# Patient Record
Sex: FEMALE | Race: BLACK OR AFRICAN AMERICAN | NOT HISPANIC OR LATINO
[De-identification: names, ages, dates, MRNs, and addresses within clinical notes are randomized per-mention and may not be internally consistent; named-entity substitution may affect disease eponyms.]

---

## 2018-07-18 ENCOUNTER — APPOINTMENT (OUTPATIENT)
Dept: PULMONOLOGY | Facility: CLINIC | Age: 63
End: 2018-07-18
Payer: COMMERCIAL

## 2018-07-18 VITALS
BODY MASS INDEX: 48.82 KG/M2 | OXYGEN SATURATION: 94 % | HEIGHT: 65 IN | TEMPERATURE: 97.8 F | DIASTOLIC BLOOD PRESSURE: 70 MMHG | HEART RATE: 112 BPM | SYSTOLIC BLOOD PRESSURE: 120 MMHG | RESPIRATION RATE: 12 BRPM | WEIGHT: 293 LBS

## 2018-07-18 DIAGNOSIS — Z80.1 FAMILY HISTORY OF MALIGNANT NEOPLASM OF TRACHEA, BRONCHUS AND LUNG: ICD-10-CM

## 2018-07-18 DIAGNOSIS — Z78.9 OTHER SPECIFIED HEALTH STATUS: ICD-10-CM

## 2018-07-18 PROCEDURE — 94060 EVALUATION OF WHEEZING: CPT

## 2018-07-18 PROCEDURE — 99213 OFFICE O/P EST LOW 20 MIN: CPT | Mod: 25

## 2018-07-18 RX ORDER — BRIMONIDINE TARTRATE 1 MG/ML
0.1 SOLUTION/ DROPS OPHTHALMIC
Refills: 0 | Status: ACTIVE | COMMUNITY

## 2018-07-18 RX ORDER — LATANOPROST/PF 0.005 %
0.01 DROPS OPHTHALMIC (EYE)
Refills: 0 | Status: ACTIVE | COMMUNITY

## 2018-07-18 RX ORDER — DORZOLAMIDE HYDROCHLORIDE 20 MG/ML
2 SOLUTION OPHTHALMIC
Refills: 0 | Status: ACTIVE | COMMUNITY

## 2018-08-08 ENCOUNTER — RECORD ABSTRACTING (OUTPATIENT)
Age: 63
End: 2018-08-08

## 2018-09-14 ENCOUNTER — APPOINTMENT (OUTPATIENT)
Dept: PULMONOLOGY | Facility: CLINIC | Age: 63
End: 2018-09-14

## 2018-11-03 ENCOUNTER — RECORD ABSTRACTING (OUTPATIENT)
Age: 63
End: 2018-11-03

## 2018-11-03 DIAGNOSIS — E78.3 HYPERCHYLOMICRONEMIA: ICD-10-CM

## 2018-11-07 ENCOUNTER — APPOINTMENT (OUTPATIENT)
Dept: PULMONOLOGY | Facility: CLINIC | Age: 63
End: 2018-11-07
Payer: COMMERCIAL

## 2018-11-07 VITALS
BODY MASS INDEX: 48.82 KG/M2 | SYSTOLIC BLOOD PRESSURE: 140 MMHG | HEIGHT: 65 IN | DIASTOLIC BLOOD PRESSURE: 81 MMHG | WEIGHT: 293 LBS | TEMPERATURE: 98.7 F | OXYGEN SATURATION: 95 % | HEART RATE: 95 BPM

## 2018-11-07 PROCEDURE — 95012 NITRIC OXIDE EXP GAS DETER: CPT

## 2018-11-07 PROCEDURE — 94060 EVALUATION OF WHEEZING: CPT

## 2018-11-07 PROCEDURE — 99213 OFFICE O/P EST LOW 20 MIN: CPT | Mod: 25

## 2018-11-07 PROCEDURE — 71046 X-RAY EXAM CHEST 2 VIEWS: CPT

## 2018-11-07 PROCEDURE — 94727 GAS DIL/WSHOT DETER LNG VOL: CPT

## 2018-11-07 PROCEDURE — 94729 DIFFUSING CAPACITY: CPT

## 2018-11-07 RX ORDER — PREDNISONE 10 MG/1
10 TABLET ORAL
Refills: 0 | Status: DISCONTINUED | COMMUNITY
End: 2018-11-07

## 2018-11-09 LAB — ACE BLD-CCNC: 58 U/L

## 2019-01-05 ENCOUNTER — RECORD ABSTRACTING (OUTPATIENT)
Age: 64
End: 2019-01-05

## 2019-01-09 ENCOUNTER — APPOINTMENT (OUTPATIENT)
Dept: PULMONOLOGY | Facility: CLINIC | Age: 64
End: 2019-01-09
Payer: COMMERCIAL

## 2019-01-09 VITALS — DIASTOLIC BLOOD PRESSURE: 87 MMHG | OXYGEN SATURATION: 95 % | SYSTOLIC BLOOD PRESSURE: 160 MMHG | HEART RATE: 113 BPM

## 2019-01-09 PROCEDURE — 94060 EVALUATION OF WHEEZING: CPT

## 2019-01-09 PROCEDURE — 99213 OFFICE O/P EST LOW 20 MIN: CPT | Mod: 25

## 2019-01-09 NOTE — DISCUSSION/SUMMARY
[FreeTextEntry1] : Asthma\par  sarcoidosis\par \par Check ACE- normal\par  Rx updated\par  office f/u\par \par CXR Nov 2019\par  DEXA 4/2020\par  f/u ECHO/EKG at f/u

## 2019-01-09 NOTE — HISTORY OF PRESENT ILLNESS
[Difficulty Breathing During Exertion] : stable dyspnea on exertion [Feelings Of Weakness On Exertion] : stable exercise intolerance [Cough] : stable coughing [Wheezing] : denies wheezing [Regional Soft Tissue Swelling Both Lower Extremities] : denies lower extremity edema [Chest Pain Or Discomfort] : denies chest pain [Fever] : denies fever [Wt Gain ___ Lbs] : no recent weight gain [Wt Loss ___ Lbs] : no recent weight loss

## 2019-01-09 NOTE — PHYSICAL EXAM
[General Appearance - Well Developed] : well developed [Normal Appearance] : normal appearance [Well Groomed] : well groomed [General Appearance - Well Nourished] : well nourished [No Deformities] : no deformities [General Appearance - In No Acute Distress] : no acute distress [Normal Conjunctiva] : the conjunctiva exhibited no abnormalities [Eyelids - No Xanthelasma] : the eyelids demonstrated no xanthelasmas [Normal Oropharynx] : normal oropharynx [Neck Appearance] : the appearance of the neck was normal [Neck Cervical Mass (___cm)] : no neck mass was observed [Jugular Venous Distention Increased] : there was no jugular-venous distention [Thyroid Diffuse Enlargement] : the thyroid was not enlarged [Thyroid Nodule] : there were no palpable thyroid nodules [Heart Rate And Rhythm] : heart rate and rhythm were normal [Heart Sounds] : normal S1 and S2 [Murmurs] : no murmurs present [Arterial Pulses Normal] : the arterial pulses were normal [Edema] : no peripheral edema present [Veins - Varicosity Changes] : no varicosital changes were noted in the lower extremities [Respiration, Rhythm And Depth] : normal respiratory rhythm and effort [Exaggerated Use Of Accessory Muscles For Inspiration] : no accessory muscle use [Auscultation Breath Sounds / Voice Sounds] : lungs were clear to auscultation bilaterally [Chest Palpation] : palpation of the chest revealed no abnormalities [Lungs Percussion] : the lungs were normal to percussion [Bowel Sounds] : normal bowel sounds [Abdomen Soft] : soft [Abdomen Tenderness] : non-tender [Abdomen Mass (___ Cm)] : no abdominal mass palpated [Abnormal Walk] : normal gait [Gait - Sufficient For Exercise Testing] : the gait was sufficient for exercise testing [Nail Clubbing] : no clubbing of the fingernails [Cyanosis, Localized] : no localized cyanosis [Petechial Hemorrhages (___cm)] : no petechial hemorrhages [Skin Color & Pigmentation] : normal skin color and pigmentation [] : no rash [No Venous Stasis] : no venous stasis [Skin Lesions] : no skin lesions [No Skin Ulcers] : no skin ulcer [No Xanthoma] : no  xanthoma was observed [Deep Tendon Reflexes (DTR)] : deep tendon reflexes were 2+ and symmetric [Sensation] : the sensory exam was normal to light touch and pinprick [No Focal Deficits] : no focal deficits

## 2019-01-09 NOTE — PROCEDURE
[FreeTextEntry1] : CXR\par PA lateral projection November 7, 2018\par Cardiac size grossly enlarged suggesting enlargement of the right\par Underpenetrated due to patient size\par No parenchymal infiltrates pleural effusions dominant pulmonary nodules pneumothorax\par Compared to August 23, 2017 no significant interval change noted\par PFT Nov 7 2018\par Moderate obstructive ventilatory impairment with FEV1 61% of predicted\par Air trapping\par Moderate to severe reduction diffusion 50% of predicted with a loss of functioning alveolar capillary units\par Hemoglobin 15.0\par FENO 8 ppb Nov 7 2018\par \par Spirometry January 9, 2019\par Moderate reduction in flow rates with an obstructive ventilatory impairment\par No response to bronchodilator at the FEV1\par Stable flow rates\par \par Unable to perform exhaled nitric oxide at today's visit

## 2019-03-13 ENCOUNTER — APPOINTMENT (OUTPATIENT)
Dept: PULMONOLOGY | Facility: CLINIC | Age: 64
End: 2019-03-13

## 2019-04-03 ENCOUNTER — APPOINTMENT (OUTPATIENT)
Dept: PULMONOLOGY | Facility: CLINIC | Age: 64
End: 2019-04-03
Payer: COMMERCIAL

## 2019-04-03 VITALS
HEIGHT: 65 IN | WEIGHT: 293 LBS | BODY MASS INDEX: 48.82 KG/M2 | TEMPERATURE: 98.5 F | SYSTOLIC BLOOD PRESSURE: 147 MMHG | HEART RATE: 96 BPM | DIASTOLIC BLOOD PRESSURE: 85 MMHG | OXYGEN SATURATION: 95 % | RESPIRATION RATE: 16 BRPM

## 2019-04-03 PROCEDURE — 95012 NITRIC OXIDE EXP GAS DETER: CPT

## 2019-04-03 PROCEDURE — 94060 EVALUATION OF WHEEZING: CPT

## 2019-04-03 PROCEDURE — 99213 OFFICE O/P EST LOW 20 MIN: CPT | Mod: 25

## 2019-04-03 RX ORDER — LEVOFLOXACIN 500 MG/1
500 TABLET, FILM COATED ORAL
Qty: 7 | Refills: 0 | Status: DISCONTINUED | COMMUNITY
Start: 2019-01-11

## 2019-04-03 RX ORDER — AMLODIPINE BESYLATE 5 MG/1
5 TABLET ORAL
Qty: 90 | Refills: 0 | Status: ACTIVE | COMMUNITY
Start: 2019-01-09

## 2019-04-03 RX ORDER — ERGOCALCIFEROL 1.25 MG/1
1.25 MG CAPSULE, LIQUID FILLED ORAL
Qty: 12 | Refills: 0 | Status: ACTIVE | COMMUNITY
Start: 2019-01-28

## 2019-04-03 RX ORDER — METHYLPREDNISOLONE 4 MG/1
4 TABLET ORAL
Qty: 21 | Refills: 0 | Status: DISCONTINUED | COMMUNITY
Start: 2019-01-11

## 2019-04-03 RX ORDER — BRIMONIDINE TARTRATE 2 MG/MG
0.2 SOLUTION/ DROPS OPHTHALMIC
Qty: 5 | Refills: 0 | Status: ACTIVE | COMMUNITY
Start: 2018-12-10

## 2019-04-03 NOTE — PROCEDURE
[FreeTextEntry1] : CXR\par PA lateral projection November 7, 2018\par Cardiac size grossly enlarged suggesting enlargement of the right\par Underpenetrated due to patient size\par No parenchymal infiltrates pleural effusions dominant pulmonary nodules pneumothorax\par Compared to August 23, 2017 no significant interval change noted\par PFT Nov 7 2018\par Moderate obstructive ventilatory impairment with FEV1 61% of predicted\par Air trapping\par Moderate to severe reduction diffusion 50% of predicted with a loss of functioning alveolar capillary units\par Hemoglobin 15.0\par \par \par Spirometry 4/3/2019\par Moderate reduction in flow rates with an obstructive ventilatory impairment\par No response to bronchodilator at the FEV1\par Stable flow rates\par \par FENO 9 ppb

## 2019-04-10 ENCOUNTER — RX RENEWAL (OUTPATIENT)
Age: 64
End: 2019-04-10

## 2019-05-14 ENCOUNTER — RX RENEWAL (OUTPATIENT)
Age: 64
End: 2019-05-14

## 2019-05-15 ENCOUNTER — APPOINTMENT (OUTPATIENT)
Dept: PULMONOLOGY | Facility: CLINIC | Age: 64
End: 2019-05-15
Payer: COMMERCIAL

## 2019-05-15 ENCOUNTER — NON-APPOINTMENT (OUTPATIENT)
Age: 64
End: 2019-05-15

## 2019-05-15 VITALS
OXYGEN SATURATION: 94 % | HEART RATE: 111 BPM | RESPIRATION RATE: 16 BRPM | SYSTOLIC BLOOD PRESSURE: 155 MMHG | DIASTOLIC BLOOD PRESSURE: 77 MMHG | TEMPERATURE: 99.6 F

## 2019-05-15 LAB — POCT - HEMOGLOBIN (HGB), QUANTITATIVE, TRANSCUTANEOUS: 13.1

## 2019-05-15 PROCEDURE — 88738 HGB QUANT TRANSCUTANEOUS: CPT

## 2019-05-15 PROCEDURE — 99214 OFFICE O/P EST MOD 30 MIN: CPT | Mod: 25

## 2019-05-15 PROCEDURE — 95012 NITRIC OXIDE EXP GAS DETER: CPT

## 2019-05-15 PROCEDURE — 93000 ELECTROCARDIOGRAM COMPLETE: CPT

## 2019-05-15 PROCEDURE — 94727 GAS DIL/WSHOT DETER LNG VOL: CPT

## 2019-05-15 PROCEDURE — 94060 EVALUATION OF WHEEZING: CPT

## 2019-05-15 PROCEDURE — 94729 DIFFUSING CAPACITY: CPT

## 2019-05-15 NOTE — PROCEDURE
[FreeTextEntry1] : CXR\par PA lateral projection November 7, 2018\par Cardiac size grossly enlarged suggesting enlargement of the right\par Underpenetrated due to patient size\par No parenchymal infiltrates pleural effusions dominant pulmonary nodules pneumothorax\par Compared to August 23, 2017 no significant interval change noted\par \par PFT  May 15 2019\par Moderate obstructive ventilatory impairment with FEV1 61% of predicted\par Air trapping\par Moderate to severe reduction diffusion 50% of predicted with a loss of functioning alveolar capillary units\par Hemoglobin  13.5\par \par FENO 11 ppb

## 2019-05-15 NOTE — HISTORY OF PRESENT ILLNESS
[Difficulty Breathing During Exertion] : stable dyspnea on exertion [Feelings Of Weakness On Exertion] : stable exercise intolerance [Wheezing] : denies wheezing [Cough] : stable coughing [Regional Soft Tissue Swelling Both Lower Extremities] : denies lower extremity edema [Chest Pain Or Discomfort] : denies chest pain [Fever] : denies fever [Wt Loss ___ Lbs] : no recent weight loss [Wt Gain ___ Lbs] : no recent weight gain

## 2019-05-15 NOTE — PHYSICAL EXAM
[Normal Appearance] : normal appearance [General Appearance - Well Developed] : well developed [General Appearance - Well Nourished] : well nourished [No Deformities] : no deformities [Well Groomed] : well groomed [General Appearance - In No Acute Distress] : no acute distress [Normal Conjunctiva] : the conjunctiva exhibited no abnormalities [Normal Oropharynx] : normal oropharynx [Eyelids - No Xanthelasma] : the eyelids demonstrated no xanthelasmas [Neck Cervical Mass (___cm)] : no neck mass was observed [Jugular Venous Distention Increased] : there was no jugular-venous distention [Neck Appearance] : the appearance of the neck was normal [Thyroid Diffuse Enlargement] : the thyroid was not enlarged [Thyroid Nodule] : there were no palpable thyroid nodules [Heart Rate And Rhythm] : heart rate and rhythm were normal [Heart Sounds] : normal S1 and S2 [Murmurs] : no murmurs present [Arterial Pulses Normal] : the arterial pulses were normal [Veins - Varicosity Changes] : no varicosital changes were noted in the lower extremities [Edema] : no peripheral edema present [Exaggerated Use Of Accessory Muscles For Inspiration] : no accessory muscle use [Respiration, Rhythm And Depth] : normal respiratory rhythm and effort [Chest Palpation] : palpation of the chest revealed no abnormalities [Auscultation Breath Sounds / Voice Sounds] : lungs were clear to auscultation bilaterally [Lungs Percussion] : the lungs were normal to percussion [Abdomen Tenderness] : non-tender [Bowel Sounds] : normal bowel sounds [Abdomen Soft] : soft [Abdomen Mass (___ Cm)] : no abdominal mass palpated [Gait - Sufficient For Exercise Testing] : the gait was sufficient for exercise testing [Abnormal Walk] : normal gait [Nail Clubbing] : no clubbing of the fingernails [Cyanosis, Localized] : no localized cyanosis [Petechial Hemorrhages (___cm)] : no petechial hemorrhages [Skin Color & Pigmentation] : normal skin color and pigmentation [] : no rash [No Venous Stasis] : no venous stasis [No Xanthoma] : no  xanthoma was observed [Skin Lesions] : no skin lesions [No Skin Ulcers] : no skin ulcer [Deep Tendon Reflexes (DTR)] : deep tendon reflexes were 2+ and symmetric [Sensation] : the sensory exam was normal to light touch and pinprick [No Focal Deficits] : no focal deficits

## 2019-05-15 NOTE — DISCUSSION/SUMMARY
[FreeTextEntry1] : Asthma\par  sarcoidosis\par Sinus tachycardia\par Check ACE- normal\par  Rx updated\par Cardiology consultation with Dr. [facility] with echocardiogram and further management based on their expertise\par CXR Nov 2019\par  DEXA 4/2020\par  f/u ECHO/EKG at f/u

## 2019-05-22 ENCOUNTER — NON-APPOINTMENT (OUTPATIENT)
Age: 64
End: 2019-05-22

## 2019-05-22 ENCOUNTER — APPOINTMENT (OUTPATIENT)
Dept: CARDIOLOGY | Facility: CLINIC | Age: 64
End: 2019-05-22
Payer: COMMERCIAL

## 2019-05-22 VITALS
HEIGHT: 65 IN | SYSTOLIC BLOOD PRESSURE: 160 MMHG | HEART RATE: 95 BPM | OXYGEN SATURATION: 99 % | DIASTOLIC BLOOD PRESSURE: 90 MMHG | TEMPERATURE: 98.3 F | BODY MASS INDEX: 48.82 KG/M2 | WEIGHT: 293 LBS

## 2019-05-22 DIAGNOSIS — M54.9 DORSALGIA, UNSPECIFIED: ICD-10-CM

## 2019-05-22 PROCEDURE — 93306 TTE W/DOPPLER COMPLETE: CPT

## 2019-05-22 PROCEDURE — 93000 ELECTROCARDIOGRAM COMPLETE: CPT | Mod: 59

## 2019-05-22 PROCEDURE — 93224 XTRNL ECG REC UP TO 48 HRS: CPT

## 2019-05-22 PROCEDURE — 99204 OFFICE O/P NEW MOD 45 MIN: CPT

## 2019-05-22 NOTE — HISTORY OF PRESENT ILLNESS
[FreeTextEntry1] : pt presents for cardiac eval pt with exertional dyspnea pt with sarcoidosis sees dr johnson pt with asthma .pt woith exertional dyspnea on ocsassion .pt s/p recent ekg sinus tach vs atrial arrythmia pt denies any chest  pain dizziness ,lightheadedness ,nausea vomiting diaphoresis\par

## 2019-05-22 NOTE — PHYSICAL EXAM
[Normal Conjunctiva] : the conjunctiva exhibited no abnormalities [Eyelids - No Xanthelasma] : the eyelids demonstrated no xanthelasmas [Normal Oral Mucosa] : normal oral mucosa [No Oral Pallor] : no oral pallor [No Oral Cyanosis] : no oral cyanosis [Normal Jugular Venous A Waves Present] : normal jugular venous A waves present [Normal Jugular Venous V Waves Present] : normal jugular venous V waves present [No Jugular Venous Khan A Waves] : no jugular venous khan A waves [Heart Rate And Rhythm] : heart rate and rhythm were normal [Heart Sounds] : normal S1 and S2 [Murmurs] : no murmurs present [Respiration, Rhythm And Depth] : normal respiratory rhythm and effort [Exaggerated Use Of Accessory Muscles For Inspiration] : no accessory muscle use [Auscultation Breath Sounds / Voice Sounds] : lungs were clear to auscultation bilaterally [Abdomen Soft] : soft [Abdomen Tenderness] : non-tender [Abdomen Mass (___ Cm)] : no abdominal mass palpated [Abnormal Walk] : normal gait [Gait - Sufficient For Exercise Testing] : the gait was sufficient for exercise testing [Nail Clubbing] : no clubbing of the fingernails [Cyanosis, Localized] : no localized cyanosis [Petechial Hemorrhages (___cm)] : no petechial hemorrhages [Skin Color & Pigmentation] : normal skin color and pigmentation [] : no rash [No Venous Stasis] : no venous stasis [Skin Lesions] : no skin lesions [No Skin Ulcers] : no skin ulcer [No Xanthoma] : no  xanthoma was observed [Oriented To Time, Place, And Person] : oriented to person, place, and time [Affect] : the affect was normal [Mood] : the mood was normal [No Anxiety] : not feeling anxious [FreeTextEntry1] : obesity

## 2019-05-23 ENCOUNTER — APPOINTMENT (OUTPATIENT)
Dept: CARDIOLOGY | Facility: CLINIC | Age: 64
End: 2019-05-23
Payer: COMMERCIAL

## 2019-05-23 PROCEDURE — 78452 HT MUSCLE IMAGE SPECT MULT: CPT

## 2019-05-23 PROCEDURE — 93015 CV STRESS TEST SUPVJ I&R: CPT

## 2019-05-23 PROCEDURE — A9500: CPT

## 2019-05-24 ENCOUNTER — OUTPATIENT (OUTPATIENT)
Dept: OUTPATIENT SERVICES | Facility: HOSPITAL | Age: 64
LOS: 1 days | End: 2019-05-24
Payer: COMMERCIAL

## 2019-05-24 VITALS
HEART RATE: 90 BPM | SYSTOLIC BLOOD PRESSURE: 145 MMHG | OXYGEN SATURATION: 98 % | TEMPERATURE: 99 F | DIASTOLIC BLOOD PRESSURE: 84 MMHG | HEIGHT: 65 IN | RESPIRATION RATE: 18 BRPM | WEIGHT: 293 LBS

## 2019-05-24 DIAGNOSIS — R06.02 SHORTNESS OF BREATH: ICD-10-CM

## 2019-05-24 DIAGNOSIS — R94.39 ABNORMAL RESULT OF OTHER CARDIOVASCULAR FUNCTION STUDY: ICD-10-CM

## 2019-05-24 DIAGNOSIS — Z90.710 ACQUIRED ABSENCE OF BOTH CERVIX AND UTERUS: Chronic | ICD-10-CM

## 2019-05-24 LAB
ALBUMIN SERPL ELPH-MCNC: 4.3 G/DL — SIGNIFICANT CHANGE UP (ref 3.3–5)
ALP SERPL-CCNC: 100 U/L — SIGNIFICANT CHANGE UP (ref 40–120)
ALT FLD-CCNC: 20 U/L — SIGNIFICANT CHANGE UP (ref 10–45)
ANION GAP SERPL CALC-SCNC: 12 MMOL/L — SIGNIFICANT CHANGE UP (ref 5–17)
AST SERPL-CCNC: 18 U/L — SIGNIFICANT CHANGE UP (ref 10–40)
BILIRUB SERPL-MCNC: 0.6 MG/DL — SIGNIFICANT CHANGE UP (ref 0.2–1.2)
BUN SERPL-MCNC: 11 MG/DL — SIGNIFICANT CHANGE UP (ref 7–23)
CALCIUM SERPL-MCNC: 9.9 MG/DL — SIGNIFICANT CHANGE UP (ref 8.4–10.5)
CHLORIDE SERPL-SCNC: 105 MMOL/L — SIGNIFICANT CHANGE UP (ref 96–108)
CO2 SERPL-SCNC: 27 MMOL/L — SIGNIFICANT CHANGE UP (ref 22–31)
CREAT SERPL-MCNC: 0.82 MG/DL — SIGNIFICANT CHANGE UP (ref 0.5–1.3)
GLUCOSE SERPL-MCNC: 88 MG/DL — SIGNIFICANT CHANGE UP (ref 70–99)
HCT VFR BLD CALC: 42.5 % — SIGNIFICANT CHANGE UP (ref 34.5–45)
HGB BLD-MCNC: 14.5 G/DL — SIGNIFICANT CHANGE UP (ref 11.5–15.5)
MCHC RBC-ENTMCNC: 27.9 PG — SIGNIFICANT CHANGE UP (ref 27–34)
MCHC RBC-ENTMCNC: 34.2 GM/DL — SIGNIFICANT CHANGE UP (ref 32–36)
MCV RBC AUTO: 81.7 FL — SIGNIFICANT CHANGE UP (ref 80–100)
PLATELET # BLD AUTO: 264 K/UL — SIGNIFICANT CHANGE UP (ref 150–400)
POTASSIUM SERPL-MCNC: 4.4 MMOL/L — SIGNIFICANT CHANGE UP (ref 3.5–5.3)
POTASSIUM SERPL-SCNC: 4.4 MMOL/L — SIGNIFICANT CHANGE UP (ref 3.5–5.3)
PROT SERPL-MCNC: 7.9 G/DL — SIGNIFICANT CHANGE UP (ref 6–8.3)
RBC # BLD: 5.2 M/UL — SIGNIFICANT CHANGE UP (ref 3.8–5.2)
RBC # FLD: 14.9 % — HIGH (ref 10.3–14.5)
SODIUM SERPL-SCNC: 144 MMOL/L — SIGNIFICANT CHANGE UP (ref 135–145)
WBC # BLD: 6.8 K/UL — SIGNIFICANT CHANGE UP (ref 3.8–10.5)
WBC # FLD AUTO: 6.8 K/UL — SIGNIFICANT CHANGE UP (ref 3.8–10.5)

## 2019-05-24 PROCEDURE — C1817: CPT

## 2019-05-24 PROCEDURE — C1894: CPT

## 2019-05-24 PROCEDURE — C1769: CPT

## 2019-05-24 PROCEDURE — 93005 ELECTROCARDIOGRAM TRACING: CPT

## 2019-05-24 PROCEDURE — 93010 ELECTROCARDIOGRAM REPORT: CPT

## 2019-05-24 PROCEDURE — 99204 OFFICE O/P NEW MOD 45 MIN: CPT

## 2019-05-24 PROCEDURE — C1887: CPT

## 2019-05-24 PROCEDURE — 99152 MOD SED SAME PHYS/QHP 5/>YRS: CPT | Mod: GC

## 2019-05-24 PROCEDURE — 85027 COMPLETE CBC AUTOMATED: CPT

## 2019-05-24 PROCEDURE — 80053 COMPREHEN METABOLIC PANEL: CPT

## 2019-05-24 PROCEDURE — 93456 R HRT CORONARY ARTERY ANGIO: CPT | Mod: 26,GC

## 2019-05-24 PROCEDURE — 99152 MOD SED SAME PHYS/QHP 5/>YRS: CPT

## 2019-05-24 PROCEDURE — 93456 R HRT CORONARY ARTERY ANGIO: CPT

## 2019-05-24 NOTE — H&P CARDIOLOGY - PMH
Asthma, unspecified asthma severity, unspecified whether complicated, unspecified whether persistent    Essential hypertension    Glaucoma of both eyes, unspecified glaucoma type    Morbid obesity with BMI of 50.0-59.9, adult    Sarcoidosis

## 2019-05-24 NOTE — H&P CARDIOLOGY - HISTORY OF PRESENT ILLNESS
64 y/o  female with PMHx of Morbid Obesity (BMI 54.91), Asthma - never intubated, Sarcoidosis (followed by Pulm - Dr. Mccrary), Glaucoma and HTN offering c/o exertional dyspnea, possible anginal equivalent "for quite some time that I always thought was my asthma".  Patient was evaluated by Cardiology - Dr. Day and had TTE on 5/22 - no report available and Nuclear Stress Test on 5/23/2019 with abnormal findings - final report not available.  Patient is now for R/L HC for which she presents today.

## 2019-05-26 LAB
ALBUMIN SERPL ELPH-MCNC: 4.3 G/DL
ALP BLD-CCNC: 94 U/L
ALT SERPL-CCNC: 22 U/L
ANION GAP SERPL CALC-SCNC: 13 MMOL/L
AST SERPL-CCNC: 16 U/L
BASOPHILS # BLD AUTO: 0.05 K/UL
BASOPHILS NFR BLD AUTO: 0.8 %
BILIRUB SERPL-MCNC: 0.5 MG/DL
BUN SERPL-MCNC: 16 MG/DL
CALCIUM SERPL-MCNC: 9.5 MG/DL
CHLORIDE SERPL-SCNC: 104 MMOL/L
CO2 SERPL-SCNC: 26 MMOL/L
CREAT SERPL-MCNC: 0.77 MG/DL
EOSINOPHIL # BLD AUTO: 0.15 K/UL
EOSINOPHIL NFR BLD AUTO: 2.5 %
ESTIMATED AVERAGE GLUCOSE: 120 MG/DL
GLUCOSE SERPL-MCNC: 99 MG/DL
HBA1C MFR BLD HPLC: 5.8 %
HCT VFR BLD CALC: 41.3 %
HGB BLD-MCNC: 13.8 G/DL
IMM GRANULOCYTES NFR BLD AUTO: 0.2 %
LYMPHOCYTES # BLD AUTO: 1.79 K/UL
LYMPHOCYTES NFR BLD AUTO: 29.5 %
MAN DIFF?: NORMAL
MCHC RBC-ENTMCNC: 27.1 PG
MCHC RBC-ENTMCNC: 33.4 GM/DL
MCV RBC AUTO: 81 FL
MONOCYTES # BLD AUTO: 0.57 K/UL
MONOCYTES NFR BLD AUTO: 9.4 %
NEUTROPHILS # BLD AUTO: 3.5 K/UL
NEUTROPHILS NFR BLD AUTO: 57.6 %
PLATELET # BLD AUTO: 288 K/UL
POTASSIUM SERPL-SCNC: 4 MMOL/L
PROT SERPL-MCNC: 7.2 G/DL
RBC # BLD: 5.1 M/UL
RBC # FLD: 16.5 %
SODIUM SERPL-SCNC: 143 MMOL/L
T4 FREE SERPL-MCNC: 1.3 NG/DL
TSH SERPL-ACNC: 1.53 UIU/ML
WBC # FLD AUTO: 6.07 K/UL

## 2019-05-28 ENCOUNTER — MEDICATION RENEWAL (OUTPATIENT)
Age: 64
End: 2019-05-28

## 2019-05-28 ENCOUNTER — OTHER (OUTPATIENT)
Age: 64
End: 2019-05-28

## 2019-05-28 PROBLEM — H40.9 UNSPECIFIED GLAUCOMA: Chronic | Status: ACTIVE | Noted: 2019-05-24

## 2019-05-28 PROBLEM — I10 ESSENTIAL (PRIMARY) HYPERTENSION: Chronic | Status: ACTIVE | Noted: 2019-05-24

## 2019-05-28 PROBLEM — D86.9 SARCOIDOSIS, UNSPECIFIED: Chronic | Status: ACTIVE | Noted: 2019-05-24

## 2019-05-28 PROBLEM — E66.01 MORBID (SEVERE) OBESITY DUE TO EXCESS CALORIES: Chronic | Status: ACTIVE | Noted: 2019-05-24

## 2019-05-28 PROBLEM — J45.909 UNSPECIFIED ASTHMA, UNCOMPLICATED: Chronic | Status: ACTIVE | Noted: 2019-05-24

## 2019-06-03 ENCOUNTER — NON-APPOINTMENT (OUTPATIENT)
Age: 64
End: 2019-06-03

## 2019-06-04 ENCOUNTER — APPOINTMENT (OUTPATIENT)
Dept: CARDIOLOGY | Facility: CLINIC | Age: 64
End: 2019-06-04
Payer: COMMERCIAL

## 2019-06-04 VITALS
WEIGHT: 293 LBS | OXYGEN SATURATION: 98 % | HEART RATE: 96 BPM | BODY MASS INDEX: 50.59 KG/M2 | TEMPERATURE: 98.1 F | DIASTOLIC BLOOD PRESSURE: 96 MMHG | SYSTOLIC BLOOD PRESSURE: 158 MMHG

## 2019-06-04 DIAGNOSIS — I34.0 NONRHEUMATIC MITRAL (VALVE) INSUFFICIENCY: ICD-10-CM

## 2019-06-04 PROCEDURE — 99213 OFFICE O/P EST LOW 20 MIN: CPT

## 2019-06-04 NOTE — PHYSICAL EXAM
[No Acute Distress] : no acute distress [Well Nourished] : well nourished [Normal Sclera/Conjunctiva] : normal sclera/conjunctiva [Well Developed] : well developed [Well-Appearing] : well-appearing [EOMI] : extraocular movements intact [PERRL] : pupils equal round and reactive to light [Normal Oropharynx] : the oropharynx was normal [Normal Outer Ear/Nose] : the outer ears and nose were normal in appearance [No JVD] : no jugular venous distention [Supple] : supple [No Lymphadenopathy] : no lymphadenopathy [Thyroid Normal, No Nodules] : the thyroid was normal and there were no nodules present [Clear to Auscultation] : lungs were clear to auscultation bilaterally [No Accessory Muscle Use] : no accessory muscle use [No Respiratory Distress] : no respiratory distress  [Normal Rate] : normal rate  [Regular Rhythm] : with a regular rhythm [Normal S1, S2] : normal S1 and S2 [No Murmur] : no murmur heard [No Carotid Bruits] : no carotid bruits [No Abdominal Bruit] : a ~M bruit was not heard ~T in the abdomen [No Varicosities] : no varicosities [Pedal Pulses Present] : the pedal pulses are present [No Edema] : there was no peripheral edema [No Extremity Clubbing/Cyanosis] : no extremity clubbing/cyanosis [No Palpable Aorta] : no palpable aorta [Soft] : abdomen soft [Non-distended] : non-distended [Non Tender] : non-tender [No HSM] : no HSM [Normal Bowel Sounds] : normal bowel sounds [No Masses] : no abdominal mass palpated [Normal Posterior Cervical Nodes] : no posterior cervical lymphadenopathy [No CVA Tenderness] : no CVA  tenderness [Normal Anterior Cervical Nodes] : no anterior cervical lymphadenopathy [No Joint Swelling] : no joint swelling [No Spinal Tenderness] : no spinal tenderness [Grossly Normal Strength/Tone] : grossly normal strength/tone [No Rash] : no rash [Normal Gait] : normal gait [Coordination Grossly Intact] : coordination grossly intact [No Focal Deficits] : no focal deficits [Deep Tendon Reflexes (DTR)] : deep tendon reflexes were 2+ and symmetric [Normal Affect] : the affect was normal [Normal Insight/Judgement] : insight and judgment were intact [de-identified] : site of cath intact

## 2019-06-04 NOTE — HISTORY OF PRESENT ILLNESS
[FreeTextEntry1] : f/u cv issues  [de-identified] : pt presents for f/u pt s/p f/u s/p cath  pt with diastolic dysfx /pt witth mod mr .pt feels significant improvement on dyazide .pt with nl cors pt denies any chest  pain dizziness ,lightheadedness ,nausea vomiting diaphoresis\par pt s/p holter pvcs

## 2019-06-06 LAB
ANION GAP SERPL CALC-SCNC: 15 MMOL/L
BUN SERPL-MCNC: 25 MG/DL
CALCIUM SERPL-MCNC: 10.2 MG/DL
CHLORIDE SERPL-SCNC: 96 MMOL/L
CO2 SERPL-SCNC: 29 MMOL/L
CREAT SERPL-MCNC: 1.25 MG/DL
GLUCOSE SERPL-MCNC: 105 MG/DL
MAGNESIUM SERPL-MCNC: 2.4 MG/DL
NT-PROBNP SERPL-MCNC: 19 PG/ML
POTASSIUM SERPL-SCNC: 4.6 MMOL/L
SODIUM SERPL-SCNC: 140 MMOL/L

## 2019-06-07 ENCOUNTER — OTHER (OUTPATIENT)
Age: 64
End: 2019-06-07

## 2019-06-19 ENCOUNTER — APPOINTMENT (OUTPATIENT)
Dept: PULMONOLOGY | Facility: CLINIC | Age: 64
End: 2019-06-19
Payer: COMMERCIAL

## 2019-06-19 VITALS — SYSTOLIC BLOOD PRESSURE: 145 MMHG | DIASTOLIC BLOOD PRESSURE: 80 MMHG | OXYGEN SATURATION: 100 % | HEART RATE: 89 BPM

## 2019-06-19 PROCEDURE — 94060 EVALUATION OF WHEEZING: CPT

## 2019-06-19 PROCEDURE — 99213 OFFICE O/P EST LOW 20 MIN: CPT | Mod: 25

## 2019-06-19 PROCEDURE — 95012 NITRIC OXIDE EXP GAS DETER: CPT

## 2019-06-19 NOTE — PROCEDURE
[FreeTextEntry1] : CXR\par PA lateral projection November 7, 2018\par Cardiac size grossly enlarged suggesting enlargement of the right\par Underpenetrated due to patient size\par No parenchymal infiltrates pleural effusions dominant pulmonary nodules pneumothorax\par Compared to August 23, 2017 no significant interval change noted\par \par PFT  May 15 2019\par Moderate obstructive ventilatory impairment with FEV1 61% of predicted\par Air trapping\par Moderate to severe reduction diffusion 50% of predicted with a loss of functioning alveolar capillary units\par Hemoglobin  13.5\par \par Spirometry June 19, 2019\par Mild obstructive ventilatory impairment\par No bronchodilator response at FEV1\par 22% response to bronchodilator at small airways\par Interval improvement of flow rates\par FENO 12 ppb

## 2019-06-19 NOTE — DISCUSSION/SUMMARY
[FreeTextEntry1] : Asthma\par  sarcoidosis\par Mod MR\par  Diastolic Dysfx\par On Dyazide\par \par Check ACE- normal\par  Rx updated\par Note stable with interval improvement of spirometry on beta-blocker\par  office f/u\par \par CXR Nov 2019\par  DEXA 4/2020\par  f/u ECHO/EKG per Dr Day

## 2019-06-19 NOTE — PHYSICAL EXAM
[General Appearance - Well Developed] : well developed [Normal Appearance] : normal appearance [Well Groomed] : well groomed [General Appearance - Well Nourished] : well nourished [No Deformities] : no deformities [General Appearance - In No Acute Distress] : no acute distress [Normal Conjunctiva] : the conjunctiva exhibited no abnormalities [Eyelids - No Xanthelasma] : the eyelids demonstrated no xanthelasmas [Normal Oropharynx] : normal oropharynx [Neck Appearance] : the appearance of the neck was normal [Neck Cervical Mass (___cm)] : no neck mass was observed [Jugular Venous Distention Increased] : there was no jugular-venous distention [Thyroid Diffuse Enlargement] : the thyroid was not enlarged [Thyroid Nodule] : there were no palpable thyroid nodules [Heart Rate And Rhythm] : heart rate and rhythm were normal [Heart Sounds] : normal S1 and S2 [Murmurs] : no murmurs present [Veins - Varicosity Changes] : no varicosital changes were noted in the lower extremities [Arterial Pulses Normal] : the arterial pulses were normal [Edema] : no peripheral edema present [Auscultation Breath Sounds / Voice Sounds] : lungs were clear to auscultation bilaterally [Respiration, Rhythm And Depth] : normal respiratory rhythm and effort [Exaggerated Use Of Accessory Muscles For Inspiration] : no accessory muscle use [Bowel Sounds] : normal bowel sounds [Lungs Percussion] : the lungs were normal to percussion [Chest Palpation] : palpation of the chest revealed no abnormalities [Abdomen Soft] : soft [Abdomen Tenderness] : non-tender [Abdomen Mass (___ Cm)] : no abdominal mass palpated [Abnormal Walk] : normal gait [Gait - Sufficient For Exercise Testing] : the gait was sufficient for exercise testing [Nail Clubbing] : no clubbing of the fingernails [Petechial Hemorrhages (___cm)] : no petechial hemorrhages [Cyanosis, Localized] : no localized cyanosis [Skin Color & Pigmentation] : normal skin color and pigmentation [] : no rash [No Venous Stasis] : no venous stasis [Skin Lesions] : no skin lesions [No Skin Ulcers] : no skin ulcer [No Xanthoma] : no  xanthoma was observed [Deep Tendon Reflexes (DTR)] : deep tendon reflexes were 2+ and symmetric [Sensation] : the sensory exam was normal to light touch and pinprick [No Focal Deficits] : no focal deficits

## 2019-06-19 NOTE — REASON FOR VISIT
[Follow-Up] : a follow-up visit [Asthma] : asthma [Sarcoidosis] : sarcoidosis [Shortness of Breath] : shortness of Breath

## 2019-06-19 NOTE — HISTORY OF PRESENT ILLNESS
[Difficulty Breathing During Exertion] : stable dyspnea on exertion [Feelings Of Weakness On Exertion] : stable exercise intolerance [Cough] : stable coughing [Regional Soft Tissue Swelling Both Lower Extremities] : denies lower extremity edema [Wheezing] : denies wheezing [Chest Pain Or Discomfort] : denies chest pain [Fever] : denies fever [Wt Gain ___ Lbs] : no recent weight gain [FreeTextEntry1] : Cardiology evaluation\par Patient had addition of Dyazide with improvement of shortness of breath\par Mild mitral valve regurgitation\par Hypertension\par Arrhythmia\par With PVCs noted Toprol was added\par This post cardiac cath with diastolic dysfunction moderate mitral regurgitation Holter monitor that demonstrated PVCs [Wt Loss ___ Lbs] : no recent weight loss

## 2019-06-25 ENCOUNTER — APPOINTMENT (OUTPATIENT)
Dept: CARDIOLOGY | Facility: CLINIC | Age: 64
End: 2019-06-25
Payer: COMMERCIAL

## 2019-06-25 ENCOUNTER — NON-APPOINTMENT (OUTPATIENT)
Age: 64
End: 2019-06-25

## 2019-06-25 VITALS
WEIGHT: 293 LBS | TEMPERATURE: 98.5 F | BODY MASS INDEX: 48.82 KG/M2 | SYSTOLIC BLOOD PRESSURE: 146 MMHG | HEIGHT: 65 IN | DIASTOLIC BLOOD PRESSURE: 90 MMHG | HEART RATE: 99 BPM | OXYGEN SATURATION: 97 %

## 2019-06-25 DIAGNOSIS — I49.9 CARDIAC ARRHYTHMIA, UNSPECIFIED: ICD-10-CM

## 2019-06-25 DIAGNOSIS — I10 ESSENTIAL (PRIMARY) HYPERTENSION: ICD-10-CM

## 2019-06-25 PROCEDURE — 93000 ELECTROCARDIOGRAM COMPLETE: CPT

## 2019-06-25 PROCEDURE — 99213 OFFICE O/P EST LOW 20 MIN: CPT

## 2019-06-25 NOTE — HISTORY OF PRESENT ILLNESS
[FreeTextEntry1] : pt presents for f/u saw dr elizabeth woody from pulm prospective pt with no furthur palpitations pt denies any chest  pain dizziness ,lightheadedness ,nausea vomiting diaphoresis\par pt s/p cath moderate mr ml lvfx no obstructive cad

## 2019-06-25 NOTE — PHYSICAL EXAM
[Normal Appearance] : normal appearance [General Appearance - Well Developed] : well developed [No Deformities] : no deformities [General Appearance - Well Nourished] : well nourished [Well Groomed] : well groomed [General Appearance - In No Acute Distress] : no acute distress [Normal Conjunctiva] : the conjunctiva exhibited no abnormalities [Normal Oral Mucosa] : normal oral mucosa [Eyelids - No Xanthelasma] : the eyelids demonstrated no xanthelasmas [No Oral Pallor] : no oral pallor [No Oral Cyanosis] : no oral cyanosis [Normal Jugular Venous V Waves Present] : normal jugular venous V waves present [Normal Jugular Venous A Waves Present] : normal jugular venous A waves present [No Jugular Venous Khan A Waves] : no jugular venous khan A waves [Exaggerated Use Of Accessory Muscles For Inspiration] : no accessory muscle use [Respiration, Rhythm And Depth] : normal respiratory rhythm and effort [Auscultation Breath Sounds / Voice Sounds] : lungs were clear to auscultation bilaterally [Heart Rate And Rhythm] : heart rate and rhythm were normal [Heart Sounds] : normal S1 and S2 [Murmurs] : no murmurs present [Abdomen Soft] : soft [Abdomen Tenderness] : non-tender [Abnormal Walk] : normal gait [Abdomen Mass (___ Cm)] : no abdominal mass palpated [Gait - Sufficient For Exercise Testing] : the gait was sufficient for exercise testing [Nail Clubbing] : no clubbing of the fingernails [Petechial Hemorrhages (___cm)] : no petechial hemorrhages [Cyanosis, Localized] : no localized cyanosis [] : no rash [Skin Color & Pigmentation] : normal skin color and pigmentation [No Venous Stasis] : no venous stasis [Skin Lesions] : no skin lesions [No Skin Ulcers] : no skin ulcer [No Xanthoma] : no  xanthoma was observed [Oriented To Time, Place, And Person] : oriented to person, place, and time [Mood] : the mood was normal [Affect] : the affect was normal [No Anxiety] : not feeling anxious [FreeTextEntry1] : obese

## 2019-07-15 ENCOUNTER — RX RENEWAL (OUTPATIENT)
Age: 64
End: 2019-07-15

## 2019-08-11 ENCOUNTER — RX RENEWAL (OUTPATIENT)
Age: 64
End: 2019-08-11

## 2019-08-11 RX ORDER — METOPROLOL SUCCINATE 50 MG/1
50 TABLET, EXTENDED RELEASE ORAL
Qty: 30 | Refills: 1 | Status: ACTIVE | COMMUNITY
Start: 2019-06-04 | End: 1900-01-01

## 2019-08-12 ENCOUNTER — APPOINTMENT (OUTPATIENT)
Dept: PULMONOLOGY | Facility: CLINIC | Age: 64
End: 2019-08-12

## 2019-08-13 ENCOUNTER — APPOINTMENT (OUTPATIENT)
Dept: PULMONOLOGY | Facility: CLINIC | Age: 64
End: 2019-08-13
Payer: COMMERCIAL

## 2019-08-13 VITALS — SYSTOLIC BLOOD PRESSURE: 130 MMHG | DIASTOLIC BLOOD PRESSURE: 88 MMHG | HEART RATE: 102 BPM | OXYGEN SATURATION: 95 %

## 2019-08-13 PROCEDURE — 95012 NITRIC OXIDE EXP GAS DETER: CPT

## 2019-08-13 PROCEDURE — 94060 EVALUATION OF WHEEZING: CPT

## 2019-08-13 PROCEDURE — 99213 OFFICE O/P EST LOW 20 MIN: CPT | Mod: 25

## 2019-08-13 RX ORDER — MOMETASONE FUROATE AND FORMOTEROL FUMARATE DIHYDRATE 200; 5 UG/1; UG/1
200-5 AEROSOL RESPIRATORY (INHALATION) TWICE DAILY
Qty: 1 | Refills: 3 | Status: DISCONTINUED | COMMUNITY
End: 2019-08-13

## 2019-08-13 NOTE — PROCEDURE
[FreeTextEntry1] : CXR\par PA lateral projection November 7, 2018\par Cardiac size grossly enlarged suggesting enlargement of the right\par Underpenetrated due to patient size\par No parenchymal infiltrates pleural effusions dominant pulmonary nodules pneumothorax\par Compared to August 23, 2017 no significant interval change noted\par \par PFT  May 15 2019\par Moderate obstructive ventilatory impairment with FEV1 61% of predicted\par Air trapping\par Moderate to severe reduction diffusion 50% of predicted with a loss of functioning alveolar capillary units\par Hemoglobin  13.5\par \par Spirometry  August 13 2019\par Mild obstructive ventilatory impairment\par No bronchodilator response at FEV1\par \par FENO 11  ppb

## 2019-08-13 NOTE — PHYSICAL EXAM
[General Appearance - Well Developed] : well developed [Well Groomed] : well groomed [Normal Appearance] : normal appearance [General Appearance - Well Nourished] : well nourished [General Appearance - In No Acute Distress] : no acute distress [Normal Conjunctiva] : the conjunctiva exhibited no abnormalities [No Deformities] : no deformities [Eyelids - No Xanthelasma] : the eyelids demonstrated no xanthelasmas [Normal Oropharynx] : normal oropharynx [Neck Appearance] : the appearance of the neck was normal [Neck Cervical Mass (___cm)] : no neck mass was observed [Thyroid Diffuse Enlargement] : the thyroid was not enlarged [Jugular Venous Distention Increased] : there was no jugular-venous distention [Heart Rate And Rhythm] : heart rate and rhythm were normal [Thyroid Nodule] : there were no palpable thyroid nodules [Murmurs] : no murmurs present [Heart Sounds] : normal S1 and S2 [Arterial Pulses Normal] : the arterial pulses were normal [Edema] : no peripheral edema present [Veins - Varicosity Changes] : no varicosital changes were noted in the lower extremities [Exaggerated Use Of Accessory Muscles For Inspiration] : no accessory muscle use [Respiration, Rhythm And Depth] : normal respiratory rhythm and effort [Auscultation Breath Sounds / Voice Sounds] : lungs were clear to auscultation bilaterally [Lungs Percussion] : the lungs were normal to percussion [Chest Palpation] : palpation of the chest revealed no abnormalities [Bowel Sounds] : normal bowel sounds [Abdomen Soft] : soft [Abdomen Tenderness] : non-tender [Abdomen Mass (___ Cm)] : no abdominal mass palpated [Abnormal Walk] : normal gait [Gait - Sufficient For Exercise Testing] : the gait was sufficient for exercise testing [Nail Clubbing] : no clubbing of the fingernails [Petechial Hemorrhages (___cm)] : no petechial hemorrhages [Cyanosis, Localized] : no localized cyanosis [Skin Color & Pigmentation] : normal skin color and pigmentation [] : no rash [No Venous Stasis] : no venous stasis [Skin Lesions] : no skin lesions [No Skin Ulcers] : no skin ulcer [Deep Tendon Reflexes (DTR)] : deep tendon reflexes were 2+ and symmetric [No Xanthoma] : no  xanthoma was observed [Sensation] : the sensory exam was normal to light touch and pinprick [No Focal Deficits] : no focal deficits

## 2019-08-13 NOTE — HISTORY OF PRESENT ILLNESS
[None] : ~He/She~ has no significant interval events [Difficulty Breathing During Exertion] : stable dyspnea on exertion [Cough] : stable coughing [Feelings Of Weakness On Exertion] : stable exercise intolerance [Wheezing] : denies wheezing [Regional Soft Tissue Swelling Both Lower Extremities] : denies lower extremity edema [Chest Pain Or Discomfort] : denies chest pain [Fever] : denies fever [Never] : was never a smoker [Wt Gain ___ Lbs] : no recent weight gain [Wt Loss ___ Lbs] : no recent weight loss [Oxygen] : the patient uses no supplemental oxygen

## 2019-08-13 NOTE — DISCUSSION/SUMMARY
[FreeTextEntry1] : Asthma\par  sarcoidosis\par Mod MR\par  Diastolic Dysfx\par On Dyazide\par \par Check ACE- normal\par  Rx updated\par  office f/u\par CXR Nov 2019\par  DEXA 4/2020\par  f/u ECHO/EKG per Dr Day

## 2019-09-21 ENCOUNTER — RESULT REVIEW (OUTPATIENT)
Age: 64
End: 2019-09-21

## 2019-09-25 ENCOUNTER — APPOINTMENT (OUTPATIENT)
Dept: PULMONOLOGY | Facility: CLINIC | Age: 64
End: 2019-09-25
Payer: COMMERCIAL

## 2019-09-25 VITALS
TEMPERATURE: 98.5 F | OXYGEN SATURATION: 94 % | SYSTOLIC BLOOD PRESSURE: 139 MMHG | DIASTOLIC BLOOD PRESSURE: 72 MMHG | HEART RATE: 99 BPM

## 2019-09-25 PROCEDURE — 94060 EVALUATION OF WHEEZING: CPT

## 2019-09-25 PROCEDURE — 90662 IIV NO PRSV INCREASED AG IM: CPT

## 2019-09-25 PROCEDURE — G0008: CPT

## 2019-09-25 PROCEDURE — 99214 OFFICE O/P EST MOD 30 MIN: CPT | Mod: 25

## 2019-09-25 RX ORDER — AZITHROMYCIN 250 MG/1
250 TABLET, FILM COATED ORAL
Qty: 6 | Refills: 0 | Status: DISCONTINUED | COMMUNITY
Start: 2019-09-06 | End: 2019-09-25

## 2019-09-25 NOTE — PHYSICAL EXAM
[General Appearance - Well Developed] : well developed [Normal Appearance] : normal appearance [Well Groomed] : well groomed [General Appearance - Well Nourished] : well nourished [General Appearance - In No Acute Distress] : no acute distress [No Deformities] : no deformities [Normal Conjunctiva] : the conjunctiva exhibited no abnormalities [Eyelids - No Xanthelasma] : the eyelids demonstrated no xanthelasmas [Normal Oropharynx] : normal oropharynx [Neck Appearance] : the appearance of the neck was normal [Neck Cervical Mass (___cm)] : no neck mass was observed [Thyroid Diffuse Enlargement] : the thyroid was not enlarged [Jugular Venous Distention Increased] : there was no jugular-venous distention [Thyroid Nodule] : there were no palpable thyroid nodules [Heart Rate And Rhythm] : heart rate and rhythm were normal [Heart Sounds] : normal S1 and S2 [Murmurs] : no murmurs present [Arterial Pulses Normal] : the arterial pulses were normal [Edema] : no peripheral edema present [Veins - Varicosity Changes] : no varicosital changes were noted in the lower extremities [Respiration, Rhythm And Depth] : normal respiratory rhythm and effort [Exaggerated Use Of Accessory Muscles For Inspiration] : no accessory muscle use [Auscultation Breath Sounds / Voice Sounds] : lungs were clear to auscultation bilaterally [Chest Palpation] : palpation of the chest revealed no abnormalities [Lungs Percussion] : the lungs were normal to percussion [Bowel Sounds] : normal bowel sounds [Abdomen Soft] : soft [Abdomen Tenderness] : non-tender [Abdomen Mass (___ Cm)] : no abdominal mass palpated [Abnormal Walk] : normal gait [Gait - Sufficient For Exercise Testing] : the gait was sufficient for exercise testing [Nail Clubbing] : no clubbing of the fingernails [Petechial Hemorrhages (___cm)] : no petechial hemorrhages [Cyanosis, Localized] : no localized cyanosis [] : no rash [Skin Color & Pigmentation] : normal skin color and pigmentation [No Venous Stasis] : no venous stasis [Skin Lesions] : no skin lesions [No Skin Ulcers] : no skin ulcer [No Xanthoma] : no  xanthoma was observed [Deep Tendon Reflexes (DTR)] : deep tendon reflexes were 2+ and symmetric [Sensation] : the sensory exam was normal to light touch and pinprick [No Focal Deficits] : no focal deficits

## 2019-09-25 NOTE — DISCUSSION/SUMMARY
[FreeTextEntry1] : Asthma\par  sarcoidosis\par Mod MR\par  Diastolic Dysfx\par On Dyazide\par \par ACE- normal\par  Rx updated\par  office f/u\par \par CXR Nov 2019\par  DEXA 4/2020\par  f/u ECHO/EKG per Dr Day

## 2019-09-25 NOTE — PROCEDURE
[FreeTextEntry1] : Spirometry September 25, 2019\par Mild obstructive ventilatory impairment\par No response to bronchodilator at FEV1\par Stable flow rates\par \par CXR\par PA lateral projection November 7, 2018\par Cardiac size grossly enlarged suggesting enlargement of the right\par Underpenetrated due to patient size\par No parenchymal infiltrates pleural effusions dominant pulmonary nodules pneumothorax\par Compared to August 23, 2017 no significant interval change noted\par \par PFT  May 15 2019\par Moderate obstructive ventilatory impairment with FEV1 61% of predicted\par Air trapping\par Moderate to severe reduction diffusion 50% of predicted with a loss of functioning alveolar capillary units\par Hemoglobin  13.5\par \par FENO 11  ppb\par \par High-dose flu vaccination administered September 25, 2019

## 2019-09-25 NOTE — HISTORY OF PRESENT ILLNESS
[None] : ~He/She~ has no significant interval events [Feelings Of Weakness On Exertion] : stable exercise intolerance [Difficulty Breathing During Exertion] : stable dyspnea on exertion [Regional Soft Tissue Swelling Both Lower Extremities] : denies lower extremity edema [Cough] : stable coughing [Wheezing] : denies wheezing [Chest Pain Or Discomfort] : denies chest pain [Fever] : denies fever [Never] : was never a smoker [Wt Gain ___ Lbs] : no recent weight gain [Oxygen] : the patient uses no supplemental oxygen [Wt Loss ___ Lbs] : no recent weight loss

## 2019-10-29 ENCOUNTER — APPOINTMENT (OUTPATIENT)
Dept: CARDIOLOGY | Facility: CLINIC | Age: 64
End: 2019-10-29

## 2019-11-14 ENCOUNTER — APPOINTMENT (OUTPATIENT)
Dept: PULMONOLOGY | Facility: CLINIC | Age: 64
End: 2019-11-14
Payer: COMMERCIAL

## 2019-11-14 VITALS
HEIGHT: 65 IN | BODY MASS INDEX: 48.82 KG/M2 | HEART RATE: 101 BPM | OXYGEN SATURATION: 93 % | SYSTOLIC BLOOD PRESSURE: 143 MMHG | TEMPERATURE: 98.2 F | RESPIRATION RATE: 15 BRPM | DIASTOLIC BLOOD PRESSURE: 85 MMHG | WEIGHT: 293 LBS

## 2019-11-14 PROCEDURE — 94060 EVALUATION OF WHEEZING: CPT

## 2019-11-14 PROCEDURE — 94727 GAS DIL/WSHOT DETER LNG VOL: CPT

## 2019-11-14 PROCEDURE — 71046 X-RAY EXAM CHEST 2 VIEWS: CPT

## 2019-11-14 PROCEDURE — 94729 DIFFUSING CAPACITY: CPT

## 2019-11-14 PROCEDURE — 99213 OFFICE O/P EST LOW 20 MIN: CPT | Mod: 25

## 2019-11-14 RX ORDER — PREDNISONE 10 MG/1
10 TABLET ORAL
Qty: 18 | Refills: 0 | Status: DISCONTINUED | COMMUNITY
Start: 2019-09-06 | End: 2019-11-14

## 2019-11-14 NOTE — PROCEDURE
[FreeTextEntry1] : Chest x-ray PA lateral normal cardiac size\par Uncoiled aorta\par No clear parenchymal infiltrates pleural effusions or dominant pulmonary nodules\par No interval change compared to chest x-ray barring difference in technique dating back to November 7, 2018\par \par PFT November 14, 2019\par Mild reduction in flow rates with an obstructive pattern\par Normal total lung capacity 80% predicted\par Mild air trapping RV/TLC ratio 129% predicted.\par Moderate reduction diffusion 56% predicted with a loss of functioning alveolocapillary units\par Heme globin 13.8\par Bronchodilator response at small airways 29%\par \par PFT  May 15 2019\par Moderate obstructive ventilatory impairment with FEV1 61% of predicted\par Air trapping\par Moderate to severe reduction diffusion 50% of predicted with a loss of functioning alveolar capillary units\par Hemoglobin  13.5\par \par High-dose flu vaccination administered September 25, 2019

## 2019-11-14 NOTE — DISCUSSION/SUMMARY
[FreeTextEntry1] : Asthma\par  sarcoidosis\par Sinus  tach 0n BB but  issue  with  wheeze- f/u  cardiology\par Mod MR\par  Diastolic Dysfx\par On Dyazide\par \par Check ACE- normal\par  Rx updated\par  office f/u\par \par CXR Nov 2020\par  DEXA 4/2020\par  f/u ECHO/EKG per Dr Day timing

## 2019-11-14 NOTE — PHYSICAL EXAM
[Normal Appearance] : normal appearance [General Appearance - Well Developed] : well developed [Well Groomed] : well groomed [General Appearance - Well Nourished] : well nourished [No Deformities] : no deformities [General Appearance - In No Acute Distress] : no acute distress [Normal Conjunctiva] : the conjunctiva exhibited no abnormalities [Eyelids - No Xanthelasma] : the eyelids demonstrated no xanthelasmas [Normal Oropharynx] : normal oropharynx [Neck Appearance] : the appearance of the neck was normal [Neck Cervical Mass (___cm)] : no neck mass was observed [Jugular Venous Distention Increased] : there was no jugular-venous distention [Thyroid Diffuse Enlargement] : the thyroid was not enlarged [Thyroid Nodule] : there were no palpable thyroid nodules [Heart Rate And Rhythm] : heart rate and rhythm were normal [Heart Sounds] : normal S1 and S2 [Arterial Pulses Normal] : the arterial pulses were normal [Murmurs] : no murmurs present [Edema] : no peripheral edema present [Veins - Varicosity Changes] : no varicosital changes were noted in the lower extremities [Respiration, Rhythm And Depth] : normal respiratory rhythm and effort [Exaggerated Use Of Accessory Muscles For Inspiration] : no accessory muscle use [Auscultation Breath Sounds / Voice Sounds] : lungs were clear to auscultation bilaterally [Lungs Percussion] : the lungs were normal to percussion [Chest Palpation] : palpation of the chest revealed no abnormalities [Bowel Sounds] : normal bowel sounds [Abdomen Tenderness] : non-tender [Abdomen Soft] : soft [Abdomen Mass (___ Cm)] : no abdominal mass palpated [Abnormal Walk] : normal gait [Gait - Sufficient For Exercise Testing] : the gait was sufficient for exercise testing [Cyanosis, Localized] : no localized cyanosis [Nail Clubbing] : no clubbing of the fingernails [Petechial Hemorrhages (___cm)] : no petechial hemorrhages [Skin Color & Pigmentation] : normal skin color and pigmentation [] : no rash [No Venous Stasis] : no venous stasis [Skin Lesions] : no skin lesions [No Skin Ulcers] : no skin ulcer [No Xanthoma] : no  xanthoma was observed [Deep Tendon Reflexes (DTR)] : deep tendon reflexes were 2+ and symmetric [Sensation] : the sensory exam was normal to light touch and pinprick [No Focal Deficits] : no focal deficits

## 2019-11-14 NOTE — HISTORY OF PRESENT ILLNESS
[None] : ~He/She~ has no significant interval events [Difficulty Breathing During Exertion] : stable dyspnea on exertion [Wheezing] : denies wheezing [Cough] : stable coughing [Feelings Of Weakness On Exertion] : stable exercise intolerance [Fever] : denies fever [Regional Soft Tissue Swelling Both Lower Extremities] : denies lower extremity edema [Chest Pain Or Discomfort] : denies chest pain [Never] : was never a smoker [Wt Gain ___ Lbs] : no recent weight gain [Wt Loss ___ Lbs] : no recent weight loss [Oxygen] : the patient uses no supplemental oxygen

## 2019-12-19 ENCOUNTER — APPOINTMENT (OUTPATIENT)
Dept: PULMONOLOGY | Facility: CLINIC | Age: 64
End: 2019-12-19
Payer: COMMERCIAL

## 2019-12-19 VITALS — DIASTOLIC BLOOD PRESSURE: 80 MMHG | OXYGEN SATURATION: 98 % | HEART RATE: 98 BPM | SYSTOLIC BLOOD PRESSURE: 130 MMHG

## 2019-12-19 PROCEDURE — 99213 OFFICE O/P EST LOW 20 MIN: CPT | Mod: 25

## 2019-12-19 PROCEDURE — 94060 EVALUATION OF WHEEZING: CPT

## 2019-12-19 PROCEDURE — 95012 NITRIC OXIDE EXP GAS DETER: CPT

## 2019-12-19 NOTE — PHYSICAL EXAM
[General Appearance - Well Developed] : well developed [Normal Appearance] : normal appearance [Well Groomed] : well groomed [No Deformities] : no deformities [General Appearance - Well Nourished] : well nourished [General Appearance - In No Acute Distress] : no acute distress [Normal Conjunctiva] : the conjunctiva exhibited no abnormalities [Eyelids - No Xanthelasma] : the eyelids demonstrated no xanthelasmas [Normal Oropharynx] : normal oropharynx [Neck Appearance] : the appearance of the neck was normal [Neck Cervical Mass (___cm)] : no neck mass was observed [Jugular Venous Distention Increased] : there was no jugular-venous distention [Thyroid Nodule] : there were no palpable thyroid nodules [Thyroid Diffuse Enlargement] : the thyroid was not enlarged [Heart Rate And Rhythm] : heart rate and rhythm were normal [Heart Sounds] : normal S1 and S2 [Murmurs] : no murmurs present [Arterial Pulses Normal] : the arterial pulses were normal [Veins - Varicosity Changes] : no varicosital changes were noted in the lower extremities [Edema] : no peripheral edema present [Exaggerated Use Of Accessory Muscles For Inspiration] : no accessory muscle use [Respiration, Rhythm And Depth] : normal respiratory rhythm and effort [Lungs Percussion] : the lungs were normal to percussion [Chest Palpation] : palpation of the chest revealed no abnormalities [Auscultation Breath Sounds / Voice Sounds] : lungs were clear to auscultation bilaterally [Abdomen Tenderness] : non-tender [Bowel Sounds] : normal bowel sounds [Abdomen Soft] : soft [Abnormal Walk] : normal gait [Abdomen Mass (___ Cm)] : no abdominal mass palpated [Nail Clubbing] : no clubbing of the fingernails [Gait - Sufficient For Exercise Testing] : the gait was sufficient for exercise testing [Cyanosis, Localized] : no localized cyanosis [Petechial Hemorrhages (___cm)] : no petechial hemorrhages [Skin Color & Pigmentation] : normal skin color and pigmentation [] : no rash [Skin Lesions] : no skin lesions [No Venous Stasis] : no venous stasis [No Xanthoma] : no  xanthoma was observed [No Skin Ulcers] : no skin ulcer [No Focal Deficits] : no focal deficits [Deep Tendon Reflexes (DTR)] : deep tendon reflexes were 2+ and symmetric [Sensation] : the sensory exam was normal to light touch and pinprick

## 2019-12-19 NOTE — DISCUSSION/SUMMARY
[FreeTextEntry1] : Asthma\par  sarcoidosis\par Sinus  tach on BB but  issue  with  wheeze- f/u  cardiology\par Mod MR\par  Diastolic Dysfx\par On Dyazide\par \par Check ACE- normal\par  Rx updated\par  office f/u\par \par CXR Nov 2020\par  DEXA 4/2020\par  f/u ECHO/EKG per Dr Day timing

## 2019-12-19 NOTE — HISTORY OF PRESENT ILLNESS
[Feelings Of Weakness On Exertion] : stable exercise intolerance [None] : ~He/She~ has no significant interval events [Difficulty Breathing During Exertion] : stable dyspnea on exertion [Wheezing] : denies wheezing [Cough] : stable coughing [Fever] : denies fever [Regional Soft Tissue Swelling Both Lower Extremities] : denies lower extremity edema [Chest Pain Or Discomfort] : denies chest pain [Never] : was never a smoker [Wt Gain ___ Lbs] : no recent weight gain [Wt Loss ___ Lbs] : no recent weight loss [Oxygen] : the patient uses no supplemental oxygen

## 2019-12-19 NOTE — PROCEDURE
[FreeTextEntry1] : Spirometry 12/19/2019\par mild  reduction flow rates\par No BD at FEV1\par Obstructive pattern\par \par FENO  12   12/19/2019\par \par Chest x-ray PA lateral normal cardiac size Nov 14 2019\par Uncoiled aorta\par No clear parenchymal infiltrates pleural effusions or dominant pulmonary nodules\par No interval change compared to chest x-ray barring difference in technique dating back to November 7, 2018\par \par PFT November 14, 2019\par Mild reduction in flow rates with an obstructive pattern\par Normal total lung capacity 80% predicted\par Mild air trapping RV/TLC ratio 129% predicted.\par Moderate reduction diffusion 56% predicted with a loss of functioning alveolocapillary units\par Heme globin 13.8\par Bronchodilator response at small airways 29%\par \par \par High-dose flu vaccination administered September 25, 2019

## 2020-01-30 ENCOUNTER — APPOINTMENT (OUTPATIENT)
Dept: PULMONOLOGY | Facility: CLINIC | Age: 65
End: 2020-01-30
Payer: MEDICARE

## 2020-01-30 VITALS
OXYGEN SATURATION: 100 % | TEMPERATURE: 99.1 F | RESPIRATION RATE: 16 BRPM | HEART RATE: 92 BPM | DIASTOLIC BLOOD PRESSURE: 86 MMHG | SYSTOLIC BLOOD PRESSURE: 118 MMHG

## 2020-01-30 LAB
ANION GAP SERPL CALC-SCNC: 11 MMOL/L
BUN SERPL-MCNC: 23 MG/DL
CALCIUM SERPL-MCNC: 9.7 MG/DL
CHLORIDE SERPL-SCNC: 101 MMOL/L
CO2 SERPL-SCNC: 30 MMOL/L
CREAT SERPL-MCNC: 1.02 MG/DL
GLUCOSE SERPL-MCNC: 103 MG/DL
POTASSIUM SERPL-SCNC: 4 MMOL/L
SODIUM SERPL-SCNC: 142 MMOL/L

## 2020-01-30 PROCEDURE — 99213 OFFICE O/P EST LOW 20 MIN: CPT | Mod: 25

## 2020-01-30 PROCEDURE — 94060 EVALUATION OF WHEEZING: CPT

## 2020-01-30 PROCEDURE — 95012 NITRIC OXIDE EXP GAS DETER: CPT

## 2020-01-30 NOTE — PHYSICAL EXAM
[General Appearance - Well Developed] : well developed [Normal Appearance] : normal appearance [Well Groomed] : well groomed [General Appearance - Well Nourished] : well nourished [No Deformities] : no deformities [General Appearance - In No Acute Distress] : no acute distress [Normal Conjunctiva] : the conjunctiva exhibited no abnormalities [Eyelids - No Xanthelasma] : the eyelids demonstrated no xanthelasmas [Normal Oropharynx] : normal oropharynx [Neck Appearance] : the appearance of the neck was normal [Neck Cervical Mass (___cm)] : no neck mass was observed [Jugular Venous Distention Increased] : there was no jugular-venous distention [Thyroid Diffuse Enlargement] : the thyroid was not enlarged [Thyroid Nodule] : there were no palpable thyroid nodules [Heart Sounds] : normal S1 and S2 [Heart Rate And Rhythm] : heart rate and rhythm were normal [Arterial Pulses Normal] : the arterial pulses were normal [Murmurs] : no murmurs present [Veins - Varicosity Changes] : no varicosital changes were noted in the lower extremities [Edema] : no peripheral edema present [Respiration, Rhythm And Depth] : normal respiratory rhythm and effort [Exaggerated Use Of Accessory Muscles For Inspiration] : no accessory muscle use [Auscultation Breath Sounds / Voice Sounds] : lungs were clear to auscultation bilaterally [Chest Palpation] : palpation of the chest revealed no abnormalities [Lungs Percussion] : the lungs were normal to percussion [Bowel Sounds] : normal bowel sounds [Abdomen Soft] : soft [Abdomen Tenderness] : non-tender [Abdomen Mass (___ Cm)] : no abdominal mass palpated [Gait - Sufficient For Exercise Testing] : the gait was sufficient for exercise testing [Abnormal Walk] : normal gait [Cyanosis, Localized] : no localized cyanosis [Nail Clubbing] : no clubbing of the fingernails [Petechial Hemorrhages (___cm)] : no petechial hemorrhages [Skin Color & Pigmentation] : normal skin color and pigmentation [] : no rash [No Venous Stasis] : no venous stasis [Skin Lesions] : no skin lesions [No Skin Ulcers] : no skin ulcer [No Xanthoma] : no  xanthoma was observed [Deep Tendon Reflexes (DTR)] : deep tendon reflexes were 2+ and symmetric [Sensation] : the sensory exam was normal to light touch and pinprick [No Focal Deficits] : no focal deficits

## 2020-02-01 LAB — ACE BLD-CCNC: 55 U/L

## 2020-02-01 NOTE — PROCEDURE
[FreeTextEntry1] : Spirometry 1/30/20\par mild borderline moderate  reduction flow rates\par No BD at FEV1\par Obstructive pattern\par \par FENO  11  ppb  1/30 20\par \par Chest x-ray PA lateral normal cardiac size Nov 14 2019\par Uncoiled aorta\par No clear parenchymal infiltrates pleural effusions or dominant pulmonary nodules\par No interval change compared to chest x-ray barring difference in technique dating back to November 7, 2018\par \par PFT November 14, 2019\par Mild reduction in flow rates with an obstructive pattern\par Normal total lung capacity 80% predicted\par Mild air trapping RV/TLC ratio 129% predicted.\par Moderate reduction diffusion 56% predicted with a loss of functioning alveolocapillary units\par Heme globin 13.8\par Bronchodilator response at small airways 29%\par \par High-dose flu vaccination administered September 25, 2019\par DAta 1/30/20\par \par Serum electrolytes normal\par Glucose 103\par BUN 23 creatinine 1.02\par Serum calcium 9.7 in patient with sarcoidosis\par ACE level normal range

## 2020-02-01 NOTE — HISTORY OF PRESENT ILLNESS
[None] : ~He/She~ has no significant interval events [Difficulty Breathing During Exertion] : stable dyspnea on exertion [Feelings Of Weakness On Exertion] : stable exercise intolerance [Wheezing] : denies wheezing [Cough] : stable coughing [Regional Soft Tissue Swelling Both Lower Extremities] : denies lower extremity edema [Chest Pain Or Discomfort] : denies chest pain [Fever] : denies fever [Never] : was never a smoker [Wt Gain ___ Lbs] : no recent weight gain [Wt Loss ___ Lbs] : no recent weight loss [Oxygen] : the patient uses no supplemental oxygen

## 2020-02-10 ENCOUNTER — OUTPATIENT (OUTPATIENT)
Dept: OUTPATIENT SERVICES | Facility: HOSPITAL | Age: 65
LOS: 1 days | End: 2020-02-10
Payer: MEDICARE

## 2020-02-10 ENCOUNTER — RESULT REVIEW (OUTPATIENT)
Age: 65
End: 2020-02-10

## 2020-02-10 DIAGNOSIS — Z90.710 ACQUIRED ABSENCE OF BOTH CERVIX AND UTERUS: Chronic | ICD-10-CM

## 2020-02-10 DIAGNOSIS — K62.5 HEMORRHAGE OF ANUS AND RECTUM: ICD-10-CM

## 2020-02-10 PROCEDURE — 88305 TISSUE EXAM BY PATHOLOGIST: CPT | Mod: 26

## 2020-02-10 PROCEDURE — 88305 TISSUE EXAM BY PATHOLOGIST: CPT

## 2020-02-10 PROCEDURE — 45380 COLONOSCOPY AND BIOPSY: CPT

## 2020-02-12 LAB — SURGICAL PATHOLOGY STUDY: SIGNIFICANT CHANGE UP

## 2020-02-27 ENCOUNTER — APPOINTMENT (OUTPATIENT)
Dept: PULMONOLOGY | Facility: CLINIC | Age: 65
End: 2020-02-27
Payer: MEDICARE

## 2020-02-27 VITALS
DIASTOLIC BLOOD PRESSURE: 83 MMHG | SYSTOLIC BLOOD PRESSURE: 137 MMHG | TEMPERATURE: 98.6 F | OXYGEN SATURATION: 98 % | HEART RATE: 98 BPM

## 2020-02-27 PROCEDURE — 94060 EVALUATION OF WHEEZING: CPT

## 2020-02-27 PROCEDURE — 99213 OFFICE O/P EST LOW 20 MIN: CPT | Mod: 25

## 2020-02-27 RX ORDER — ALBUTEROL SULFATE 90 UG/1
108 (90 BASE) AEROSOL, METERED RESPIRATORY (INHALATION)
Qty: 34 | Refills: 0 | Status: ACTIVE | COMMUNITY
Start: 2019-07-15 | End: 1900-01-01

## 2020-02-27 NOTE — PHYSICAL EXAM
[General Appearance - Well Developed] : well developed [Normal Appearance] : normal appearance [Well Groomed] : well groomed [General Appearance - Well Nourished] : well nourished [General Appearance - In No Acute Distress] : no acute distress [No Deformities] : no deformities [Normal Conjunctiva] : the conjunctiva exhibited no abnormalities [Eyelids - No Xanthelasma] : the eyelids demonstrated no xanthelasmas [Normal Oropharynx] : normal oropharynx [Neck Appearance] : the appearance of the neck was normal [Neck Cervical Mass (___cm)] : no neck mass was observed [Jugular Venous Distention Increased] : there was no jugular-venous distention [Thyroid Diffuse Enlargement] : the thyroid was not enlarged [Thyroid Nodule] : there were no palpable thyroid nodules [Heart Rate And Rhythm] : heart rate and rhythm were normal [Heart Sounds] : normal S1 and S2 [Murmurs] : no murmurs present [Edema] : no peripheral edema present [Arterial Pulses Normal] : the arterial pulses were normal [Veins - Varicosity Changes] : no varicosital changes were noted in the lower extremities [Respiration, Rhythm And Depth] : normal respiratory rhythm and effort [Exaggerated Use Of Accessory Muscles For Inspiration] : no accessory muscle use [Auscultation Breath Sounds / Voice Sounds] : lungs were clear to auscultation bilaterally [Chest Palpation] : palpation of the chest revealed no abnormalities [Lungs Percussion] : the lungs were normal to percussion [Bowel Sounds] : normal bowel sounds [Abdomen Soft] : soft [Abdomen Tenderness] : non-tender [Abdomen Mass (___ Cm)] : no abdominal mass palpated [Abnormal Walk] : normal gait [Gait - Sufficient For Exercise Testing] : the gait was sufficient for exercise testing [Nail Clubbing] : no clubbing of the fingernails [Cyanosis, Localized] : no localized cyanosis [Petechial Hemorrhages (___cm)] : no petechial hemorrhages [Skin Color & Pigmentation] : normal skin color and pigmentation [] : no rash [No Venous Stasis] : no venous stasis [Skin Lesions] : no skin lesions [No Skin Ulcers] : no skin ulcer [No Xanthoma] : no  xanthoma was observed [Deep Tendon Reflexes (DTR)] : deep tendon reflexes were 2+ and symmetric [Sensation] : the sensory exam was normal to light touch and pinprick [No Focal Deficits] : no focal deficits

## 2020-02-27 NOTE — PROCEDURE
[FreeTextEntry1] : Spirometry 2/27/2020\par  moderate  reduction flow rates\par No BD at FEV1\par Obstructive pattern\par \par FENO  11  ppb  1/30 20\par \par Chest x-ray PA lateral normal cardiac size Nov 14 2019\par Uncoiled aorta\par No clear parenchymal infiltrates pleural effusions or dominant pulmonary nodules\par No interval change compared to chest x-ray barring difference in technique dating back to November 7, 2018\par \par PFT November 14, 2019\par Mild reduction in flow rates with an obstructive pattern\par Normal total lung capacity 80% predicted\par Mild air trapping RV/TLC ratio 129% predicted.\par Moderate reduction diffusion 56% predicted with a loss of functioning alveolocapillary units\par Heme globin 13.8\par Bronchodilator response at small airways 29%\par \par High-dose flu vaccination administered September 25, 2019\par DAta 1/30/20\par \par Serum electrolytes normal\par Glucose 103\par BUN 23 creatinine 1.02\par Serum calcium 9.7 in patient with sarcoidosis\par ACE level normal range

## 2020-02-27 NOTE — HISTORY OF PRESENT ILLNESS
[None] : ~He/She~ has no significant interval events [Feelings Of Weakness On Exertion] : stable exercise intolerance [Difficulty Breathing During Exertion] : stable dyspnea on exertion [Cough] : stable coughing [Regional Soft Tissue Swelling Both Lower Extremities] : denies lower extremity edema [Wheezing] : denies wheezing [Chest Pain Or Discomfort] : denies chest pain [Fever] : denies fever [Never] : was never a smoker [Wt Gain ___ Lbs] : no recent weight gain [Wt Loss ___ Lbs] : no recent weight loss [Oxygen] : the patient uses no supplemental oxygen

## 2020-02-27 NOTE — DISCUSSION/SUMMARY
[FreeTextEntry1] : Asthma\par  sarcoidosis\par Sinus  tach on BB but  issue  with  wheeze- f/u  cardiology\par Mod MR\par  Diastolic Dysfx\par On Dyazide\par \par  Rx updated\par  office f/u\par OPTH completed Jan 2020\par \par CXR Nov 2020\par  DEXA 4/2020\par  f/u ECHO/EKG per Dr Day timing

## 2020-04-02 ENCOUNTER — APPOINTMENT (OUTPATIENT)
Dept: PULMONOLOGY | Facility: CLINIC | Age: 65
End: 2020-04-02

## 2020-06-26 ENCOUNTER — RX RENEWAL (OUTPATIENT)
Age: 65
End: 2020-06-26

## 2020-08-28 ENCOUNTER — RX RENEWAL (OUTPATIENT)
Age: 65
End: 2020-08-28

## 2020-09-01 ENCOUNTER — RX RENEWAL (OUTPATIENT)
Age: 65
End: 2020-09-01

## 2020-09-09 ENCOUNTER — APPOINTMENT (OUTPATIENT)
Dept: PULMONOLOGY | Facility: CLINIC | Age: 65
End: 2020-09-09
Payer: MEDICARE

## 2020-09-09 VITALS — SYSTOLIC BLOOD PRESSURE: 140 MMHG | DIASTOLIC BLOOD PRESSURE: 83 MMHG

## 2020-09-09 VITALS
DIASTOLIC BLOOD PRESSURE: 83 MMHG | SYSTOLIC BLOOD PRESSURE: 156 MMHG | OXYGEN SATURATION: 92 % | HEART RATE: 120 BPM | TEMPERATURE: 98 F | RESPIRATION RATE: 16 BRPM

## 2020-09-09 PROCEDURE — 90662 IIV NO PRSV INCREASED AG IM: CPT

## 2020-09-09 PROCEDURE — G0008: CPT

## 2020-09-09 PROCEDURE — 71046 X-RAY EXAM CHEST 2 VIEWS: CPT

## 2020-09-09 PROCEDURE — 94618 PULMONARY STRESS TESTING: CPT

## 2020-09-09 PROCEDURE — 99214 OFFICE O/P EST MOD 30 MIN: CPT | Mod: 25

## 2020-09-09 RX ORDER — AZITHROMYCIN 250 MG/1
250 TABLET, FILM COATED ORAL
Qty: 1 | Refills: 0 | Status: DISCONTINUED | COMMUNITY
Start: 2020-03-18 | End: 2020-09-09

## 2020-09-09 RX ORDER — METHYLPREDNISOLONE 4 MG/1
4 TABLET ORAL
Qty: 1 | Refills: 0 | Status: DISCONTINUED | COMMUNITY
Start: 2020-03-18 | End: 2020-09-09

## 2020-09-09 NOTE — HISTORY OF PRESENT ILLNESS
[Difficulty Breathing During Exertion] : stable dyspnea on exertion [None] : ~He/She~ has no significant interval events [Wheezing] : denies wheezing [Feelings Of Weakness On Exertion] : stable exercise intolerance [Cough] : stable coughing [Regional Soft Tissue Swelling Both Lower Extremities] : denies lower extremity edema [Chest Pain Or Discomfort] : denies chest pain [Fever] : denies fever [Never] : never [TextBox_4] : 1  week ago chest tightness txed PMD antibx Z Indra with interval improvement\par  no sputum\par  no wheeze\par no fever\par No COVID Ab negative [Wt Gain ___ Lbs] : no recent weight gain [Oxygen] : the patient uses no supplemental oxygen [Wt Loss ___ Lbs] : no recent weight loss

## 2020-09-09 NOTE — PHYSICAL EXAM
[General Appearance - Well Developed] : well developed [Normal Appearance] : normal appearance [Well Groomed] : well groomed [General Appearance - Well Nourished] : well nourished [No Deformities] : no deformities [General Appearance - In No Acute Distress] : no acute distress [Normal Conjunctiva] : the conjunctiva exhibited no abnormalities [Eyelids - No Xanthelasma] : the eyelids demonstrated no xanthelasmas [Normal Oropharynx] : normal oropharynx [Neck Appearance] : the appearance of the neck was normal [Neck Cervical Mass (___cm)] : no neck mass was observed [Thyroid Diffuse Enlargement] : the thyroid was not enlarged [Jugular Venous Distention Increased] : there was no jugular-venous distention [Heart Rate And Rhythm] : heart rate and rhythm were normal [Thyroid Nodule] : there were no palpable thyroid nodules [Heart Sounds] : normal S1 and S2 [Murmurs] : no murmurs present [Arterial Pulses Normal] : the arterial pulses were normal [Veins - Varicosity Changes] : no varicosital changes were noted in the lower extremities [Edema] : no peripheral edema present [Exaggerated Use Of Accessory Muscles For Inspiration] : no accessory muscle use [Respiration, Rhythm And Depth] : normal respiratory rhythm and effort [Auscultation Breath Sounds / Voice Sounds] : lungs were clear to auscultation bilaterally [Chest Palpation] : palpation of the chest revealed no abnormalities [Lungs Percussion] : the lungs were normal to percussion [Abdomen Soft] : soft [Bowel Sounds] : normal bowel sounds [Abdomen Mass (___ Cm)] : no abdominal mass palpated [Abdomen Tenderness] : non-tender [Nail Clubbing] : no clubbing of the fingernails [Abnormal Walk] : normal gait [Gait - Sufficient For Exercise Testing] : the gait was sufficient for exercise testing [Cyanosis, Localized] : no localized cyanosis [Petechial Hemorrhages (___cm)] : no petechial hemorrhages [Skin Color & Pigmentation] : normal skin color and pigmentation [] : no rash [No Venous Stasis] : no venous stasis [No Skin Ulcers] : no skin ulcer [Skin Lesions] : no skin lesions [No Xanthoma] : no  xanthoma was observed [No Focal Deficits] : no focal deficits [Sensation] : the sensory exam was normal to light touch and pinprick [Deep Tendon Reflexes (DTR)] : deep tendon reflexes were 2+ and symmetric

## 2020-09-10 LAB — SARS-COV-2 N GENE NPH QL NAA+PROBE: NOT DETECTED

## 2020-09-11 ENCOUNTER — APPOINTMENT (OUTPATIENT)
Dept: PULMONOLOGY | Facility: CLINIC | Age: 65
End: 2020-09-11
Payer: MEDICARE

## 2020-09-11 VITALS
DIASTOLIC BLOOD PRESSURE: 80 MMHG | RESPIRATION RATE: 16 BRPM | HEIGHT: 65 IN | WEIGHT: 293 LBS | BODY MASS INDEX: 48.82 KG/M2 | HEART RATE: 105 BPM | SYSTOLIC BLOOD PRESSURE: 140 MMHG | OXYGEN SATURATION: 96 %

## 2020-09-11 LAB — POCT - HEMOGLOBIN (HGB), QUANTITATIVE, TRANSCUTANEOUS: 13.9

## 2020-09-11 PROCEDURE — 94060 EVALUATION OF WHEEZING: CPT

## 2020-09-11 PROCEDURE — 94750: CPT

## 2020-09-11 PROCEDURE — 94729 DIFFUSING CAPACITY: CPT

## 2020-09-11 PROCEDURE — 95012 NITRIC OXIDE EXP GAS DETER: CPT

## 2020-09-11 PROCEDURE — 88738 HGB QUANT TRANSCUTANEOUS: CPT

## 2020-09-11 PROCEDURE — 94726 PLETHYSMOGRAPHY LUNG VOLUMES: CPT

## 2020-10-12 ENCOUNTER — APPOINTMENT (OUTPATIENT)
Dept: PULMONOLOGY | Facility: CLINIC | Age: 65
End: 2020-10-12
Payer: MEDICARE

## 2020-10-12 VITALS
HEART RATE: 112 BPM | DIASTOLIC BLOOD PRESSURE: 67 MMHG | TEMPERATURE: 97.3 F | SYSTOLIC BLOOD PRESSURE: 129 MMHG | OXYGEN SATURATION: 99 %

## 2020-10-12 DIAGNOSIS — Z23 ENCOUNTER FOR IMMUNIZATION: ICD-10-CM

## 2020-10-12 PROCEDURE — 99214 OFFICE O/P EST MOD 30 MIN: CPT | Mod: 25

## 2020-10-12 PROCEDURE — 90670 PCV13 VACCINE IM: CPT

## 2020-10-12 PROCEDURE — 77080 DXA BONE DENSITY AXIAL: CPT

## 2020-10-12 PROCEDURE — G0009: CPT

## 2020-10-12 NOTE — HISTORY OF PRESENT ILLNESS
[None] : ~He/She~ has no significant interval events [Difficulty Breathing During Exertion] : stable dyspnea on exertion [Feelings Of Weakness On Exertion] : stable exercise intolerance [Cough] : stable coughing [Wheezing] : denies wheezing [Regional Soft Tissue Swelling Both Lower Extremities] : denies lower extremity edema [Chest Pain Or Discomfort] : denies chest pain [Fever] : denies fever [Never] : was never a smoker [TextBox_4] : 1  week ago chest tightness txed PMD antibx Z Indra with interval improvement\par  no sputum\par  no wheeze\par no fever\par No COVID Ab negative [Wt Gain ___ Lbs] : no recent weight gain [Wt Loss ___ Lbs] : no recent weight loss [Oxygen] : the patient uses no supplemental oxygen

## 2020-10-12 NOTE — DISCUSSION/SUMMARY
[FreeTextEntry1] : Asthma\par  sarcoidosis\par Sinus  tach on BB but  issue  with  wheeze- f/u  cardiology\par Mod MR\par  Diastolic Dysfx\par On Dyazide\par Glaucoma\par \par  Rx updated\par  office f/u\par OPTH completed Jan 2020\par \par CXR Nov 2020\par  DEXA 4/2020\par  f/u ECHO/EKG per Dr Day timing

## 2020-10-12 NOTE — PROCEDURE
[FreeTextEntry1] : PFT body box\par September 11, 2020\par Mild reduction in flow rates with a mild obstructive ventilatory impairment\par Lung volumes are normal\par Air trapping with an RV/TLC ratio 129% predicted.\par Resistance is increased specific conductance is increased\par Mild reduction diffusion with a loss of functioning alveolocapillary units 56% of predicted.\par Impression chronic obstructive airway disease\par \par  nitric oxide 103 elevated \par Hemoglobin 13.9\par Inflammatory airway disease \par \par Chest x-ray PA lateral September 9, 2020\par Sarcoidosis\par Cardiac size are normal\par Uncoiled aorta\par No parenchymal infiltrates pleural effusions or dominant pulmonary nodules\par We will change compared to chest x-ray of November 14, 2019\par \par Pulmonary 6-minute walk stress test September 9, 2020\par \par Room air O2 saturation 97\par Persistent tachycardia\par Desaturation to 89% on room air\par Impression borderline desaturation\par No indication for portable oxygen therapy\par \par \par Spirometry 2/27/2020\par  moderate  reduction flow rates\par No BD at FEV1\par Obstructive pattern\par \par FENO  11  ppb  1/30 20\par \par Chest x-ray PA lateral normal cardiac size Nov 14 2019\par Uncoiled aorta\par No clear parenchymal infiltrates pleural effusions or dominant pulmonary nodules\par No interval change compared to chest x-ray barring difference in technique dating back to November 7, 2018\par \par PFT November 14, 2019\par Mild reduction in flow rates with an obstructive pattern\par Normal total lung capacity 80% predicted\par Mild air trapping RV/TLC ratio 129% predicted.\par Moderate reduction diffusion 56% predicted with a loss of functioning alveolocapillary units\par Heme globin 13.8\par Bronchodilator response at small airways 29%\par \par High-dose flu vaccination administered Sept 9 2020\par \par DAta 1/30/20\par \par Serum electrolytes normal\par Glucose 103\par BUN 23 creatinine 1.02\par Serum calcium 9.7 in patient with sarcoidosis\par ACE level normal range\par \par High-dose flu vaccination administered September 9, 2020\par PREVNAIR 10/12/2020\par \par DEXA Bone Density\par  Normal study

## 2020-11-23 ENCOUNTER — APPOINTMENT (OUTPATIENT)
Dept: PULMONOLOGY | Facility: CLINIC | Age: 65
End: 2020-11-23

## 2021-04-01 ENCOUNTER — NON-APPOINTMENT (OUTPATIENT)
Age: 66
End: 2021-04-01

## 2021-04-01 ENCOUNTER — INPATIENT (INPATIENT)
Facility: HOSPITAL | Age: 66
LOS: 3 days | Discharge: HOME | DRG: 202 | End: 2021-04-05
Attending: HOSPITALIST | Admitting: HOSPITALIST
Payer: COMMERCIAL

## 2021-04-01 VITALS
TEMPERATURE: 99 F | HEART RATE: 124 BPM | RESPIRATION RATE: 18 BRPM | HEIGHT: 66 IN | OXYGEN SATURATION: 98 % | SYSTOLIC BLOOD PRESSURE: 166 MMHG | DIASTOLIC BLOOD PRESSURE: 72 MMHG | WEIGHT: 279.99 LBS

## 2021-04-01 DIAGNOSIS — R06.02 SHORTNESS OF BREATH: ICD-10-CM

## 2021-04-01 DIAGNOSIS — Z90.710 ACQUIRED ABSENCE OF BOTH CERVIX AND UTERUS: Chronic | ICD-10-CM

## 2021-04-01 DIAGNOSIS — Z98.49 CATARACT EXTRACTION STATUS, UNSPECIFIED EYE: Chronic | ICD-10-CM

## 2021-04-01 LAB
ALBUMIN SERPL ELPH-MCNC: 4.1 G/DL — SIGNIFICANT CHANGE UP (ref 3.3–5)
ALP SERPL-CCNC: 117 U/L — SIGNIFICANT CHANGE UP (ref 40–120)
ALT FLD-CCNC: 22 U/L — SIGNIFICANT CHANGE UP (ref 10–45)
ANION GAP SERPL CALC-SCNC: 12 MMOL/L — SIGNIFICANT CHANGE UP (ref 5–17)
APTT BLD: 30.7 SEC — SIGNIFICANT CHANGE UP (ref 27.5–35.5)
AST SERPL-CCNC: 18 U/L — SIGNIFICANT CHANGE UP (ref 10–40)
BASOPHILS # BLD AUTO: 0.03 K/UL — SIGNIFICANT CHANGE UP (ref 0–0.2)
BASOPHILS NFR BLD AUTO: 0.5 % — SIGNIFICANT CHANGE UP (ref 0–2)
BILIRUB SERPL-MCNC: 0.5 MG/DL — SIGNIFICANT CHANGE UP (ref 0.2–1.2)
BUN SERPL-MCNC: 16 MG/DL — SIGNIFICANT CHANGE UP (ref 7–23)
CALCIUM SERPL-MCNC: 9.7 MG/DL — SIGNIFICANT CHANGE UP (ref 8.4–10.5)
CHLORIDE SERPL-SCNC: 102 MMOL/L — SIGNIFICANT CHANGE UP (ref 96–108)
CO2 SERPL-SCNC: 28 MMOL/L — SIGNIFICANT CHANGE UP (ref 22–31)
CREAT SERPL-MCNC: 1.08 MG/DL — SIGNIFICANT CHANGE UP (ref 0.5–1.3)
D DIMER BLD IA.RAPID-MCNC: 221 NG/ML DDU — SIGNIFICANT CHANGE UP
EOSINOPHIL # BLD AUTO: 0.02 K/UL — SIGNIFICANT CHANGE UP (ref 0–0.5)
EOSINOPHIL NFR BLD AUTO: 0.3 % — SIGNIFICANT CHANGE UP (ref 0–6)
GLUCOSE SERPL-MCNC: 140 MG/DL — HIGH (ref 70–99)
HCT VFR BLD CALC: 38 % — SIGNIFICANT CHANGE UP (ref 34.5–45)
HGB BLD-MCNC: 12.8 G/DL — SIGNIFICANT CHANGE UP (ref 11.5–15.5)
IMM GRANULOCYTES NFR BLD AUTO: 0.7 % — SIGNIFICANT CHANGE UP (ref 0–1.5)
INR BLD: 1.05 RATIO — SIGNIFICANT CHANGE UP (ref 0.88–1.16)
LYMPHOCYTES # BLD AUTO: 0.6 K/UL — LOW (ref 1–3.3)
LYMPHOCYTES # BLD AUTO: 10.1 % — LOW (ref 13–44)
MAGNESIUM SERPL-MCNC: 2 MG/DL — SIGNIFICANT CHANGE UP (ref 1.6–2.6)
MCHC RBC-ENTMCNC: 26.1 PG — LOW (ref 27–34)
MCHC RBC-ENTMCNC: 33.7 GM/DL — SIGNIFICANT CHANGE UP (ref 32–36)
MCV RBC AUTO: 77.4 FL — LOW (ref 80–100)
MONOCYTES # BLD AUTO: 0.16 K/UL — SIGNIFICANT CHANGE UP (ref 0–0.9)
MONOCYTES NFR BLD AUTO: 2.7 % — SIGNIFICANT CHANGE UP (ref 2–14)
NEUTROPHILS # BLD AUTO: 5.12 K/UL — SIGNIFICANT CHANGE UP (ref 1.8–7.4)
NEUTROPHILS NFR BLD AUTO: 85.7 % — HIGH (ref 43–77)
NRBC # BLD: 0 /100 WBCS — SIGNIFICANT CHANGE UP (ref 0–0)
NT-PROBNP SERPL-SCNC: 50 PG/ML — SIGNIFICANT CHANGE UP (ref 0–300)
PHOSPHATE SERPL-MCNC: 2.6 MG/DL — SIGNIFICANT CHANGE UP (ref 2.5–4.5)
PLATELET # BLD AUTO: 291 K/UL — SIGNIFICANT CHANGE UP (ref 150–400)
POTASSIUM SERPL-MCNC: 3.9 MMOL/L — SIGNIFICANT CHANGE UP (ref 3.5–5.3)
POTASSIUM SERPL-SCNC: 3.9 MMOL/L — SIGNIFICANT CHANGE UP (ref 3.5–5.3)
PROT SERPL-MCNC: 7.5 G/DL — SIGNIFICANT CHANGE UP (ref 6–8.3)
PROTHROM AB SERPL-ACNC: 12.6 SEC — SIGNIFICANT CHANGE UP (ref 10.6–13.6)
RBC # BLD: 4.91 M/UL — SIGNIFICANT CHANGE UP (ref 3.8–5.2)
RBC # FLD: 16.3 % — HIGH (ref 10.3–14.5)
SARS-COV-2 RNA SPEC QL NAA+PROBE: SIGNIFICANT CHANGE UP
SODIUM SERPL-SCNC: 142 MMOL/L — SIGNIFICANT CHANGE UP (ref 135–145)
TROPONIN T, HIGH SENSITIVITY RESULT: 12 NG/L — SIGNIFICANT CHANGE UP (ref 0–51)
TROPONIN T, HIGH SENSITIVITY RESULT: 13 NG/L — SIGNIFICANT CHANGE UP (ref 0–51)
WBC # BLD: 5.97 K/UL — SIGNIFICANT CHANGE UP (ref 3.8–10.5)
WBC # FLD AUTO: 5.97 K/UL — SIGNIFICANT CHANGE UP (ref 3.8–10.5)

## 2021-04-01 PROCEDURE — 99285 EMERGENCY DEPT VISIT HI MDM: CPT

## 2021-04-01 PROCEDURE — 93010 ELECTROCARDIOGRAM REPORT: CPT

## 2021-04-01 PROCEDURE — 71045 X-RAY EXAM CHEST 1 VIEW: CPT | Mod: 26

## 2021-04-01 PROCEDURE — 71275 CT ANGIOGRAPHY CHEST: CPT | Mod: 26

## 2021-04-01 RX ORDER — BRIMONIDINE TARTRATE 2 MG/MG
1 SOLUTION/ DROPS OPHTHALMIC
Qty: 0 | Refills: 0 | DISCHARGE

## 2021-04-01 RX ORDER — ERGOCALCIFEROL 1.25 MG/1
50000 CAPSULE ORAL
Refills: 0 | Status: DISCONTINUED | OUTPATIENT
Start: 2021-04-01 | End: 2021-04-05

## 2021-04-01 RX ORDER — LATANOPROST 0.05 MG/ML
1 SOLUTION/ DROPS OPHTHALMIC; TOPICAL
Qty: 0 | Refills: 0 | DISCHARGE

## 2021-04-01 RX ORDER — AMLODIPINE BESYLATE 2.5 MG/1
5 TABLET ORAL DAILY
Refills: 0 | Status: DISCONTINUED | OUTPATIENT
Start: 2021-04-01 | End: 2021-04-05

## 2021-04-01 RX ORDER — BUDESONIDE AND FORMOTEROL FUMARATE DIHYDRATE 160; 4.5 UG/1; UG/1
2 AEROSOL RESPIRATORY (INHALATION)
Refills: 0 | Status: DISCONTINUED | OUTPATIENT
Start: 2021-04-01 | End: 2021-04-05

## 2021-04-01 RX ORDER — IPRATROPIUM/ALBUTEROL SULFATE 18-103MCG
3 AEROSOL WITH ADAPTER (GRAM) INHALATION ONCE
Refills: 0 | Status: COMPLETED | OUTPATIENT
Start: 2021-04-01 | End: 2021-04-01

## 2021-04-01 RX ORDER — ASPIRIN/CALCIUM CARB/MAGNESIUM 324 MG
81 TABLET ORAL DAILY
Refills: 0 | Status: DISCONTINUED | OUTPATIENT
Start: 2021-04-01 | End: 2021-04-05

## 2021-04-01 RX ORDER — ALBUTEROL 90 UG/1
2 AEROSOL, METERED ORAL EVERY 6 HOURS
Refills: 0 | Status: DISCONTINUED | OUTPATIENT
Start: 2021-04-01 | End: 2021-04-02

## 2021-04-01 RX ORDER — FERROUS SULFATE 325(65) MG
325 TABLET ORAL DAILY
Refills: 0 | Status: DISCONTINUED | OUTPATIENT
Start: 2021-04-01 | End: 2021-04-05

## 2021-04-01 RX ORDER — MONTELUKAST 4 MG/1
10 TABLET, CHEWABLE ORAL DAILY
Refills: 0 | Status: DISCONTINUED | OUTPATIENT
Start: 2021-04-01 | End: 2021-04-05

## 2021-04-01 RX ORDER — DORZOLAMIDE HYDROCHLORIDE 20 MG/ML
1 SOLUTION/ DROPS OPHTHALMIC
Refills: 0 | Status: DISCONTINUED | OUTPATIENT
Start: 2021-04-01 | End: 2021-04-05

## 2021-04-01 RX ORDER — DORZOLAMIDE HYDROCHLORIDE 20 MG/ML
1 SOLUTION/ DROPS OPHTHALMIC
Qty: 0 | Refills: 0 | DISCHARGE

## 2021-04-01 RX ORDER — LATANOPROST 0.05 MG/ML
1 SOLUTION/ DROPS OPHTHALMIC; TOPICAL EVERY 24 HOURS
Refills: 0 | Status: DISCONTINUED | OUTPATIENT
Start: 2021-04-01 | End: 2021-04-05

## 2021-04-01 RX ORDER — BRIMONIDINE TARTRATE 2 MG/MG
1 SOLUTION/ DROPS OPHTHALMIC
Refills: 0 | Status: DISCONTINUED | OUTPATIENT
Start: 2021-04-01 | End: 2021-04-05

## 2021-04-01 RX ORDER — METOPROLOL TARTRATE 50 MG
50 TABLET ORAL DAILY
Refills: 0 | Status: DISCONTINUED | OUTPATIENT
Start: 2021-04-01 | End: 2021-04-05

## 2021-04-01 RX ADMIN — Medication 60 MILLIGRAM(S): at 16:05

## 2021-04-01 RX ADMIN — Medication 3 MILLILITER(S): at 16:06

## 2021-04-01 NOTE — H&P ADULT - PROBLEM SELECTOR PLAN 2
- Plan above for asthma exacerbation  - F/u CTA r/o PE  - TTE  - Cardiac catheterization for suspected pulmonary HTN Acute on chronic worsening ALEXANDER. Spirometry 2/2020 showing obstructive pattern with moderate reduction flow rates. Pulmonary walk test Sept 2020 showed desaturation to 89% on RA.   -Previous RHC showed normal coronaries with mod MR and diastolic dysfunction  - HF exacerbation less likely given low BNP, even in setting of obesity  - F/u final read regarding fibrotic changes noted on CT, concerning for ILD. Consider Pulm consult in AM.   - Plan above for asthma exacerbation  - F/u TTE  - LHC/ RHC for ischemic workup and suspected pulmonary HTN (cor pulmonale) Acute on chronic worsening ALEXANDER. Spirometry 2/2020 showing obstructive pattern with moderate reduction flow rates. Pulmonary walk test Sept 2020 showed desaturation to 89% on RA.   -Previous RHC showed normal coronaries with mod MR and diastolic dysfunction  - HF exacerbation less likely given low BNP, even in setting of obesity  - F/u final read regarding fibrotic changes noted on CT, concerning for ILD. Consider Pulm consult in AM.   - Plan above for asthma exacerbation  - F/u TTE  - LHC/ RHC for ischemic workup and suspected pulmonary HTN (cor pulmonale)  - Tele and continuous pulse ox Acute on chronic worsening ALEXANDER. Spirometry 2/2020 showing obstructive pattern with moderate reduction flow rates. Pulmonary walk test Sept 2020 showed desaturation to 89% on RA.   - Previous RHC showed normal coronaries with mod MR and diastolic dysfunction  - HF exacerbation less likely given low BNP, even in setting of obesity  - F/u final read regarding fibrotic changes noted on CT, concerning for ILD. Consider Pulm consult in AM.   - F/u TTE to r/o cardiomyopathy, valvulopathy, etc  - LHC/ RHC for complete ischemic workup following TTE and suspected pulmonary HTN (cor pulmonale) respectively  - Tele and continuous pulse ox Acute on chronic worsening ALEXANDER. Spirometry 2/2020 showing obstructive pattern with moderate reduction flow rates. Pulmonary walk test Sept 2020 showed desaturation to 89% on RA.   - Previous RHC showed normal coronaries with mod MR and diastolic dysfunction  - HF exacerbation less likely given low BNP, even in setting of obesity  - F/u final read regarding ? fibrotic changes noted on CT w/?bilateral GGO, concerning for ILD Consider Pulm consult in AM.   - COVID negative; no fever, leukocytosis, cough, hypoxia to suggest infectious process  - F/u TTE to r/o cardiomyopathy, valvulopathy, etc  - LHC/ RHC for complete ischemic workup following TTE and suspected pulmonary HTN (cor pulmonale) respectively  - Tele and continuous pulse ox

## 2021-04-01 NOTE — ED ADULT NURSE REASSESSMENT NOTE - NS ED NURSE REASSESS COMMENT FT1
Received report from Cecily TURNER. Pt resting comfortably in bed, denies pain. Respirations equal and symmetrical. IV in right forearm patent. Pt aware of plan to await lab results. Bed locked and in lowest position.

## 2021-04-01 NOTE — H&P ADULT - PROBLEM SELECTOR PLAN 5
Not on any medications.  Stable   CTM Hx of mild intermittent asthma, last hospitalization over 5 years. Never been intubated.   -C/w Singulair  -C/w PRN Xopenex, holding home Albuterol  - Albuterol nebs  - Continuous pulse Ox

## 2021-04-01 NOTE — H&P ADULT - NSHPSOCIALHISTORY_GEN_ALL_CORE
Non-smoker  Drinks 1-2 [employer] beers at social events, last drink 1 month ago  No other recreational drug use  Mostly sedentary, ambulates with cane  Lives with daughter and granddaughter  Recently retired, previously PCA at [employer]

## 2021-04-01 NOTE — H&P ADULT - NSICDXPASTSURGICALHX_GEN_ALL_CORE_FT
PAST SURGICAL HISTORY:  History of hysterectomy for benign disease 2006    S/P cataract surgery

## 2021-04-01 NOTE — H&P ADULT - PROBLEM SELECTOR PROBLEM 5
Sarcoidosis Asthma, unspecified asthma severity, unspecified whether complicated, unspecified whether persistent

## 2021-04-01 NOTE — H&P ADULT - PROBLEM SELECTOR PLAN 8
C/w Latanoprost  C/w Dorzolamide  C/w Alphagan Hx of Microcytic anemia, on ferrous sulfate. Denies acute blood loss, melena or hematochezia.  -C/w Ferrous sulfate

## 2021-04-01 NOTE — H&P ADULT - PROBLEM SELECTOR PLAN 3
New RBBB with associated sinus tachycardia likely secondary to pulmonary disease vs ischemic CAD vs arrhythmia vs PE. Was previously on ECG for palpitations.   -Tele monitoring    - Cardiac cath New RBBB likely secondary to pulmonary disease vs ischemic CAD vs PE. Was previously on Holter monitoring for palpitations.  -CTA neg for PE, shows fibrotic changes and LN consistent with sarcoidosis.  - Plan above for ischemic workup (TTE, L/R HC)  - Plan above for pulmonary workup/ALEXANDER  - Tele monitoring    #CAD  - F/u A1C  - F/u ipid panel   -C/w ASA 81 New RBBB likely secondary to pulmonary disease vs ischemic CAD vs PE. Was previously on Holter monitoring for palpitations.  -ECG showed RBBB without ST elevation Trop 12--> 13 without active CP  -CTA neg for PE, shows fibrotic changes and LN consistent with sarcoidosis.  - Plan above for ischemic workup (TTE, L/R HC)  - Plan above for pulmonary workup/ALEXANDER  - Tele monitoring    #CAD  - F/u A1C  - F/u lipid panel   - C/w ASA 81 New RBBB likely secondary to pulmonary disease vs ischemic CAD vs PE. Was previously on Holter monitoring for palpitations.  -ECG showed RBBB without ST elevation Trop 12--> 13 without active CP  -CTA neg for PE, shows possible ?fibrotic changes and LN consistent with sarcoidosis.  f/u official report  - Plan above for ischemic workup (TTE, L/R HC)  - Plan above for pulmonary workup/ALEXANDER  - Tele monitoring    #CAD  - F/u A1C  - F/u lipid panel   - C/w ASA 81

## 2021-04-01 NOTE — H&P ADULT - NSHPREVIEWOFSYSTEMS_GEN_ALL_CORE
REVIEW OF SYSTEMS:    CONSTITUTIONAL: No weakness, fevers, night sweats, fatigue/malaise, chills, weight loss, loss of appetite  HEENT: No headache, visual changes, hearing changes, dysphagia or odynophagia    NECK: No neck pain or stiffness  RESPIRATORY: No SOB, cough, wheezing, hemoptysis   CARDIOVASCULAR: No chest pain, palpitations, orthopnea  GASTROINTESTINAL: No abdominal pain, nausea, vomiting, hematemesis, diarrhea or constipation. No melena or hematochezia.  GENITOURINARY: No dysuria, frequency or hematuria  NEUROLOGICAL: No numbness, weakness, paresthesias  MSK: No joint swelling or pain  HEME: No easy bruising, bleeding or LAD  SKIN: No itching, rashes  PSYCH: No mood changes, no SI/HI REVIEW OF SYSTEMS:    CONSTITUTIONAL: +Fatigue, lightheadedness, dizziness. No weakness, fevers, night sweats, chills, weight loss, loss of appetite  HEENT: No headache, visual changes, hearing changes, dysphagia or odynophagia    NECK: No neck pain or stiffness  RESPIRATORY: +Cough, ALEXANDER, wheezing. No SOB at rest, hemoptysis   CARDIOVASCULAR: +Chest tightness, orthopnea, palpitations. No chest pain, PND  GASTROINTESTINAL: No abdominal pain, nausea, vomiting, hematemesis, diarrhea or constipation. No melena or hematochezia.  GENITOURINARY: No dysuria, frequency or hematuria  NEUROLOGICAL: No numbness, weakness, paresthesias  EXT: +B/l LE Swelling  HEME: No easy bruising, bleeding or LAD  SKIN: No itching, rashes  PSYCH: No mood changes, no SI/HI

## 2021-04-01 NOTE — ED ADULT NURSE NOTE - OBJECTIVE STATEMENT
Pt brought in by ambulance from PMD office for shortness of breath.  Pt reports worsening sob with wheezing x 4 days.  Hx asthma, morbid obesity, htn.  Sent in by MD for new RBBB, was given duoneb and full dose aspirin.  Pt is conversive, speaking in full sentences without difficulty, abdomen soft/non-distended/non-tender, +b/l le edema states is about her baseline, denies chest pain, lightheadedness, abdominal pain, nausea/vomiting/diarrhea, urinary symptoms, fevers.  IV L AC by EMS.

## 2021-04-01 NOTE — H&P ADULT - PROBLEM SELECTOR PLAN 6
HTNsive on admission.  C/w Amlodipine 5 mg   Hold home Metoprolol succinate in setting of asthma exacerbation  Start Not on high dose steroids or any other medications. CTA shows calcified mediastinal and hilar LN consistent with sarcoidosis.   - F/u Final read on CT regarding fibrotic changes noted on CT scan  - CTM   - Pulm consult

## 2021-04-01 NOTE — H&P ADULT - ATTENDING COMMENTS
65 y F with PMHx of Sarcoidosis, Asthma, Morbid Obesity, HLD and HTN who presents with acute worsening of chronic dyspnea on exertion associated with wheezing for 3 days, found to have new RBBB on EKG, being admitted for acute asthma exacerbation.  Will c/w IV sollumedrol, xopenex.  f/u CTA chest official report; f/u TTE.  d-dimer negative, not likely PE.  Will get pulm consult

## 2021-04-01 NOTE — H&P ADULT - PROBLEM SELECTOR PLAN 4
Hx of mild intermittent asthma, last hospitalization over 5 years. Never been intubated.   -C/w Singulair  -C/w PRN Albuterol  - Albuterol nebs  - Continuous pulse Ox Sinus tachycardia in setting of albuter. Has Hx of persistant tachycardia and palpitations on BB.   -CTA neg for PE  -C/w metoprolol  -Tele monitoring Sinus tachycardia in setting of missed BB, albuterol administration. Has Hx of persistant tachycardia and palpitations on BB.   -CTA neg for PE  -C/w metoprolol  -Tele monitoring Sinus tachycardia in setting of not taking home BB, albuterol administration. Has Hx of persistent tachycardia and palpitations on BB.   -CTA neg for PE  -C/w metoprolol  -Tele monitoring

## 2021-04-01 NOTE — H&P ADULT - HISTORY OF PRESENT ILLNESS
Patient is a 65 y F with PMHx of Sarcoidosis, Asthma, Morbid Obesity, HLD and HTN who presents with worsening shortness of breath for 3 days. Patient notes she has had worsening dyspnea on exertion for the past 3 months, previously was able to walk around her 1 story home without issue and now, becomes short of breath when walking a few steps from her bedroom to the bathroom. Also notes worsening non-painful swelling in her legs for same duration. She normally sleeps with 2 pillows propping her up, noting shortness of breath when lying flat. Denies PND. In the last 3 days, she notes her ALEXANDER is worsening, associated with wheezing, intermittent substernal chest tightness, cough and lightheadedness. Symptoms were at the most frequent night prior to admission, Patient notes she was using her Symbicort more often, 4-5x in the night prior to going to her doctor with little improvement. On day of admission, patient presented as a walk-in to her PMD. An EKG was performed showing new RBBB, which was not seen on her prior EKGs. At that point, PMD told patient to go to the ED.     ED Course: Patient   Patient is a 65 y F with PMHx of Sarcoidosis, Asthma, Morbid Obesity, HLD and HTN who presents with worsening shortness of breath for 3 days. Patient notes she has had worsening dyspnea on exertion for the past 3 months, previously was able to walk around her 1 story home without issue and now, becomes short of breath when walking a few steps from her bedroom to the bathroom.  Also notes worsening non-painful swelling in her legs for same duration. Patient notes she is largely sedentary lying in bed or chair, due to worsening LE swelling. She normally sleeps with 2 pillows propping her up, noting shortness of breath when lying flat. Denies PND, SOB at rest. In the last 3 days, she notes her ALEXANDER is worsening, associated with wheezing, intermittent substernal chest tightness, dry cough, lightheadedness and palpitations. Patient notes she was using her Symbicort more often, 4-5x in the night prior to going to her doctor with little improvement. Did not take any Albuterol because she does not take it unless she feels symptoms when exposed to pollen and other triggers outside of the home. Symptoms became more frequent night prior to admission, prompting her to walk-in to her PMD's office the next day. In her PMD's office, an EKG was performed showing new RBBB, which was not seen on her prior EKGs. At that point, PMD told patient to go to the ED.     Of note, had right heart cath with coronary angiogram in May 2019 following abnormal nuclear stress test (documentation not in EMR). Cath showed non-obstructive CAD. Noted in cardiology notes to have mild MR and diastolic dysfunction.        In ED, vital signs 98.8 F, -124, //66, RR 18-20, SpO2 94% on RA, dropping to 91% on RA upon ambulation. S/p Solumedrol 60 mg IVP x1, Albuterol nebs x 1.  Patient is a 65 y F with PMHx of Sarcoidosis, Asthma, Morbid Obesity, HLD and HTN who presents with worsening shortness of breath for 3 days. Patient notes she has had worsening dyspnea on exertion for the past 3 months, previously was able to walk around her 1 story home without issue and now, becomes short of breath when walking a few steps from her bedroom to the bathroom.  Also notes worsening non-painful swelling in her legs for same duration. Patient notes she is largely sedentary lying in bed or chair, due to worsening LE swelling. She normally sleeps with 2 pillows propping her up, noting shortness of breath when lying flat. Denies PND, SOB at rest. In the last 3 days, she notes her ALEXANDER is worsening, associated with wheezing, intermittent substernal chest tightness, dry cough, lightheadedness and palpitations. Patient notes she was using her Symbicort more often, 4-5x in the night prior to going to her doctor with little improvement. Did not take any Albuterol because she does not take it unless she feels symptoms when exposed to pollen and other triggers outside of the home. Symptoms became more frequent night prior to admission, prompting her to walk-in to her PMD's office the next day. In her PMD's office, an EKG was performed showing new RBBB, which was not seen on her prior EKGs. At that point, PMD told patient to go to the ED. Denies chest pain, abdominal pain, N/V, fevers/chills, recent travel.     Of note, had right heart cath with coronary angiogram in May 2019 following abnormal nuclear stress test (documentation not in EMR). Cath showed non-obstructive CAD. Noted in cardiology notes to have mild MR and diastolic dysfunction.        In ED, vital signs 98.8 F, -124, //66, RR 18-20, SpO2 94% on RA, dropping to 91% on RA upon ambulation. S/p Solumedrol 60 mg IVP x1, Albuterol nebs x 1.

## 2021-04-01 NOTE — H&P ADULT - NSHPLABSRESULTS_GEN_ALL_CORE
04-01    142  |  102  |  16  ----------------------------<  140<H>  3.9   |  28  |  1.08    Ca    9.7      01 Apr 2021 16:44  Phos  2.6     04-01  Mg     2.0     04-01    TPro  7.5  /  Alb  4.1  /  TBili  0.5  /  DBili  x   /  AST  18  /  ALT  22  /  AlkPhos  117  04-01                          12.8   5.97  )-----------( 291      ( 01 Apr 2021 16:44 )             38.0    Troponin T, High Sensitivity Result: 13   Troponin T, High Sensitivity Result: 12   Serum Pro-Brain Natriuretic Peptide: 50 pg/mL  D-Dimer Assay, Quantitative: 221 ng/mL  COVID-19 PCR: NotDetec Labs/imaging/EKG reviewed    04-01    142  |  102  |  16  ----------------------------<  140<H>  3.9   |  28  |  1.08    Ca    9.7      01 Apr 2021 16:44  Phos  2.6     04-01  Mg     2.0     04-01    TPro  7.5  /  Alb  4.1  /  TBili  0.5  /  DBili  x   /  AST  18  /  ALT  22  /  AlkPhos  117  04-01                          12.8   5.97  )-----------( 291      ( 01 Apr 2021 16:44 )             38.0    Troponin T, High Sensitivity Result: 13   Troponin T, High Sensitivity Result: 12   Serum Pro-Brain Natriuretic Peptide: 50 pg/mL  D-Dimer Assay, Quantitative: 221 ng/mL  COVID-19 PCR: NotDetec    CTA chest prelim:   INTERPRETATION:  Evaluation of the segmental pulmonary arteries is limited by multiple artifacts. No main, left, right or lobar pulmonary emboli.    Partially calcified mediastinal and hilar lymph nodes consistent with known history of sarcoidosis.    CXR : clear    EKG: incomplete RBBB

## 2021-04-01 NOTE — ED PROVIDER NOTE - PHYSICAL EXAMINATION
Physical Exam:  Gen: NAD, non-toxic appearing, awake alert   HEENT: normal conjunctiva, oral mucosa moist  Lung: wheezes B/L, speaking in full sentences  CV: RRR, no murmurs, rubs or gallops  Abd: soft, NT, ND, no guarding, no rigidity, no rebound tenderness, no CVA tenderness   MSK: no visible deformities  Neuro: No focal sensory or motor deficits  Skin: Warm, well perfused, no rash, 2+ B/L pitting edema in LE's  Psych: normal affect, calm  ~Pankaj Boudreaux MD (PGY-1)

## 2021-04-01 NOTE — ED PROVIDER NOTE - ATTENDING CONTRIBUTION TO CARE
Patient presenting complaining of shortness of breath.  History of asthma, never intubated for it, last on steroids many years ago.  Also history of sarcoidosis but reporting this usually does not affect her lungs.  Never smoker.  Unsure what triggered this exacerbation.  Received nebs from EMS en-route with some improvement.  Denying chest pains.    A 14 point review of systems is negative except as in HPI or otherwise documented.    Exam:  General: Patient well appearing, tachycardic otherwise vital signs within normal limits  HEENT: airway patent with moist mucous membranes  Cardiac: regular tachycardia S1/S2 with strong peripheral pulses  Respiratory: diffusely wheezy inspiratory and expiratory speaking in 4 word sentences  GI: abdomen soft, non tender, non distended  Neuro: no gross neurologic deficits  Skin: warm, well perfused  Psych: normal mood and affect    Patient presenting with acute asthma exacerbation, unclear trigger - plan for nebs, steroids, labs, COVID-19 testing, CXR.  Will re-evaluate after treatment, may require admission or observation based of response to therapy.

## 2021-04-01 NOTE — ED PROVIDER NOTE - NS ED ROS FT
CONST: no fevers, no chills  ENT: no sore throat  CV: no chest pain, +leg swelling  RESP: +shortness of breath, +cough  ABD: no abdominal pain, no nausea, no vomiting, no diarrhea  : no dysuria, no flank pain, no hematuria  MSK: no back pain, no extremity pain  NEURO: no headache or additional neurologic complaints  SKIN:  no rash

## 2021-04-01 NOTE — H&P ADULT - PROBLEM SELECTOR PLAN 7
Hx of Microcytic anemia, on ferrous sulfate. Denies acute blood loss.  -C/w Ferrous sulfate HTNsive on admission, did not take home anti-hypertensives prior to admission.   C/w Amlodipine 5 mg   C/w Metoprolol succinate

## 2021-04-01 NOTE — H&P ADULT - NSHPPHYSICALEXAM_GEN_ALL_CORE
PHYSICAL EXAM:  GENERAL: NAD, Morbidly Obese female, on RA, speaking in full sentences  HEAD: NC/AT  EYES: EOMI, PERRL, sclera clear  ENMT: No tonsillar erythema, exudates, or enlargement; Moist mucous membranes  NECK: Supple, No JVD,  CHEST/LUNG: Few scattered expiratory wheezes, otherwise CTABL; No rales, rhonchi or rubs  HEART: +Sinus Tachycardia; S1 and S2; No murmurs, rubs, or gallops appreciated  ABDOMEN: Soft, Nontender, Nondistended, Bowel sounds present  EXTREMITIES:+ 1+ pitting edema to the knees b/l. Palpable Peripheral Pulses, No clubbing, cyanosis  LYMPH: No lymphadenopathy noted  SKIN: No rashes or lesions  MSK: no joint swelling or erythema  NEURO: AOx4, Non focal exam PHYSICAL EXAM:  GENERAL: NAD, Morbidly Obese female, on RA, speaking in full sentences  HEAD: NC/AT  EYES: EOMI, PERRL, sclera clear  ENMT: No tonsillar erythema, exudates, or enlargement; Moist mucous membranes  NECK: +Hepatojugular reflex, distended superficial lung veins. Supple, No JVD  CHEST/LUNG: Few scattered expiratory wheezes, otherwise CTABL; No rales, rhonchi or rubs  HEART: +Sinus Tachycardia; S1 and S2; No murmurs, rubs, or gallops appreciated  ABDOMEN: Soft, Nontender, Nondistended, Bowel sounds present  EXTREMITIES:+ 1+ pitting edema to the knees b/l. Palpable Peripheral Pulses, No clubbing, cyanosis  LYMPH: No lymphadenopathy noted  SKIN: No rashes or lesions  MSK: no joint swelling or erythema  NEURO: AOx4, Non focal exam

## 2021-04-01 NOTE — ED PROVIDER NOTE - PROGRESS NOTE DETAILS
Janusz SEGAL (PGY-1)   pt arrived to ED with paperwork from pcp's office stating to "admit to Dr. Day." was informed that Dr. Day follows pt's when they are admitted to hospitalist. Will admit pt to hospitalist

## 2021-04-01 NOTE — ED PROVIDER NOTE - OBJECTIVE STATEMENT
65F PMH HTN, HLD, obesity, sarcoidosis, asthma presenting for SOB for past 4 days. Also endorses some worsening B/L LE swelling with some intermittent coughing. Was at PCP's office (Dr. Burnett) this AM where she was noted to have a new RBBB. Denies chest pain, abdominal pain, urinary symptoms, fevers/chills, recent travel, recent sx

## 2021-04-01 NOTE — H&P ADULT - PROBLEM SELECTOR PROBLEM 4
Asthma, unspecified asthma severity, unspecified whether complicated, unspecified whether persistent Sinus tachycardia

## 2021-04-01 NOTE — H&P ADULT - PROBLEM SELECTOR PLAN 10
Diet: CC/DASH  DVT: Lovenox  Dispo: PT  Vaccinations: Has not received COVID Vaccine, has received flu shot and PSV13  GOC: Full Code

## 2021-04-01 NOTE — H&P ADULT - NSICDXPASTMEDICALHX_GEN_ALL_CORE_FT
PAST MEDICAL HISTORY:  Asthma, unspecified asthma severity, unspecified whether complicated, unspecified whether persistent     Essential hypertension     Glaucoma of both eyes, unspecified glaucoma type     Morbid obesity with BMI of 50.0-59.9, adult     Sarcoidosis

## 2021-04-01 NOTE — H&P ADULT - ASSESSMENT
Patient is a 65 y F with PMHx of Sarcoidosis, Asthma, Morbid Obesity, HLD and HTN who presents with acute worsening of chronic dyspnea on exertion associated with wheezing for 3 days, found to have new RBBB on EKG, concerning for acute asthma exacerbation vs pulmonary hypertension vs HF.  Patient is a 65 y F with PMHx of Sarcoidosis, Asthma, Morbid Obesity, HLD and HTN who presents with acute worsening of chronic dyspnea on exertion associated with wheezing for 3 days, found to have new RBBB on EKG, concerning for acute asthma exacerbation vs pulmonary hypertension vs ILD.

## 2021-04-01 NOTE — ED PROVIDER NOTE - CLINICAL SUMMARY MEDICAL DECISION MAKING FREE TEXT BOX
65F presenting for SOB with worsening LE edema and new RBBB on ECG. Exam with wheezes B/L, VSS  The patient's risk factors for ACS were reviewed as well as the EKG.  The CXR assists in r/o Pneumonia, Pneumothorax, Esophageal Tears.  The patient does not appear to have a Pulmonary Embolism based on the Wells Score and PERC rule and there is no apparent DVT.  There are no signs of Pericarditis, Endocarditis, or Myocarditis based on risk factor analysis.  There is no fever.  There does not appear to be an Aortic Dissection either based on history, physical exam, and signs. Likely asthma exacerbation.  Plan: ekg, troponin, labs, coags, bnp, d-dimer, cardiac monitor, reassess.

## 2021-04-01 NOTE — H&P ADULT - PROBLEM SELECTOR PLAN 1
Acute wheezing, cough, ALEXANDER and cough  -S/p Solumedrol x 1, Albuterol nebs x 1.   -C/w albuterol nebs  -C/w IV Solumedrol 5-7 days  -Continuous pulse ox Acute wheezing, cough, ALEXANDER and cough.  -S/p Solumedrol x 1, Albuterol nebs x 1, with improvement in symptoms.    -Start Xopenex PRN  -C/w IV Solumedrol with transition to PO prednisone   -Continuous pulse ox  - Peak flow Acute wheezing, cough, ALEXANDER and cough for 3 days. Was not taking rescue inhaler with new exacerbation  -S/p Solumedrol x 1, Albuterol nebs x 1, with improvement in symptoms.    -Start Xopenex PRN  -C/w IV Solumedrol with transition to PO prednisone   -Continuous pulse ox  - Peak flow measurements Acute wheezing, cough, ALEXANDER and cough for 3 days. Was not taking rescue inhaler with new exacerbation  -S/p Solumedrol x 1, Albuterol nebs x 1, with improvement in symptoms.    -Start Xopenex PRN  -C/w IV Solumedrol with transition to PO prednisone  -Duonebs q6   -Continuous pulse ox  - Peak flow measurements Acute wheezing, cough, ALEXANDER and cough for 3 days. Was not taking rescue inhaler with new exacerbation  -S/p Solumedrol x 1, Albuterol nebs x 1, with improvement in symptoms.    -Start Xopenex PRN  -C/w IV Solumedrol 40mg q8h for now with transition to PO prednisone  -Duonebs q6   -Continuous pulse ox  - Peak flow measurements  - check VBG

## 2021-04-02 DIAGNOSIS — I45.10 UNSPECIFIED RIGHT BUNDLE-BRANCH BLOCK: ICD-10-CM

## 2021-04-02 DIAGNOSIS — H40.9 UNSPECIFIED GLAUCOMA: ICD-10-CM

## 2021-04-02 DIAGNOSIS — Z29.9 ENCOUNTER FOR PROPHYLACTIC MEASURES, UNSPECIFIED: ICD-10-CM

## 2021-04-02 DIAGNOSIS — R00.0 TACHYCARDIA, UNSPECIFIED: ICD-10-CM

## 2021-04-02 DIAGNOSIS — D50.9 IRON DEFICIENCY ANEMIA, UNSPECIFIED: ICD-10-CM

## 2021-04-02 DIAGNOSIS — D86.9 SARCOIDOSIS, UNSPECIFIED: ICD-10-CM

## 2021-04-02 DIAGNOSIS — J45.901 UNSPECIFIED ASTHMA WITH (ACUTE) EXACERBATION: ICD-10-CM

## 2021-04-02 DIAGNOSIS — J45.909 UNSPECIFIED ASTHMA, UNCOMPLICATED: ICD-10-CM

## 2021-04-02 DIAGNOSIS — I10 ESSENTIAL (PRIMARY) HYPERTENSION: ICD-10-CM

## 2021-04-02 DIAGNOSIS — R06.00 DYSPNEA, UNSPECIFIED: ICD-10-CM

## 2021-04-02 LAB
A1C WITH ESTIMATED AVERAGE GLUCOSE RESULT: 6.2 % — HIGH (ref 4–5.6)
ALBUMIN SERPL ELPH-MCNC: 3.9 G/DL — SIGNIFICANT CHANGE UP (ref 3.3–5)
ALP SERPL-CCNC: 113 U/L — SIGNIFICANT CHANGE UP (ref 40–120)
ALT FLD-CCNC: 21 U/L — SIGNIFICANT CHANGE UP (ref 10–45)
ANION GAP SERPL CALC-SCNC: 11 MMOL/L — SIGNIFICANT CHANGE UP (ref 5–17)
AST SERPL-CCNC: 16 U/L — SIGNIFICANT CHANGE UP (ref 10–40)
BASOPHILS # BLD AUTO: 0.01 K/UL — SIGNIFICANT CHANGE UP (ref 0–0.2)
BASOPHILS NFR BLD AUTO: 0.1 % — SIGNIFICANT CHANGE UP (ref 0–2)
BILIRUB SERPL-MCNC: 0.4 MG/DL — SIGNIFICANT CHANGE UP (ref 0.2–1.2)
BUN SERPL-MCNC: 19 MG/DL — SIGNIFICANT CHANGE UP (ref 7–23)
CALCIUM SERPL-MCNC: 9.5 MG/DL — SIGNIFICANT CHANGE UP (ref 8.4–10.5)
CHLORIDE SERPL-SCNC: 102 MMOL/L — SIGNIFICANT CHANGE UP (ref 96–108)
CHOLEST SERPL-MCNC: 223 MG/DL — HIGH
CO2 SERPL-SCNC: 28 MMOL/L — SIGNIFICANT CHANGE UP (ref 22–31)
COVID-19 SPIKE DOMAIN AB INTERP: NEGATIVE — SIGNIFICANT CHANGE UP
COVID-19 SPIKE DOMAIN ANTIBODY RESULT: 0.4 U/ML — SIGNIFICANT CHANGE UP
CREAT SERPL-MCNC: 1.07 MG/DL — SIGNIFICANT CHANGE UP (ref 0.5–1.3)
EOSINOPHIL # BLD AUTO: 0 K/UL — SIGNIFICANT CHANGE UP (ref 0–0.5)
EOSINOPHIL NFR BLD AUTO: 0 % — SIGNIFICANT CHANGE UP (ref 0–6)
ESTIMATED AVERAGE GLUCOSE: 131 MG/DL — HIGH (ref 68–114)
GAS PNL BLDV: SIGNIFICANT CHANGE UP
GLUCOSE SERPL-MCNC: 148 MG/DL — HIGH (ref 70–99)
HCT VFR BLD CALC: 37.4 % — SIGNIFICANT CHANGE UP (ref 34.5–45)
HCV AB S/CO SERPL IA: 0.07 S/CO — SIGNIFICANT CHANGE UP (ref 0–0.99)
HCV AB SERPL-IMP: SIGNIFICANT CHANGE UP
HDLC SERPL-MCNC: 71 MG/DL — SIGNIFICANT CHANGE UP
HGB BLD-MCNC: 12.7 G/DL — SIGNIFICANT CHANGE UP (ref 11.5–15.5)
IMM GRANULOCYTES NFR BLD AUTO: 0.4 % — SIGNIFICANT CHANGE UP (ref 0–1.5)
LIPID PNL WITH DIRECT LDL SERPL: 138 MG/DL — HIGH
LYMPHOCYTES # BLD AUTO: 0.98 K/UL — LOW (ref 1–3.3)
LYMPHOCYTES # BLD AUTO: 14 % — SIGNIFICANT CHANGE UP (ref 13–44)
MAGNESIUM SERPL-MCNC: 2.2 MG/DL — SIGNIFICANT CHANGE UP (ref 1.6–2.6)
MCHC RBC-ENTMCNC: 26.1 PG — LOW (ref 27–34)
MCHC RBC-ENTMCNC: 34 GM/DL — SIGNIFICANT CHANGE UP (ref 32–36)
MCV RBC AUTO: 77 FL — LOW (ref 80–100)
MONOCYTES # BLD AUTO: 0.25 K/UL — SIGNIFICANT CHANGE UP (ref 0–0.9)
MONOCYTES NFR BLD AUTO: 3.6 % — SIGNIFICANT CHANGE UP (ref 2–14)
NEUTROPHILS # BLD AUTO: 5.75 K/UL — SIGNIFICANT CHANGE UP (ref 1.8–7.4)
NEUTROPHILS NFR BLD AUTO: 81.9 % — HIGH (ref 43–77)
NON HDL CHOLESTEROL: 151 MG/DL — HIGH
NRBC # BLD: 0 /100 WBCS — SIGNIFICANT CHANGE UP (ref 0–0)
PHOSPHATE SERPL-MCNC: 2.2 MG/DL — LOW (ref 2.5–4.5)
PLATELET # BLD AUTO: 313 K/UL — SIGNIFICANT CHANGE UP (ref 150–400)
POTASSIUM SERPL-MCNC: 4.2 MMOL/L — SIGNIFICANT CHANGE UP (ref 3.5–5.3)
POTASSIUM SERPL-SCNC: 4.2 MMOL/L — SIGNIFICANT CHANGE UP (ref 3.5–5.3)
PROT SERPL-MCNC: 7.3 G/DL — SIGNIFICANT CHANGE UP (ref 6–8.3)
RBC # BLD: 4.86 M/UL — SIGNIFICANT CHANGE UP (ref 3.8–5.2)
RBC # FLD: 16 % — HIGH (ref 10.3–14.5)
SARS-COV-2 IGG+IGM SERPL QL IA: 0.4 U/ML — SIGNIFICANT CHANGE UP
SARS-COV-2 IGG+IGM SERPL QL IA: NEGATIVE — SIGNIFICANT CHANGE UP
SODIUM SERPL-SCNC: 141 MMOL/L — SIGNIFICANT CHANGE UP (ref 135–145)
TRIGL SERPL-MCNC: 67 MG/DL — SIGNIFICANT CHANGE UP
WBC # BLD: 7.02 K/UL — SIGNIFICANT CHANGE UP (ref 3.8–10.5)
WBC # FLD AUTO: 7.02 K/UL — SIGNIFICANT CHANGE UP (ref 3.8–10.5)

## 2021-04-02 PROCEDURE — 99223 1ST HOSP IP/OBS HIGH 75: CPT | Mod: GC

## 2021-04-02 PROCEDURE — 99222 1ST HOSP IP/OBS MODERATE 55: CPT

## 2021-04-02 PROCEDURE — 99223 1ST HOSP IP/OBS HIGH 75: CPT

## 2021-04-02 PROCEDURE — 93306 TTE W/DOPPLER COMPLETE: CPT | Mod: 26

## 2021-04-02 RX ORDER — IPRATROPIUM/ALBUTEROL SULFATE 18-103MCG
3 AEROSOL WITH ADAPTER (GRAM) INHALATION EVERY 6 HOURS
Refills: 0 | Status: DISCONTINUED | OUTPATIENT
Start: 2021-04-02 | End: 2021-04-04

## 2021-04-02 RX ORDER — LEVALBUTEROL 1.25 MG/.5ML
0.63 SOLUTION, CONCENTRATE RESPIRATORY (INHALATION) EVERY 6 HOURS
Refills: 0 | Status: DISCONTINUED | OUTPATIENT
Start: 2021-04-02 | End: 2021-04-05

## 2021-04-02 RX ORDER — SODIUM,POTASSIUM PHOSPHATES 278-250MG
2 POWDER IN PACKET (EA) ORAL EVERY 4 HOURS
Refills: 0 | Status: COMPLETED | OUTPATIENT
Start: 2021-04-02 | End: 2021-04-02

## 2021-04-02 RX ORDER — ENOXAPARIN SODIUM 100 MG/ML
40 INJECTION SUBCUTANEOUS DAILY
Refills: 0 | Status: DISCONTINUED | OUTPATIENT
Start: 2021-04-02 | End: 2021-04-05

## 2021-04-02 RX ADMIN — AMLODIPINE BESYLATE 5 MILLIGRAM(S): 2.5 TABLET ORAL at 05:45

## 2021-04-02 RX ADMIN — BRIMONIDINE TARTRATE 1 DROP(S): 2 SOLUTION/ DROPS OPHTHALMIC at 00:36

## 2021-04-02 RX ADMIN — Medication 50 MILLIGRAM(S): at 05:46

## 2021-04-02 RX ADMIN — Medication 40 MILLIGRAM(S): at 05:46

## 2021-04-02 RX ADMIN — Medication 40 MILLIGRAM(S): at 21:25

## 2021-04-02 RX ADMIN — Medication 40 MILLIGRAM(S): at 14:16

## 2021-04-02 RX ADMIN — BRIMONIDINE TARTRATE 1 DROP(S): 2 SOLUTION/ DROPS OPHTHALMIC at 05:47

## 2021-04-02 RX ADMIN — Medication 3 MILLILITER(S): at 23:15

## 2021-04-02 RX ADMIN — Medication 2 PACKET(S): at 14:15

## 2021-04-02 RX ADMIN — DORZOLAMIDE HYDROCHLORIDE 1 DROP(S): 20 SOLUTION/ DROPS OPHTHALMIC at 00:36

## 2021-04-02 RX ADMIN — DORZOLAMIDE HYDROCHLORIDE 1 DROP(S): 20 SOLUTION/ DROPS OPHTHALMIC at 22:05

## 2021-04-02 RX ADMIN — Medication 3 MILLILITER(S): at 05:46

## 2021-04-02 RX ADMIN — DORZOLAMIDE HYDROCHLORIDE 1 DROP(S): 20 SOLUTION/ DROPS OPHTHALMIC at 11:57

## 2021-04-02 RX ADMIN — Medication 2 PACKET(S): at 11:39

## 2021-04-02 RX ADMIN — ENOXAPARIN SODIUM 40 MILLIGRAM(S): 100 INJECTION SUBCUTANEOUS at 11:40

## 2021-04-02 RX ADMIN — BUDESONIDE AND FORMOTEROL FUMARATE DIHYDRATE 2 PUFF(S): 160; 4.5 AEROSOL RESPIRATORY (INHALATION) at 17:43

## 2021-04-02 RX ADMIN — MONTELUKAST 10 MILLIGRAM(S): 4 TABLET, CHEWABLE ORAL at 21:25

## 2021-04-02 RX ADMIN — BRIMONIDINE TARTRATE 1 DROP(S): 2 SOLUTION/ DROPS OPHTHALMIC at 17:44

## 2021-04-02 RX ADMIN — Medication 81 MILLIGRAM(S): at 05:45

## 2021-04-02 RX ADMIN — Medication 3 MILLILITER(S): at 11:39

## 2021-04-02 RX ADMIN — Medication 325 MILLIGRAM(S): at 11:39

## 2021-04-02 RX ADMIN — LEVALBUTEROL 0.63 MILLIGRAM(S): 1.25 SOLUTION, CONCENTRATE RESPIRATORY (INHALATION) at 17:43

## 2021-04-02 RX ADMIN — BUDESONIDE AND FORMOTEROL FUMARATE DIHYDRATE 2 PUFF(S): 160; 4.5 AEROSOL RESPIRATORY (INHALATION) at 05:46

## 2021-04-02 RX ADMIN — Medication 3 MILLILITER(S): at 17:44

## 2021-04-02 RX ADMIN — LATANOPROST 1 DROP(S): 0.05 SOLUTION/ DROPS OPHTHALMIC; TOPICAL at 23:18

## 2021-04-02 RX ADMIN — MONTELUKAST 10 MILLIGRAM(S): 4 TABLET, CHEWABLE ORAL at 00:37

## 2021-04-02 RX ADMIN — LATANOPROST 1 DROP(S): 0.05 SOLUTION/ DROPS OPHTHALMIC; TOPICAL at 00:35

## 2021-04-02 NOTE — PROGRESS NOTE ADULT - PROBLEM SELECTOR PLAN 4
Sinus tachycardia in setting of not taking home BB, albuterol administration. Has Hx of persistent tachycardia and palpitations on BB.   -CTA neg for PE  -C/w metoprolol  -Tele monitoring

## 2021-04-02 NOTE — PROGRESS NOTE ADULT - ASSESSMENT
Patient is a 65 y F with PMHx of Sarcoidosis, Asthma, Morbid Obesity, HLD and HTN who presents with acute worsening of chronic dyspnea on exertion associated with wheezing for 3 days, found to have new RBBB on EKG, concerning for acute asthma exacerbation vs pulmonary hypertension vs ILD.

## 2021-04-02 NOTE — PHYSICAL THERAPY INITIAL EVALUATION ADULT - PERTINENT HX OF CURRENT PROBLEM, REHAB EVAL
65F PMH HTN, HLD, obesity, sarcoidosis, asthma presenting for SOB for past 4 days and worsening B/L LE swelling with some intermittent coughing. Pt was at PCP's morning of admit where pt noted to have new RBBB on EKG, concerning for acute asthma exacerbation vs pulmonary hypertension. Denies chest pain, abdominal pain, urinary symptoms, fevers/chills, recent travel, recent sx.

## 2021-04-02 NOTE — PROGRESS NOTE ADULT - PROBLEM SELECTOR PLAN 5
Hx of mild intermittent asthma, last hospitalization over 5 years. Never been intubated.   -C/w Singulair  -C/w PRN Xopenex, holding home Albuterol  - Albuterol nebs  - Continuous pulse Ox

## 2021-04-02 NOTE — PROGRESS NOTE ADULT - PROBLEM SELECTOR PLAN 2
Acute on chronic worsening ALEXANDER. Spirometry 2/2020 showing obstructive pattern with moderate reduction flow rates. Pulmonary walk test Sept 2020 showed desaturation to 89% on RA.   - Previous RHC showed normal coronaries with mod MR and diastolic dysfunction  - HF exacerbation less likely given low BNP, even in setting of obesity  - F/u final read regarding ? fibrotic changes noted on CT w/?bilateral GGO, concerning for ILD Consider Pulm consult in AM.   - COVID negative; no fever, leukocytosis, cough, hypoxia to suggest infectious process  - F/u TTE to r/o cardiomyopathy, valvulopathy, etc  - LHC/ RHC for complete ischemic workup following TTE and suspected pulmonary HTN (cor pulmonale) respectively  - Tele and continuous pulse ox

## 2021-04-02 NOTE — CONSULT NOTE ADULT - SUBJECTIVE AND OBJECTIVE BOX
Patient seen and evaluated at bedside    Chief Complaint: dyspnea    HPI:  Patient is a 65 y F with PMHx of Sarcoidosis, Asthma, Morbid Obesity, HLD and HTN who presents with worsening shortness of breath for 3 days. In her PMD's office, an EKG was performed showing new RBBB, which was not seen on her prior EKGs. At that point, PMD told patient to go to the ED. Denies chest pain, abdominal pain, N/V, fevers/chills, recent travel. Cardiology consulted.    PMHx:   Asthma, unspecified asthma severity, unspecified whether complicated, unspecified whether persistent    Sarcoidosis    Essential hypertension    Glaucoma of both eyes, unspecified glaucoma type    Morbid obesity with BMI of 50.0-59.9, adult      PSHx:   History of hysterectomy for benign disease    S/P cataract surgery      FAMILY HISTORY:  No pertinent family history in first degree relatives      Allergies:  No Known Allergies    Home Medications:  Alphagan P 0.1% ophthalmic solution: to each affected eye 2 times a day (01 Apr 2021 22:21)  amLODIPine 5 mg oral tablet: 1 tab(s) orally once a day (01 Apr 2021 22:21)  Aspirin Enteric Coated 81 mg oral delayed release tablet: 1 tab(s) orally once a day (01 Apr 2021 22:21)  dorzolamide 2% ophthalmic solution: 1 drop(s) to each affected eye 2 times a day (01 Apr 2021 22:21)  ferrous sulfate 325 mg (65 mg elemental iron) oral delayed release tablet: 1 tab(s) orally once a day (01 Apr 2021 22:21)  latanoprost 0.005% ophthalmic solution: 1 drop(s) to each affected eye 2 times a day (01 Apr 2021 22:21)  metoprolol succinate 50 mg oral tablet, extended release: 1 tab(s) orally once a day (01 Apr 2021 22:21)  montelukast 10 mg oral tablet: 1 tab(s) orally once a day (01 Apr 2021 22:21)  ProAir HFA 90 mcg/inh inhalation aerosol: 2 puff(s) inhaled 4 times a day, As Needed (01 Apr 2021 22:21)  Symbicort 160 mcg-4.5 mcg/inh inhalation aerosol: 2 puff(s) inhaled 2 times a day (01 Apr 2021 22:21)  Vitamin D2 50,000 intl units (1.25 mg) oral capsule: 1 cap(s) orally once a day (01 Apr 2021 22:21)    Current Medications:   albuterol/ipratropium for Nebulization 3 milliLiter(s) Nebulizer every 6 hours  amLODIPine   Tablet 5 milliGRAM(s) Oral daily  aspirin enteric coated 81 milliGRAM(s) Oral daily  brimonidine 0.2% Ophthalmic Solution 1 Drop(s) Both EYES two times a day  budesonide 160 MICROgram(s)/formoterol 4.5 MICROgram(s) Inhaler 2 Puff(s) Inhalation two times a day  dorzolamide 2% Ophthalmic Solution 1 Drop(s) Both EYES <User Schedule>  enoxaparin Injectable 40 milliGRAM(s) SubCutaneous daily  ergocalciferol 57204 Unit(s) Oral <User Schedule>  ferrous    sulfate 325 milliGRAM(s) Oral daily  latanoprost 0.005% Ophthalmic Solution 1 Drop(s) Both EYES every 24 hours  levalbuterol Inhalation 0.63 milliGRAM(s) Inhalation every 6 hours PRN  methylPREDNISolone sodium succinate Injectable 40 milliGRAM(s) IV Push every 8 hours  metoprolol succinate ER 50 milliGRAM(s) Oral daily  montelukast 10 milliGRAM(s) Oral daily  potassium phosphate / sodium phosphate Powder (PHOS-NaK) 2 Packet(s) Oral every 4 hours    Social History  Smoking History: denies  Alcohol Use: denies  Drug Use: denies    REVIEW OF SYSTEMS:  Constitutional:     [X] negative [ ] fevers [ ] chills [ ] weight loss [ ] weight gain  HEENT:                  [X] negative [ ] dry eyes [ ] eye irritation [ ] postnasal drip [ ] nasal congestion  CV:                         [X] negative  [ ] chest pain [ ] orthopnea [ ] palpitations [ ] murmur  Resp:                     [X] negative [ ] cough [ ] shortness of breath [ ] wheezing [ ] sputum [ ] hemoptysis  GI:                          [X] negative [ ] nausea [ ] vomiting [ ] diarrhea [ ] constipation [ ] abd pain [ ] dysphagia   :                        [X] negative [ ] dysuria [ ] nocturia [ ] hematuria [ ] increased urinary frequency  MSK:                      [X] negative [ ] back pain [ ] myalgias [ ] arthralgias [ ] fracture  Skin:                       [X] negative [ ] rash [ ] itch  Neuro:                   [X] negative [ ] headache [ ] dizziness [ ] syncope [ ] weakness [ ] numbness  Psych:                    [X] negative [ ] anxiety [ ] depression  Endo:                     [X] negative [ ] diabetes [ ] thyroid problem  Heme/Lymph:      [X] negative [ ] anemia [ ] bleeding problem  Allergic/Immune: [X] negative [ ] itchy eyes [ ] nasal discharge [ ] hives [ ] angioedema    [X] All other systems negative or otherwise described above.  [ ] Unable to assess ROS because ________.    ICU Vital Signs Last 24 Hrs  T(C): 36.9 (02 Apr 2021 09:16), Max: 37.6 (01 Apr 2021 21:59)  T(F): 98.4 (02 Apr 2021 09:16), Max: 99.7 (01 Apr 2021 21:59)  HR: 100 (02 Apr 2021 09:16) (100 - 124)  BP: 120/47 (02 Apr 2021 09:16) (120/47 - 166/72)  BP(mean): --  ABP: --  ABP(mean): --  RR: 16 (02 Apr 2021 09:16) (16 - 20)  SpO2: 94% (02 Apr 2021 09:16) (94% - 100%)    Daily Height in cm: 167.64 (01 Apr 2021 21:59)    Daily     Physical Exam:  GENERAL: No acute distress, well-developed  HEAD:  Atraumatic, Normocephalic  ENT: EOMI, conjunctiva and sclera clear, Neck supple, No JVD, moist mucosa  CHEST/LUNG: Clear to auscultation bilaterally; No wheeze, equal breath sounds bilaterally   BACK: No spinal tenderness  HEART: Regular rate and rhythm; No murmurs, rubs, or gallops, radial and DP 2+ b/l, euvolemic  ABDOMEN: Soft, Nontender, Nondistended  EXTREMITIES:  No clubbing, cyanosis, or edema  PSYCH: Nl behavior, nl affect  NEUROLOGY: AAOx3, non-focal  SKIN: Normal color, No rashes or lesions  LINES: no central lines present    Cardiovascular Diagnostic Testing    ECG: Personally reviewed    Echo: Personally reviewed    Stress Testing: none    Cath: none    Imaging: none    CXR: Personally reviewed    Labs: Personally reviewed                        12.7   7.02  )-----------( 313      ( 02 Apr 2021 04:58 )             37.4     04-02    141  |  102  |  19  ----------------------------<  148<H>  4.2   |  28  |  1.07    Ca    9.5      02 Apr 2021 04:58  Phos  2.2     04-02  Mg     2.2     04-02    TPro  7.3  /  Alb  3.9  /  TBili  0.4  /  DBili  x   /  AST  16  /  ALT  21  /  AlkPhos  113  04-02    PT/INR - ( 01 Apr 2021 16:44 )   PT: 12.6 sec;   INR: 1.05 ratio         PTT - ( 01 Apr 2021 16:44 )  PTT:30.7 sec  Serum Pro-Brain Natriuretic Peptide: 50 pg/mL (04-01 @ 16:44)      < from: Cardiac Cath Lab - Adult (05.24.19 @ 17:56) >  VENTRICLES: No left ventriculogram was performed.  CORONARY VESSELS: The coronary circulation is right dominant.  LM:   --  LM: Normal.  LAD:   --  LAD: Normal.  CX:   --  Circumflex: Normal.  RCA:   --  RCA: Normal.  COMPLICATIONS: There were no complications.  DIAGNOSTIC RECOMMENDATIONS: Medical management is recommended.  Prepared and signed by  Cookie Lawton M.D.  Signed 05/24/2019 20:08:53    < end of copied text >      < from: CT Angio Chest w/ IV Cont (04.01.21 @ 22:33) >  IMPRESSION:    1. No pulmonary embolus, however the segmental and subsegmental divisions are not well evaluated.  2. Calcified and non calcified chest lymph nodes can be secondary to history of sarcoidosis.    < end of copied text >   Patient seen and evaluated at bedside    Chief Complaint: dyspnea    HPI:  Patient is a 65 y F with PMHx of Sarcoidosis, Asthma, Morbid Obesity, HLD and HTN who presents with worsening shortness of breath for 3 days. In her PMD's office, an EKG was performed showing new RBBB, which was not seen on her prior EKGs. At that point, PMD told patient to go to the ED. Denies chest pain, abdominal pain, N/V, fevers/chills, recent travel. Cardiology consulted.    PMHx:   Asthma, unspecified asthma severity, unspecified whether complicated, unspecified whether persistent    Sarcoidosis    Essential hypertension    Glaucoma of both eyes, unspecified glaucoma type    Morbid obesity with BMI of 50.0-59.9, adult      PSHx:   History of hysterectomy for benign disease    S/P cataract surgery      FAMILY HISTORY:  No pertinent family history in first degree relatives      Allergies:  No Known Allergies    Home Medications:  Alphagan P 0.1% ophthalmic solution: to each affected eye 2 times a day (01 Apr 2021 22:21)  amLODIPine 5 mg oral tablet: 1 tab(s) orally once a day (01 Apr 2021 22:21)  Aspirin Enteric Coated 81 mg oral delayed release tablet: 1 tab(s) orally once a day (01 Apr 2021 22:21)  dorzolamide 2% ophthalmic solution: 1 drop(s) to each affected eye 2 times a day (01 Apr 2021 22:21)  ferrous sulfate 325 mg (65 mg elemental iron) oral delayed release tablet: 1 tab(s) orally once a day (01 Apr 2021 22:21)  latanoprost 0.005% ophthalmic solution: 1 drop(s) to each affected eye 2 times a day (01 Apr 2021 22:21)  metoprolol succinate 50 mg oral tablet, extended release: 1 tab(s) orally once a day (01 Apr 2021 22:21)  montelukast 10 mg oral tablet: 1 tab(s) orally once a day (01 Apr 2021 22:21)  ProAir HFA 90 mcg/inh inhalation aerosol: 2 puff(s) inhaled 4 times a day, As Needed (01 Apr 2021 22:21)  Symbicort 160 mcg-4.5 mcg/inh inhalation aerosol: 2 puff(s) inhaled 2 times a day (01 Apr 2021 22:21)  Vitamin D2 50,000 intl units (1.25 mg) oral capsule: 1 cap(s) orally once a day (01 Apr 2021 22:21)    Current Medications:   albuterol/ipratropium for Nebulization 3 milliLiter(s) Nebulizer every 6 hours  amLODIPine   Tablet 5 milliGRAM(s) Oral daily  aspirin enteric coated 81 milliGRAM(s) Oral daily  brimonidine 0.2% Ophthalmic Solution 1 Drop(s) Both EYES two times a day  budesonide 160 MICROgram(s)/formoterol 4.5 MICROgram(s) Inhaler 2 Puff(s) Inhalation two times a day  dorzolamide 2% Ophthalmic Solution 1 Drop(s) Both EYES <User Schedule>  enoxaparin Injectable 40 milliGRAM(s) SubCutaneous daily  ergocalciferol 83948 Unit(s) Oral <User Schedule>  ferrous    sulfate 325 milliGRAM(s) Oral daily  latanoprost 0.005% Ophthalmic Solution 1 Drop(s) Both EYES every 24 hours  levalbuterol Inhalation 0.63 milliGRAM(s) Inhalation every 6 hours PRN  methylPREDNISolone sodium succinate Injectable 40 milliGRAM(s) IV Push every 8 hours  metoprolol succinate ER 50 milliGRAM(s) Oral daily  montelukast 10 milliGRAM(s) Oral daily  potassium phosphate / sodium phosphate Powder (PHOS-NaK) 2 Packet(s) Oral every 4 hours    Social History  Smoking History: denies  Alcohol Use: denies  Drug Use: denies    REVIEW OF SYSTEMS:  Constitutional:     [X] negative [ ] fevers [ ] chills [ ] weight loss [ ] weight gain  HEENT:                  [X] negative [ ] dry eyes [ ] eye irritation [ ] postnasal drip [ ] nasal congestion  CV:                         [X] negative  [ ] chest pain [ ] orthopnea [ ] palpitations [ ] murmur  Resp:                     [ ] negative [ ] cough [X] shortness of breath [ ] wheezing [ ] sputum [ ] hemoptysis  GI:                          [X] negative [ ] nausea [ ] vomiting [ ] diarrhea [ ] constipation [ ] abd pain [ ] dysphagia   :                        [X] negative [ ] dysuria [ ] nocturia [ ] hematuria [ ] increased urinary frequency  MSK:                      [X] negative [ ] back pain [ ] myalgias [ ] arthralgias [ ] fracture  Skin:                       [X] negative [ ] rash [ ] itch  Neuro:                   [X] negative [ ] headache [ ] dizziness [ ] syncope [ ] weakness [ ] numbness  Psych:                    [X] negative [ ] anxiety [ ] depression  Endo:                     [X] negative [ ] diabetes [ ] thyroid problem  Heme/Lymph:      [X] negative [ ] anemia [ ] bleeding problem  Allergic/Immune: [X] negative [ ] itchy eyes [ ] nasal discharge [ ] hives [ ] angioedema    [X] All other systems negative or otherwise described above.  [ ] Unable to assess ROS because ________.    ICU Vital Signs Last 24 Hrs  T(C): 36.9 (02 Apr 2021 09:16), Max: 37.6 (01 Apr 2021 21:59)  T(F): 98.4 (02 Apr 2021 09:16), Max: 99.7 (01 Apr 2021 21:59)  HR: 100 (02 Apr 2021 09:16) (100 - 124)  BP: 120/47 (02 Apr 2021 09:16) (120/47 - 166/72)  BP(mean): --  ABP: --  ABP(mean): --  RR: 16 (02 Apr 2021 09:16) (16 - 20)  SpO2: 94% (02 Apr 2021 09:16) (94% - 100%)    Daily Height in cm: 167.64 (01 Apr 2021 21:59)    Daily     Physical Exam:  GENERAL: No acute distress, well-developed  HEAD:  Atraumatic, Normocephalic  ENT: EOMI, conjunctiva and sclera clear, Neck supple, No JVD, moist mucosa  CHEST/LUNG: Clear to auscultation bilaterally; No wheeze, equal breath sounds bilaterally   BACK: No spinal tenderness  HEART: Regular rhythm; tachycardic; No murmurs, rubs, or gallops, radial and DP 2+ b/l, euvolemic  ABDOMEN: Soft, Nontender, obese  EXTREMITIES:  No clubbing, cyanosis, or edema  PSYCH: Nl behavior, nl affect  NEUROLOGY: AAOx3, non-focal  SKIN: Normal color, No rashes or lesions  LINES: no central lines present    Cardiovascular Diagnostic Testing    ECG: Personally reviewed    Echo: Personally reviewed    Stress Testing: none    Cath: none    Imaging: none    CXR: Personally reviewed    Labs: Personally reviewed                        12.7   7.02  )-----------( 313      ( 02 Apr 2021 04:58 )             37.4     04-02    141  |  102  |  19  ----------------------------<  148<H>  4.2   |  28  |  1.07    Ca    9.5      02 Apr 2021 04:58  Phos  2.2     04-02  Mg     2.2     04-02    TPro  7.3  /  Alb  3.9  /  TBili  0.4  /  DBili  x   /  AST  16  /  ALT  21  /  AlkPhos  113  04-02    PT/INR - ( 01 Apr 2021 16:44 )   PT: 12.6 sec;   INR: 1.05 ratio         PTT - ( 01 Apr 2021 16:44 )  PTT:30.7 sec  Serum Pro-Brain Natriuretic Peptide: 50 pg/mL (04-01 @ 16:44)      < from: Cardiac Cath Lab - Adult (05.24.19 @ 17:56) >  VENTRICLES: No left ventriculogram was performed.  CORONARY VESSELS: The coronary circulation is right dominant.  LM:   --  LM: Normal.  LAD:   --  LAD: Normal.  CX:   --  Circumflex: Normal.  RCA:   --  RCA: Normal.  COMPLICATIONS: There were no complications.  DIAGNOSTIC RECOMMENDATIONS: Medical management is recommended.  Prepared and signed by  Cookie Lawton M.D.  Signed 05/24/2019 20:08:53    < end of copied text >      < from: CT Angio Chest w/ IV Cont (04.01.21 @ 22:33) >  IMPRESSION:    1. No pulmonary embolus, however the segmental and subsegmental divisions are not well evaluated.  2. Calcified and non calcified chest lymph nodes can be secondary to history of sarcoidosis.    < end of copied text >   Patient seen and evaluated at bedside    Chief Complaint: dyspnea    HPI:  Patient is a 65 y F with PMHx of Sarcoidosis, Asthma, Morbid Obesity, HLD and HTN who presents with worsening shortness of breath for 3 days. In her PMD's office, an EKG was performed showing new RBBB, which was not seen on her prior EKGs. At that point, PMD told patient to go to the ED. Denies chest pain, abdominal pain, N/V, fevers/chills, recent travel. Cardiology consulted.    PMHx:   Asthma, unspecified asthma severity, unspecified whether complicated, unspecified whether persistent  Sarcoidosis  Essential hypertension  Glaucoma of both eyes, unspecified glaucoma type  Morbid obesity with BMI of 50.0-59.9, adult    PSHx:   History of hysterectomy for benign disease  S/P cataract surgery    FAMILY HISTORY:  No pertinent family history in first degree relatives    Allergies:  No Known Allergies    Home Medications:  Alphagan P 0.1% ophthalmic solution: to each affected eye 2 times a day (01 Apr 2021 22:21)  amLODIPine 5 mg oral tablet: 1 tab(s) orally once a day (01 Apr 2021 22:21)  Aspirin Enteric Coated 81 mg oral delayed release tablet: 1 tab(s) orally once a day (01 Apr 2021 22:21)  dorzolamide 2% ophthalmic solution: 1 drop(s) to each affected eye 2 times a day (01 Apr 2021 22:21)  ferrous sulfate 325 mg (65 mg elemental iron) oral delayed release tablet: 1 tab(s) orally once a day (01 Apr 2021 22:21)  latanoprost 0.005% ophthalmic solution: 1 drop(s) to each affected eye 2 times a day (01 Apr 2021 22:21)  metoprolol succinate 50 mg oral tablet, extended release: 1 tab(s) orally once a day (01 Apr 2021 22:21)  montelukast 10 mg oral tablet: 1 tab(s) orally once a day (01 Apr 2021 22:21)  ProAir HFA 90 mcg/inh inhalation aerosol: 2 puff(s) inhaled 4 times a day, As Needed (01 Apr 2021 22:21)  Symbicort 160 mcg-4.5 mcg/inh inhalation aerosol: 2 puff(s) inhaled 2 times a day (01 Apr 2021 22:21)  Vitamin D2 50,000 intl units (1.25 mg) oral capsule: 1 cap(s) orally once a day (01 Apr 2021 22:21)    Current Medications:   albuterol/ipratropium for Nebulization 3 milliLiter(s) Nebulizer every 6 hours  amLODIPine   Tablet 5 milliGRAM(s) Oral daily  aspirin enteric coated 81 milliGRAM(s) Oral daily  brimonidine 0.2% Ophthalmic Solution 1 Drop(s) Both EYES two times a day  budesonide 160 MICROgram(s)/formoterol 4.5 MICROgram(s) Inhaler 2 Puff(s) Inhalation two times a day  dorzolamide 2% Ophthalmic Solution 1 Drop(s) Both EYES <User Schedule>  enoxaparin Injectable 40 milliGRAM(s) SubCutaneous daily  ergocalciferol 62380 Unit(s) Oral <User Schedule>  ferrous    sulfate 325 milliGRAM(s) Oral daily  latanoprost 0.005% Ophthalmic Solution 1 Drop(s) Both EYES every 24 hours  levalbuterol Inhalation 0.63 milliGRAM(s) Inhalation every 6 hours PRN  methylPREDNISolone sodium succinate Injectable 40 milliGRAM(s) IV Push every 8 hours  metoprolol succinate ER 50 milliGRAM(s) Oral daily  montelukast 10 milliGRAM(s) Oral daily  potassium phosphate / sodium phosphate Powder (PHOS-NaK) 2 Packet(s) Oral every 4 hours    Social History  Smoking History: denies  Alcohol Use: denies  Drug Use: denies    REVIEW OF SYSTEMS:  Constitutional:     [X] negative [ ] fevers [ ] chills [ ] weight loss [ ] weight gain  HEENT:                  [X] negative [ ] dry eyes [ ] eye irritation [ ] postnasal drip [ ] nasal congestion  CV:                         [X] negative  [ ] chest pain [ ] orthopnea [ ] palpitations [ ] murmur  Resp:                     [ ] negative [ ] cough [X] shortness of breath [ ] wheezing [ ] sputum [ ] hemoptysis  GI:                          [X] negative [ ] nausea [ ] vomiting [ ] diarrhea [ ] constipation [ ] abd pain [ ] dysphagia   :                        [X] negative [ ] dysuria [ ] nocturia [ ] hematuria [ ] increased urinary frequency  MSK:                      [X] negative [ ] back pain [ ] myalgias [ ] arthralgias [ ] fracture  Skin:                       [X] negative [ ] rash [ ] itch  Neuro:                   [X] negative [ ] headache [ ] dizziness [ ] syncope [ ] weakness [ ] numbness  Psych:                    [X] negative [ ] anxiety [ ] depression  Endo:                     [X] negative [ ] diabetes [ ] thyroid problem  Heme/Lymph:      [X] negative [ ] anemia [ ] bleeding problem  Allergic/Immune: [X] negative [ ] itchy eyes [ ] nasal discharge [ ] hives [ ] angioedema    [X] All other systems negative or otherwise described above.  [ ] Unable to assess ROS because ________.    ICU Vital Signs Last 24 Hrs  T(C): 36.9 (02 Apr 2021 09:16), Max: 37.6 (01 Apr 2021 21:59)  T(F): 98.4 (02 Apr 2021 09:16), Max: 99.7 (01 Apr 2021 21:59)  HR: 100 (02 Apr 2021 09:16) (100 - 124)  BP: 120/47 (02 Apr 2021 09:16) (120/47 - 166/72)  BP(mean): --  RR: 16 (02 Apr 2021 09:16) (16 - 20)  SpO2: 94% (02 Apr 2021 09:16) (94% - 100%)    Daily Height in cm: 167.64 (01 Apr 2021 21:59)    Daily     Physical Exam:  GENERAL: No acute distress, well-developed  HEAD:  Atraumatic, Normocephalic  ENT: EOMI, conjunctiva and sclera clear, Neck supple, No JVD, moist mucosa  CHEST/LUNG: Clear to auscultation bilaterally; No wheeze, equal breath sounds bilaterally   BACK: No spinal tenderness  HEART: Regular rhythm; tachycardic; No murmurs, rubs, or gallops, radial and DP 2+ b/l, euvolemic  ABDOMEN: Soft, Nontender, obese  EXTREMITIES:  No clubbing, cyanosis, or edema  PSYCH: Nl behavior, nl affect  NEUROLOGY: AAOx3, non-focal  SKIN: Normal color, No rashes or lesions  LINES: no central lines present    Cardiovascular Diagnostic Testing    ECG: Personally reviewed    Echo: Personally reviewed    Stress Testing: none    Cath: none    Imaging: none    CXR: Personally reviewed    Labs: Personally reviewed                        12.7   7.02  )-----------( 313      ( 02 Apr 2021 04:58 )             37.4     04-02    141  |  102  |  19  ----------------------------<  148<H>  4.2   |  28  |  1.07    Ca    9.5      02 Apr 2021 04:58  Phos  2.2     04-02  Mg     2.2     04-02    TPro  7.3  /  Alb  3.9  /  TBili  0.4  /  DBili  x   /  AST  16  /  ALT  21  /  AlkPhos  113  04-02    PT/INR - ( 01 Apr 2021 16:44 )   PT: 12.6 sec;   INR: 1.05 ratio       PTT - ( 01 Apr 2021 16:44 )  PTT:30.7 sec  Serum Pro-Brain Natriuretic Peptide: 50 pg/mL (04-01 @ 16:44)    < from: Cardiac Cath Lab - Adult (05.24.19 @ 17:56) >  VENTRICLES: No left ventriculogram was performed.  CORONARY VESSELS: The coronary circulation is right dominant.  LM:   --  LM: Normal.  LAD:   --  LAD: Normal.  CX:   --  Circumflex: Normal.  RCA:   --  RCA: Normal.  COMPLICATIONS: There were no complications.  DIAGNOSTIC RECOMMENDATIONS: Medical management is recommended.  Prepared and signed by  Cookie Lawton M.D.  Signed 05/24/2019 20:08:53    < end of copied text >    < from: CT Angio Chest w/ IV Cont (04.01.21 @ 22:33) >  IMPRESSION:    1. No pulmonary embolus, however the segmental and subsegmental divisions are not well evaluated.  2. Calcified and non calcified chest lymph nodes can be secondary to history of sarcoidosis.    < end of copied text >

## 2021-04-02 NOTE — PROGRESS NOTE ADULT - PROBLEM SELECTOR PLAN 7
HTNsive on admission, did not take home anti-hypertensives prior to admission.   C/w Amlodipine 5 mg   C/w Metoprolol succinate

## 2021-04-02 NOTE — PROGRESS NOTE ADULT - PROBLEM SELECTOR PLAN 6
Not on high dose steroids or any other medications. CTA shows calcified mediastinal and hilar LN consistent with sarcoidosis.   - F/u Final read on CT regarding fibrotic changes noted on CT scan  - CTM   - Pulm consult

## 2021-04-02 NOTE — PROGRESS NOTE ADULT - PROBLEM SELECTOR PLAN 8
Hx of Microcytic anemia, on ferrous sulfate. Denies acute blood loss, melena or hematochezia.  -C/w Ferrous sulfate

## 2021-04-02 NOTE — PHYSICAL THERAPY INITIAL EVALUATION ADULT - LIVES WITH, PROFILE
Pt lives alone in apartment with elevator access. Prior to admission pt independent with all functional mobility including ambulation with straight cane 2/2 BLE arthritis. Pt with HHA assist for cleanng & cooking, otherwise independent with ADL's./alone

## 2021-04-02 NOTE — CONSULT NOTE ADULT - ATTENDING COMMENTS
65 year old female with past medical history of Sarcoidosis, Asthma, Morbid Obesity, HLD, and HTN presenting with asthma exacerbation and progressing incomplete RBBB. ECG 2019 with QRS measuring 102 ms incomplete RBBB. Multiple primary pulmonary medical issues that will increase RV volume and pressure load which can ultimately lead to right sided conduction defects. The actualy conduction defect itself of incomplete/complete RBBB is mostly benign; the etiology may not be. Ischemic evaluation recently done cardiac cath 2019 with nonobstructive CAD. No plans for additional ischemic evaluation. Continue management of primary pulmonary illnesses. Please call with questions. Signing off.     Irene Bonilla MD, MPH, VALDO, RPVI, FACC  Cardiovascular Specialist   [facility] ([facility])  [facility]  c: [phone] (text preferred and/or call)  e: [email or web address]  For all [facility] Cardiology and Cardiovascular Surgery on-service contact/call information, go to [email or web address] and use ""[email or web address]"" to login.  For outpatient Cardiology appointments, call  [phone] to arrange with a colleague; I do not have outpatient Cardiology clinic. 65 year old female with past medical history of Sarcoidosis, Asthma, Morbid Obesity, HLD, and HTN presenting with asthma exacerbation and progressing incomplete RBBB. ECG 2019 with QRS measuring 102 ms incomplete RBBB. All ECGs here continue to demonstrate incomplete RBBB with QRS <120 ms. cardiac enzymes negative, pBNP normal. CTA Chest negative for PE. ECHO with preserved LV and poorly visualized RV; personally reviewed and although cannot visualize RV, functional markers are within normal limites TAPSE 2 cm. Ts' 13, IVC 1.6 cm and compressible suggesting RA pressures of 5-10 mm Hg.  Ischemic evaluation recently done cardiac cath 2019 with nonobstructive CAD. No plans for additional ischemic evaluation. Continue management of primary pulmonary illnesses. Multiple primary pulmonary medical issues that will increase RV volume and pressure load which can ultimately lead to right sided conduction defects. The actual conduction defect itself of incomplete/complete RBBB is mostly benign; the etiology may not be. Life threatening etiology ruled out. Please call with questions. Signing off.     Irene Bonilla MD, MPH, VALDO, RPVI, FACC  Cardiovascular Specialist   [facility] ([facility])  [facility]  c: [phone] (text preferred and/or call)  e: [email or web address]  For all [facility] Cardiology and Cardiovascular Surgery on-service contact/call information, go to [email or web address] and use "[email or web address]" to login.  For outpatient Cardiology appointments, call  [phone] to arrange with a colleague; I do not have outpatient Cardiology clinic.

## 2021-04-02 NOTE — PROGRESS NOTE ADULT - SUBJECTIVE AND OBJECTIVE BOX
PROGRESS NOTE:     Kiersten Marshall,  PGY1   Contact: Pager [phone] [facility] | [phone] [facility] | [employer] Teams  Please check Resident Assignment tab on [place] for Team / Coverage     Patient is a 65y old  Female who presents with a chief complaint of shortness of breath (01 Apr 2021 22:02)    EVENTS / SUBJECTIVE: [place]. Pt seen at bedside.    MEDICATIONS  (STANDING):  albuterol/ipratropium for Nebulization 3 milliLiter(s) Nebulizer every 6 hours  amLODIPine   Tablet 5 milliGRAM(s) Oral daily  aspirin enteric coated 81 milliGRAM(s) Oral daily  brimonidine 0.2% Ophthalmic Solution 1 Drop(s) Both EYES two times a day  budesonide 160 MICROgram(s)/formoterol 4.5 MICROgram(s) Inhaler 2 Puff(s) Inhalation two times a day  dorzolamide 2% Ophthalmic Solution 1 Drop(s) Both EYES <User Schedule>  enoxaparin Injectable 40 milliGRAM(s) SubCutaneous daily  ergocalciferol 83388 Unit(s) Oral <User Schedule>  ferrous    sulfate 325 milliGRAM(s) Oral daily  latanoprost 0.005% Ophthalmic Solution 1 Drop(s) Both EYES every 24 hours  methylPREDNISolone sodium succinate Injectable 40 milliGRAM(s) IV Push every 8 hours  metoprolol succinate ER 50 milliGRAM(s) Oral daily  montelukast 10 milliGRAM(s) Oral daily    MEDICATIONS  (PRN):  levalbuterol Inhalation 0.63 milliGRAM(s) Inhalation every 6 hours PRN wheeze    Allergies  No Known Allergies    PHYSICAL EXAM:  Vital Signs Last 24 Hrs  T(C): 37.2 (02 Apr 2021 05:47), Max: 37.6 (01 Apr 2021 21:59)  T(F): 99 (02 Apr 2021 05:47), Max: 99.7 (01 Apr 2021 21:59)  HR: 102 (02 Apr 2021 05:47) (100 - 124)  BP: 126/66 (02 Apr 2021 05:47) (126/66 - 166/72)  RR: 16 (02 Apr 2021 05:47) (16 - 20)  SpO2: 95% (02 Apr 2021 05:47) (94% - 100%)    I&O's Summary  02 Apr 2021 07:01  -  02 Apr 2021 07:55  --------------------------------------------------------  IN: 240 mL / OUT: 0 mL / NET: 240 mL      GENERAL: No acute distress, well-developed  HEENT::  Atraumatic, EOMI, sclera clear, supple neck  CHEST/LUNG: CTAB; No wheezes, rales, or rhonchi  HEART: Regular rate and rhythm; No murmurs, rubs, or gallops  ABDOMEN: Soft, non-tender, non-distended; normal bowel sounds, no organomegaly  EXTREMITIES:  2+ peripheral pulses b/l, No clubbing, cyanosis, or edema  NEUROLOGY: A&O x 3, no focal deficits  PSYCH:   SKIN: No rashes or lesions    PATIENT DATA:                        12.7   7.02  )-----------( 313      ( 02 Apr 2021 04:58 )             37.4     04-02    141  |  102  |  19  ----------------------------<  148<H>  4.2   |  28  |  1.07    Ca    9.5      02 Apr 2021 04:58  Phos  2.2     04-02  Mg     2.2     04-02    TPro  7.3  /  Alb  3.9  /  TBili  0.4  /  DBili  x   /  AST  16  /  ALT  21  /  AlkPhos  113  04-02    PT/INR - ( 01 Apr 2021 16:44 )   PT: 12.6 sec;   INR: 1.05 ratio         PTT - ( 01 Apr 2021 16:44 )  PTT:30.7 sec

## 2021-04-02 NOTE — CONSULT NOTE ADULT - ASSESSMENT
Patient is a 65 y F with PMHx of Sarcoidosis, Asthma, Morbid Obesity, HLD and HTN who presents with worsening shortness of breath for 3 days and new RBBB    Plan  -f/u TTE; prior TTE (2019) w/ normal EF, low-normal global LV systolic function, grade 1 diastolic dysfunction, mod-severe MR, mod TR  -new RBBB, likely in the setting of multiple pulmonary disorders, sarcoidosis, and obesity; no PE on CTA  -low suspicion for ACS or CHF; trops WNL, BNP WNL    JOEY Streeter MD  Cardiology Fellow  Text or Call: [phone]  For all New Consults and Questions:  [email or web address]   Login: anders 65 y F with PMHx of Sarcoidosis, Asthma, Morbid Obesity, HLD and HTN who presents with worsening shortness of breath for 3 days and new RBBB    Plan  -f/u TTE; prior TTE (2019) w/ normal EF, low-normal global LV systolic function, grade 1 diastolic dysfunction, mod-severe MR, mod TR  -new RBBB, likely in the setting of multiple pulmonary disorders, sarcoidosis, and obesity; no PE on CTA  -low suspicion for ACS or CHF; trops WNL, BNP WNL    JOEY Streeter MD  Cardiology Fellow  Text or Call: [phone]  For all New Consults and Questions:  [email or web address]   Login: [email or web address]

## 2021-04-02 NOTE — PHYSICAL THERAPY INITIAL EVALUATION ADULT - ADDITIONAL COMMENTS
CT ANGIO CHEST: 1. No pulmonary embolus, however the segmental and subsegmental divisions are not well evaluated.  2. Calcified and non calcified chest lymph nodes can be secondary to history of sarcoidosis.

## 2021-04-02 NOTE — PROGRESS NOTE ADULT - SUBJECTIVE AND OBJECTIVE BOX
PULMONARY PROGRESS NOTE    DERIAN PEACOCK  MRN-[id number]    Patient is a 65y old  Female who presents with a chief complaint of shortness of breath (02 Apr 2021 07:54)      HPI:  per chart review:  Reason for Admission: shortness of breath  History of Present Illness:   Patient is a 65 y F with PMHx of Sarcoidosis, Asthma, Morbid Obesity, HLD and HTN who presents with worsening shortness of breath for 3 days. Patient notes she has had worsening dyspnea on exertion for the past 3 months, previously was able to walk around her 1 story home without issue and now, becomes short of breath when walking a few steps from her bedroom to the bathroom.  Also notes worsening non-painful swelling in her legs for same duration. Patient notes she is largely sedentary lying in bed or chair, due to worsening LE swelling. She normally sleeps with 2 pillows propping her up, noting shortness of breath when lying flat. Denies PND, SOB at rest. In the last 3 days, she notes her ALEXANDER is worsening, associated with wheezing, intermittent substernal chest tightness, dry cough, lightheadedness and palpitations. Patient notes she was using her Symbicort more often, 4-5x in the night prior to going to her doctor with little improvement. Did not take any Albuterol because she does not take it unless she feels symptoms when exposed to pollen and other triggers outside of the home. Symptoms became more frequent night prior to admission, prompting her to walk-in to her PMD's office the next day. In her PMD's office, an EKG was performed showing new RBBB, which was not seen on her prior EKGs. At that point, PMD told patient to go to the ED. Denies chest pain, abdominal pain, N/V, fevers/chills, recent travel.     Of note, had right heart cath with coronary angiogram in May 2019 following abnormal nuclear stress test (documentation not in EMR). Cath showed non-obstructive CAD. Noted in cardiology notes to have mild MR and diastolic dysfunction.        In ED, vital signs 98.8 F, -124, //66, RR 18-20, SpO2 94% on RA, dropping to 91% on RA upon ambulation. S/p Solumedrol 60 mg IVP x1, Albuterol nebs x 1.   -  -    ROS:   -    ACTIVE MEDICATION LIST:  MEDICATIONS  (STANDING):  albuterol/ipratropium for Nebulization 3 milliLiter(s) Nebulizer every 6 hours  amLODIPine   Tablet 5 milliGRAM(s) Oral daily  aspirin enteric coated 81 milliGRAM(s) Oral daily  brimonidine 0.2% Ophthalmic Solution 1 Drop(s) Both EYES two times a day  budesonide 160 MICROgram(s)/formoterol 4.5 MICROgram(s) Inhaler 2 Puff(s) Inhalation two times a day  dorzolamide 2% Ophthalmic Solution 1 Drop(s) Both EYES <User Schedule>  enoxaparin Injectable 40 milliGRAM(s) SubCutaneous daily  ergocalciferol 43348 Unit(s) Oral <User Schedule>  ferrous    sulfate 325 milliGRAM(s) Oral daily  latanoprost 0.005% Ophthalmic Solution 1 Drop(s) Both EYES every 24 hours  methylPREDNISolone sodium succinate Injectable 40 milliGRAM(s) IV Push every 8 hours  metoprolol succinate ER 50 milliGRAM(s) Oral daily  montelukast 10 milliGRAM(s) Oral daily  potassium phosphate / sodium phosphate Powder (PHOS-NaK) 2 Packet(s) Oral every 4 hours    MEDICATIONS  (PRN):  levalbuterol Inhalation 0.63 milliGRAM(s) Inhalation every 6 hours PRN wheeze      EXAM:  Vital Signs Last 24 Hrs  T(C): 36.9 (02 Apr 2021 09:16), Max: 37.6 (01 Apr 2021 21:59)  T(F): 98.4 (02 Apr 2021 09:16), Max: 99.7 (01 Apr 2021 21:59)  HR: 100 (02 Apr 2021 09:16) (100 - 124)  BP: 120/47 (02 Apr 2021 09:16) (120/47 - 166/72)  BP(mean): --  RR: 16 (02 Apr 2021 09:16) (16 - 20)  SpO2: 94% (02 Apr 2021 09:16) (94% - 100%)    GENERAL: The patient is awake and alert in no apparent distress.     LUNGS: Clear to auscultation without wheezing, rales or rhonchi; respirations unlabored    HEART: Regular rate and rhythm without murmur.    LABS/IMAGING: reviewed                        12.7   7.02  )-----------( 313      ( 02 Apr 2021 04:58 )             37.4   04-02    141  |  102  |  19  ----------------------------<  148<H>  4.2   |  28  |  1.07    Ca    9.5      02 Apr 2021 04:58  Phos  2.2     04-02  Mg     2.2     04-02    TPro  7.3  /  Alb  3.9  /  TBili  0.4  /  DBili  x   /  AST  16  /  ALT  21  /  AlkPhos  113  04-02      < from: CT Angio Chest w/ IV Cont (04.01.21 @ 22:33) >    EXAM:  CT ANGIO CHEST (W)AW IC                            PROCEDURE DATE:  04/01/2021            INTERPRETATION:  CTA CHEST    INDICATION: Asthma, sinus tachycardia. Sarcoidosis. Evaluate for pulmonary embolus.    TECHNIQUE: Enhanced helical imageswere obtained of the chest. Coronal and sagittal images were reconstructed.  Images were obtained after the uneventful administration of 57 cc of nonionic intravenous contrast (Omnipaque 350). 43 cc of nonionic intravenous contrast (Omnipaque 350) was discarded. Maximum intensity projection images were generated.    COMPARISON: Radiograph 4/1/2020.    FINDINGS:    Pulmonary Artery:  There is no main, central or lobar pulmonary emboli. The segmental and subsegmental divisions are not well evaluated.    Tubes/Lines: None.    Lungs, airways and pleura: Mosaic attenuation of the lungs are likely due to the expiratory phase of the study. Bilateral lower lobe and right middle lobe linear atelectasis. The airways are normal. No pleural effusion or pneumothorax.    Mediastinum: The calcified and non calcified chest lymph nodes measure less than 15 mm in the short axis. The visualized thyroid gland and esophagus are unremarkable.    The heart is enlarged. Qualitatively, the myocardium may be concentrically thickened. No pericardial effusion. The aorta is tortuous and ectatic.    Upper Abdomen: The upper abdomen is unremarkable.    Bones And Soft Tissues: The bones are unremarkable.  The soft tissues are unremarkable.      IMPRESSION:    1. No pulmonary embolus, however the segmental and subsegmental divisions are not well evaluated.  2. Calcified and non calcified chest lymph nodes can be secondary to history of sarcoidosis.            < end of copied text >    PROBLEM LIST:  65y Female with HEALTH ISSUES - PROBLEM Dx:  Asthma exacerbation  Dyspnea on exertion  RBBB  Asthma, unspecified asthma severity, unspecified whether complicated, unspecified whether persistent  Sarcoidosis  Essential hypertension  Microcytic anemia  Glaucoma of both eyes, unspecified glaucoma type      RECS:        Lupe Mays MD   [phone]           PULMONARY PROGRESS NOTE    DERIAN PEACOCK  MRN-[id number]    Patient is a 65y old  Female who presents with a chief complaint of shortness of breath (02 Apr 2021 07:54)      HPI:  per chart review:  Reason for Admission: shortness of breath  History of Present Illness:   Patient is a 65 y F with PMHx of Sarcoidosis, Asthma, Morbid Obesity, HLD and HTN who presents with worsening shortness of breath for 3 days. Patient notes she has had worsening dyspnea on exertion for the past 3 months, previously was able to walk around her 1 story home without issue and now, becomes short of breath when walking a few steps from her bedroom to the bathroom.  Also notes worsening non-painful swelling in her legs for same duration. Patient notes she is largely sedentary lying in bed or chair, due to worsening LE swelling. She normally sleeps with 2 pillows propping her up, noting shortness of breath when lying flat. Denies PND, SOB at rest. In the last 3 days, she notes her ALEXANDER is worsening, associated with wheezing, intermittent substernal chest tightness, dry cough, lightheadedness and palpitations. Patient notes she was using her Symbicort more often, 4-5x in the night prior to going to her doctor with little improvement. Did not take any Albuterol because she does not take it unless she feels symptoms when exposed to pollen and other triggers outside of the home. Symptoms became more frequent night prior to admission, prompting her to walk-in to her PMD's office the next day. In her PMD's office, an EKG was performed showing new RBBB, which was not seen on her prior EKGs. At that point, PMD told patient to go to the ED. Denies chest pain, abdominal pain, N/V, fevers/chills, recent travel.     Of note, had right heart cath with coronary angiogram in May 2019 following abnormal nuclear stress test (documentation not in EMR). Cath showed non-obstructive CAD. Noted in cardiology notes to have mild MR and diastolic dysfunction.        In ED, vital signs 98.8 F, -124, //66, RR 18-20, SpO2 94% on RA, dropping to 91% on RA upon ambulation. S/p Solumedrol 60 mg IVP x1, Albuterol nebs x 1.   -  -on my exam:  -feeling better, wheezing improving, ALEXANDER    ROS:   -    ACTIVE MEDICATION LIST:  MEDICATIONS  (STANDING):  albuterol/ipratropium for Nebulization 3 milliLiter(s) Nebulizer every 6 hours  amLODIPine   Tablet 5 milliGRAM(s) Oral daily  aspirin enteric coated 81 milliGRAM(s) Oral daily  brimonidine 0.2% Ophthalmic Solution 1 Drop(s) Both EYES two times a day  budesonide 160 MICROgram(s)/formoterol 4.5 MICROgram(s) Inhaler 2 Puff(s) Inhalation two times a day  dorzolamide 2% Ophthalmic Solution 1 Drop(s) Both EYES <User Schedule>  enoxaparin Injectable 40 milliGRAM(s) SubCutaneous daily  ergocalciferol 42303 Unit(s) Oral <User Schedule>  ferrous    sulfate 325 milliGRAM(s) Oral daily  latanoprost 0.005% Ophthalmic Solution 1 Drop(s) Both EYES every 24 hours  methylPREDNISolone sodium succinate Injectable 40 milliGRAM(s) IV Push every 8 hours  metoprolol succinate ER 50 milliGRAM(s) Oral daily  montelukast 10 milliGRAM(s) Oral daily  potassium phosphate / sodium phosphate Powder (PHOS-NaK) 2 Packet(s) Oral every 4 hours    MEDICATIONS  (PRN):  levalbuterol Inhalation 0.63 milliGRAM(s) Inhalation every 6 hours PRN wheeze      EXAM:  Vital Signs Last 24 Hrs  T(C): 36.9 (02 Apr 2021 09:16), Max: 37.6 (01 Apr 2021 21:59)  T(F): 98.4 (02 Apr 2021 09:16), Max: 99.7 (01 Apr 2021 21:59)  HR: 100 (02 Apr 2021 09:16) (100 - 124)  BP: 120/47 (02 Apr 2021 09:16) (120/47 - 166/72)  BP(mean): --  RR: 16 (02 Apr 2021 09:16) (16 - 20)  SpO2: 94% (02 Apr 2021 09:16) (94% - 100%)    GENERAL: The patient is awake and alert in no apparent distress.     LUNGS: Clear to auscultation without wheezing, rales or rhonchi; respirations unlabored        LABS/IMAGING: reviewed                        12.7   7.02  )-----------( 313      ( 02 Apr 2021 04:58 )             37.4   04-02    141  |  102  |  19  ----------------------------<  148<H>  4.2   |  28  |  1.07    Ca    9.5      02 Apr 2021 04:58  Phos  2.2     04-02  Mg     2.2     04-02    TPro  7.3  /  Alb  3.9  /  TBili  0.4  /  DBili  x   /  AST  16  /  ALT  21  /  AlkPhos  113  04-02      < from: CT Angio Chest w/ IV Cont (04.01.21 @ 22:33) >    EXAM:  CT ANGIO CHEST (W)AW IC                            PROCEDURE DATE:  04/01/2021            INTERPRETATION:  CTA CHEST    INDICATION: Asthma, sinus tachycardia. Sarcoidosis. Evaluate for pulmonary embolus.    TECHNIQUE: Enhanced helical imageswere obtained of the chest. Coronal and sagittal images were reconstructed.  Images were obtained after the uneventful administration of 57 cc of nonionic intravenous contrast (Omnipaque 350). 43 cc of nonionic intravenous contrast (Omnipaque 350) was discarded. Maximum intensity projection images were generated.    COMPARISON: Radiograph 4/1/2020.    FINDINGS:    Pulmonary Artery:  There is no main, central or lobar pulmonary emboli. The segmental and subsegmental divisions are not well evaluated.    Tubes/Lines: None.    Lungs, airways and pleura: Mosaic attenuation of the lungs are likely due to the expiratory phase of the study. Bilateral lower lobe and right middle lobe linear atelectasis. The airways are normal. No pleural effusion or pneumothorax.    Mediastinum: The calcified and non calcified chest lymph nodes measure less than 15 mm in the short axis. The visualized thyroid gland and esophagus are unremarkable.    The heart is enlarged. Qualitatively, the myocardium may be concentrically thickened. No pericardial effusion. The aorta is tortuous and ectatic.    Upper Abdomen: The upper abdomen is unremarkable.    Bones And Soft Tissues: The bones are unremarkable.  The soft tissues are unremarkable.      IMPRESSION:    1. No pulmonary embolus, however the segmental and subsegmental divisions are not well evaluated.  2. Calcified and non calcified chest lymph nodes can be secondary to history of sarcoidosis.            < end of copied text >      PROBLEM LIST:  65y Female with HEALTH ISSUES - PROBLEM Dx:  Asthma exacerbation  Dyspnea on exertion  RBBB  Asthma, unspecified asthma severity, unspecified whether complicated, unspecified whether persistent  Sarcoidosis  Essential hypertension  Microcytic anemia  Glaucoma of both eyes, unspecified glaucoma type      RECS:  -cont symbicort/singulair/nebs  -cont solumedrol 40IV Q8, taper per clinical course  -cardiology fu      Lupe Mays MD   [phone]

## 2021-04-02 NOTE — PROGRESS NOTE ADULT - PROBLEM SELECTOR PLAN 3
New RBBB likely secondary to pulmonary disease vs ischemic CAD vs PE. Was previously on Holter monitoring for palpitations.  -ECG showed RBBB without ST elevation Trop 12--> 13 without active CP  -CTA neg for PE, shows possible ?fibrotic changes and LN consistent with sarcoidosis.  f/u official report  - Plan above for ischemic workup (TTE, L/R HC)  - Plan above for pulmonary workup/ALEXANDER  - Tele monitoring    #CAD  - F/u A1C  - F/u lipid panel   - C/w ASA 81

## 2021-04-02 NOTE — PROGRESS NOTE ADULT - PROBLEM SELECTOR PLAN 1
Acute wheezing, cough, ALEXANDER and cough for 3 days. Was not taking rescue inhaler with new exacerbation  -S/p Solumedrol x 1, Albuterol nebs x 1, with improvement in symptoms.    -Start Xopenex PRN  -C/w IV Solumedrol 40mg q8h for now with transition to PO prednisone  -Duonebs q6   -Continuous pulse ox  - Peak flow measurements  - check VBG

## 2021-04-03 ENCOUNTER — TRANSCRIPTION ENCOUNTER (OUTPATIENT)
Age: 66
End: 2021-04-03

## 2021-04-03 LAB
ANION GAP SERPL CALC-SCNC: 13 MMOL/L — SIGNIFICANT CHANGE UP (ref 5–17)
BASOPHILS # BLD AUTO: 0 K/UL — SIGNIFICANT CHANGE UP (ref 0–0.2)
BASOPHILS NFR BLD AUTO: 0 % — SIGNIFICANT CHANGE UP (ref 0–2)
BUN SERPL-MCNC: 22 MG/DL — SIGNIFICANT CHANGE UP (ref 7–23)
CALCIUM SERPL-MCNC: 9 MG/DL — SIGNIFICANT CHANGE UP (ref 8.4–10.5)
CHLORIDE SERPL-SCNC: 103 MMOL/L — SIGNIFICANT CHANGE UP (ref 96–108)
CO2 SERPL-SCNC: 28 MMOL/L — SIGNIFICANT CHANGE UP (ref 22–31)
CREAT SERPL-MCNC: 1.02 MG/DL — SIGNIFICANT CHANGE UP (ref 0.5–1.3)
EOSINOPHIL # BLD AUTO: 0 K/UL — SIGNIFICANT CHANGE UP (ref 0–0.5)
EOSINOPHIL NFR BLD AUTO: 0 % — SIGNIFICANT CHANGE UP (ref 0–6)
FERRITIN SERPL-MCNC: 104 NG/ML — SIGNIFICANT CHANGE UP (ref 15–150)
GAS PNL BLDV: SIGNIFICANT CHANGE UP
GLUCOSE SERPL-MCNC: 143 MG/DL — HIGH (ref 70–99)
HCT VFR BLD CALC: 40.3 % — SIGNIFICANT CHANGE UP (ref 34.5–45)
HGB BLD-MCNC: 13.3 G/DL — SIGNIFICANT CHANGE UP (ref 11.5–15.5)
IMM GRANULOCYTES NFR BLD AUTO: 0.5 % — SIGNIFICANT CHANGE UP (ref 0–1.5)
IRON SATN MFR SERPL: 13 % — LOW (ref 14–50)
IRON SATN MFR SERPL: 40 UG/DL — SIGNIFICANT CHANGE UP (ref 30–160)
LYMPHOCYTES # BLD AUTO: 0.94 K/UL — LOW (ref 1–3.3)
LYMPHOCYTES # BLD AUTO: 10.3 % — LOW (ref 13–44)
MAGNESIUM SERPL-MCNC: 2.4 MG/DL — SIGNIFICANT CHANGE UP (ref 1.6–2.6)
MCHC RBC-ENTMCNC: 26 PG — LOW (ref 27–34)
MCHC RBC-ENTMCNC: 33 GM/DL — SIGNIFICANT CHANGE UP (ref 32–36)
MCV RBC AUTO: 78.9 FL — LOW (ref 80–100)
MONOCYTES # BLD AUTO: 0.49 K/UL — SIGNIFICANT CHANGE UP (ref 0–0.9)
MONOCYTES NFR BLD AUTO: 5.3 % — SIGNIFICANT CHANGE UP (ref 2–14)
NEUTROPHILS # BLD AUTO: 7.68 K/UL — HIGH (ref 1.8–7.4)
NEUTROPHILS NFR BLD AUTO: 83.9 % — HIGH (ref 43–77)
NRBC # BLD: 0 /100 WBCS — SIGNIFICANT CHANGE UP (ref 0–0)
PHOSPHATE SERPL-MCNC: 3.7 MG/DL — SIGNIFICANT CHANGE UP (ref 2.5–4.5)
PLATELET # BLD AUTO: 334 K/UL — SIGNIFICANT CHANGE UP (ref 150–400)
POTASSIUM SERPL-MCNC: 4.1 MMOL/L — SIGNIFICANT CHANGE UP (ref 3.5–5.3)
POTASSIUM SERPL-SCNC: 4.1 MMOL/L — SIGNIFICANT CHANGE UP (ref 3.5–5.3)
RBC # BLD: 5.11 M/UL — SIGNIFICANT CHANGE UP (ref 3.8–5.2)
RBC # FLD: 16.3 % — HIGH (ref 10.3–14.5)
SODIUM SERPL-SCNC: 144 MMOL/L — SIGNIFICANT CHANGE UP (ref 135–145)
TIBC SERPL-MCNC: 306 UG/DL — SIGNIFICANT CHANGE UP (ref 220–430)
UIBC SERPL-MCNC: 266 UG/DL — SIGNIFICANT CHANGE UP (ref 110–370)
WBC # BLD: 9.16 K/UL — SIGNIFICANT CHANGE UP (ref 3.8–10.5)
WBC # FLD AUTO: 9.16 K/UL — SIGNIFICANT CHANGE UP (ref 3.8–10.5)

## 2021-04-03 PROCEDURE — 99232 SBSQ HOSP IP/OBS MODERATE 35: CPT

## 2021-04-03 PROCEDURE — 99233 SBSQ HOSP IP/OBS HIGH 50: CPT | Mod: GC

## 2021-04-03 RX ORDER — PANTOPRAZOLE SODIUM 20 MG/1
1 TABLET, DELAYED RELEASE ORAL
Qty: 30 | Refills: 3
Start: 2021-04-03 | End: 2021-07-31

## 2021-04-03 RX ORDER — FLUTICASONE PROPIONATE 50 MCG
2 SPRAY, SUSPENSION NASAL
Refills: 0 | Status: DISCONTINUED | OUTPATIENT
Start: 2021-04-03 | End: 2021-04-03

## 2021-04-03 RX ORDER — FLUTICASONE PROPIONATE 50 MCG
1 SPRAY, SUSPENSION NASAL
Refills: 0 | Status: DISCONTINUED | OUTPATIENT
Start: 2021-04-03 | End: 2021-04-05

## 2021-04-03 RX ORDER — PANTOPRAZOLE SODIUM 20 MG/1
40 TABLET, DELAYED RELEASE ORAL
Refills: 0 | Status: DISCONTINUED | OUTPATIENT
Start: 2021-04-03 | End: 2021-04-05

## 2021-04-03 RX ORDER — SODIUM CHLORIDE 0.65 %
1 AEROSOL, SPRAY (ML) NASAL DAILY
Refills: 0 | Status: DISCONTINUED | OUTPATIENT
Start: 2021-04-03 | End: 2021-04-05

## 2021-04-03 RX ADMIN — Medication 1 SPRAY(S): at 11:54

## 2021-04-03 RX ADMIN — BRIMONIDINE TARTRATE 1 DROP(S): 2 SOLUTION/ DROPS OPHTHALMIC at 06:10

## 2021-04-03 RX ADMIN — Medication 40 MILLIGRAM(S): at 21:20

## 2021-04-03 RX ADMIN — BUDESONIDE AND FORMOTEROL FUMARATE DIHYDRATE 2 PUFF(S): 160; 4.5 AEROSOL RESPIRATORY (INHALATION) at 06:10

## 2021-04-03 RX ADMIN — Medication 3 MILLILITER(S): at 11:53

## 2021-04-03 RX ADMIN — Medication 325 MILLIGRAM(S): at 11:54

## 2021-04-03 RX ADMIN — Medication 50 MILLIGRAM(S): at 06:10

## 2021-04-03 RX ADMIN — DORZOLAMIDE HYDROCHLORIDE 1 DROP(S): 20 SOLUTION/ DROPS OPHTHALMIC at 21:20

## 2021-04-03 RX ADMIN — Medication 3 MILLILITER(S): at 17:45

## 2021-04-03 RX ADMIN — MONTELUKAST 10 MILLIGRAM(S): 4 TABLET, CHEWABLE ORAL at 21:20

## 2021-04-03 RX ADMIN — DORZOLAMIDE HYDROCHLORIDE 1 DROP(S): 20 SOLUTION/ DROPS OPHTHALMIC at 08:04

## 2021-04-03 RX ADMIN — ENOXAPARIN SODIUM 40 MILLIGRAM(S): 100 INJECTION SUBCUTANEOUS at 11:53

## 2021-04-03 RX ADMIN — Medication 600 MILLIGRAM(S): at 17:45

## 2021-04-03 RX ADMIN — BRIMONIDINE TARTRATE 1 DROP(S): 2 SOLUTION/ DROPS OPHTHALMIC at 17:44

## 2021-04-03 RX ADMIN — Medication 40 MILLIGRAM(S): at 13:22

## 2021-04-03 RX ADMIN — Medication 3 MILLILITER(S): at 06:10

## 2021-04-03 RX ADMIN — Medication 1 SPRAY(S): at 17:46

## 2021-04-03 RX ADMIN — Medication 81 MILLIGRAM(S): at 11:53

## 2021-04-03 RX ADMIN — Medication 40 MILLIGRAM(S): at 06:10

## 2021-04-03 RX ADMIN — BUDESONIDE AND FORMOTEROL FUMARATE DIHYDRATE 2 PUFF(S): 160; 4.5 AEROSOL RESPIRATORY (INHALATION) at 17:45

## 2021-04-03 RX ADMIN — AMLODIPINE BESYLATE 5 MILLIGRAM(S): 2.5 TABLET ORAL at 06:09

## 2021-04-03 NOTE — PROGRESS NOTE ADULT - PROBLEM SELECTOR PLAN 2
Acute on chronic worsening ALEXANDER. Spirometry 2/2020 showing obstructive pattern with moderate reduction flow rates. Pulmonary walk test Sept 2020 showed desaturation to 89% on RA.   - Previous RHC showed normal coronaries with mod MR and diastolic dysfunction  - HF exacerbation less likely given low BNP, even in setting of obesity  - F/u final read regarding ? fibrotic changes noted on CT w/?bilateral GGO, concerning for ILD Consider Pulm consult in AM.   - COVID negative; no fever, leukocytosis, cough, hypoxia to suggest infectious process  - F/u TTE to r/o cardiomyopathy, valvulopathy, etc  - LHC/ RHC for complete ischemic workup following TTE and suspected pulmonary HTN (cor pulmonale) respectively  - Tele and continuous pulse ox Acute on chronic worsening ALEXANDER. Spirometry 2/2020 showing obstructive pattern with moderate reduction flow rates. Pulmonary walk test Sept 2020 showed desaturation to 89% on RA.   - Previous RHC showed normal coronaries with mod MR and diastolic dysfunction  - HF exacerbation less likely given low BNP, even in setting of obesity  - F/u final read regarding ? fibrotic changes noted on CT w/?bilateral GGO, concerning for ILD Consider Pulm consult in AM.   - COVID negative; no fever, leukocytosis, cough, hypoxia to suggest infectious process  - F/u TTE to r/o cardiomyopathy, valvulopathy, etc  - LHC/ RHC for complete ischemic workup following TTE and suspected pulmonary HTN (cor pulmonale) respectively  -DC continuous pulse ox as pt is stable on room air  - f/u Dr. Mccrary regarding whether cardiac MRI should be done inpt vs outpt to r/o cardiac sarcoid  -f/u ACE level  -ENT c/s to r/o traceal stenosis, vocal cord dysfunction

## 2021-04-03 NOTE — PROVIDER CONTACT NOTE (CRITICAL VALUE NOTIFICATION) - BACKGROUND
pt with h/o sarcoidosis admitted from ED with c/o in SOB
Patient admitted for shortness of breath, history of asthma, essential hypertension, glaucoma

## 2021-04-03 NOTE — DISCHARGE NOTE PROVIDER - NSDCMRMEDTOKEN_GEN_ALL_CORE_FT
Alphagan P 0.1% ophthalmic solution: to each affected eye 2 times a day  amLODIPine 5 mg oral tablet: 1 tab(s) orally once a day  Aspirin Enteric Coated 81 mg oral delayed release tablet: 1 tab(s) orally once a day  dorzolamide 2% ophthalmic solution: 1 drop(s) to each affected eye 2 times a day  ferrous sulfate 325 mg (65 mg elemental iron) oral delayed release tablet: 1 tab(s) orally once a day  latanoprost 0.005% ophthalmic solution: 1 drop(s) to each affected eye 2 times a day  metoprolol succinate 50 mg oral tablet, extended release: 1 tab(s) orally once a day  montelukast 10 mg oral tablet: 1 tab(s) orally once a day  ProAir HFA 90 mcg/inh inhalation aerosol: 2 puff(s) inhaled 4 times a day, As Needed  Symbicort 160 mcg-4.5 mcg/inh inhalation aerosol: 2 puff(s) inhaled 2 times a day  Vitamin D2 50,000 intl units (1.25 mg) oral capsule: 1 cap(s) orally once a day   Alphagan P 0.1% ophthalmic solution: to each affected eye 2 times a day  amLODIPine 5 mg oral tablet: 1 tab(s) orally once a day  Aspirin Enteric Coated 81 mg oral delayed release tablet: 1 tab(s) orally once a day  dorzolamide 2% ophthalmic solution: 1 drop(s) to each affected eye 2 times a day  ferrous sulfate 325 mg (65 mg elemental iron) oral delayed release tablet: 1 tab(s) orally once a day  latanoprost 0.005% ophthalmic solution: 1 drop(s) to each affected eye 2 times a day  metoprolol succinate 50 mg oral tablet, extended release: 1 tab(s) orally once a day  montelukast 10 mg oral tablet: 1 tab(s) orally once a day  pantoprazole 40 mg oral delayed release tablet: 1 tab(s) orally once a day (before a meal)  ProAir HFA 90 mcg/inh inhalation aerosol: 2 puff(s) inhaled 4 times a day, As Needed  Symbicort 160 mcg-4.5 mcg/inh inhalation aerosol: 2 puff(s) inhaled 2 times a day  Vitamin D2 50,000 intl units (1.25 mg) oral capsule: 1 cap(s) orally once a day   Alphagan P 0.1% ophthalmic solution: to each affected eye 2 times a day  amLODIPine 5 mg oral tablet: 1 tab(s) orally once a day  Aspirin Enteric Coated 81 mg oral delayed release tablet: 1 tab(s) orally once a day  atorvastatin 10 mg oral tablet: 1 tab(s) orally once a day (at bedtime)  dorzolamide 2% ophthalmic solution: 1 drop(s) to each affected eye 2 times a day  ferrous sulfate 325 mg (65 mg elemental iron) oral delayed release tablet: 1 tab(s) orally once a day  latanoprost 0.005% ophthalmic solution: 1 drop(s) to each affected eye 2 times a day  metoprolol succinate 50 mg oral tablet, extended release: 1 tab(s) orally once a day  montelukast 10 mg oral tablet: 1 tab(s) orally once a day  pantoprazole 40 mg oral delayed release tablet: 1 tab(s) orally once a day (before a meal)  ProAir HFA 90 mcg/inh inhalation aerosol: 2 puff(s) inhaled 4 times a day, As Needed  Symbicort 160 mcg-4.5 mcg/inh inhalation aerosol: 2 puff(s) inhaled 2 times a day  Vitamin D2 50,000 intl units (1.25 mg) oral capsule: 1 cap(s) orally once a day   Alphagan P 0.1% ophthalmic solution: to each affected eye 2 times a day  amLODIPine 5 mg oral tablet: 1 tab(s) orally once a day  Aspirin Enteric Coated 81 mg oral delayed release tablet: 1 tab(s) orally once a day  atorvastatin 10 mg oral tablet: 1 tab(s) orally once a day (at bedtime)  Cardiac MRI : Cardiac MRI   R/O cardiac sarcoidosis   dorzolamide 2% ophthalmic solution: 1 drop(s) to each affected eye 2 times a day  ferrous sulfate 325 mg (65 mg elemental iron) oral delayed release tablet: 1 tab(s) orally once a day  ipratropium-albuterol 0.5 mg-2.5 mg/3 mLinhalation solution: 3 milliliter(s) inhaled every 8 hours  latanoprost 0.005% ophthalmic solution: 1 drop(s) to each affected eye 2 times a day  metoprolol succinate 50 mg oral tablet, extended release: 1 tab(s) orally once a day  montelukast 10 mg oral tablet: 1 tab(s) orally once a day  pantoprazole 40 mg oral delayed release tablet: 1 tab(s) orally once a day (before a meal)  predniSONE 10 mg oral tablet: Please take 5 tablets in the morning on 4/6  Please take 4 tablets in the morning on 4/7 and 4/8  Please take 3 tablets on 4/9 and 4/10  Please take 2 tablets on 4/11 and 4/12  Please take 1 tablets on 4/13 and 4/14   ProAir HFA 90 mcg/inh inhalation aerosol: 2 puff(s) inhaled 4 times a day, As Needed  Symbicort 160 mcg-4.5 mcg/inh inhalation aerosol: 2 puff(s) inhaled 2 times a day  Vitamin D2 50,000 intl units (1.25 mg) oral capsule: 1 cap(s) orally once a day

## 2021-04-03 NOTE — DISCHARGE NOTE PROVIDER - CARE PROVIDERS DIRECT ADDRESSES
,[email or web address] ,[email or web address]

## 2021-04-03 NOTE — PROVIDER CONTACT NOTE (CRITICAL VALUE NOTIFICATION) - ASSESSMENT
vss no complaints at this time no s/s of distress
Patient is A&Ox4 on room air, no s/s of distress noted or verbalized.

## 2021-04-03 NOTE — DISCHARGE NOTE PROVIDER - CARE PROVIDER_API CALL
Troy Day)  Cardiology; Internal Medicine  [address], [address]  [place] [address]  Phone: [phone]  Fax: [phone]  Established Patient  Follow Up Time:     Mario Mccrary  INTERNAL MEDICINE  [address], [address]  [place] [address]  Phone: [phone]  Fax: [phone]  Established Patient  Follow Up Time: 1 week    Graham Machado)  Cardiovascular Disease  [address] [place] [address]  Phone: [phone]  Fax: [phone]  Established Patient  Follow Up Time:     Moe Pérez  OTOLARYNGOLOGY  [address] [place] [address], [facility] [place] [address]  [place] [address]  Phone: [phone]  Fax: [phone]  Established Patient  Follow Up Time: 2 weeks   Troy Day)  Cardiology; Internal Medicine  [address], [address]  [place] [address]  Phone: [phone]  Fax: [phone]  Established Patient  Follow Up Time:     Mario Mccrary  INTERNAL MEDICINE  [address], [address]  [place] [address]  Phone: [phone]  Fax: [phone]  Established Patient  Follow Up Time: 1 week    Graham Machado)  Cardiovascular Disease  [address] [place] [address]  Phone: [phone]  Fax: [phone]  Established Patient  Follow Up Time:     Moe Pérez  OTOLARYNGOLOGY  [address], [facility] [place] [address]  [place] [address]  Phone: [phone]  Fax: [phone]  Established Patient  Follow Up Time: 2 weeks    Jm Burnett)  Internal Medicine  [address] [place] [address]  Phone: [phone]  Fax: [phone]  Established Patient  Follow Up Time: 1 month

## 2021-04-03 NOTE — PROVIDER CONTACT NOTE (CRITICAL VALUE NOTIFICATION) - ACTION/TREATMENT ORDERED:
MD made aware, no intervention at this time. Will continue to monitor.
MD made aware will f/u with labs

## 2021-04-03 NOTE — CONSULT NOTE ADULT - PROBLEM SELECTOR RECOMMENDATION 9
- Patient should follow up in ENT office as an outpatient for possible UPPP eval. Call [phone].   - Mucinex / nasal saline / flonase   - PPI  - No further ENT intervention at this time   - Call with questions or concerns - Patient should follow up in ENT office as an outpatient for possible UPPP eval / sleep apnea workup. Call [phone].   - Mucinex / nasal saline / flonase   - PPI  - No further ENT intervention at this time   - Call with questions or concerns

## 2021-04-03 NOTE — PROGRESS NOTE ADULT - SUBJECTIVE AND OBJECTIVE BOX
Triny Steel MD-PGY3  Department of Internal Medicine  Pager [phone]    PROGRESS NOTE:       Patient is a 65y old  Female who presents with a chief complaint of shortness of breath (03 Apr 2021 06:23)      SUBJECTIVE / OVERNIGHT EVENTS:    ADDITIONAL REVIEW OF SYSTEMS:    MEDICATIONS  (STANDING):  albuterol/ipratropium for Nebulization 3 milliLiter(s) Nebulizer every 6 hours  amLODIPine   Tablet 5 milliGRAM(s) Oral daily  aspirin enteric coated 81 milliGRAM(s) Oral daily  brimonidine 0.2% Ophthalmic Solution 1 Drop(s) Both EYES two times a day  budesonide 160 MICROgram(s)/formoterol 4.5 MICROgram(s) Inhaler 2 Puff(s) Inhalation two times a day  dorzolamide 2% Ophthalmic Solution 1 Drop(s) Both EYES <User Schedule>  enoxaparin Injectable 40 milliGRAM(s) SubCutaneous daily  ergocalciferol 44584 Unit(s) Oral <User Schedule>  ferrous    sulfate 325 milliGRAM(s) Oral daily  latanoprost 0.005% Ophthalmic Solution 1 Drop(s) Both EYES every 24 hours  methylPREDNISolone sodium succinate Injectable 40 milliGRAM(s) IV Push every 8 hours  metoprolol succinate ER 50 milliGRAM(s) Oral daily  montelukast 10 milliGRAM(s) Oral daily    MEDICATIONS  (PRN):  levalbuterol Inhalation 0.63 milliGRAM(s) Inhalation every 6 hours PRN wheeze      CAPILLARY BLOOD GLUCOSE        I&O's Summary    02 Apr 2021 07:01  -  03 Apr 2021 07:00  --------------------------------------------------------  IN: 970 mL / OUT: 0 mL / NET: 970 mL        PHYSICAL EXAM:  Vital Signs Last 24 Hrs  T(C): 36.7 (03 Apr 2021 05:45), Max: 37 (02 Apr 2021 18:05)  T(F): 98.1 (03 Apr 2021 05:45), Max: 98.6 (02 Apr 2021 18:05)  HR: 102 (03 Apr 2021 05:45) (100 - 110)  BP: 135/77 (03 Apr 2021 05:45) (110/73 - 150/78)  BP(mean): --  RR: 18 (03 Apr 2021 05:45) (16 - 20)  SpO2: 93% (03 Apr 2021 05:45) (93% - 96%)    CONSTITUTIONAL: NAD, well-developed  RESPIRATORY: Normal respiratory effort; lungs are clear to auscultation bilaterally  CARDIOVASCULAR: Regular rate and rhythm, normal S1 and S2, no murmur/rub/gallop; No lower extremity edema; Peripheral pulses are 2+ bilaterally  ABDOMEN: Nontender to palpation, normoactive bowel sounds, no rebound/guarding; No hepatosplenomegaly  MUSCLOSKELETAL: no clubbing or cyanosis of digits; no joint swelling or tenderness to palpation  PSYCH: A+O to person, place, and time; affect appropriate    LABS:                        13.3   9.16  )-----------( 334      ( 03 Apr 2021 06:17 )             40.3     04-02    141  |  102  |  19  ----------------------------<  148<H>  4.2   |  28  |  1.07    Ca    9.5      02 Apr 2021 04:58  Phos  2.2     04-02  Mg     2.2     04-02    TPro  7.3  /  Alb  3.9  /  TBili  0.4  /  DBili  x   /  AST  16  /  ALT  21  /  AlkPhos  113  04-02    PT/INR - ( 01 Apr 2021 16:44 )   PT: 12.6 sec;   INR: 1.05 ratio         PTT - ( 01 Apr 2021 16:44 )  PTT:30.7 sec            RADIOLOGY & ADDITIONAL TESTS:  Results Reviewed:   Imaging Personally Reviewed:  Electrocardiogram Personally Reviewed:    COORDINATION OF CARE:  Care Discussed with Consultants/Other Providers [Y/N]:  Prior or Outpatient Records Reviewed [Y/N]:   Triny Steel MD-PGY3  Department of Internal Medicine  Pager [phone]    PROGRESS NOTE:       Patient is a 65y old  Female who presents with a chief complaint of shortness of breath (03 Apr 2021 06:23)      SUBJECTIVE / OVERNIGHT EVENTS: pt satting 89% on RA overnight    ADDITIONAL REVIEW OF SYSTEMS: Pt c/o "throat tightness" and reports improvement in SOB but still has some SOB. Able to ambulate. She feels she has secretions that she is unable to bring up. Denies having sleep study done outpt.    REVIEW OF SYSTEMS:    CONSTITUTIONAL: No weakness, fevers or chills  EYES/ENT: No visual changes;  No vertigo or throat pain   NECK: No pain or stiffness  RESPIRATORY: +cough, No wheezing, hemoptysis  CARDIOVASCULAR: No chest pain or palpitations  GASTROINTESTINAL: No abdominal or epigastric pain. No nausea, vomiting, or hematemesis; No diarrhea or constipation. No melena or hematochezia.  GENITOURINARY: No dysuria, frequency or hematuria  NEUROLOGICAL: No numbness or weakness  SKIN: No itching, burning, rashes, or lesions   All other review of systems is negative unless indicated above.    MEDICATIONS  (STANDING):  albuterol/ipratropium for Nebulization 3 milliLiter(s) Nebulizer every 6 hours  amLODIPine   Tablet 5 milliGRAM(s) Oral daily  aspirin enteric coated 81 milliGRAM(s) Oral daily  brimonidine 0.2% Ophthalmic Solution 1 Drop(s) Both EYES two times a day  budesonide 160 MICROgram(s)/formoterol 4.5 MICROgram(s) Inhaler 2 Puff(s) Inhalation two times a day  dorzolamide 2% Ophthalmic Solution 1 Drop(s) Both EYES <User Schedule>  enoxaparin Injectable 40 milliGRAM(s) SubCutaneous daily  ergocalciferol 82240 Unit(s) Oral <User Schedule>  ferrous    sulfate 325 milliGRAM(s) Oral daily  latanoprost 0.005% Ophthalmic Solution 1 Drop(s) Both EYES every 24 hours  methylPREDNISolone sodium succinate Injectable 40 milliGRAM(s) IV Push every 8 hours  metoprolol succinate ER 50 milliGRAM(s) Oral daily  montelukast 10 milliGRAM(s) Oral daily    MEDICATIONS  (PRN):  levalbuterol Inhalation 0.63 milliGRAM(s) Inhalation every 6 hours PRN wheeze      CAPILLARY BLOOD GLUCOSE        I&O's Summary    02 Apr 2021 07:01  -  03 Apr 2021 07:00  --------------------------------------------------------  IN: 970 mL / OUT: 0 mL / NET: 970 mL        PHYSICAL EXAM:  Vital Signs Last 24 Hrs  T(C): 36.7 (03 Apr 2021 05:45), Max: 37 (02 Apr 2021 18:05)  T(F): 98.1 (03 Apr 2021 05:45), Max: 98.6 (02 Apr 2021 18:05)  HR: 102 (03 Apr 2021 05:45) (100 - 110)  BP: 135/77 (03 Apr 2021 05:45) (110/73 - 150/78)  BP(mean): --  RR: 18 (03 Apr 2021 05:45) (16 - 20)  SpO2: 93% (03 Apr 2021 05:45) (93% - 96%)    CONSTITUTIONAL: NAD, well-developed  RESPIRATORY: Normal respiratory effort; lungs are clear to auscultation bilaterally  CARDIOVASCULAR: Regular rate and rhythm, normal S1 and S2, no murmur/rub/gallop; No lower extremity edema; Peripheral pulses are 2+ bilaterally  ABDOMEN: Nontender to palpation, normoactive bowel sounds, no rebound/guarding; No hepatosplenomegaly  MUSCLOSKELETAL: no clubbing or cyanosis of digits; no joint swelling or tenderness to palpation  PSYCH: A+O to person, place, and time; affect appropriate    LABS:                        13.3   9.16  )-----------( 334      ( 03 Apr 2021 06:17 )             40.3     04-02    141  |  102  |  19  ----------------------------<  148<H>  4.2   |  28  |  1.07    Ca    9.5      02 Apr 2021 04:58  Phos  2.2     04-02  Mg     2.2     04-02    TPro  7.3  /  Alb  3.9  /  TBili  0.4  /  DBili  x   /  AST  16  /  ALT  21  /  AlkPhos  113  04-02    PT/INR - ( 01 Apr 2021 16:44 )   PT: 12.6 sec;   INR: 1.05 ratio         PTT - ( 01 Apr 2021 16:44 )  PTT:30.7 sec            RADIOLOGY & ADDITIONAL TESTS:  Results Reviewed:   Imaging Personally Reviewed:  Electrocardiogram Personally Reviewed:    COORDINATION OF CARE:  Care Discussed with Consultants/Other Providers [Y/N]:  Prior or Outpatient Records Reviewed [Y/N]:

## 2021-04-03 NOTE — PROGRESS NOTE ADULT - SUBJECTIVE AND OBJECTIVE BOX
Pulmonary Consult Note    DERIAN PEACOCK  MRN-[id number]    Chief Complaint: Patient is a 65y old  Female who presents with a chief complaint of shortness of breath (02 Apr 2021 12:14)      HPI:  65yFemale   -Patient is a 65 y F with PMHx of Sarcoidosis, Asthma, Morbid Obesity, HLD and HTN who presents with worsening shortness of breath for 3 days. Patient notes she has had worsening dyspnea on exertion for the past 3 months, previously was able to walk around her 1 story home without issue and now, becomes short of breath when walking a few steps from her bedroom to the bathroom.  Also notes worsening non-painful swelling in her legs for same duration. Patient notes she is largely sedentary lying in bed or chair, due to worsening LE swelling. She normally sleeps with 2 pillows propping her up, noting shortness of breath when lying flat. Denies PND, SOB at rest. In the last 3 days, she notes her ALEXANDER is worsening, associated with wheezing, intermittent substernal chest tightness, dry cough, lightheadedness and palpitations. Patient notes she was using her Symbicort more often, 4-5x in the night prior to going to her doctor with little improvement. Did not take any Albuterol because she does not take it unless she feels symptoms when exposed to pollen and other triggers outside of the home. Symptoms became more frequent night prior to admission, prompting her to walk-in to her PMD's office the next day. In her PMD's office, an EKG was performed showing new RBBB, which was not seen on her prior EKGs. At that point, PMD told patient to go to the ED. Denies chest pain, abdominal pain, N/V, fevers/chills, recent travel.     Of note, had right heart cath with coronary angiogram in May 2019 following abnormal nuclear stress test (documentation not in EMR). Cath showed non-obstructive CAD. Noted in cardiology notes to have mild MR and diastolic dysfunction.        In ED, vital signs 98.8 F, -124, //66, RR 18-20, SpO2 94% on RA, dropping to 91% on RA upon ambulation. S/p Solumedrol 60 mg IVP x1, Albuterol nebs x 1.   -    -  -  -    ROS:  -  -  All other systems reviewed and negative    PAST MEDICAL HISTORY: HEALTH ISSUES - PROBLEM Dx:  Asthma exacerbation  Asthma exacerbation    Dyspnea on exertion  Dyspnea on exertion    RBBB  RBBB    Asthma, unspecified asthma severity, unspecified whether complicated, unspecified whether persistent  Asthma, unspecified asthma severity, unspecified whether complicated, unspecified whether persistent    Sarcoidosis  Sarcoidosis    Essential hypertension  Essential hypertension    Microcytic anemia  Microcytic anemia    Glaucoma of both eyes, unspecified glaucoma type  Glaucoma of both eyes, unspecified glaucoma type    Prophylactic measure  Prophylactic measure    Sinus tachycardia  Sinus tachycardia            SOCIAL HISTORY:     ACTIVE MEDICATION LIST:  MEDICATIONS  (STANDING):  albuterol/ipratropium for Nebulization 3 milliLiter(s) Nebulizer every 6 hours  amLODIPine   Tablet 5 milliGRAM(s) Oral daily  aspirin enteric coated 81 milliGRAM(s) Oral daily  brimonidine 0.2% Ophthalmic Solution 1 Drop(s) Both EYES two times a day  budesonide 160 MICROgram(s)/formoterol 4.5 MICROgram(s) Inhaler 2 Puff(s) Inhalation two times a day  dorzolamide 2% Ophthalmic Solution 1 Drop(s) Both EYES <User Schedule>  enoxaparin Injectable 40 milliGRAM(s) SubCutaneous daily  ergocalciferol 55864 Unit(s) Oral <User Schedule>  ferrous    sulfate 325 milliGRAM(s) Oral daily  latanoprost 0.005% Ophthalmic Solution 1 Drop(s) Both EYES every 24 hours  methylPREDNISolone sodium succinate Injectable 40 milliGRAM(s) IV Push every 8 hours  metoprolol succinate ER 50 milliGRAM(s) Oral daily  montelukast 10 milliGRAM(s) Oral daily    MEDICATIONS  (PRN):  levalbuterol Inhalation 0.63 milliGRAM(s) Inhalation every 6 hours PRN wheeze      EXAM:  Vital Signs Last 24 Hrs  T(C): 36.7 (03 Apr 2021 05:45), Max: 37 (02 Apr 2021 18:05)  T(F): 98.1 (03 Apr 2021 05:45), Max: 98.6 (02 Apr 2021 18:05)  HR: 102 (03 Apr 2021 05:45) (100 - 110)  BP: 135/77 (03 Apr 2021 05:45) (110/73 - 150/78)  BP(mean): --  RR: 18 (03 Apr 2021 05:45) (16 - 20)  SpO2: 93% (03 Apr 2021 05:45) (93% - 96%)  GENERAL: No acute distress  NEURO: Alert and oriented x 3  LUNGS: posterior thorax clear  anteriuor chest with upper chest occ wheeze with ins wheeze over tracheal region  CV: S1/S2, no murmur  ABDOMEN: +BS, nontender  EXTREMITIES: no clubbing, cyanosis, edema    < from: CT Angio Chest w/ IV Cont (04.01.21 @ 22:33) >  IMPRESSION:    1. No pulmonary embolus, however the segmental and subsegmental divisions are not well evaluated.  2. Calcified and non calcified chest lymph nodes can be secondary to history of sarcoidosis.    < end of copied text >    -f/u TTE; prior TTE (2019) w/ normal EF, low-normal global LV systolic function, grade 1 diastolic dysfunction, mod-severe MR, mod TR      PROBLEM LIST:  65yFemale with HEALTH ISSUES - PROBLEM Dx:  Asthma exacerbation  Pulmonary Sarcoidosis  Dyspnea on exertion  New RBBB  HTN  Microcytic anemia  Sinus tachycardia  Mod MR  Diastolic dysfx              RECS:  -RECS:  -cont symbicort/singulair/nebs  -cont solumedrol 40IV Q8, taper per clinical course  -cardiology fu  -recommend ENT r/o Vocal  cord dysfx   check ACE level  out patient schedule PFT  Can consider cardiac MRI if any ? re  cardiac sarcoid      Thank you for this consultation, please feel free to call with any questions [phone]  Mario Paige DO [facility]

## 2021-04-03 NOTE — DISCHARGE NOTE PROVIDER - HOSPITAL COURSE
Patient is a 65 y F with PMHx of Sarcoidosis, Asthma, Morbid Obesity, HLD and HTN who presents with worsening shortness of breath for 3 days. Patient notes she has had worsening dyspnea on exertion for the past 3 months, previously was able to walk around her 1 story home without issue and now, becomes short of breath when walking a few steps from her bedroom to the bathroom.  Also notes worsening non-painful swelling in her legs for same duration. Patient notes she is largely sedentary lying in bed or chair, due to worsening LE swelling. She normally sleeps with 2 pillows propping her up, noting shortness of breath when lying flat. Denies PND, SOB at rest. In the last 3 days, she notes her ALEXANDER is worsening, associated with wheezing, intermittent substernal chest tightness, dry cough, lightheadedness and palpitations. Patient notes she was using her Symbicort more often, 4-5x in the night prior to going to her doctor with little improvement. Did not take any Albuterol because she does not take it unless she feels symptoms when exposed to pollen and other triggers outside of the home. Symptoms became more frequent night prior to admission, prompting her to walk-in to her PMD's office the next day. In her PMD's office, an EKG was performed showing new RBBB, which was not seen on her prior EKGs. At that point, PMD told patient to go to the ED. Denies chest pain, abdominal pain, N/V, fevers/chills, recent travel.     Of note, had right heart cath with coronary angiogram in May 2019 following abnormal nuclear stress test (documentation not in EMR). Cath showed non-obstructive CAD. Noted in cardiology notes to have mild MR and diastolic dysfunction.      In ED, vital signs 98.8 F, -124, //66, RR 18-20, SpO2 94% on RA, dropping to 91% on RA upon ambulation. EKG + RBBB. S/p Solumedrol 60 mg IVP x1, Albuterol nebs x 1.     Pt was admitted and underwent further cardiac, pulmonary, and ENT workup. ECHO normal LV systolic function, + mild diastolic dysfunction, + MR. BNP and Cardiac Markers were negative, with conduction abnormality likely in s/o underlying pulmonary dysfunction. Pulmonary recommended solumedrol 40IV, symbicort, nebulizers, and ENT evaluation which was + pachydermia + elongated uvular on indirect laryngoscopy. Further outpatient workup for sleep apnea +/- UPPP was recommended on discharge, and patient was started on a PPI for presumed GERD. Cardiac MRI was performed and showed _______. Pt was evaluated by PT and was discharged without PT needs.     Patient is a 65 y F with PMHx of Sarcoidosis, Asthma, Morbid Obesity, HLD and HTN who presents with worsening shortness of breath for 3 days. Patient notes she has had worsening dyspnea on exertion for the past 3 months, previously was able to walk around her 1 story home without issue and now, becomes short of breath when walking a few steps from her bedroom to the bathroom.  Also notes worsening non-painful swelling in her legs for same duration. Patient notes she is largely sedentary lying in bed or chair, due to worsening LE swelling. She normally sleeps with 2 pillows propping her up, noting shortness of breath when lying flat. Denies PND, SOB at rest. In the last 3 days, she notes her ALEXANDER is worsening, associated with wheezing, intermittent substernal chest tightness, dry cough, lightheadedness and palpitations. Patient notes she was using her Symbicort more often, 4-5x in the night prior to going to her doctor with little improvement. Did not take any Albuterol because she does not take it unless she feels symptoms when exposed to pollen and other triggers outside of the home. Symptoms became more frequent night prior to admission, prompting her to walk-in to her PMD's office the next day. In her PMD's office, an EKG was performed showing new RBBB, which was not seen on her prior EKGs. At that point, PMD told patient to go to the ED. Denies chest pain, abdominal pain, N/V, fevers/chills, recent travel.     Of note, had right heart cath with coronary angiogram in May 2019 following abnormal nuclear stress test (documentation not in EMR). Cath showed non-obstructive CAD. Noted in cardiology notes to have mild MR and diastolic dysfunction.      In ED, vital signs 98.8 F, -124, //66, RR 18-20, SpO2 94% on RA, dropping to 91% on RA upon ambulation. EKG + RBBB. S/p Solumedrol 60 mg IVP x1, Albuterol nebs x 1.     Pt was admitted and underwent further cardiac, pulmonary, and ENT workup. ECHO normal LV systolic function, + mild diastolic dysfunction, + MR. BNP and Cardiac Markers were negative, with conduction abnormality likely in s/o underlying pulmonary dysfunction. Pulmonary recommended solumedrol 40IV, symbicort, nebulizers, and ENT evaluation which was + pachydermia + elongated uvular on indirect laryngoscopy. Further outpatient workup for sleep apnea +/- UPPP was recommended on discharge, and patient was started on a PPI for presumed GERD. Cardiac MRI was performed and showed _______. Pt was evaluated by PT and was discharged without PT needs. Of note A1c [6.2%] and lipid profile [Cholesterol 223 / TG 67 / HDL 71 / [id number]] was checked inpatient:  [4/2/21]. Pt was started on statin therapy and was educated on diabetes, diet and lifestyle modifications.      Patient is a 65 y F with PMHx of Sarcoidosis, Asthma, Morbid Obesity, HLD and HTN who presents with worsening shortness of breath for 3 days. Patient notes she has had worsening dyspnea on exertion for the past 3 months, previously was able to walk around her 1 story home without issue and now, becomes short of breath when walking a few steps from her bedroom to the bathroom.  Also notes worsening non-painful swelling in her legs for same duration. Patient notes she is largely sedentary lying in bed or chair, due to worsening LE swelling. She normally sleeps with 2 pillows propping her up, noting shortness of breath when lying flat. Denies PND, SOB at rest. In the last 3 days, she notes her ALEXANDER is worsening, associated with wheezing, intermittent substernal chest tightness, dry cough, lightheadedness and palpitations. Patient notes she was using her Symbicort more often, 4-5x in the night prior to going to her doctor with little improvement. Did not take any Albuterol because she does not take it unless she feels symptoms when exposed to pollen and other triggers outside of the home. Symptoms became more frequent night prior to admission, prompting her to walk-in to her PMD's office the next day. In her PMD's office, an EKG was performed showing new RBBB, which was not seen on her prior EKGs. At that point, PMD told patient to go to the ED. Denies chest pain, abdominal pain, N/V, fevers/chills, recent travel.     Of note, had right heart cath with coronary angiogram in May 2019 following abnormal nuclear stress test (documentation not in EMR). Cath showed non-obstructive CAD. Noted in cardiology notes to have mild MR and diastolic dysfunction.      In ED, vital signs 98.8 F, -124, //66, RR 18-20, SpO2 94% on RA, dropping to 91% on RA upon ambulation. EKG + RBBB. S/p Solumedrol 60 mg IVP x1, Albuterol nebs x 1.     Pt was admitted and underwent further cardiac, pulmonary, and ENT workup. ECHO normal LV systolic function, + mild diastolic dysfunction, + MR. BNP and Cardiac Markers were negative, with conduction abnormality likely in s/o underlying pulmonary dysfunction. Pulmonary recommended solumedrol 40IV, symbicort, nebulizers, and ENT evaluation which was + pachydermia + elongated uvular on indirect laryngoscopy. Further outpatient workup for sleep apnea +/- UPPP was recommended on discharge, and patient was started on a PPI for presumed GERD. Cardiac MRI was unable to be performed inpatient due to patient body habitus and claustrophobia. Pt was evaluated by PT and was discharged without PT needs. Of note A1c [6.2%] and lipid profile [Cholesterol 223 / TG 67 / HDL 71 / [id number]] was checked inpatient:  [4/2/21]. Pt was started on statin therapy and was educated on diabetes, diet and lifestyle modifications.     Patient will need to FU ACE level outpatient, as well as obtain cardiac MRI. She will need to followup with ENT, Pulmonary and have a sleep study.      Patient is a 65 y F with PMHx of Sarcoidosis, Asthma, Morbid Obesity, HLD and HTN who presents with worsening shortness of breath for 3 days. Patient notes she has had worsening dyspnea on exertion for the past 3 months, previously was able to walk around her 1 story home without issue and now, becomes short of breath when walking a few steps from her bedroom to the bathroom.  Also notes worsening non-painful swelling in her legs for same duration. Patient notes she is largely sedentary lying in bed or chair, due to worsening LE swelling. She normally sleeps with 2 pillows propping her up, noting shortness of breath when lying flat. Denies PND, SOB at rest. In the last 3 days, she notes her ALEXANDER is worsening, associated with wheezing, intermittent substernal chest tightness, dry cough, lightheadedness and palpitations. Patient notes she was using her Symbicort more often, 4-5x in the night prior to going to her doctor with little improvement. Did not take any Albuterol because she does not take it unless she feels symptoms when exposed to pollen and other triggers outside of the home. Symptoms became more frequent night prior to admission, prompting her to walk-in to her PMD's office the next day. In her PMD's office, an EKG was performed showing new RBBB, which was not seen on her prior EKGs. At that point, PMD told patient to go to the ED. Denies chest pain, abdominal pain, N/V, fevers/chills, recent travel.     Of note, had right heart cath with coronary angiogram in May 2019 following abnormal nuclear stress test (documentation not in EMR). Cath showed non-obstructive CAD. Noted in cardiology notes to have mild MR and diastolic dysfunction. In ED, vital signs 98.8 F, -124, //66, RR 18-20, SpO2 94% on RA, dropping to 91% on RA upon ambulation. EKG + RBBB. S/p Solumedrol 60 mg IVP x1, Albuterol nebs x 1.     Pt was admitted and underwent further cardiac, pulmonary, and ENT workup. ECHO normal LV systolic function, + mild diastolic dysfunction, + MR. BNP and Cardiac Markers were negative, with conduction abnormality likely in s/o underlying pulmonary dysfunction. Pulmonary recommended solumedrol 40IV, symbicort, nebulizers, and ENT evaluation which was + pachydermia + elongated uvular on indirect laryngoscopy. Further outpatient workup for sleep apnea +/- UPPP was recommended on discharge, and patient was started on a PPI for presumed GERD. Cardiac MRI was unable to be performed inpatient due to patient body habitus and claustrophobia. Pt was evaluated by PT and was discharged without PT needs. Of note A1c [6.2%] and lipid profile [Cholesterol 223 / TG 67 / HDL 71 / [id number]] was checked inpatient:  [4/2/21]. Pt was started on statin therapy and was educated on diabetes, diet and lifestyle modifications.     Patient will continue PO Prednisone taper on discharge and will need to follow up her serum ACE level, sent, as an outpatient w/ her pulmonologist, as well as obtain cardiac MRI, and a sleep study. She will also need to followup with ENT.

## 2021-04-03 NOTE — DISCHARGE NOTE PROVIDER - NSFOLLOWUPCLINICS_GEN_ALL_ED_FT
[facility] - ENT  Otolaryngology (ENT)  [address] [place] [address]  Phone: [phone]  Fax:      [facility] - ENT  Otolaryngology (ENT)  [address] [place] [address]  Phone: [phone]  Fax:     [facility] Rheumatology  Rheumatology  [address] [place] [id number]  Phone: [phone]  Fax:   Follow Up Time: 2 weeks

## 2021-04-03 NOTE — DISCHARGE NOTE PROVIDER - PROVIDER TOKENS
PROVIDER:[[id number]],ESTABLISHEDPATIENT:[T]],PROVIDER:[[id number]],FOLLOWUP:[1 week],ESTABLISHEDPATIENT:[T]],PROVIDER:[[id number]],ESTABLISHEDPATIENT:[T]],PROVIDER:[TOKEN:[[id number]],FOLLOWUP:[2 weeks],ESTABLISHEDPATIENT:[T]] PROVIDER:[TOKEN:[[id number]],ESTABLISHEDPATIENT:[T]],PROVIDER:[[id number]],FOLLOWUP:[1 week],ESTABLISHEDPATIENT:[T]],PROVIDER:[TOKEN:[[id number]],ESTABLISHEDPATIENT:[T]],PROVIDER:[TOKEN:[[id number]],FOLLOWUP:[2 weeks],ESTABLISHEDPATIENT:[T]],PROVIDER:[[id number]],FOLLOWUP:[1 month],ESTABLISHEDPATIENT:[T]]

## 2021-04-03 NOTE — DISCHARGE NOTE PROVIDER - NSDCFUADDAPPT_GEN_ALL_CORE_FT
Please follow up in ENT office for possible "UPPP evaluation / sleep apnea workup". Please call [phone] to schedule your appointment at location of clinic after inpatient evaluation by Dr. Moe Pérez.    Please follow up with Dr. Mccrary, pulmonologist, within 1 week of discharge.     Please follow up with Dr. Day /Dr. Machado, cardiology,      Please follow up with your rheumatologist    Please follow up with Dr. Burnett within 2 weeks-1 month of discharge.    Please follow up in ENT office for possible "UPPP evaluation / sleep apnea workup". Please call [phone] to schedule your appointment at location of clinic after inpatient evaluation by Dr. Moe Pérez.    Please follow up with Dr. Mccrary, pulmonologist, within 1 week of discharge.     Please follow up with Dr. Day /Dr. Machado, cardiology,      Please follow up with [facility] rheumatology within 2 weeks of discharge.

## 2021-04-03 NOTE — CONSULT NOTE ADULT - ASSESSMENT
65 y F with PMHx of Sarcoidosis, Asthma, Morbid Obesity, HLD and HTN admitted for worsening shortness of breath for 3 days. Now presenting for upper airway eval. Indirect laryngoscopy performed which was notable for an elongated uvula likely 2/2 sleep apnea and pachydermia 2/2 GERD. Otherwise normal.

## 2021-04-03 NOTE — CONSULT NOTE ADULT - SUBJECTIVE AND OBJECTIVE BOX
CC: throat tightness    HPI: Patient is a 65 y F with PMHx of Sarcoidosis, Asthma, Morbid Obesity, HLD and HTN admitted for worsening shortness of breath for 3 days. Patient notes she has had worsening dyspnea on exertion for the past 3 months, previously was able to walk around her 1 story home without issue and now, becomes short of breath when walking a few steps from her bedroom to the bathroom. ENT consulted for upper airway eval as patient is complaining of throat tightness, cough, and excessive mucus production. Pt denies dysphagia, odynophagia, dysphonia, changes in voice or inability to tolerate secretions.        PAST MEDICAL & SURGICAL HISTORY:  Asthma, unspecified asthma severity, unspecified whether complicated, unspecified whether persistent    Sarcoidosis    Essential hypertension    Glaucoma of both eyes, unspecified glaucoma type    Morbid obesity with BMI of 50.0-59.9, adult    History of hysterectomy for benign disease  2006    S/P cataract surgery      Allergies    No Known Allergies    Intolerances      MEDICATIONS  (STANDING):  albuterol/ipratropium for Nebulization 3 milliLiter(s) Nebulizer every 6 hours  amLODIPine   Tablet 5 milliGRAM(s) Oral daily  aspirin enteric coated 81 milliGRAM(s) Oral daily  brimonidine 0.2% Ophthalmic Solution 1 Drop(s) Both EYES two times a day  budesonide 160 MICROgram(s)/formoterol 4.5 MICROgram(s) Inhaler 2 Puff(s) Inhalation two times a day  dorzolamide 2% Ophthalmic Solution 1 Drop(s) Both EYES <User Schedule>  enoxaparin Injectable 40 milliGRAM(s) SubCutaneous daily  ergocalciferol 57017 Unit(s) Oral <User Schedule>  ferrous    sulfate 325 milliGRAM(s) Oral daily  guaiFENesin  milliGRAM(s) Oral every 12 hours  latanoprost 0.005% Ophthalmic Solution 1 Drop(s) Both EYES every 24 hours  methylPREDNISolone sodium succinate Injectable 40 milliGRAM(s) IV Push every 8 hours  metoprolol succinate ER 50 milliGRAM(s) Oral daily  montelukast 10 milliGRAM(s) Oral daily  sodium chloride 0.65% Nasal 1 Spray(s) Both Nostrils daily    MEDICATIONS  (PRN):  levalbuterol Inhalation 0.63 milliGRAM(s) Inhalation every 6 hours PRN wheeze      Social History: Non-smoker  Drinks 1-2 [employer] beers at social events, last drink 1 month ago  No other recreational drug use  Mostly sedentary, ambulates with cane  Lives with daughter and granddaughter  Recently retired, previously PCA at [employer]    Family history: no pertinent family history    ROS:   ENT: all negative except as noted in HPI   CV: denies palpitations  Pulm: see hpi  GI: denies change in appetite indigestion, n/v  : denies pertinent urinary symptoms, urgency  Neuro: denies numbness/tingling, loss of sensation  Psych: denies anxiety  MS: denies muscle weakness, instability  Heme: denies easy bruising or bleeding  Endo: denies heat/cold intolerance, excessive sweating  Vascular: denies LE edema    Vital Signs Last 24 Hrs  T(C): 36.7 (03 Apr 2021 05:45), Max: 37 (02 Apr 2021 18:05)  T(F): 98.1 (03 Apr 2021 05:45), Max: 98.6 (02 Apr 2021 18:05)  HR: 102 (03 Apr 2021 05:45) (102 - 110)  BP: 135/77 (03 Apr 2021 05:45) (110/73 - 150/78)  BP(mean): --  RR: 18 (03 Apr 2021 05:45) (18 - 20)  SpO2: 93% (03 Apr 2021 05:45) (93% - 96%)                          13.3   9.16  )-----------( 334      ( 03 Apr 2021 06:17 )             40.3    04-03    144  |  103  |  22  ----------------------------<  143<H>  4.1   |  28  |  1.02    Ca    9.0      03 Apr 2021 07:15  Phos  3.7     04-03  Mg     2.4     04-03    TPro  7.3  /  Alb  3.9  /  TBili  0.4  /  DBili  x   /  AST  16  /  ALT  21  /  AlkPhos  113  04-02   PT/INR - ( 01 Apr 2021 16:44 )   PT: 12.6 sec;   INR: 1.05 ratio         PTT - ( 01 Apr 2021 16:44 )  PTT:30.7 sec    PHYSICAL EXAM:  Gen: NAD  Skin: No rashes, bruises, or lesions  Head: Normocephalic, Atraumatic  Face: no edema, erythema, or fluctuance. Parotid glands soft without mass  Eyes: no scleral injection  Nose: Nares bilaterally patent, no discharge  Mouth: No Stridor / Drooling / Trismus.  Mucosa moist, tongue/uvula midline, oropharynx clear  Neck: Flat, supple, no lymphadenopathy, trachea midline, no masses  Lymphatic: No lymphadenopathy  Resp: breathing easily, no stridor  CV: no peripheral edema/cyanosis  GI: nondistended   Peripheral vascular: no JVD or edema  Neuro: facial nerve intact, no facial droop        Fiberoptic Indirect laryngoscopy:  (Scope #2 used)  Reason for Laryngoscopy: upper airway eval    Patient was unable to cooperate with mirror.  +Elongated uvula, +pachydermia. Nasopharynx, oropharynx, and hypopharynx clear, no bleeding. Tongue base, posterior pharyngeal wall, vallecula, epiglottis, and subglottis appear normal. No erythema, edema, pooling of secretions, masses or lesions. Airway patent, no foreign body visualized. No glottic/supraglottic edema. True vocal cords, arytenoids, vestibular folds, ventricles, pyriform sinuses, and aryepiglottic folds appear normal bilaterally. Vocal cords mobile with good contact b/l.

## 2021-04-03 NOTE — DISCHARGE NOTE PROVIDER - NSDCCPCAREPLAN_GEN_ALL_CORE_FT
PRINCIPAL DISCHARGE DIAGNOSIS  Diagnosis: Shortness of breath  Assessment and Plan of Treatment: You came to the hospital with shortness of breath when exerting yourself and a workup was conducted inpatient to identify the cause: heart, lung, or throat. In the setting of your known asthma history, you were evaluated by ENT, Pulmonology, and Cardiology to rule out other causes for your symptoms.      SECONDARY DISCHARGE DIAGNOSES  Diagnosis: RBBB  Assessment and Plan of Treatment: You had an echocardiogram (ECHO) to further evaluate your heart in the setting of this new conduction abnormality, RBBB (Right Bundle Branch Block). Your ECHO showed good cardiac systolic (pump) function, and your laboratory testing was negative for heart attack or failure. It is likely this conduction abnormality, RBBB, that was seen on your EKG is from your underlying pulmonary conditions.    Diagnosis: Sarcoidosis  Assessment and Plan of Treatment: A cardiac MRI was performed to evaluate if sarcoidosis could be impacting the heart. It showed ____.    Diagnosis: Asthma exacerbation  Assessment and Plan of Treatment: You were treated with IV steroids and nebulizers in the hospital for your asthma exacerbation. You were evaluated by you outpatient pulmonologist's team in the hospital so your provider is aware of your hospitalization. Please make sure to follow up withing 3-7 days after discharge to ensure appropriate management of your condition.    Diagnosis: At risk for sleep apnea  Assessment and Plan of Treatment: You were evaluated by ENT and had an indirect laryngoscopy which showed + elongated uvula + pachydermia which means you have anatomy that can block your airway and make it difficult to breath, especially when you are sleeping when these muscules are relaxed. The ENT physicians recommend you follow up outpatient for further evaluation.    Diagnosis: GERD (gastroesophageal reflux disease)  Assessment and Plan of Treatment: The findings seen on your laryngoscopy likely occured after chronic exposure to acidic stomach fluid. The contents of the stomach is very acidic and can irritate your throat / esophagus if exposed. You were started on a medication that ends in "-azole" which will help control this condition and the symptoms it causes - GERD (Gasrtro Esophageal Reflux Disease)     PRINCIPAL DISCHARGE DIAGNOSIS  Diagnosis: Shortness of breath  Assessment and Plan of Treatment: You came to the hospital with shortness of breath when exerting yourself and a workup was conducted inpatient to identify the cause: heart, lung, or throat. In the setting of your known asthma history, you were evaluated by ENT, Pulmonology, and Cardiology to rule out other causes for your symptoms.      SECONDARY DISCHARGE DIAGNOSES  Diagnosis: Asthma exacerbation  Assessment and Plan of Treatment: You were treated with IV steroids and nebulizers in the hospital for your asthma exacerbation. You were evaluated by you outpatient pulmonologist's team in the hospital so your provider is aware of your hospitalization. Please make sure to follow up withing 3-7 days after discharge to ensure appropriate management of your condition.    Diagnosis: RBBB  Assessment and Plan of Treatment: You had an echocardiogram (ECHO) to further evaluate your heart in the setting of this new conduction abnormality, RBBB (Right Bundle Branch Block). Your ECHO showed good cardiac systolic (pump) function, and your laboratory testing was negative for heart attack or failure. It is likely this conduction abnormality, RBBB, that was seen on your EKG is from your underlying pulmonary conditions.    Diagnosis: Sarcoidosis  Assessment and Plan of Treatment: A cardiac MRI was performed to evaluate if sarcoidosis could be impacting the heart. It showed ____.    Diagnosis: At risk for sleep apnea  Assessment and Plan of Treatment: You were evaluated by ENT and had an indirect laryngoscopy which showed + elongated uvula + pachydermia which means you have anatomy that can block your airway and make it difficult to breath, especially when you are sleeping when these muscules are relaxed. The ENT physicians recommend you follow up outpatient for further evaluation.    Diagnosis: GERD (gastroesophageal reflux disease)  Assessment and Plan of Treatment: The findings seen on your laryngoscopy likely occured after chronic exposure to acidic stomach fluid. The contents of the stomach is very acidic and can irritate your throat / esophagus if exposed. You were started on a medication that ends in "-azole" which will help control this condition and the symptoms it causes - GERD (Gasrtro Esophageal Reflux Disease)    Diagnosis: Hyperlipidemia  Assessment and Plan of Treatment: Your lipid profile was checked on admission and you were found to have elevated cholesterol. You were started on a medication to lower your cholesterol in the hospital and should follow up with your provider to ensure appropriate interventions.    Diagnosis: History of prediabetes  Assessment and Plan of Treatment: Your A1c was checked inpatient and was elevated at 6.2%. A level of 7% or higher is diagnostic of diabetes mellitus. You were educated inpatient on this condition and on diet and lifestyle modifications that can improve your blood sugar control. Unlike Diabetes, with appropriate interventions you can reverse your "prediabetes" and lower your A1c so you are not at an increased risk of this condition. Please follow up with your doctor to ensure appropriate management of your elevated sugars and to aid in strategies for diabetes prevention.     PRINCIPAL DISCHARGE DIAGNOSIS  Diagnosis: Shortness of breath  Assessment and Plan of Treatment: You came to the hospital with shortness of breath when exerting yourself and a workup was conducted inpatient to identify the cause: heart, lung, or throat. In the setting of your known asthma history, you were evaluated by ENT, Pulmonology, and Cardiology to rule out other causes for your symptoms.      SECONDARY DISCHARGE DIAGNOSES  Diagnosis: Asthma exacerbation  Assessment and Plan of Treatment: You were treated with IV steroids and nebulizers in the hospital for your asthma exacerbation. You were evaluated by you outpatient pulmonologist's team in the hospital so your provider is aware of your hospitalization. Please make sure to follow up withing 3-7 days after discharge to ensure appropriate management of your condition.    Diagnosis: RBBB  Assessment and Plan of Treatment: You had an echocardiogram (ECHO) to further evaluate your heart in the setting of this new conduction abnormality, RBBB (Right Bundle Branch Block). Your ECHO showed good cardiac systolic (pump) function, and your laboratory testing was negative for heart attack or failure. It is likely this conduction abnormality, RBBB, that was seen on your EKG is from your underlying pulmonary conditions.    Diagnosis: Sarcoidosis  Assessment and Plan of Treatment: Please obtain cardiac MRI outpatient to evaluate if you have cardiac sarcoidosis.    Diagnosis: At risk for sleep apnea  Assessment and Plan of Treatment: You were evaluated by ENT and had an indirect laryngoscopy which showed + elongated uvula + pachydermia which means you have anatomy that can block your airway and make it difficult to breath, especially when you are sleeping when these muscules are relaxed. The ENT physicians recommend you follow up outpatient for further evaluation.    Diagnosis: GERD (gastroesophageal reflux disease)  Assessment and Plan of Treatment: The findings seen on your laryngoscopy likely occured after chronic exposure to acidic stomach fluid. The contents of the stomach is very acidic and can irritate your throat / esophagus if exposed. You were started on a medication that ends in "-azole" which will help control this condition and the symptoms it causes - GERD (Gasrtro Esophageal Reflux Disease)    Diagnosis: Hyperlipidemia  Assessment and Plan of Treatment: Your lipid profile was checked on admission and you were found to have elevated cholesterol. You were started on a medication to lower your cholesterol in the hospital and should follow up with your provider to ensure appropriate interventions.    Diagnosis: History of prediabetes  Assessment and Plan of Treatment: Your A1c was checked inpatient and was elevated at 6.2%. A level of 7% or higher is diagnostic of diabetes mellitus. You were educated inpatient on this condition and on diet and lifestyle modifications that can improve your blood sugar control. Unlike Diabetes, with appropriate interventions you can reverse your "prediabetes" and lower your A1c so you are not at an increased risk of this condition. Please follow up with your doctor to ensure appropriate management of your elevated sugars and to aid in strategies for diabetes prevention.

## 2021-04-03 NOTE — DISCHARGE NOTE PROVIDER - NPI NUMBER (FOR SYSAD[employer] USE ONLY) :
[[id number]],[[id number]],[[id number]] [[id number]]

## 2021-04-03 NOTE — PROGRESS NOTE ADULT - PROBLEM SELECTOR PLAN 3
New RBBB likely secondary to pulmonary disease vs ischemic CAD vs PE. Was previously on Holter monitoring for palpitations.  -ECG showed RBBB without ST elevation Trop 12--> 13 without active CP  -CTA neg for PE, shows possible ?fibrotic changes and LN consistent with sarcoidosis.  f/u official report  - Plan above for ischemic workup (TTE, L/R HC)  - Plan above for pulmonary workup/ALEXANDER  - Tele monitoring    #CAD  - F/u A1C  - F/u lipid panel   - C/w ASA 81 New RBBB likely secondary to pulmonary disease vs ischemic CAD vs PE. Was previously on Holter monitoring for palpitations.  -ECG showed RBBB without ST elevation Trop 12--> 13 without active CP  -CTA neg for PE, shows possible ?fibrotic changes and LN consistent with sarcoidosis.     - Plan above for ischemic workup (L/R HC)  -TTE technically difficult but LV systolic fxn is WNL, R heart not visualized  - Plan above for pulmonary workup/ALEXANDER  - Tele monitoring    #CAD  - F/u A1C  - F/u lipid panel   - C/w ASA 81

## 2021-04-04 DIAGNOSIS — K21.9 GASTRO-ESOPHAGEAL REFLUX DISEASE WITHOUT ESOPHAGITIS: ICD-10-CM

## 2021-04-04 DIAGNOSIS — E78.5 HYPERLIPIDEMIA, UNSPECIFIED: ICD-10-CM

## 2021-04-04 DIAGNOSIS — J38.7 OTHER DISEASES OF LARYNX: ICD-10-CM

## 2021-04-04 LAB
ANION GAP SERPL CALC-SCNC: 12 MMOL/L — SIGNIFICANT CHANGE UP (ref 5–17)
BASOPHILS # BLD AUTO: 0.01 K/UL — SIGNIFICANT CHANGE UP (ref 0–0.2)
BASOPHILS NFR BLD AUTO: 0.1 % — SIGNIFICANT CHANGE UP (ref 0–2)
BUN SERPL-MCNC: 26 MG/DL — HIGH (ref 7–23)
CALCIUM SERPL-MCNC: 8.7 MG/DL — SIGNIFICANT CHANGE UP (ref 8.4–10.5)
CHLORIDE SERPL-SCNC: 104 MMOL/L — SIGNIFICANT CHANGE UP (ref 96–108)
CO2 SERPL-SCNC: 27 MMOL/L — SIGNIFICANT CHANGE UP (ref 22–31)
CREAT SERPL-MCNC: 1.01 MG/DL — SIGNIFICANT CHANGE UP (ref 0.5–1.3)
EOSINOPHIL # BLD AUTO: 0 K/UL — SIGNIFICANT CHANGE UP (ref 0–0.5)
EOSINOPHIL NFR BLD AUTO: 0 % — SIGNIFICANT CHANGE UP (ref 0–6)
GLUCOSE SERPL-MCNC: 151 MG/DL — HIGH (ref 70–99)
HCT VFR BLD CALC: 43.3 % — SIGNIFICANT CHANGE UP (ref 34.5–45)
HGB BLD-MCNC: 14.3 G/DL — SIGNIFICANT CHANGE UP (ref 11.5–15.5)
IMM GRANULOCYTES NFR BLD AUTO: 0.3 % — SIGNIFICANT CHANGE UP (ref 0–1.5)
LYMPHOCYTES # BLD AUTO: 0.91 K/UL — LOW (ref 1–3.3)
LYMPHOCYTES # BLD AUTO: 10.4 % — LOW (ref 13–44)
MAGNESIUM SERPL-MCNC: 2.5 MG/DL — SIGNIFICANT CHANGE UP (ref 1.6–2.6)
MCHC RBC-ENTMCNC: 26.1 PG — LOW (ref 27–34)
MCHC RBC-ENTMCNC: 33 GM/DL — SIGNIFICANT CHANGE UP (ref 32–36)
MCV RBC AUTO: 79.2 FL — LOW (ref 80–100)
MONOCYTES # BLD AUTO: 0.31 K/UL — SIGNIFICANT CHANGE UP (ref 0–0.9)
MONOCYTES NFR BLD AUTO: 3.6 % — SIGNIFICANT CHANGE UP (ref 2–14)
NEUTROPHILS # BLD AUTO: 7.47 K/UL — HIGH (ref 1.8–7.4)
NEUTROPHILS NFR BLD AUTO: 85.6 % — HIGH (ref 43–77)
NRBC # BLD: 0 /100 WBCS — SIGNIFICANT CHANGE UP (ref 0–0)
PHOSPHATE SERPL-MCNC: 3.3 MG/DL — SIGNIFICANT CHANGE UP (ref 2.5–4.5)
PLATELET # BLD AUTO: 335 K/UL — SIGNIFICANT CHANGE UP (ref 150–400)
POTASSIUM SERPL-MCNC: 4.4 MMOL/L — SIGNIFICANT CHANGE UP (ref 3.5–5.3)
POTASSIUM SERPL-SCNC: 4.4 MMOL/L — SIGNIFICANT CHANGE UP (ref 3.5–5.3)
RBC # BLD: 5.47 M/UL — HIGH (ref 3.8–5.2)
RBC # FLD: 16.6 % — HIGH (ref 10.3–14.5)
SODIUM SERPL-SCNC: 143 MMOL/L — SIGNIFICANT CHANGE UP (ref 135–145)
WBC # BLD: 8.73 K/UL — SIGNIFICANT CHANGE UP (ref 3.8–10.5)
WBC # FLD AUTO: 8.73 K/UL — SIGNIFICANT CHANGE UP (ref 3.8–10.5)

## 2021-04-04 PROCEDURE — 99232 SBSQ HOSP IP/OBS MODERATE 35: CPT | Mod: GC

## 2021-04-04 PROCEDURE — 99232 SBSQ HOSP IP/OBS MODERATE 35: CPT

## 2021-04-04 RX ORDER — ATORVASTATIN CALCIUM 80 MG/1
10 TABLET, FILM COATED ORAL AT BEDTIME
Refills: 0 | Status: DISCONTINUED | OUTPATIENT
Start: 2021-04-04 | End: 2021-04-05

## 2021-04-04 RX ORDER — IPRATROPIUM/ALBUTEROL SULFATE 18-103MCG
3 AEROSOL WITH ADAPTER (GRAM) INHALATION EVERY 8 HOURS
Refills: 0 | Status: DISCONTINUED | OUTPATIENT
Start: 2021-04-04 | End: 2021-04-05

## 2021-04-04 RX ADMIN — BRIMONIDINE TARTRATE 1 DROP(S): 2 SOLUTION/ DROPS OPHTHALMIC at 17:28

## 2021-04-04 RX ADMIN — BUDESONIDE AND FORMOTEROL FUMARATE DIHYDRATE 2 PUFF(S): 160; 4.5 AEROSOL RESPIRATORY (INHALATION) at 17:29

## 2021-04-04 RX ADMIN — Medication 50 MILLIGRAM(S): at 06:51

## 2021-04-04 RX ADMIN — LATANOPROST 1 DROP(S): 0.05 SOLUTION/ DROPS OPHTHALMIC; TOPICAL at 00:35

## 2021-04-04 RX ADMIN — LATANOPROST 1 DROP(S): 0.05 SOLUTION/ DROPS OPHTHALMIC; TOPICAL at 23:14

## 2021-04-04 RX ADMIN — MONTELUKAST 10 MILLIGRAM(S): 4 TABLET, CHEWABLE ORAL at 21:09

## 2021-04-04 RX ADMIN — BRIMONIDINE TARTRATE 1 DROP(S): 2 SOLUTION/ DROPS OPHTHALMIC at 06:51

## 2021-04-04 RX ADMIN — ATORVASTATIN CALCIUM 10 MILLIGRAM(S): 80 TABLET, FILM COATED ORAL at 21:09

## 2021-04-04 RX ADMIN — Medication 600 MILLIGRAM(S): at 17:30

## 2021-04-04 RX ADMIN — Medication 81 MILLIGRAM(S): at 11:36

## 2021-04-04 RX ADMIN — Medication 3 MILLILITER(S): at 11:37

## 2021-04-04 RX ADMIN — PANTOPRAZOLE SODIUM 40 MILLIGRAM(S): 20 TABLET, DELAYED RELEASE ORAL at 06:51

## 2021-04-04 RX ADMIN — Medication 325 MILLIGRAM(S): at 11:36

## 2021-04-04 RX ADMIN — DORZOLAMIDE HYDROCHLORIDE 1 DROP(S): 20 SOLUTION/ DROPS OPHTHALMIC at 18:15

## 2021-04-04 RX ADMIN — Medication 3 MILLILITER(S): at 00:34

## 2021-04-04 RX ADMIN — Medication 40 MILLIGRAM(S): at 06:51

## 2021-04-04 RX ADMIN — Medication 600 MILLIGRAM(S): at 06:51

## 2021-04-04 RX ADMIN — DORZOLAMIDE HYDROCHLORIDE 1 DROP(S): 20 SOLUTION/ DROPS OPHTHALMIC at 08:39

## 2021-04-04 RX ADMIN — AMLODIPINE BESYLATE 5 MILLIGRAM(S): 2.5 TABLET ORAL at 06:50

## 2021-04-04 RX ADMIN — Medication 1 SPRAY(S): at 17:29

## 2021-04-04 RX ADMIN — ENOXAPARIN SODIUM 40 MILLIGRAM(S): 100 INJECTION SUBCUTANEOUS at 11:36

## 2021-04-04 RX ADMIN — Medication 3 MILLILITER(S): at 06:51

## 2021-04-04 RX ADMIN — BUDESONIDE AND FORMOTEROL FUMARATE DIHYDRATE 2 PUFF(S): 160; 4.5 AEROSOL RESPIRATORY (INHALATION) at 06:51

## 2021-04-04 RX ADMIN — ERGOCALCIFEROL 50000 UNIT(S): 1.25 CAPSULE ORAL at 06:51

## 2021-04-04 RX ADMIN — Medication 1 SPRAY(S): at 06:51

## 2021-04-04 RX ADMIN — Medication 3 MILLILITER(S): at 21:09

## 2021-04-04 RX ADMIN — Medication 3 MILLILITER(S): at 13:27

## 2021-04-04 RX ADMIN — Medication 1 SPRAY(S): at 11:41

## 2021-04-04 NOTE — PROGRESS NOTE ADULT - PROBLEM SELECTOR PLAN 3
New RBBB likely secondary to pulmonary disease vs ischemic CAD vs PE. Was previously on Holter monitoring for palpitations.  -ECG showed RBBB without ST elevation Trop 12--> 13 without active CP  -CTA neg for PE, shows possible ?fibrotic changes and LN consistent with sarcoidosis.     - Plan above for ischemic workup (L/R HC)  -TTE technically difficult but LV systolic fxn is WNL, R heart not visualized  - Plan above for pulmonary workup/ALEXANDER  - Tele monitoring    #CAD  - F/u A1C  - F/u lipid panel   - C/w ASA 81 - EKG out/inpatient + RBBB likely 2/2 pulmonary dx; CTA neg for PE  - nonobstructive CAD on prior RHC ; trops / BNP wnl   - TTE technically difficult but LV systolic fxn is WNL, R heart not visualized  - modifying risk factors: lipid panel, A1c   - telemetry monitoring, h/o prior holter monitoring for palpitations

## 2021-04-04 NOTE — PROGRESS NOTE ADULT - PROBLEM SELECTOR PLAN 10
Diet: CC/DASH  DVT: Lovenox  Dispo: PT  Vaccinations: Has not received COVID Vaccine, has received flu shot and PSV13  GOC: Full Code Vaccinations: ask if wants COVID Vaccine on dc, has had flu shot and PSV13  Diet: CC/DASH  DVT: Lovenox  Dispo: home w/ no skilled PT needs  GOC: Full Code

## 2021-04-04 NOTE — PROGRESS NOTE ADULT - PROBLEM SELECTOR PLAN 9
- C/w Latanoprost  - C/w Dorzolamide  - C/w Alphagan - history of HTN on home regimen inpatient   - c/w Amlodipine 5 mg and Metoprolol succinate 50 mg  - last 24hrs (124/73 - 150/85) may require uptitration outpatient

## 2021-04-04 NOTE — PROGRESS NOTE ADULT - PROBLEM SELECTOR PROBLEM 5
Asthma, unspecified asthma severity, unspecified whether complicated, unspecified whether persistent Pachyderma of larynx

## 2021-04-04 NOTE — PROGRESS NOTE ADULT - ASSESSMENT
Patient is a 65 y F with PMHx of Sarcoidosis, Asthma, Morbid Obesity, HLD and HTN who presents with acute worsening of chronic dyspnea on exertion associated with wheezing for 3 days, found to have new RBBB on EKG, concerning for acute asthma exacerbation vs pulmonary hypertension vs ILD.  65F BMI 45 PMHx Sarcoidosis, Asthma, HTN/HLD, high risk TESSA p/w acute-on-chronic  ALEXANDER + RBBB, admitted for asthma exacerbation vs cardiac sarcoid / dysfunction.

## 2021-04-04 NOTE — PROGRESS NOTE ADULT - PROBLEM SELECTOR PROBLEM 8
Microcytic anemia Asthma, unspecified asthma severity, unspecified whether complicated, unspecified whether persistent

## 2021-04-04 NOTE — PROGRESS NOTE ADULT - PROBLEM SELECTOR PLAN 4
Sinus tachycardia in setting of not taking home BB, albuterol administration. Has Hx of persistent tachycardia and palpitations on BB.   -CTA neg for PE  -C/w metoprolol  -Tele monitoring - initally off BB in s/o albuterol / symbicort use   - h/o persistent tachycardia and palpitations on BB  - CTA neg for PE  - c/w telemetry, toprol XL 50 qd

## 2021-04-04 NOTE — PROGRESS NOTE ADULT - SUBJECTIVE AND OBJECTIVE BOX
PULMONARY PROGRESS NOTE    DERIAN PEACOCK  MRN-[id number]    Patient is a 65y old  Female who presents with a chief complaint of shortness of breath (04 Apr 2021 09:28)      HPI:  on room air  some productive cough in the morning  otherwise feeling better  -    ROS:   -    ACTIVE MEDICATION LIST:  MEDICATIONS  (STANDING):  albuterol/ipratropium for Nebulization 3 milliLiter(s) Nebulizer every 8 hours  amLODIPine   Tablet 5 milliGRAM(s) Oral daily  aspirin enteric coated 81 milliGRAM(s) Oral daily  atorvastatin 10 milliGRAM(s) Oral at bedtime  brimonidine 0.2% Ophthalmic Solution 1 Drop(s) Both EYES two times a day  budesonide 160 MICROgram(s)/formoterol 4.5 MICROgram(s) Inhaler 2 Puff(s) Inhalation two times a day  dorzolamide 2% Ophthalmic Solution 1 Drop(s) Both EYES <User Schedule>  enoxaparin Injectable 40 milliGRAM(s) SubCutaneous daily  ergocalciferol 45894 Unit(s) Oral <User Schedule>  ferrous    sulfate 325 milliGRAM(s) Oral daily  fluticasone propionate 50 MICROgram(s)/spray Nasal Spray 1 Spray(s) Both Nostrils two times a day  guaiFENesin  milliGRAM(s) Oral every 12 hours  latanoprost 0.005% Ophthalmic Solution 1 Drop(s) Both EYES every 24 hours  metoprolol succinate ER 50 milliGRAM(s) Oral daily  montelukast 10 milliGRAM(s) Oral daily  pantoprazole    Tablet 40 milliGRAM(s) Oral before breakfast  sodium chloride 0.65% Nasal 1 Spray(s) Both Nostrils daily    MEDICATIONS  (PRN):  levalbuterol Inhalation 0.63 milliGRAM(s) Inhalation every 6 hours PRN wheeze      EXAM:  Vital Signs Last 24 Hrs  T(C): 36.7 (04 Apr 2021 12:55), Max: 36.9 (03 Apr 2021 20:44)  T(F): 98.1 (04 Apr 2021 12:55), Max: 98.4 (03 Apr 2021 20:44)  HR: 86 (04 Apr 2021 12:55) (80 - 96)  BP: 136/77 (04 Apr 2021 12:55) (124/73 - 150/85)  BP(mean): --  RR: 18 (04 Apr 2021 12:55) (18 - 18)  SpO2: 97% (04 Apr 2021 12:55) (97% - 98%)    GENERAL: The patient is awake and alert in no apparent distress.     LUNGS: Clear to auscultation without wheezing, rales or rhonchi; respirations unlabored    HEART: Regular rate and rhythm without murmur.                            14.3   8.73  )-----------( 335      ( 04 Apr 2021 06:13 )             43.3       04-04    143  |  104  |  26<H>  ----------------------------<  151<H>  4.4   |  27  |  1.01    Ca    8.7      04 Apr 2021 06:13  Phos  3.3     04-04  Mg     2.5     04-04       rad< from: CT Angio Chest w/ IV Cont (04.01.21 @ 22:33) >    EXAM:  CT ANGIO CHEST (W)AW IC                            PROCEDURE DATE:  04/01/2021            INTERPRETATION:  CTA CHEST    INDICATION: Asthma, sinus tachycardia. Sarcoidosis. Evaluate for pulmonary embolus.    TECHNIQUE: Enhanced helical imageswere obtained of the chest. Coronal and sagittal images were reconstructed.  Images were obtained after the uneventful administration of 57 cc of nonionic intravenous contrast (Omnipaque 350). 43 cc of nonionic intravenous contrast (Omnipaque 350) was discarded. Maximum intensity projection images were generated.    COMPARISON: Radiograph 4/1/2020.    FINDINGS:    Pulmonary Artery:  There is no main, central or lobar pulmonary emboli. The segmental and subsegmental divisions are not well evaluated.    Tubes/Lines: None.    Lungs, airways and pleura: Mosaic attenuation of the lungs are likely due to the expiratory phase of the study. Bilateral lower lobe and right middle lobe linear atelectasis. The airways are normal. No pleural effusion or pneumothorax.    Mediastinum: The calcified and non calcified chest lymph nodes measure less than 15 mm in the short axis. The visualized thyroid gland and esophagus are unremarkable.    The heart is enlarged. Qualitatively, the myocardium may be concentrically thickened. No pericardial effusion. The aorta is tortuous and ectatic.    Upper Abdomen: The upper abdomen is unremarkable.    Bones And Soft Tissues: The bones are unremarkable.  The soft tissues are unremarkable.      IMPRESSION:    1. No pulmonary embolus, however the segmental and subsegmental divisions are not well evaluated.  2. Calcified and non calcified chest lymph nodes can be secondary to history of sarcoidosis.                  THU RAM MD; Attending Radiologist  This document has been electronically signed. Apr 2 2021  8:48AM    < end of copied text >  < from: TTE with Doppler (w/Cont) (04.02.21 @ 05:10) >  PROCEDURE: Transthoracic echocardiogram with 2-D, M-Mode  and complete spectral and color flow Doppler. Verbal  consent was obtained for injection of  Ultrasonic Enhancing  Agent following a discussion of risks and benefits.  Following intravenous injection of Ultrasonic Enhancing  Agent , harmonic imaging was performed.  INDICATION: Dyspnea, unspecified (R06.00)  ------------------------------------------------------------------------  Dimensions:    Normal Values:  LA:            2.0 - 4.0 cm  Ao:            2.0 - 3.8 cm  SEPTUM: 0.5    0.6 - 1.2 cm  PWT:    0.5    0.6 - 1.1 cm  LVIDd:         3.0 -5.6 cm  LVIDs:         1.8 - 4.0 cm  ------------------------------------------------------------------------  Observations:  Mitral Valve: Mitral valve not well visualized, probably  normal.  Aortic Valve/Aorta: Aortic valve notl visualized.  Left Atrium: Normal left atrium.  LA volume index = 21  cc/m2.  Left Ventricle: Normal left ventricular systolic function.   Endocardial visualization enhanced with intravenous  injection of Ultrasonic Enhancing Agent (Definity). No left  ventricular thrombus.  Right Heart: Right atrium not visualized. The right  ventricle is not visualized.  Pericardium/Pleura: No pericardial effusion seen.  ------------------------------------------------------------------------  Conclusions:  1. Normal left ventricular systolic function.   Endocardial  visualization enhanced with intravenous injection of  Ultrasonic Enhancing Agent (Definity). No left ventricular  thrombus.  2. The right ventricle is not visualized.  *** No previous Echo exam.  ------------------------------------------------------------------------  Confirmed on  4/2/2021 - 17:01:55 by CINDY Frederick  ------------------------------------------------------------------------    < end of copied text >      PROBLEM LIST:  65y Female with HEALTH ISSUES - PROBLEM Dx:  Asthma exacerbation  Asthma exacerbation    Dyspnea on exertion  Dyspnea on exertion    RBBB  RBBB    Asthma, unspecified asthma severity, unspecified whether complicated, unspecified whether persistent  Asthma, unspecified asthma severity, unspecified whether complicated, unspecified whether persistent    Sarcoidosis  Sarcoidosis    Essential hypertension  Essential hypertension    Microcytic anemia  Microcytic anemia    Glaucoma of both eyes, unspecified glaucoma type  Glaucoma of both eyes, unspecified glaucoma type    Prophylactic measure  Prophylactic measure    Sinus tachycardia  Sinus tachycardia    Pachyderma of larynx  Pachyderma of larynx    Hyperlipidemia  Hyperlipidemia    GERD (gastroesophageal reflux disease)  GERD (gastroesophageal reflux disease)         RECS:  -cont symbicort/singulair/nebs  - prednisone 50mg ordered for 4/5, would taper 10mg every 2 days  - close f/u with Dr. Mccrary outpatient  - appreciate ENT   - continue to use IS hourly      Please call with any questions.    Leola Ledezma DO  [facility]monary/Sleep Medicine  [phone]

## 2021-04-04 NOTE — PROGRESS NOTE ADULT - PROBLEM SELECTOR PLAN 6
Not on high dose steroids or any other medications. CTA shows calcified mediastinal and hilar LN consistent with sarcoidosis.   - F/u Final read on CT regarding fibrotic changes noted on CT scan  - CTM   - Pulm consult - Cholesterol 223 / TG 67 / HDL 71 / [id number] [4/2/21]  - check LFTs and initiate statin therapy   - script on dc with outpatient follow up

## 2021-04-04 NOTE — PROGRESS NOTE ADULT - PROBLEM SELECTOR PLAN 2
Acute on chronic worsening ALEXANDER. Spirometry 2/2020 showing obstructive pattern with moderate reduction flow rates. Pulmonary walk test Sept 2020 showed desaturation to 89% on RA.   - Previous RHC showed normal coronaries with mod MR and diastolic dysfunction  - HF exacerbation less likely given low BNP, even in setting of obesity  - F/u final read regarding ? fibrotic changes noted on CT w/?bilateral GGO, concerning for ILD Consider Pulm consult in AM.   - COVID negative; no fever, leukocytosis, cough, hypoxia to suggest infectious process  - F/u TTE to r/o cardiomyopathy, valvulopathy, etc  - LHC/ RHC for complete ischemic workup following TTE and suspected pulmonary HTN (cor pulmonale) respectively  -DC continuous pulse ox as pt is stable on room air  - f/u Dr. Mccrary regarding whether cardiac MRI should be done inpt vs outpt to r/o cardiac sarcoid  -f/u ACE level  -ENT c/s to r/o traceal stenosis, vocal cord dysfunction - CTA + fibrotic changes, hilar LN consistent with sarcoidosis history  - TTE limited, + MR, no thrombus, no systofic dysfxn  -  ---> 130 /  rest --->  ambulation   - f/u ACE level   - discuss with Dr. Mccrary, pt pulmonologist, whether in vs. outpt cardiac MRI

## 2021-04-04 NOTE — PROGRESS NOTE ADULT - SUBJECTIVE AND OBJECTIVE BOX
INTERNAL MEDICINE - PROGRESS NOTE  Kiersten Marshall PGY1 | NS Pager: [phone] | TEAM 1 : after 7pm, please page [phone]    patient name: DERIAN PEACOCK | age: 65y ([birth year]) | gender: Female| arrival date: 04-01-21 Inpatient     INTERVAL EVENTS:   BEDSIDE:    MEDICATIONS  (STANDING):  albuterol/ipratropium for Nebulization 3 milliLiter(s) Nebulizer every 6 hours  amLODIPine   Tablet 5 milliGRAM(s) Oral daily  aspirin enteric coated 81 milliGRAM(s) Oral daily  brimonidine 0.2% Ophthalmic Solution 1 Drop(s) Both EYES two times a day  budesonide 160 MICROgram(s)/formoterol 4.5 MICROgram(s) Inhaler 2 Puff(s) Inhalation two times a day  dorzolamide 2% Ophthalmic Solution 1 Drop(s) Both EYES <User Schedule>  enoxaparin Injectable 40 milliGRAM(s) SubCutaneous daily  ergocalciferol 88291 Unit(s) Oral <User Schedule>  ferrous    sulfate 325 milliGRAM(s) Oral daily  fluticasone propionate 50 MICROgram(s)/spray Nasal Spray 1 Spray(s) Both Nostrils two times a day  guaiFENesin  milliGRAM(s) Oral every 12 hours  latanoprost 0.005% Ophthalmic Solution 1 Drop(s) Both EYES every 24 hours  methylPREDNISolone sodium succinate Injectable 40 milliGRAM(s) IV Push every 8 hours  metoprolol succinate ER 50 milliGRAM(s) Oral daily  montelukast 10 milliGRAM(s) Oral daily  pantoprazole    Tablet 40 milliGRAM(s) Oral before breakfast  sodium chloride 0.65% Nasal 1 Spray(s) Both Nostrils daily    MEDICATIONS  (PRN):  levalbuterol Inhalation 0.63 milliGRAM(s) Inhalation every 6 hours PRN wheeze    Diet, Regular:   DASH/TLC Sodium & Cholesterol Restricted (DASH) (04-02-21 @ 01:31) [Active]          Daily     Daily   Orthostatic VS    PHYSICAL EXAM:  Vitals:  T(C): 36.9 (04-04-21 @ 05:06), Max: 36.9 (04-03-21 @ 20:44)  HR: 80 (04-04-21 @ 05:06) (80 - 97)  BP: 150/85 (04-04-21 @ 05:06) (124/73 - 150/85)  RR: 18 (04-04-21 @ 07:49) (17 - 18)  SpO2: 98% (04-04-21 @ 07:49) (97% - 98%)    Constitutional: NAD  HEENT: anicteric sclera, no nasal discharge, MMM   Respiratory: CTA B/L; no retractions  Cardiac: +S1/S2; RRR  Gastrointestinal: soft, NT/ND; no guarding; +BSx4  Extremities: no peripheral edema  Musculoskeletal: no joint swelling or erythema  Dermatologic: skin warm, dry and intact  Neurologic: AAOx3; no focal deficits  Psychiatric: affect and characteristics of appearance, verbalizations, behaviors appropriate    INS & OUTS:     04-03-21 @ 07:01  -  04-04-21 @ 07:00  --------------------------------------------------------  IN: 1340 mL / OUT: 0 mL / NET: 1340 mL    LABS, IMAGING, WORKUP:                          14.3   8.73  )-----------( 335      ( 04 Apr 2021 06:13 )             43.3       COVID-19 PCR: Lalitha (01 Apr 2021 16:46)         INTERNAL MEDICINE - PROGRESS NOTE  Kiersten Marshall PGY1 | NS Pager: [phone] | TEAM 1 : after 7pm, please page [phone]    patient name: DERIAN PEACOCK | age: 65y ([birth year]) | gender: Female| arrival date: 04-01-21 Inpatient     INTERVAL EVENTS: DECLAN  BEDSIDE: Pt seen at bedside. Endorses improvement in SOB, not back to baseline. No wheezing, chest pain, palpitations, lightheadedness, headache.     MEDICATIONS  (STANDING):  albuterol/ipratropium for Nebulization 3 milliLiter(s) Nebulizer every 6 hours  amLODIPine   Tablet 5 milliGRAM(s) Oral daily  aspirin enteric coated 81 milliGRAM(s) Oral daily  brimonidine 0.2% Ophthalmic Solution 1 Drop(s) Both EYES two times a day  budesonide 160 MICROgram(s)/formoterol 4.5 MICROgram(s) Inhaler 2 Puff(s) Inhalation two times a day  dorzolamide 2% Ophthalmic Solution 1 Drop(s) Both EYES <User Schedule>  enoxaparin Injectable 40 milliGRAM(s) SubCutaneous daily  ergocalciferol 42117 Unit(s) Oral <User Schedule>  ferrous    sulfate 325 milliGRAM(s) Oral daily  fluticasone propionate 50 MICROgram(s)/spray Nasal Spray 1 Spray(s) Both Nostrils two times a day  guaiFENesin  milliGRAM(s) Oral every 12 hours  latanoprost 0.005% Ophthalmic Solution 1 Drop(s) Both EYES every 24 hours  methylPREDNISolone sodium succinate Injectable 40 milliGRAM(s) IV Push every 8 hours  metoprolol succinate ER 50 milliGRAM(s) Oral daily  montelukast 10 milliGRAM(s) Oral daily  pantoprazole    Tablet 40 milliGRAM(s) Oral before breakfast  sodium chloride 0.65% Nasal 1 Spray(s) Both Nostrils daily    MEDICATIONS  (PRN):  levalbuterol Inhalation 0.63 milliGRAM(s) Inhalation every 6 hours PRN wheeze    Diet, Regular:   DASH/TLC Sodium & Cholesterol Restricted (DASH) (04-02-21 @ 01:31) [Active]    PHYSICAL EXAM:  Vitals:  T(C): 36.9 (04-04-21 @ 05:06), Max: 36.9 (04-03-21 @ 20:44)  HR: 80 (04-04-21 @ 05:06) (80 - 97)  BP: 150/85 (04-04-21 @ 05:06) (124/73 - 150/85)  RR: 18 (04-04-21 @ 07:49) (17 - 18)  SpO2: 98% (04-04-21 @ 07:49) (97% - 98%)    Constitutional: NAD  HEENT: anicteric sclera, no nasal discharge, MMM   Respiratory: CTA B/L; no retractions ; decreased air flow bL but improving from prior  Cardiac: +S1/S2; NST  Gastrointestinal: soft, obese, NT/ND; +BS  Extremities: no peripheral edema  Musculoskeletal: no joint swelling or erythema  Dermatologic: skin warm, dry and intact  Neurologic: AAOx3; no focal deficits  Psychiatric: affect and characteristics of appearance, verbalizations, behaviors appropriate    INS & OUTS:     04-03-21 @ 07:01  -  04-04-21 @ 07:00  --------------------------------------------------------  IN: 1340 mL / OUT: 0 mL / NET: 1340 mL      LABS, IMAGING, WORKUP:                        14.3   8.73  )-----------( 335      ( 04 Apr 2021 06:13 )             43.3       COVID-19 PCR: Lalitha (01 Apr 2021 16:46)         INTERNAL MEDICINE - PROGRESS NOTE  Kiersten Marshall PGY1 | NS Pager: [phone] | TEAM 1 : after 7pm, please page [phone]    patient name: DERIAN PEACOCK | age: 65y ([birth year]) | gender: Female| arrival date: 04-01-21 Inpatient     INTERVAL EVENTS: DECLAN  BEDSIDE: Pt seen at bedside. Endorses improvement in SOB, not back to baseline. No wheezing, chest pain, palpitations, lightheadedness, headache.     MEDICATIONS  (STANDING):  albuterol/ipratropium for Nebulization 3 milliLiter(s) Nebulizer every 6 hours  amLODIPine   Tablet 5 milliGRAM(s) Oral daily  aspirin enteric coated 81 milliGRAM(s) Oral daily  brimonidine 0.2% Ophthalmic Solution 1 Drop(s) Both EYES two times a day  budesonide 160 MICROgram(s)/formoterol 4.5 MICROgram(s) Inhaler 2 Puff(s) Inhalation two times a day  dorzolamide 2% Ophthalmic Solution 1 Drop(s) Both EYES <User Schedule>  enoxaparin Injectable 40 milliGRAM(s) SubCutaneous daily  ergocalciferol 12048 Unit(s) Oral <User Schedule>  ferrous    sulfate 325 milliGRAM(s) Oral daily  fluticasone propionate 50 MICROgram(s)/spray Nasal Spray 1 Spray(s) Both Nostrils two times a day  guaiFENesin  milliGRAM(s) Oral every 12 hours  latanoprost 0.005% Ophthalmic Solution 1 Drop(s) Both EYES every 24 hours  methylPREDNISolone sodium succinate Injectable 40 milliGRAM(s) IV Push every 8 hours  metoprolol succinate ER 50 milliGRAM(s) Oral daily  montelukast 10 milliGRAM(s) Oral daily  pantoprazole    Tablet 40 milliGRAM(s) Oral before breakfast  sodium chloride 0.65% Nasal 1 Spray(s) Both Nostrils daily    MEDICATIONS  (PRN):  levalbuterol Inhalation 0.63 milliGRAM(s) Inhalation every 6 hours PRN wheeze    Diet, Regular:   DASH/TLC Sodium & Cholesterol Restricted (DASH) (04-02-21 @ 01:31) [Active]    PHYSICAL EXAM:  Vitals:  T(C): 36.9 (04-04-21 @ 05:06), Max: 36.9 (04-03-21 @ 20:44)  HR: 80 (04-04-21 @ 05:06) (80 - 97)  BP: 150/85 (04-04-21 @ 05:06) (124/73 - 150/85)  RR: 18 (04-04-21 @ 07:49) (17 - 18)  SpO2: 98% (04-04-21 @ 07:49) (97% - 98%)    Constitutional: NAD  HEENT: anicteric sclera, no nasal discharge, MMM   Respiratory: CTA B/L; no retractions ; decreased air flow bL but improving from prior  Cardiac: +S1/S2; NST  Gastrointestinal: soft, obese, NT/ND; +BS  Extremities: no peripheral edema  Musculoskeletal: no joint swelling or erythema  Dermatologic: skin warm, dry and intact  Neurologic: AAOx3; no focal deficits  Psychiatric: affect and characteristics of appearance, verbalizations, behaviors appropriate    INS & OUTS:     04-03-21 @ 07:01  -  04-04-21 @ 07:00  --------------------------------------------------------  IN: 1340 mL / OUT: 0 mL / NET: 1340 mL      LABS, IMAGING, WORKUP:                        14.3   8.73  )-----------( 335      ( 04 Apr 2021 06:13 )             43.3     Lipid Profile (04.02.21 @ 08:46)   Cholesterol, Serum: 223 mg/dL   Triglycerides, Serum: 67 mg/dL   HDL Cholesterol, Serum: 71 mg/dL   Non HDL Cholesterol: 151: Patient’s Atherosclerotic Cardiovascular Disease (ASCVD) Risk   A1C with Estimated Average Glucose in AM (04.02.21 @ 10:39) 6.2%  COVID-19 PCR: [place] (01 Apr 2021 16:46)

## 2021-04-04 NOTE — PROGRESS NOTE ADULT - PROBLEM SELECTOR PLAN 7
HTNsive on admission, did not take home anti-hypertensives prior to admission.   C/w Amlodipine 5 mg   C/w Metoprolol succinate - likely cause of pachydermia / contributor to symptoms  - start PPI on discharge, continue inpatient

## 2021-04-04 NOTE — PROGRESS NOTE ADULT - PROBLEM SELECTOR PLAN 5
Hx of mild intermittent asthma, last hospitalization over 5 years. Never been intubated.   -C/w Singulair  -C/w PRN Xopenex, holding home Albuterol  - Albuterol nebs  - Continuous pulse Ox - ENT indirect laryngoscopy + pachydermia + elongated uvula  - fluticasone, mucinex inpatient   - at high risk of sleep apnea, outpatient f/u with ENT for UPPP/SA workup

## 2021-04-04 NOTE — PROGRESS NOTE ADULT - PROBLEM SELECTOR PLAN 1
-wheezing resolved  -c/w inhalers  -C/w IV Solumedrol 40mg q8h for now with transition to PO prednisone  -Duonebs q6   -Continuous pulse ox  - Peak flow measurements  -lacatate downtrending, suspect it was elevated in the setting of frequent inhaler use on admission. Pt afebrile - prior hospializations [3] within last year [0] intubations [0]  - home medications: montelucast 10, symbicort 2p BID, proair 90 prn  - IV Solumedrol 40mg q8h [04/02-04/04] ; transition to PO and taper initiated   - Montelucast, Symbicort BID, Duonebs spaced to q8, Xopenex PRN  - lactate in s/o frequent inhaler use downtrending, afebrile   - s/p cont pulse ox as SpO2 stable, w/o wheezing, improved air mvmt bL  - 40IV methylpred conversion to 50PO prednisone ordered to start 4/5  - c/w peak flow rates, taper steroids per clinical course  - pending improvement today, pt may be clear for d/c Monday d/w pulmonology

## 2021-04-04 NOTE — PROGRESS NOTE ADULT - PROBLEM SELECTOR PLAN 8
Hx of Microcytic anemia, on ferrous sulfate. Denies acute blood loss, melena or hematochezia.  -C/w Ferrous sulfate - spirometry 2/20 + obstructive pattern with moderate reduction flow rates.   - pulmonary walk test 09/20 + SpO2 desat 89% RA  - follow Dr. Mccrary outpatient, consultation appreciated

## 2021-04-05 ENCOUNTER — TRANSCRIPTION ENCOUNTER (OUTPATIENT)
Age: 66
End: 2021-04-05

## 2021-04-05 VITALS
TEMPERATURE: 98 F | OXYGEN SATURATION: 98 % | RESPIRATION RATE: 18 BRPM | SYSTOLIC BLOOD PRESSURE: 124 MMHG | HEART RATE: 154 BPM | DIASTOLIC BLOOD PRESSURE: 88 MMHG

## 2021-04-05 LAB
ACE SERPL-CCNC: 46 U/L — SIGNIFICANT CHANGE UP (ref 14–82)
ALBUMIN SERPL ELPH-MCNC: 3.7 G/DL — SIGNIFICANT CHANGE UP (ref 3.3–5)
ALP SERPL-CCNC: 88 U/L — SIGNIFICANT CHANGE UP (ref 40–120)
ALT FLD-CCNC: 18 U/L — SIGNIFICANT CHANGE UP (ref 10–45)
ANION GAP SERPL CALC-SCNC: 7 MMOL/L — SIGNIFICANT CHANGE UP (ref 5–17)
AST SERPL-CCNC: 11 U/L — SIGNIFICANT CHANGE UP (ref 10–40)
BILIRUB SERPL-MCNC: 0.3 MG/DL — SIGNIFICANT CHANGE UP (ref 0.2–1.2)
BUN SERPL-MCNC: 30 MG/DL — HIGH (ref 7–23)
CALCIUM SERPL-MCNC: 8.5 MG/DL — SIGNIFICANT CHANGE UP (ref 8.4–10.5)
CHLORIDE SERPL-SCNC: 105 MMOL/L — SIGNIFICANT CHANGE UP (ref 96–108)
CO2 SERPL-SCNC: 29 MMOL/L — SIGNIFICANT CHANGE UP (ref 22–31)
CREAT SERPL-MCNC: 1.03 MG/DL — SIGNIFICANT CHANGE UP (ref 0.5–1.3)
GLUCOSE SERPL-MCNC: 116 MG/DL — HIGH (ref 70–99)
HCT VFR BLD CALC: 37.6 % — SIGNIFICANT CHANGE UP (ref 34.5–45)
HGB BLD-MCNC: 12.3 G/DL — SIGNIFICANT CHANGE UP (ref 11.5–15.5)
LACTATE BLDV-MCNC: 1.6 MMOL/L — SIGNIFICANT CHANGE UP (ref 0.7–2)
MAGNESIUM SERPL-MCNC: 2.4 MG/DL — SIGNIFICANT CHANGE UP (ref 1.6–2.6)
MCHC RBC-ENTMCNC: 25.8 PG — LOW (ref 27–34)
MCHC RBC-ENTMCNC: 32.7 GM/DL — SIGNIFICANT CHANGE UP (ref 32–36)
MCV RBC AUTO: 79 FL — LOW (ref 80–100)
NRBC # BLD: 0 /100 WBCS — SIGNIFICANT CHANGE UP (ref 0–0)
PHOSPHATE SERPL-MCNC: 3.8 MG/DL — SIGNIFICANT CHANGE UP (ref 2.5–4.5)
PLATELET # BLD AUTO: 297 K/UL — SIGNIFICANT CHANGE UP (ref 150–400)
POTASSIUM SERPL-MCNC: 4 MMOL/L — SIGNIFICANT CHANGE UP (ref 3.5–5.3)
POTASSIUM SERPL-SCNC: 4 MMOL/L — SIGNIFICANT CHANGE UP (ref 3.5–5.3)
PROT SERPL-MCNC: 6.7 G/DL — SIGNIFICANT CHANGE UP (ref 6–8.3)
RBC # BLD: 4.76 M/UL — SIGNIFICANT CHANGE UP (ref 3.8–5.2)
RBC # FLD: 16.6 % — HIGH (ref 10.3–14.5)
SODIUM SERPL-SCNC: 141 MMOL/L — SIGNIFICANT CHANGE UP (ref 135–145)
WBC # BLD: 7.95 K/UL — SIGNIFICANT CHANGE UP (ref 3.8–10.5)
WBC # FLD AUTO: 7.95 K/UL — SIGNIFICANT CHANGE UP (ref 3.8–10.5)

## 2021-04-05 PROCEDURE — 82947 ASSAY GLUCOSE BLOOD QUANT: CPT

## 2021-04-05 PROCEDURE — 83550 IRON BINDING TEST: CPT

## 2021-04-05 PROCEDURE — 85730 THROMBOPLASTIN TIME PARTIAL: CPT

## 2021-04-05 PROCEDURE — 85018 HEMOGLOBIN: CPT

## 2021-04-05 PROCEDURE — 86803 HEPATITIS C AB TEST: CPT

## 2021-04-05 PROCEDURE — 82728 ASSAY OF FERRITIN: CPT

## 2021-04-05 PROCEDURE — 82330 ASSAY OF CALCIUM: CPT

## 2021-04-05 PROCEDURE — 80048 BASIC METABOLIC PNL TOTAL CA: CPT

## 2021-04-05 PROCEDURE — 99239 HOSP IP/OBS DSCHRG MGMT >30: CPT | Mod: GC

## 2021-04-05 PROCEDURE — 84295 ASSAY OF SERUM SODIUM: CPT

## 2021-04-05 PROCEDURE — 80061 LIPID PANEL: CPT

## 2021-04-05 PROCEDURE — 85610 PROTHROMBIN TIME: CPT

## 2021-04-05 PROCEDURE — 84132 ASSAY OF SERUM POTASSIUM: CPT

## 2021-04-05 PROCEDURE — 94640 AIRWAY INHALATION TREATMENT: CPT

## 2021-04-05 PROCEDURE — 82435 ASSAY OF BLOOD CHLORIDE: CPT

## 2021-04-05 PROCEDURE — 83880 ASSAY OF NATRIURETIC PEPTIDE: CPT

## 2021-04-05 PROCEDURE — U0003: CPT

## 2021-04-05 PROCEDURE — 71045 X-RAY EXAM CHEST 1 VIEW: CPT

## 2021-04-05 PROCEDURE — 83036 HEMOGLOBIN GLYCOSYLATED A1C: CPT

## 2021-04-05 PROCEDURE — C8929: CPT

## 2021-04-05 PROCEDURE — 82565 ASSAY OF CREATININE: CPT

## 2021-04-05 PROCEDURE — 85379 FIBRIN DEGRADATION QUANT: CPT

## 2021-04-05 PROCEDURE — 85014 HEMATOCRIT: CPT

## 2021-04-05 PROCEDURE — 96374 THER/PROPH/DIAG INJ IV PUSH: CPT

## 2021-04-05 PROCEDURE — 82164 ANGIOTENSIN I ENZYME TEST: CPT

## 2021-04-05 PROCEDURE — 71275 CT ANGIOGRAPHY CHEST: CPT

## 2021-04-05 PROCEDURE — 84484 ASSAY OF TROPONIN QUANT: CPT

## 2021-04-05 PROCEDURE — 85027 COMPLETE CBC AUTOMATED: CPT

## 2021-04-05 PROCEDURE — 85025 COMPLETE CBC W/AUTO DIFF WBC: CPT

## 2021-04-05 PROCEDURE — 83605 ASSAY OF LACTIC ACID: CPT

## 2021-04-05 PROCEDURE — 82803 BLOOD GASES ANY COMBINATION: CPT

## 2021-04-05 PROCEDURE — 83540 ASSAY OF IRON: CPT

## 2021-04-05 PROCEDURE — U0005: CPT

## 2021-04-05 PROCEDURE — 97161 PT EVAL LOW COMPLEX 20 MIN: CPT

## 2021-04-05 PROCEDURE — 84100 ASSAY OF PHOSPHORUS: CPT

## 2021-04-05 PROCEDURE — 80053 COMPREHEN METABOLIC PANEL: CPT

## 2021-04-05 PROCEDURE — 99285 EMERGENCY DEPT VISIT HI MDM: CPT | Mod: 25

## 2021-04-05 PROCEDURE — 83735 ASSAY OF MAGNESIUM: CPT

## 2021-04-05 PROCEDURE — 86769 SARS-COV-2 COVID-19 ANTIBODY: CPT

## 2021-04-05 RX ORDER — ATORVASTATIN CALCIUM 80 MG/1
1 TABLET, FILM COATED ORAL
Qty: 30 | Refills: 2
Start: 2021-04-05 | End: 2021-07-03

## 2021-04-05 RX ORDER — ATORVASTATIN CALCIUM 80 MG/1
1 TABLET, FILM COATED ORAL
Qty: 0 | Refills: 0 | DISCHARGE
Start: 2021-04-05

## 2021-04-05 RX ORDER — IPRATROPIUM/ALBUTEROL SULFATE 18-103MCG
3 AEROSOL WITH ADAPTER (GRAM) INHALATION
Qty: 270 | Refills: 0
Start: 2021-04-05 | End: 2021-05-04

## 2021-04-05 RX ADMIN — DORZOLAMIDE HYDROCHLORIDE 1 DROP(S): 20 SOLUTION/ DROPS OPHTHALMIC at 08:42

## 2021-04-05 RX ADMIN — BUDESONIDE AND FORMOTEROL FUMARATE DIHYDRATE 2 PUFF(S): 160; 4.5 AEROSOL RESPIRATORY (INHALATION) at 06:03

## 2021-04-05 RX ADMIN — Medication 1 SPRAY(S): at 11:24

## 2021-04-05 RX ADMIN — Medication 50 MILLIGRAM(S): at 06:02

## 2021-04-05 RX ADMIN — AMLODIPINE BESYLATE 5 MILLIGRAM(S): 2.5 TABLET ORAL at 06:01

## 2021-04-05 RX ADMIN — Medication 1 SPRAY(S): at 06:02

## 2021-04-05 RX ADMIN — ENOXAPARIN SODIUM 40 MILLIGRAM(S): 100 INJECTION SUBCUTANEOUS at 11:22

## 2021-04-05 RX ADMIN — BRIMONIDINE TARTRATE 1 DROP(S): 2 SOLUTION/ DROPS OPHTHALMIC at 06:02

## 2021-04-05 RX ADMIN — Medication 325 MILLIGRAM(S): at 11:22

## 2021-04-05 RX ADMIN — Medication 81 MILLIGRAM(S): at 11:22

## 2021-04-05 RX ADMIN — Medication 600 MILLIGRAM(S): at 06:02

## 2021-04-05 RX ADMIN — Medication 50 MILLIGRAM(S): at 06:03

## 2021-04-05 RX ADMIN — PANTOPRAZOLE SODIUM 40 MILLIGRAM(S): 20 TABLET, DELAYED RELEASE ORAL at 06:02

## 2021-04-05 NOTE — PROGRESS NOTE ADULT - PROBLEM SELECTOR PLAN 3
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

## 2021-04-05 NOTE — PROGRESS NOTE ADULT - ASSESSMENT
65F BMI 45 PMHx Sarcoidosis, Asthma, HTN/HLD, high risk TESSA p/w acute-on-chronic  ALEXANDER + RBBB, admitted for asthma exacerbation vs cardiac sarcoid / dysfunction.

## 2021-04-05 NOTE — PROGRESS NOTE ADULT - PROBLEM SELECTOR PLAN 5
- ENT indirect laryngoscopy + pachydermia + elongated uvula  - fluticasone, mucinex inpatient   - at high risk of sleep apnea, outpatient f/u with ENT for UPPP/SA workup

## 2021-04-05 NOTE — PROGRESS NOTE ADULT - PROBLEM SELECTOR PLAN 10
Vaccinations: ask if wants COVID Vaccine on dc, has had flu shot and PSV13  Diet: CC/DASH  DVT: Lovenox  Dispo: home w/ no skilled PT needs  GOC: Full Code

## 2021-04-05 NOTE — PROGRESS NOTE ADULT - ATTENDING COMMENTS
Patient seen and examined. Plan as discussed w/ Dr. Marshall and MS3 Valorie: appreciate pulmonology's evaluation and recommendations for asthma exacerbation; trend lactate and monitor hemodynamics while on Albuterol; trend peak flows to monitor for improvement; f/u cardiology and TTE ordered given new RBBB identified on ECG, though ischemia as etiology less likely at this time given negative cardiac enzymes.
Patient seen and examined. Plan as discussed w/ Dr. Steel: appreciate pulmonology's evaluation and recommendations for continued steroids and will request ENT evaluation for r/o vocal cord dysfunction, and will discuss whether cardiac MRI for ?r/o cardiac sarcoid should be performed inpatient or outpatient per their recommendations; f/u serum ACE level; trend lactate and monitor hemodynamics while on Albuterol; trend peak flows to monitor for improvement.
Patient seen and examined. Plan as discussed w/ Dr. Dai and MS3 Valorie: serum ACE level sent, awaiting results, patient will f/u w/ pulm as O/P for these results; she is eager to be discharged home as she has clinically improved; she will go home on Prednisone taper and Rx for cardiac MRI as outpatient (she did not tolerate study as planned this AM d/t claustrophobia/anxiety; patient will be transported home by her daughter; total discharge planning time spent = 35minutes.
Patient seen and examined. Plan as discussed w/ Dr. Marshall: serum ACE level sent, awaiting results; patient clinically appears much improved; will begin steroid taper; ENT evaluation appreciated; will discuss w/ pulmonology whether cardiac MRI for ?r/o cardiac sarcoid should be performed inpatient or outpatient per their recommendations.

## 2021-04-05 NOTE — PROGRESS NOTE ADULT - PROBLEM SELECTOR PLAN 9
- history of HTN on home regimen inpatient   - c/w Amlodipine 5 mg and Metoprolol succinate 50 mg  - last 24hrs (124/73 - 150/85) may require uptitration outpatient

## 2021-04-05 NOTE — DISCHARGE NOTE NURSING/CASE MANAGEMENT/SOCIAL WORK - NSDCFUADDAPPT_GEN_ALL_CORE_FT
Please follow up with Dr. Burnett within 2 weeks-1 month of discharge.    Please follow up in ENT office for possible "UPPP evaluation / sleep apnea workup". Please call [phone] to schedule your appointment at location of clinic after inpatient evaluation by Dr. Moe Pérez.    Please follow up with Dr. Mccrary, pulmonologist, within 1 week of discharge.     Please follow up with Dr. Day /Dr. Machado, cardiology,      Please follow up with [facility] rheumatology within 2 weeks of discharge.

## 2021-04-05 NOTE — PROGRESS NOTE ADULT - PROBLEM/PLAN-2
Critical Care - Asmt/Plan


Problems:  


(1) Acute respiratory failure


(2) Bacteremia


(3) Pneumonia


(4) Sepsis


(5) HCAP (healthcare-associated pneumonia)


(6) Seizure disorder


(7) Down's syndrome


Respiratory:  monitor respiratory rate, adjust FIO2, CXR


Cardiac:  continue to monitor HR/BP


Renal:  F/U I&O, keep IV fluid, check electrolytes


Infectious Disease:  check cultures


Gastrointestinal:  continue feedings/current rate


Hematologic:  monitor H/H, transfuse if hgb<8.5


Neurologic:  PRN Ativan, keep patient comfortable


Affect:  PRN ativan


Prophylaxis:  Protonix, Heparin


Disposition:  keep in ICU


Time Spent (Minutes):  40


Notes Reviewed:  internist, cardio


Discussed with:  nurses, consultants, 





Critical Care - Objective





Last 24 Hour Vital Signs








  Date Time  Temp Pulse Resp B/P (MAP) Pulse Ox O2 Delivery O2 Flow Rate FiO2


 


9/12/20 07:00  58 25 112/51 (71) 95   


 


9/12/20 06:00  62 25 104/47 (66) 94   


 


9/12/20 05:00  70 24 114/66 (82) 90   


 


9/12/20 04:00 98.0 59 25 107/47 (67) 92   


 


9/12/20 04:00      Mechanical Ventilator  





      Mechanical Ventilator  


 


9/12/20 04:00        95


 


9/12/20 03:41  57      


 


9/12/20 03:16  59 26     95


 


9/12/20 03:00  57 23 102/49 (66) 95   


 


9/12/20 02:00  59 24 103/56 (72) 94   


 


9/12/20 01:00  50 26 118/59 (78) 94   


 


9/12/20 00:00 98.7 51 26 121/61 (81) 95   


 


9/12/20 00:00      Mechanical Ventilator  





      Mechanical Ventilator  


 


9/12/20 00:00        95


 


9/12/20 00:00  53      


 


9/11/20 23:00  52 26 125/63 (83) 97   


 


9/11/20 22:59  57 26     95


 


9/11/20 22:00  67 26 103/52 (69) 98   


 


9/11/20 21:00  67 26 99/52 (68) 98   


 


9/11/20 20:00        95


 


9/11/20 20:00  56      


 


9/11/20 20:00 98.9 58 26 119/63 (81) 99   


 


9/11/20 20:00      Mechanical Ventilator  





      Mechanical Ventilator  


 


9/11/20 19:16  51 26     95


 


9/11/20 19:00  52 26 92/50 (64) 97   


 


9/11/20 18:00  57 26 96/53 (67) 96   


 


9/11/20 17:00  66 26 109/55 (73) 99   


 


9/11/20 16:10        95


 


9/11/20 16:06 99.0 65 26 119/60 (79) 99   


 


9/11/20 16:00  65      


 


9/11/20 16:00      Mechanical Ventilator  





      Mechanical Ventilator  


 


9/11/20 15:41  62 26     95


 


9/11/20 15:00  68 21 93/55 (68) 99   


 


9/11/20 14:00  64 23 103/52 (69) 99   


 


9/11/20 13:00 99.0 61 26 99/86 (90) 99   


 


9/11/20 12:13        95


 


9/11/20 12:11 99.0 67 26 105/57 (73) 99   


 


9/11/20 12:00      Mechanical Ventilator  





      Mechanical Ventilator  


 


9/11/20 12:00  60      


 


9/11/20 11:00  65 26 99/48 (65) 98   


 


9/11/20 10:54  55 26     95


 


9/11/20 10:00  59 23 114/57 (76) 98   


 


9/11/20 09:00 98.0 57 17 104/58 (73) 99   








Status:  awake


Condition:  critical


HEENT:  atraumatic, normocephalic


Neck:  full ROM


Heart:  HR/BP stable


Abdomen:  non-tender


Extremities:  no C/C/E, edema


Accucheck:  131





Critical Care - Subjective


ROS Limited/Unobtainable:  Yes


Condition:  critical


EKG Rhythm:  Sinus Rhythm


FI02:  95


Vent Support Breath Rate:  26


Vent Support Mode:  AC


Vent Tidal Volume:  500


Sputum Amount:  Moderate


PEEP:  10.0


PIP:  50


Tube Feeding Amount:  60


I&O:











Intake and Output  


 


 9/11/20 9/12/20





 19:00 07:00


 


Intake Total 1020 ml 1260 ml


 


Output Total 1255 ml 705 ml


 


Balance -235 ml 555 ml


 


  


 


Free Water 200 ml 200 ml


 


IV Total  220 ml


 


Tube Feeding 720 ml 720 ml


 


Other 100 ml 120 ml


 


Output Urine Total 1255 ml 705 ml


 


# Bowel Movements 4 3








ET-Tube:  7.5


ET Position:  22


Labs:





Laboratory Tests








Test


  9/11/20


10:55 9/12/20


04:00


 


Aspergillus galactomannan


Antigen Pending  


  


 


 


Beta-(1,3)-D-Glucan Pending   


 


White Blood Count


  


  11.3 K/UL


(4.8-10.8)  H


 


Red Blood Count


  


  2.67 M/UL


(4.20-5.40)  L


 


Hemoglobin


  


  8.7 G/DL


(12.0-16.0)  L


 


Hematocrit


  


  25.9 %


(37.0-47.0)  L


 


Mean Corpuscular Volume  97 FL (80-99)  


 


Mean Corpuscular Hemoglobin


  


  32.6 PG


(27.0-31.0)  H


 


Mean Corpuscular Hemoglobin


Concent 


  33.6 G/DL


(32.0-36.0)


 


Red Cell Distribution Width


  


  14.5 %


(11.6-14.8)


 


Platelet Count


  


  178 K/UL


(150-450)


 


Mean Platelet Volume


  


  8.8 FL


(6.5-10.1)


 


Neutrophils (%) (Auto)


  


  % (45.0-75.0)


 


 


Lymphocytes (%) (Auto)


  


  % (20.0-45.0)


 


 


Monocytes (%) (Auto)   % (1.0-10.0)  


 


Eosinophils (%) (Auto)   % (0.0-3.0)  


 


Basophils (%) (Auto)   % (0.0-2.0)  


 


Neutrophils % (Manual)  Pending  


 


Lymphocytes % (Manual)  Pending  


 


Platelet Estimate  Pending  


 


Platelet Morphology  Pending  


 


Sodium Level


  


  150 MMOL/L


(136-145)  H


 


Potassium Level


  


  3.2 MMOL/L


(3.5-5.1)  L


 


Chloride Level


  


  117 MMOL/L


()  H


 


Carbon Dioxide Level


  


  29 MMOL/L


(21-32)


 


Anion Gap


  


  4 mmol/L


(5-15)  L


 


Blood Urea Nitrogen


  


  35 mg/dL


(7-18)  H


 


Creatinine


  


  0.9 MG/DL


(0.55-1.30)


 


Estimat Glomerular Filtration


Rate 


  > 60 mL/min


(>60)


 


Glucose Level


  


  158 MG/DL


()  H


 


Calcium Level


  


  7.5 MG/DL


(8.5-10.1)  L


 


Phosphorus Level


  


  3.0 MG/DL


(2.5-4.9)


 


Magnesium Level


  


  1.8 MG/DL


(1.8-2.4)


 


Total Bilirubin


  


  0.5 MG/DL


(0.2-1.0)


 


Aspartate Amino Transf


(AST/SGOT) 


  40 U/L (15-37)


H


 


Alanine Aminotransferase


(ALT/SGPT) 


  118 U/L


(12-78)  H


 


Alkaline Phosphatase


  


  59 U/L


()


 


Total Protein


  


  4.6 G/DL


(6.4-8.2)  L


 


Albumin


  


  1.1 G/DL


(3.4-5.0)  L


 


Globulin  3.5 g/dL  


 


Albumin/Globulin Ratio


  


  0.3 (1.0-2.7)


L

















Chano Cherry MD Sep 12, 2020 08:06
DISPLAY PLAN FREE TEXT

## 2021-04-05 NOTE — PROGRESS NOTE ADULT - PROVIDER SPECIALTY LIST ADULT
Internal Medicine
Pulmonology
Internal Medicine
Pulmonology
Pulmonology
Internal Medicine
Internal Medicine

## 2021-04-05 NOTE — PROGRESS NOTE ADULT - PROBLEM SELECTOR PLAN 6
- Cholesterol 223 / TG 67 / HDL 71 / [id number] [4/2/21]  - check LFTs and initiate statin therapy   - script on dc with outpatient follow up

## 2021-04-05 NOTE — PROGRESS NOTE ADULT - PROBLEM SELECTOR PLAN 8
- spirometry 2/20 + obstructive pattern with moderate reduction flow rates.   - pulmonary walk test 09/20 + SpO2 desat 89% RA  - follow Dr. Mccrary outpatient, consultation appreciated

## 2021-04-05 NOTE — PROGRESS NOTE ADULT - PROBLEM SELECTOR PLAN 1
- prior hospializations [3] within last year [0] intubations [0]  - home medications: montelucast 10, symbicort 2p BID, proair 90 prn  - IV Solumedrol 40mg q8h [04/02-04/04] ; transition to PO and taper initiated   - Montelucast, Symbicort BID, Duonebs spaced to q8, Xopenex PRN  - lactate in s/o frequent inhaler use downtrending, afebrile   - s/p cont pulse ox as SpO2 stable, w/o wheezing, improved air mvmt bL  - 40IV methylpred conversion to 50PO prednisone ordered to start 4/5  - c/w peak flow rates, taper steroids per clinical course  - pending improvement today, pt may be clear for d/c Monday d/w pulmonology - prior hospializations [3] within last year [0] intubations [0]  - home medications: montelucast 10, symbicort 2p BID, proair 90 prn  - IV Solumedrol 40mg q8h [04/02-04/04] ; transition to PO and taper initiated   - Montelucast, Symbicort BID, Duonebs spaced to q8, Xopenex PRN  - lactate in s/o frequent inhaler use downtrending, afebrile   - s/p cont pulse ox as SpO2 stable, w/o wheezing, improved air mvmt bL  - 40IV methylpred conversion to 50PO prednisone ordered to start 4/5  - c/w peak flow rates, taper steroids per clinical course - prior hospializations [3] within last year [0] intubations [0]  - home medications: montelucast 10, symbicort 2p BID, proair 90 prn  - IV Solumedrol 40mg q8h [04/02-04/04] ; switched today to prednisone 50, taper initiated decrease by 10 every 2 days)    - Montelucast, Symbicort BID, Duonebs spaced to q8, Xopenex PRN  - lactate in s/o frequent inhaler use downtrending, afebrile   - s/p cont pulse ox as SpO2 stable, w/o wheezing, improved air mvmt bL  - 40IV methylpred conversion to 50PO prednisone ordered to start 4/5  - c/w peak flow rates, taper steroids per clinical course

## 2021-04-05 NOTE — PROGRESS NOTE ADULT - PROBLEM SELECTOR PLAN 7
- likely cause of pachydermia / contributor to symptoms  - start PPI on discharge, continue inpatient

## 2021-04-05 NOTE — PROGRESS NOTE ADULT - PROBLEM SELECTOR PLAN 4
- initally off BB in s/o albuterol / symbicort use   - h/o persistent tachycardia and palpitations on BB  - CTA neg for PE  - c/w telemetry, toprol XL 50 qd

## 2021-04-05 NOTE — PROGRESS NOTE ADULT - PROBLEM SELECTOR PLAN 2
- CTA + fibrotic changes, hilar LN consistent with sarcoidosis history  - TTE limited, + MR, no thrombus, no systofic dysfxn  -  ---> 130 /  rest --->  ambulation   - f/u ACE level   - discuss with Dr. Mccrary, pt pulmonologist, whether in vs. outpt cardiac MRI Unable to tolerate inpatient, will obtain outpatient.   - CTA + fibrotic changes, hilar LN consistent with sarcoidosis history  - TTE limited, + MR, no thrombus, no systofic dysfxn  -  ---> 130 /  rest --->  ambulation   - f/u ACE level

## 2021-04-05 NOTE — PROGRESS NOTE ADULT - SUBJECTIVE AND OBJECTIVE BOX
Yarelis Dai Y1  [phone]    Patient is a 65y old  Female who presents with a chief complaint of shortness of breath (04 Apr 2021 13:39)      SUBJECTIVE / OVERNIGHT EVENTS:  Patient seen and examined at bedside.    Other Review of Systems Negative.    MEDICATIONS  (STANDING):  albuterol/ipratropium for Nebulization 3 milliLiter(s) Nebulizer every 8 hours  amLODIPine   Tablet 5 milliGRAM(s) Oral daily  aspirin enteric coated 81 milliGRAM(s) Oral daily  atorvastatin 10 milliGRAM(s) Oral at bedtime  brimonidine 0.2% Ophthalmic Solution 1 Drop(s) Both EYES two times a day  budesonide 160 MICROgram(s)/formoterol 4.5 MICROgram(s) Inhaler 2 Puff(s) Inhalation two times a day  dorzolamide 2% Ophthalmic Solution 1 Drop(s) Both EYES <User Schedule>  enoxaparin Injectable 40 milliGRAM(s) SubCutaneous daily  ergocalciferol 53847 Unit(s) Oral <User Schedule>  ferrous    sulfate 325 milliGRAM(s) Oral daily  fluticasone propionate 50 MICROgram(s)/spray Nasal Spray 1 Spray(s) Both Nostrils two times a day  guaiFENesin  milliGRAM(s) Oral every 12 hours  latanoprost 0.005% Ophthalmic Solution 1 Drop(s) Both EYES every 24 hours  metoprolol succinate ER 50 milliGRAM(s) Oral daily  montelukast 10 milliGRAM(s) Oral daily  pantoprazole    Tablet 40 milliGRAM(s) Oral before breakfast  predniSONE   Tablet 50 milliGRAM(s) Oral daily  sodium chloride 0.65% Nasal 1 Spray(s) Both Nostrils daily    MEDICATIONS  (PRN):  levalbuterol Inhalation 0.63 milliGRAM(s) Inhalation every 6 hours PRN wheeze      OBJECTIVE:    Vital Signs Last 24 Hrs  T(C): 36.6 (05 Apr 2021 04:55), Max: 36.7 (04 Apr 2021 12:55)  T(F): 97.8 (05 Apr 2021 04:55), Max: 98.1 (04 Apr 2021 12:55)  HR: 82 (05 Apr 2021 04:55) (82 - 86)  BP: 128/73 (05 Apr 2021 04:55) (108/67 - 136/77)  BP(mean): --  RR: 18 (05 Apr 2021 04:55) (18 - 18)  SpO2: 97% (05 Apr 2021 04:55) (97% - 98%)    CAPILLARY BLOOD GLUCOSE        I&O's Summary    04 Apr 2021 07:01  -  05 Apr 2021 07:00  --------------------------------------------------------  IN: 1340 mL / OUT: 0 mL / NET: 1340 mL        PHYSICAL EXAM:  GENERAL: NAD, well-developed  HEAD:  Atraumatic, Normocephalic  EYES: EOMI, PERRLA, conjunctiva and sclera clear  NECK: Supple, No JVD  CHEST/LUNG: Clear to auscultation bilaterally; No wheeze  HEART: Regular rate and rhythm; No murmurs, rubs, or gallops  ABDOMEN: Soft, Nontender, Nondistended; Bowel sounds present  EXTREMITIES:  2+ Peripheral Pulses, No clubbing, cyanosis, or edema  PSYCH: AAOx3  NEUROLOGY: non-focal  SKIN: No rashes or lesions    LABS:                        12.3   7.95  )-----------( 297      ( 05 Apr 2021 05:48 )             37.6     Auto Eosinophil # x     / Auto Eosinophil % x     / Auto Neutrophil # x     / Auto Neutrophil % x     / BANDS % x                            14.3   8.73  )-----------( 335      ( 04 Apr 2021 06:13 )             43.3     Auto Eosinophil # 0.00  / Auto Eosinophil % 0.0   / Auto Neutrophil # 7.47  / Auto Neutrophil % 85.6  / BANDS % x        04-05    141  |  105  |  30<H>  ----------------------------<  116<H>  4.0   |  29  |  1.03  04-04    143  |  104  |  26<H>  ----------------------------<  151<H>  4.4   |  27  |  1.01    Ca    8.5      05 Apr 2021 05:47  Mg     2.4     04-05  Phos  3.8     04-05  TPro  6.7  /  Alb  3.7  /  TBili  0.3  /  DBili  x   /  AST  11  /  ALT  18  /  AlkPhos  88  04-05                  ABG:     VBG:       Care Discussed with Consultants/Other Providers:    RADIOLOGY & ADDITIONAL TESTS:  (Imaging Personally Reviewed)       Yarelis Dai Y1  [phone]    Patient is a 65y old  Female who presents with a chief complaint of shortness of breath (04 Apr 2021 13:39)      SUBJECTIVE / OVERNIGHT EVENTS:  Patient seen and examined at bedside.  No acute events overnight. No SOB, chest pain.   Unable to tolerate cardiac MRI--> will obtain outpatient.     Other Review of Systems Negative.    MEDICATIONS  (STANDING):  albuterol/ipratropium for Nebulization 3 milliLiter(s) Nebulizer every 8 hours  amLODIPine   Tablet 5 milliGRAM(s) Oral daily  aspirin enteric coated 81 milliGRAM(s) Oral daily  atorvastatin 10 milliGRAM(s) Oral at bedtime  brimonidine 0.2% Ophthalmic Solution 1 Drop(s) Both EYES two times a day  budesonide 160 MICROgram(s)/formoterol 4.5 MICROgram(s) Inhaler 2 Puff(s) Inhalation two times a day  dorzolamide 2% Ophthalmic Solution 1 Drop(s) Both EYES <User Schedule>  enoxaparin Injectable 40 milliGRAM(s) SubCutaneous daily  ergocalciferol 29809 Unit(s) Oral <User Schedule>  ferrous    sulfate 325 milliGRAM(s) Oral daily  fluticasone propionate 50 MICROgram(s)/spray Nasal Spray 1 Spray(s) Both Nostrils two times a day  guaiFENesin  milliGRAM(s) Oral every 12 hours  latanoprost 0.005% Ophthalmic Solution 1 Drop(s) Both EYES every 24 hours  metoprolol succinate ER 50 milliGRAM(s) Oral daily  montelukast 10 milliGRAM(s) Oral daily  pantoprazole    Tablet 40 milliGRAM(s) Oral before breakfast  predniSONE   Tablet 50 milliGRAM(s) Oral daily  sodium chloride 0.65% Nasal 1 Spray(s) Both Nostrils daily    MEDICATIONS  (PRN):  levalbuterol Inhalation 0.63 milliGRAM(s) Inhalation every 6 hours PRN wheeze      OBJECTIVE:    Vital Signs Last 24 Hrs  T(C): 36.6 (05 Apr 2021 04:55), Max: 36.7 (04 Apr 2021 12:55)  T(F): 97.8 (05 Apr 2021 04:55), Max: 98.1 (04 Apr 2021 12:55)  HR: 82 (05 Apr 2021 04:55) (82 - 86)  BP: 128/73 (05 Apr 2021 04:55) (108/67 - 136/77)  BP(mean): --  RR: 18 (05 Apr 2021 04:55) (18 - 18)  SpO2: 97% (05 Apr 2021 04:55) (97% - 98%)    CAPILLARY BLOOD GLUCOSE        I&O's Summary    04 Apr 2021 07:01  -  05 Apr 2021 07:00  --------------------------------------------------------  IN: 1340 mL / OUT: 0 mL / NET: 1340 mL        PHYSICAL EXAM:  GENERAL: NAD, well-developed  HEAD:  Atraumatic, Normocephalic  EYES: EOMI, PERRLA, conjunctiva and sclera clear  NECK: Supple, No JVD  CHEST/LUNG: Clear to auscultation bilaterally; No wheeze  HEART: Regular rate and rhythm; No murmurs, rubs, or gallops  ABDOMEN: Soft, Nontender, Nondistended; Bowel sounds present  EXTREMITIES:  2+ Peripheral Pulses, No clubbing, cyanosis, or edema  PSYCH: AAOx3  NEUROLOGY: non-focal  SKIN: No rashes or lesions    LABS:                        12.3   7.95  )-----------( 297      ( 05 Apr 2021 05:48 )             37.6     Auto Eosinophil # x     / Auto Eosinophil % x     / Auto Neutrophil # x     / Auto Neutrophil % x     / BANDS % x                            14.3   8.73  )-----------( 335      ( 04 Apr 2021 06:13 )             43.3     Auto Eosinophil # 0.00  / Auto Eosinophil % 0.0   / Auto Neutrophil # 7.47  / Auto Neutrophil % 85.6  / BANDS % x        04-05    141  |  105  |  30<H>  ----------------------------<  116<H>  4.0   |  29  |  1.03  04-04    143  |  104  |  26<H>  ----------------------------<  151<H>  4.4   |  27  |  1.01    Ca    8.5      05 Apr 2021 05:47  Mg     2.4     04-05  Phos  3.8     04-05  TPro  6.7  /  Alb  3.7  /  TBili  0.3  /  DBili  x   /  AST  11  /  ALT  18  /  AlkPhos  88  04-05        Care Discussed with Consultants/Other Providers:  Pulmonary Dr. Trust     RADIOLOGY & ADDITIONAL TESTS:  (Imaging Personally Reviewed)       Yarelis Dai PGY1  [phone]    Patient is a 65y old  Female who presents with a chief complaint of shortness of breath (04 Apr 2021 13:39)      SUBJECTIVE / OVERNIGHT EVENTS:  Patient seen and examined at bedside.  No acute events overnight. No SOB, chest pain.   Unable to tolerate cardiac MRI--> will obtain outpatient.   OK to dc today     Other Review of Systems Negative.    MEDICATIONS  (STANDING):  albuterol/ipratropium for Nebulization 3 milliLiter(s) Nebulizer every 8 hours  amLODIPine   Tablet 5 milliGRAM(s) Oral daily  aspirin enteric coated 81 milliGRAM(s) Oral daily  atorvastatin 10 milliGRAM(s) Oral at bedtime  brimonidine 0.2% Ophthalmic Solution 1 Drop(s) Both EYES two times a day  budesonide 160 MICROgram(s)/formoterol 4.5 MICROgram(s) Inhaler 2 Puff(s) Inhalation two times a day  dorzolamide 2% Ophthalmic Solution 1 Drop(s) Both EYES <User Schedule>  enoxaparin Injectable 40 milliGRAM(s) SubCutaneous daily  ergocalciferol 06972 Unit(s) Oral <User Schedule>  ferrous    sulfate 325 milliGRAM(s) Oral daily  fluticasone propionate 50 MICROgram(s)/spray Nasal Spray 1 Spray(s) Both Nostrils two times a day  guaiFENesin  milliGRAM(s) Oral every 12 hours  latanoprost 0.005% Ophthalmic Solution 1 Drop(s) Both EYES every 24 hours  metoprolol succinate ER 50 milliGRAM(s) Oral daily  montelukast 10 milliGRAM(s) Oral daily  pantoprazole    Tablet 40 milliGRAM(s) Oral before breakfast  predniSONE   Tablet 50 milliGRAM(s) Oral daily  sodium chloride 0.65% Nasal 1 Spray(s) Both Nostrils daily    MEDICATIONS  (PRN):  levalbuterol Inhalation 0.63 milliGRAM(s) Inhalation every 6 hours PRN wheeze      OBJECTIVE:    Vital Signs Last 24 Hrs  T(C): 36.6 (05 Apr 2021 04:55), Max: 36.7 (04 Apr 2021 12:55)  T(F): 97.8 (05 Apr 2021 04:55), Max: 98.1 (04 Apr 2021 12:55)  HR: 82 (05 Apr 2021 04:55) (82 - 86)  BP: 128/73 (05 Apr 2021 04:55) (108/67 - 136/77)  BP(mean): --  RR: 18 (05 Apr 2021 04:55) (18 - 18)  SpO2: 97% (05 Apr 2021 04:55) (97% - 98%)    CAPILLARY BLOOD GLUCOSE        I&O's Summary    04 Apr 2021 07:01  -  05 Apr 2021 07:00  --------------------------------------------------------  IN: 1340 mL / OUT: 0 mL / NET: 1340 mL        PHYSICAL EXAM:  GENERAL: NAD, well-developed  HEAD:  Atraumatic, Normocephalic  EYES: EOMI, PERRLA, conjunctiva and sclera clear  NECK: Supple, No JVD  CHEST/LUNG: Clear to auscultation bilaterally; No wheeze  HEART: Regular rate and rhythm; No murmurs, rubs, or gallops  ABDOMEN: Soft, Nontender, Nondistended; Bowel sounds present  EXTREMITIES:  2+ Peripheral Pulses, No clubbing, cyanosis, or edema  PSYCH: AAOx3  NEUROLOGY: non-focal  SKIN: No rashes or lesions    LABS:                        12.3   7.95  )-----------( 297      ( 05 Apr 2021 05:48 )             37.6     Auto Eosinophil # x     / Auto Eosinophil % x     / Auto Neutrophil # x     / Auto Neutrophil % x     / BANDS % x                            14.3   8.73  )-----------( 335      ( 04 Apr 2021 06:13 )             43.3     Auto Eosinophil # 0.00  / Auto Eosinophil % 0.0   / Auto Neutrophil # 7.47  / Auto Neutrophil % 85.6  / BANDS % x        04-05    141  |  105  |  30<H>  ----------------------------<  116<H>  4.0   |  29  |  1.03  04-04    143  |  104  |  26<H>  ----------------------------<  151<H>  4.4   |  27  |  1.01    Ca    8.5      05 Apr 2021 05:47  Mg     2.4     04-05  Phos  3.8     04-05  TPro  6.7  /  Alb  3.7  /  TBili  0.3  /  DBili  x   /  AST  11  /  ALT  18  /  AlkPhos  88  04-05        Care Discussed with Consultants/Other Providers:  Pulmonary Dr. Trust     RADIOLOGY & ADDITIONAL TESTS:  (Imaging Personally Reviewed)

## 2021-04-05 NOTE — DISCHARGE NOTE NURSING/CASE MANAGEMENT/SOCIAL WORK - PATIENT PORTAL LINK FT
You can access the FollowMyHealth Patient Portal offered by [facility] by registering at the following website: [email or web address]. By joining [facility]’s FollowMyHealth portal, you will also be able to view your health information using other applications (apps) compatible with our system.

## 2021-04-06 ENCOUNTER — RX RENEWAL (OUTPATIENT)
Age: 66
End: 2021-04-06

## 2021-04-07 DIAGNOSIS — Z20.822 CONTACT WITH AND (SUSPECTED) EXPOSURE TO COVID-19: ICD-10-CM

## 2021-04-09 LAB — SARS-COV-2 N GENE NPH QL NAA+PROBE: NOT DETECTED

## 2021-04-12 ENCOUNTER — APPOINTMENT (OUTPATIENT)
Dept: PULMONOLOGY | Facility: CLINIC | Age: 66
End: 2021-04-12
Payer: MEDICARE

## 2021-04-12 VITALS
TEMPERATURE: 99 F | DIASTOLIC BLOOD PRESSURE: 82 MMHG | OXYGEN SATURATION: 96 % | HEART RATE: 117 BPM | SYSTOLIC BLOOD PRESSURE: 134 MMHG | RESPIRATION RATE: 14 BRPM

## 2021-04-12 DIAGNOSIS — I45.10 UNSPECIFIED RIGHT BUNDLE-BRANCH BLOCK: ICD-10-CM

## 2021-04-12 PROCEDURE — 36415 COLL VENOUS BLD VENIPUNCTURE: CPT

## 2021-04-12 PROCEDURE — 94729 DIFFUSING CAPACITY: CPT

## 2021-04-12 PROCEDURE — 99496 TRANSJ CARE MGMT HIGH F2F 7D: CPT | Mod: 25

## 2021-04-12 PROCEDURE — 94010 BREATHING CAPACITY TEST: CPT

## 2021-04-12 PROCEDURE — 94727 GAS DIL/WSHOT DETER LNG VOL: CPT

## 2021-04-12 PROCEDURE — 95012 NITRIC OXIDE EXP GAS DETER: CPT

## 2021-04-12 PROCEDURE — 99072 ADDL SUPL MATRL&STAF TM PHE: CPT

## 2021-04-12 PROCEDURE — ZZZZZ: CPT

## 2021-04-12 NOTE — PHYSICAL EXAM
[General Appearance - Well Developed] : well developed [Normal Appearance] : normal appearance [Well Groomed] : well groomed [General Appearance - Well Nourished] : well nourished [No Deformities] : no deformities [General Appearance - In No Acute Distress] : no acute distress [Normal Conjunctiva] : the conjunctiva exhibited no abnormalities [Eyelids - No Xanthelasma] : the eyelids demonstrated no xanthelasmas [Normal Oropharynx] : normal oropharynx [Neck Appearance] : the appearance of the neck was normal [Neck Cervical Mass (___cm)] : no neck mass was observed [Jugular Venous Distention Increased] : there was no jugular-venous distention [Thyroid Diffuse Enlargement] : the thyroid was not enlarged [Thyroid Nodule] : there were no palpable thyroid nodules [Heart Rate And Rhythm] : heart rate and rhythm were normal [Heart Sounds] : normal S1 and S2 [Murmurs] : no murmurs present [Arterial Pulses Normal] : the arterial pulses were normal [Edema] : no peripheral edema present [Veins - Varicosity Changes] : no varicosital changes were noted in the lower extremities [Respiration, Rhythm And Depth] : normal respiratory rhythm and effort [Exaggerated Use Of Accessory Muscles For Inspiration] : no accessory muscle use [Auscultation Breath Sounds / Voice Sounds] : lungs were clear to auscultation bilaterally [Chest Palpation] : palpation of the chest revealed no abnormalities [Lungs Percussion] : the lungs were normal to percussion [Bowel Sounds] : normal bowel sounds [Abdomen Soft] : soft [Abdomen Tenderness] : non-tender [Abdomen Mass (___ Cm)] : no abdominal mass palpated [Abnormal Walk] : normal gait [Gait - Sufficient For Exercise Testing] : the gait was sufficient for exercise testing [Nail Clubbing] : no clubbing of the fingernails [Cyanosis, Localized] : no localized cyanosis [Petechial Hemorrhages (___cm)] : no petechial hemorrhages [Skin Color & Pigmentation] : normal skin color and pigmentation [] : no rash [No Venous Stasis] : no venous stasis [Skin Lesions] : no skin lesions [No Skin Ulcers] : no skin ulcer [No Xanthoma] : no  xanthoma was observed [Deep Tendon Reflexes (DTR)] : deep tendon reflexes were 2+ and symmetric [Sensation] : the sensory exam was normal to light touch and pinprick [No Focal Deficits] : no focal deficits [FreeTextEntry1] : Cleared wheeze  rhonchi from hospital visit

## 2021-04-12 NOTE — PROCEDURE
[FreeTextEntry1] : PFT 4/12/21\par mild  moderate reduction flow rates\par  Normal lung Volumes\par  DLCO 58 % moderate reduction with loss fx alveolar  capillary units\par  HGB 12.3\par \par NIOX  18  ppb WNL 4/12/21\par \par PFT body box\par September 11, 2020\par Mild reduction in flow rates with a mild obstructive ventilatory impairment\par Lung volumes are normal\par Air trapping with an RV/TLC ratio 129% predicted.\par Resistance is increased specific conductance is increased\par Mild reduction diffusion with a loss of functioning alveolocapillary units 56% of predicted.\par Impression chronic obstructive airway disease\par \par  nitric oxide 103 elevated \par Hemoglobin 13.9\par Inflammatory airway disease \par \par Chest x-ray PA lateral September 9, 2020\par Sarcoidosis\par Cardiac size are normal\par Uncoiled aorta\par No parenchymal infiltrates pleural effusions or dominant pulmonary nodules\par We will change compared to chest x-ray of November 14, 2019\par \par Pulmonary 6-minute walk stress test September 9, 2020\par \par Room air O2 saturation 97\par Persistent tachycardia\par Desaturation to 89% on room air\par Impression borderline desaturation\par No indication for portable oxygen therapy\par \par \par Spirometry 2/27/2020\par  moderate  reduction flow rates\par No BD at FEV1\par Obstructive pattern\par \par FENO  11  ppb  1/30 20\par \par Chest x-ray PA lateral normal cardiac size Nov 14 2019\par Uncoiled aorta\par No clear parenchymal infiltrates pleural effusions or dominant pulmonary nodules\par No interval change compared to chest x-ray barring difference in technique dating back to November 7, 2018\par \par PFT November 14, 2019\par Mild reduction in flow rates with an obstructive pattern\par Normal total lung capacity 80% predicted\par Mild air trapping RV/TLC ratio 129% predicted.\par Moderate reduction diffusion 56% predicted with a loss of functioning alveolocapillary units\par Heme globin 13.8\par Bronchodilator response at small airways 29%\par \par High-dose flu vaccination administered Sept 9 2020\par \par DAta 1/30/20\par \par Serum electrolytes normal\par Glucose 103\par BUN 23 creatinine 1.02\par Serum calcium 9.7 in patient with sarcoidosis\par ACE level normal range\par \par High-dose flu vaccination administered September 9, 2020\par PREVNAIR 10/12/2020\par \par DEXA Bone Density\par  Normal study

## 2021-04-12 NOTE — DISCUSSION/SUMMARY
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

## 2021-04-12 NOTE — HISTORY OF PRESENT ILLNESS
[None] : ~He/She~ has no significant interval events [Difficulty Breathing During Exertion] : stable dyspnea on exertion [Feelings Of Weakness On Exertion] : stable exercise intolerance [Cough] : stable coughing [Wheezing] : denies wheezing [Regional Soft Tissue Swelling Both Lower Extremities] : denies lower extremity edema [Chest Pain Or Discomfort] : denies chest pain [Fever] : denies fever [Never] : was never a smoker [TextBox_4] : [facility] emergency department evaluation\par Chief complaint shortness of breath x4 days\par ED O2 saturation 98%-99 noted nasal cannula 2 L rate 124\par Blood pressure reported 166/72 ED chart review\par Reported 99.1 reviewed EKG in ED\par Reported incomplete right bundle branch block\par They noted on exam clinical exam with wheezing will score thank you clinically did not have pulmonary embolism plan troponin labs coags BNP D-dimer cardiac monitor\par CBC\par Vital count 5.97 hemoglobin 12.8 hematocrit 38 platelet count 291,000 mild lymphopenia identified rest of bloods pending\par No Covid swab noted\par No chest x-ray completed\par 65-year-old female with history of hypertension hyperlipidemia obesity sarcoidosis asthma shortness of breath x4 days\par Also noted bilateral lower extremity edema\par Intermittent cough\par Was at primary care physician's office Dr. Cronin in a.m. noted to have a new right bundle branch block\par \par Ability to portable chest x-ray April 1, 2021\par Limited difficult to assess cardiac size on AP view\par Widening of the ribs\par No evidence of parenchymal pulmonary opacities\par Favor an acute inflammatory airway inflammatory event widening the ribs suggestive of air trapping and a known patient of asthma\par Noted the official chest x-ray report is clear lungs\par \par ADDENDUM\par Addendum data review\par April 1, 2021\par CT angiogram protocol results pending\par Chest x-ray clear lungs COVID-19 PCR negative magnesium 2.2 phosphorus mildly reduced 2.2 serum glucose 148\par BUN 19 creatinine 1.07\par CBC White count 7.02 hemoglobin 12.7 hematocrit 37.4\par Platelet count 313,000\par \par Addendum preliminary report CT angiogram protocol\par No reported main left right upper lobe pulmonary emboli\par Partially calcified mediastinal and hilar adenopathy consistent with known diagnosis of sarcoidosis\par  [Wt Gain ___ Lbs] : no recent weight gain [Wt Loss ___ Lbs] : no recent weight loss [Oxygen] : the patient uses no supplemental oxygen

## 2021-04-14 ENCOUNTER — NON-APPOINTMENT (OUTPATIENT)
Age: 66
End: 2021-04-14

## 2021-04-14 LAB — ACE BLD-CCNC: 38 U/L

## 2021-05-02 ENCOUNTER — RX RENEWAL (OUTPATIENT)
Age: 66
End: 2021-05-02

## 2021-05-02 RX ORDER — TRIAMTERENE AND HYDROCHLOROTHIAZIDE 37.5; 25 MG/1; MG/1
37.5-25 CAPSULE ORAL
Qty: 90 | Refills: 1 | Status: ACTIVE | COMMUNITY
Start: 2020-03-18 | End: 1900-01-01

## 2021-05-22 DIAGNOSIS — Z01.818 ENCOUNTER FOR OTHER PREPROCEDURAL EXAMINATION: ICD-10-CM

## 2021-05-23 ENCOUNTER — APPOINTMENT (OUTPATIENT)
Dept: DISASTER EMERGENCY | Facility: CLINIC | Age: 66
End: 2021-05-23

## 2021-05-23 LAB — SARS-COV-2 N GENE NPH QL NAA+PROBE: NOT DETECTED

## 2021-05-26 ENCOUNTER — APPOINTMENT (OUTPATIENT)
Dept: PULMONOLOGY | Facility: CLINIC | Age: 66
End: 2021-05-26
Payer: MEDICARE

## 2021-05-26 VITALS
HEART RATE: 100 BPM | DIASTOLIC BLOOD PRESSURE: 81 MMHG | OXYGEN SATURATION: 96 % | BODY MASS INDEX: 48.82 KG/M2 | SYSTOLIC BLOOD PRESSURE: 152 MMHG | WEIGHT: 293 LBS | TEMPERATURE: 97.9 F | HEIGHT: 65 IN

## 2021-05-26 PROCEDURE — 99214 OFFICE O/P EST MOD 30 MIN: CPT | Mod: 25

## 2021-05-26 PROCEDURE — 99072 ADDL SUPL MATRL&STAF TM PHE: CPT

## 2021-05-26 PROCEDURE — 95012 NITRIC OXIDE EXP GAS DETER: CPT

## 2021-05-26 PROCEDURE — 88738 HGB QUANT TRANSCUTANEOUS: CPT

## 2021-05-26 PROCEDURE — 94729 DIFFUSING CAPACITY: CPT

## 2021-05-26 PROCEDURE — 94727 GAS DIL/WSHOT DETER LNG VOL: CPT

## 2021-05-26 PROCEDURE — ZZZZZ: CPT

## 2021-05-26 PROCEDURE — 94010 BREATHING CAPACITY TEST: CPT

## 2021-05-26 NOTE — DISCUSSION/SUMMARY
[FreeTextEntry1] : Asthma\par  sarcoidosis\par stable pulmonary physiology\par Reported new R BBB incomplete- concern re   cardiac sarcoid but in hospital did not tolerate Cardiac MRI\par Mod MR\par  Diastolic Dysfx\par On Dyazide\par Glaucoma\par \par  Rx updated\par Taper steroids to off\par  office f/u\par OPTH completed Jan 2020\par \par CXR Nov 2021\par  DEXA 4/2020\par DEclines  COVID VACCINE

## 2021-05-26 NOTE — HISTORY OF PRESENT ILLNESS
[None] : ~He/She~ has no significant interval events [Difficulty Breathing During Exertion] : stable dyspnea on exertion [Feelings Of Weakness On Exertion] : stable exercise intolerance [Cough] : stable coughing [Wheezing] : denies wheezing [Regional Soft Tissue Swelling Both Lower Extremities] : denies lower extremity edema [Chest Pain Or Discomfort] : denies chest pain [Fever] : denies fever [Never] : was never a smoker [TextBox_4] : [facility] emergency department evaluation\par Chief complaint shortness of breath x4 days\par ED O2 saturation 98%-99 noted nasal cannula 2 L rate 124\par Blood pressure reported 166/72 ED chart review\par Reported 99.1 reviewed EKG in ED\par Reported incomplete right bundle branch block\par They noted on exam clinical exam with wheezing will score thank you clinically did not have pulmonary embolism plan troponin labs coags BNP D-dimer cardiac monitor\par CBC\par Vital count 5.97 hemoglobin 12.8 hematocrit 38 platelet count 291,000 mild lymphopenia identified rest of bloods pending\par No Covid swab noted\par No chest x-ray completed\par 65-year-old female with history of hypertension hyperlipidemia obesity sarcoidosis asthma shortness of breath x4 days\par Also noted bilateral lower extremity edema\par Intermittent cough\par Was at primary care physician's office Dr. Cronin in a.m. noted to have a new right bundle branch block\par \par Ability to portable chest x-ray April 1, 2021\par Limited difficult to assess cardiac size on AP view\par Widening of the ribs\par No evidence of parenchymal pulmonary opacities\par Favor an acute inflammatory airway inflammatory event widening the ribs suggestive of air trapping and a known patient of asthma\par Noted the official chest x-ray report is clear lungs\par \par ADDENDUM\par Addendum data review\par April 1, 2021\par CT angiogram protocol results pending\par Chest x-ray clear lungs COVID-19 PCR negative magnesium 2.2 phosphorus mildly reduced 2.2 serum glucose 148\par BUN 19 creatinine 1.07\par CBC White count 7.02 hemoglobin 12.7 hematocrit 37.4\par Platelet count 313,000\par \par Addendum preliminary report CT angiogram protocol\par No reported main left right upper lobe pulmonary emboli\par Partially calcified mediastinal and hilar adenopathy consistent with known diagnosis of sarcoidosis\par  [Wt Gain ___ Lbs] : no recent weight gain [Wt Loss ___ Lbs] : no recent weight loss [Oxygen] : the patient uses no supplemental oxygen

## 2021-05-26 NOTE — PROCEDURE
[FreeTextEntry1] : ECHO 4/2/21\par NO abnl reported finding\par \par PFT 5/26/21\par Moderate reduction flow rates\par Lung Volumes -nl TLC 86 % pred\par  Airtrapping\par DLCO 55 % with loss fx  alveolar  capillary units\par HGB 12.3\par \par NIOX 11 ppb WNL 5/26/21\par \par PFT 4/12/21\par mild  moderate reduction flow rates\par  Normal lung Volumes\par  DLCO 58 % moderate reduction with loss fx alveolar  capillary units\par  HGB 12.3\par \par NIOX  18  ppb WNL 4/12/21\par \par PFT body box\par September 11, 2020\par Mild reduction in flow rates with a mild obstructive ventilatory impairment\par Lung volumes are normal\par Air trapping with an RV/TLC ratio 129% predicted.\par Resistance is increased specific conductance is increased\par Mild reduction diffusion with a loss of functioning alveolocapillary units 56% of predicted.\par Impression chronic obstructive airway disease\par \par  nitric oxide 103 elevated \par Hemoglobin 13.9\par Inflammatory airway disease \par \par Chest x-ray PA lateral September 9, 2020\par Sarcoidosis\par Cardiac size are normal\par Uncoiled aorta\par No parenchymal infiltrates pleural effusions or dominant pulmonary nodules\par We will change compared to chest x-ray of November 14, 2019\par \par Pulmonary 6-minute walk stress test September 9, 2020\par \par Room air O2 saturation 97\par Persistent tachycardia\par Desaturation to 89% on room air\par Impression borderline desaturation\par No indication for portable oxygen therapy\par \par \par Spirometry 2/27/2020\par  moderate  reduction flow rates\par No BD at FEV1\par Obstructive pattern\par \par FENO  11  ppb  1/30 20\par \par Chest x-ray PA lateral normal cardiac size Nov 14 2019\par Uncoiled aorta\par No clear parenchymal infiltrates pleural effusions or dominant pulmonary nodules\par No interval change compared to chest x-ray barring difference in technique dating back to November 7, 2018\par \par PFT November 14, 2019\par Mild reduction in flow rates with an obstructive pattern\par Normal total lung capacity 80% predicted\par Mild air trapping RV/TLC ratio 129% predicted.\par Moderate reduction diffusion 56% predicted with a loss of functioning alveolocapillary units\par Heme globin 13.8\par Bronchodilator response at small airways 29%\par \par High-dose flu vaccination administered Sept 9 2020\par \par DAta 1/30/20\par \par Serum electrolytes normal\par Glucose 103\par BUN 23 creatinine 1.02\par Serum calcium 9.7 in patient with sarcoidosis\par ACE level normal range\par \par High-dose flu vaccination administered September 9, 2020\par PREVNAIR 10/12/2020\par \par DEXA Bone Density\par  Normal study

## 2021-07-12 ENCOUNTER — APPOINTMENT (OUTPATIENT)
Dept: PULMONOLOGY | Facility: CLINIC | Age: 66
End: 2021-07-12
Payer: MEDICARE

## 2021-07-12 VITALS
TEMPERATURE: 97.7 F | DIASTOLIC BLOOD PRESSURE: 83 MMHG | HEART RATE: 105 BPM | SYSTOLIC BLOOD PRESSURE: 133 MMHG | OXYGEN SATURATION: 95 %

## 2021-07-12 PROCEDURE — 99072 ADDL SUPL MATRL&STAF TM PHE: CPT

## 2021-07-12 PROCEDURE — 99214 OFFICE O/P EST MOD 30 MIN: CPT

## 2021-07-12 NOTE — HISTORY OF PRESENT ILLNESS
[None] : ~He/She~ has no significant interval events [Difficulty Breathing During Exertion] : stable dyspnea on exertion [Feelings Of Weakness On Exertion] : stable exercise intolerance [Cough] : stable coughing [Wheezing] : denies wheezing [Regional Soft Tissue Swelling Both Lower Extremities] : denies lower extremity edema [Chest Pain Or Discomfort] : denies chest pain [Fever] : denies fever [Never] : was never a smoker [TextBox_4] : No cough wheeze or sputum\par  post nasal congestion\par  no fever chills  sweats\par \par [facility] emergency department evaluation- admission April 1- 5 2021\par Chief complaint shortness of breath x4 days\par ED O2 saturation 98%-99 noted nasal cannula 2 L rate 124\par Blood pressure reported 166/72 ED chart review\par Reported 99.1 reviewed EKG in ED\par Reported incomplete right bundle branch block\par They noted on exam clinical exam with wheezing will score thank you clinically did not have pulmonary embolism plan troponin labs coags BNP D-dimer cardiac monitor\par CBC\par Vital count 5.97 hemoglobin 12.8 hematocrit 38 platelet count 291,000 mild lymphopenia identified rest of bloods pending\par No Covid swab noted\par No chest x-ray completed\par 65-year-old female with history of hypertension hyperlipidemia obesity sarcoidosis asthma shortness of breath x4 days\par Also noted bilateral lower extremity edema\par Intermittent cough\par Was at primary care physician's office Dr. Jones in a.m. noted to have a new right bundle branch block\par  portable chest x-ray April 1, 2021\par Limited difficult to assess cardiac size on AP view\par Widening of the ribs\par No evidence of parenchymal pulmonary opacities\par Favor an acute inflammatory airway inflammatory event widening the ribs suggestive of air trapping and a known patient of asthma\par Noted the official chest x-ray report is clear lungs\par \par ADDENDUM\par Addendum data review\par April 1, 2021\par CT angiogram protocol results pending\par Chest x-ray clear lungs COVID-19 PCR negative magnesium 2.2 phosphorus mildly reduced 2.2 serum glucose 148\par BUN 19 creatinine 1.07\par CBC White count 7.02 hemoglobin 12.7 hematocrit 37.4\par Platelet count 313,000\par \par Addendum preliminary report CT angiogram protocol\par No reported main left right upper lobe pulmonary emboli\par Partially calcified mediastinal and hilar adenopathy consistent with known diagnosis of sarcoidosis\par  [Wt Gain ___ Lbs] : no recent weight gain [Wt Loss ___ Lbs] : no recent weight loss [Oxygen] : the patient uses no supplemental oxygen

## 2021-07-12 NOTE — DISCUSSION/SUMMARY
[FreeTextEntry1] : Asthma\par  sarcoidosis\par stable pulmonary physiology\par Reported new R BBB incomplete- concern re   cardiac sarcoid but in hospital did not tolerate Cardiac MRI\par Mod MR\par  Diastolic Dysfx\par On Dyazide\par Glaucoma\par \par  Rx updated\par Taper steroids to off\par  office f/u\par OPTH completed Jan 2020 f/u due\par \par CXR Nov 2021\par  DEXA 4/2020\par DEclines  COVID VACCINE

## 2021-07-27 NOTE — PATIENT PROFILE ADULT - NS PRO AD NO ADVANCE DIRECTIVE
Blanca Silveiraannah called to request a refill on her medication. Last office visit : 4/2/2021   Next office visit : 8/5/2021     Last UDS:   Amphetamine Screen, Urine   Date Value Ref Range Status   04/02/2021 -  Final     Barbiturate Screen, Urine   Date Value Ref Range Status   04/02/2021 -  Final     Benzodiazepine Screen, Urine   Date Value Ref Range Status   04/02/2021 -  Final     Buprenorphine Urine   Date Value Ref Range Status   04/02/2021 -  Final     Cocaine Metabolite Screen, Urine   Date Value Ref Range Status   04/02/2021 -  Final     Gabapentin Screen, Urine   Date Value Ref Range Status   04/02/2021 -  Final     MDMA, Urine   Date Value Ref Range Status   04/02/2021 -  Final     Methamphetamine, Urine   Date Value Ref Range Status   04/02/2021 -  Final     Opiate Scrn, Ur   Date Value Ref Range Status   04/02/2021 -  Final     Oxycodone Screen, Ur   Date Value Ref Range Status   04/02/2021 -  Final     PCP Screen, Urine   Date Value Ref Range Status   04/02/2021 -  Final     Propoxyphene Screen, Urine   Date Value Ref Range Status   04/02/2021 -  Final     THC Screen, Urine   Date Value Ref Range Status   04/02/2021 -  Final     Tricyclic Antidepressants, Urine   Date Value Ref Range Status   04/02/2021 -  Final       Last Lulu Cameron: 6/25/21  Medication Contract: needs update at next visit   Last Fill: 6/25/21    Requested Prescriptions     Pending Prescriptions Disp Refills    gabapentin (NEURONTIN) 300 MG capsule [Pharmacy Med Name: GABAPENTIN 300 MG CAPSULE] 150 capsule 0     Sig: TAKE 2 CAPSULES BY MOUTH IN THE MORNING & 1 CAP AT NOON, THEN 2 CAPS AT BEDTIME         Please approve or refuse this medication.    Estela Gomez
No

## 2021-08-29 LAB — SARS-COV-2 N GENE NPH QL NAA+PROBE: NOT DETECTED

## 2021-08-30 ENCOUNTER — APPOINTMENT (OUTPATIENT)
Dept: PULMONOLOGY | Facility: CLINIC | Age: 66
End: 2021-08-30
Payer: MEDICARE

## 2021-08-30 VITALS
DIASTOLIC BLOOD PRESSURE: 81 MMHG | OXYGEN SATURATION: 91 % | TEMPERATURE: 98.1 F | HEART RATE: 97 BPM | SYSTOLIC BLOOD PRESSURE: 153 MMHG

## 2021-08-30 PROCEDURE — 94729 DIFFUSING CAPACITY: CPT

## 2021-08-30 PROCEDURE — 88738 HGB QUANT TRANSCUTANEOUS: CPT

## 2021-08-30 PROCEDURE — 95012 NITRIC OXIDE EXP GAS DETER: CPT

## 2021-08-30 PROCEDURE — 94727 GAS DIL/WSHOT DETER LNG VOL: CPT

## 2021-08-30 PROCEDURE — 99214 OFFICE O/P EST MOD 30 MIN: CPT | Mod: 25

## 2021-08-30 PROCEDURE — 94010 BREATHING CAPACITY TEST: CPT

## 2021-08-30 NOTE — DISCUSSION/SUMMARY
[FreeTextEntry1] : Asthma\par  sarcoidosis\par stable pulmonary physiology\par Reported new R BBB incomplete- concern re   cardiac sarcoid but in hospital did not tolerate Cardiac MRI\par Mod MR\par  Diastolic Dysfx\par On Dyazide\par Glaucoma\par \par  Rx updated\par Taper steroids to off\par  office f/u\par OPTH completed Jan 2020 f/u due\par \par CXR Nov 2021\par  DEXA 4/2020\par DEclines  COVID VACCINE\par Flu shot  next  visit at patient  request\par Noteed decline at lung fx \par Trial  BREZTRI 2 puffs QD and recheck PFT data and f/u CXR

## 2021-08-30 NOTE — PROCEDURE
[FreeTextEntry1] : PFT No BD 8/30/21\par moderate reduction Flow rates\par  Mild restrictive ventilatory impairment\par DLCO 42 % pred with severe loss fx  alveolar  capillary units\par  HGB 15.0\par NIOX 12 ppb\par \par ECHO 4/2/21\par NO abnl reported finding\par \par PFT 5/26/21\par Moderate reduction flow rates\par Lung Volumes -nl TLC 86 % pred\par  Airtrapping\par DLCO 55 % with loss fx  alveolar  capillary units\par HGB 12.3\par \par NIOX 11 ppb WNL 5/26/21\par \par PFT 4/12/21\par mild  moderate reduction flow rates\par  Normal lung Volumes\par  DLCO 58 % moderate reduction with loss fx alveolar  capillary units\par  HGB 12.3\par \par NIOX  18  ppb WNL 4/12/21\par \par PFT body box\par September 11, 2020\par Mild reduction in flow rates with a mild obstructive ventilatory impairment\par Lung volumes are normal\par Air trapping with an RV/TLC ratio 129% predicted.\par Resistance is increased specific conductance is increased\par Mild reduction diffusion with a loss of functioning alveolocapillary units 56% of predicted.\par Impression chronic obstructive airway disease\par \par  nitric oxide 103 elevated \par Hemoglobin 13.9\par Inflammatory airway disease \par \par Chest x-ray PA lateral September 9, 2020\par Sarcoidosis\par Cardiac size are normal\par Uncoiled aorta\par No parenchymal infiltrates pleural effusions or dominant pulmonary nodules\par We will change compared to chest x-ray of November 14, 2019\par \par Pulmonary 6-minute walk stress test September 9, 2020\par \par Room air O2 saturation 97\par Persistent tachycardia\par Desaturation to 89% on room air\par Impression borderline desaturation\par No indication for portable oxygen therapy\par \par \par Spirometry 2/27/2020\par  moderate  reduction flow rates\par No BD at FEV1\par Obstructive pattern\par \par FENO  11  ppb  1/30 20\par \par Chest x-ray PA lateral normal cardiac size Nov 14 2019\par Uncoiled aorta\par No clear parenchymal infiltrates pleural effusions or dominant pulmonary nodules\par No interval change compared to chest x-ray barring difference in technique dating back to November 7, 2018\par \par PFT November 14, 2019\par Mild reduction in flow rates with an obstructive pattern\par Normal total lung capacity 80% predicted\par Mild air trapping RV/TLC ratio 129% predicted.\par Moderate reduction diffusion 56% predicted with a loss of functioning alveolocapillary units\par Heme globin 13.8\par Bronchodilator response at small airways 29%\par \par High-dose flu vaccination administered Sept 9 2020\par \par DAta 1/30/20\par \par Serum electrolytes normal\par Glucose 103\par BUN 23 creatinine 1.02\par Serum calcium 9.7 in patient with sarcoidosis\par ACE level normal range\par \par High-dose flu vaccination administered September 9, 2020\par PREVNAIR 10/12/2020\par \par DEXA Bone Density\par  Normal study

## 2021-08-30 NOTE — HISTORY OF PRESENT ILLNESS
[None] : ~He/She~ has no significant interval events [Difficulty Breathing During Exertion] : stable dyspnea on exertion [Feelings Of Weakness On Exertion] : stable exercise intolerance [Cough] : stable coughing [Wheezing] : denies wheezing [Regional Soft Tissue Swelling Both Lower Extremities] : denies lower extremity edema [Chest Pain Or Discomfort] : denies chest pain [Fever] : denies fever [Never] : was never a smoker [TextBox_4] : No cough wheeze or sputum\par  post nasal congestion\par  no fever chills  sweats\par \par [facility] emergency department evaluation- admission April 1- 5 2021\par Chief complaint shortness of breath x4 days\par ED O2 saturation 98%-99 noted nasal cannula 2 L rate 124\par Blood pressure reported 166/72 ED chart review\par Reported 99.1 reviewed EKG in ED\par Reported incomplete right bundle branch block\par They noted on exam clinical exam with wheezing will score thank you clinically did not have pulmonary embolism plan troponin labs coags BNP D-dimer cardiac monitor\par CBC\par Vital count 5.97 hemoglobin 12.8 hematocrit 38 platelet count 291,000 mild lymphopenia identified rest of bloods pending\par No Covid swab noted\par No chest x-ray completed\par 65-year-old female with history of hypertension hyperlipidemia obesity sarcoidosis asthma shortness of breath x4 days\par Also noted bilateral lower extremity edema\par Intermittent cough\par Was at primary care physician's office Dr. Jones in a.m. noted to have a new right bundle branch block\par  portable chest x-ray April 1, 2021\par Limited difficult to assess cardiac size on AP view\par Widening of the ribs\par No evidence of parenchymal pulmonary opacities\par Favor an acute inflammatory airway inflammatory event widening the ribs suggestive of air trapping and a known patient of asthma\par Noted the official chest x-ray report is clear lungs\par \par ADDENDUM\par Addendum data review\par April 1, 2021\par CT angiogram protocol results pending\par Chest x-ray clear lungs COVID-19 PCR negative magnesium 2.2 phosphorus mildly reduced 2.2 serum glucose 148\par BUN 19 creatinine 1.07\par CBC White count 7.02 hemoglobin 12.7 hematocrit 37.4\par Platelet count 313,000\par \par Addendum preliminary report CT angiogram protocol\par No reported main left right upper lobe pulmonary emboli\par Partially calcified mediastinal and hilar adenopathy consistent with known diagnosis of sarcoidosis\par  [Wt Gain ___ Lbs] : no recent weight gain [Wt Loss ___ Lbs] : no recent weight loss [Oxygen] : the patient uses no supplemental oxygen

## 2021-09-27 ENCOUNTER — APPOINTMENT (OUTPATIENT)
Dept: PULMONOLOGY | Facility: CLINIC | Age: 66
End: 2021-09-27

## 2022-01-01 NOTE — DISCUSSION/SUMMARY
[FreeTextEntry1] : Asthma\par  sarcoidosis\par Sinus  tach on BB but  issue  with  wheeze- f/u  cardiology\par Mod MR\par  Diastolic Dysfx\par On Dyazide\par \par  Rx updated\par  office f/u\par OPTH completed Jan 2020\par \par CXR Nov 2020\par  DEXA 4/2020\par  f/u ECHO/EKG per Dr Day timing Negative Screen

## 2022-01-05 ENCOUNTER — APPOINTMENT (OUTPATIENT)
Dept: PULMONOLOGY | Facility: CLINIC | Age: 67
End: 2022-01-05

## 2022-01-31 DIAGNOSIS — Z01.812 ENCOUNTER FOR PREPROCEDURAL LABORATORY EXAMINATION: ICD-10-CM

## 2022-03-07 ENCOUNTER — APPOINTMENT (OUTPATIENT)
Dept: PULMONOLOGY | Facility: CLINIC | Age: 67
End: 2022-03-07
Payer: MEDICARE

## 2022-03-07 ENCOUNTER — APPOINTMENT (OUTPATIENT)
Dept: PULMONOLOGY | Facility: CLINIC | Age: 67
End: 2022-03-07

## 2022-03-07 VITALS
OXYGEN SATURATION: 94 % | BODY MASS INDEX: 48.82 KG/M2 | HEIGHT: 65 IN | WEIGHT: 293 LBS | TEMPERATURE: 98.2 F | HEART RATE: 108 BPM | RESPIRATION RATE: 15 BRPM

## 2022-03-07 VITALS — SYSTOLIC BLOOD PRESSURE: 134 MMHG | DIASTOLIC BLOOD PRESSURE: 78 MMHG

## 2022-03-07 LAB — POCT - HEMOGLOBIN (HGB), QUANTITATIVE, TRANSCUTANEOUS: 13.3

## 2022-03-07 PROCEDURE — 94729 DIFFUSING CAPACITY: CPT

## 2022-03-07 PROCEDURE — 99214 OFFICE O/P EST MOD 30 MIN: CPT | Mod: 25

## 2022-03-07 PROCEDURE — 88738 HGB QUANT TRANSCUTANEOUS: CPT

## 2022-03-07 PROCEDURE — ZZZZZ: CPT

## 2022-03-07 PROCEDURE — 94727 GAS DIL/WSHOT DETER LNG VOL: CPT

## 2022-03-07 PROCEDURE — 71046 X-RAY EXAM CHEST 2 VIEWS: CPT

## 2022-03-07 PROCEDURE — 94060 EVALUATION OF WHEEZING: CPT

## 2022-03-07 NOTE — DISCUSSION/SUMMARY
[FreeTextEntry1] : Asthma\par  sarcoidosis\par stable pulmonary physiology\par Reported new R BBB incomplete- concern re   cardiac sarcoid but in hospital did not tolerate Cardiac MRI\par Mod MR\par  Diastolic Dysfx\par On Dyazide\par Glaucoma\par \par  Rx updated\par Taper steroids to off\par  office f/u\par OPTH completed Jan 2020 f/u due\par \par CXR Nov 2021\par  DEXA 4/2020\par DEclines  COVID VACCINE\par Trial  BREZTRI 2 puffs QD OFF\par and recheck PFT data \par  f/u CXR 1 year

## 2022-03-07 NOTE — PROCEDURE
[FreeTextEntry1] : PFT March 7, 2022\par Moderate obstructive ventilatory impairment\par Borderline 10% response to bronchodilator at the FEV1\par 27% response to bronchodilator at the small airways\par Overall lung lines are normal\par Total capacity 82% predicted\par Decreased ERV 27% predicted secondary to short stature with increased weight\par RV/TLC ratio 131% predicted consistent with air trapping\par Diffusion 50% predicted with a moderate loss of functioning alveolar capillary units\par Impression moderate obstructive ventilatory impairment with reactive component associated air trapping and moderate gas exchange impairment\par NIOX 10 PPB normal range 3/7/2022\par \par Chest x-ray PA lateral 3/7/2022\par Limited study\par Small lung volumes\par Motion lateral\par Cardiac size grossly normal\par Full bilateral hilum consistent with known diagnosis of sarcoidosis\par No interval change compared to chest x-ray of 9/9/2020\par \par \par PFT No BD 8/30/21\par moderate reduction Flow rates\par  Mild restrictive ventilatory impairment\par DLCO 42 % pred with severe loss fx  alveolar  capillary units\par  HGB 15.0\par NIOX 12 ppb\par \par ECHO 4/2/21\par NO abnl reported finding\par \par PFT 5/26/21\par Moderate reduction flow rates\par Lung Volumes -nl TLC 86 % pred\par  Airtrapping\par DLCO 55 % with loss fx  alveolar  capillary units\par HGB 12.3\par \par NIOX 11 ppb WNL 5/26/21\par \par PFT 4/12/21\par mild  moderate reduction flow rates\par  Normal lung Volumes\par  DLCO 58 % moderate reduction with loss fx alveolar  capillary units\par  HGB 12.3\par \par NIOX  18  ppb WNL 4/12/21\par \par PFT body box\par September 11, 2020\par Mild reduction in flow rates with a mild obstructive ventilatory impairment\par Lung volumes are normal\par Air trapping with an RV/TLC ratio 129% predicted.\par Resistance is increased specific conductance is increased\par Mild reduction diffusion with a loss of functioning alveolocapillary units 56% of predicted.\par Impression chronic obstructive airway disease\par \par  nitric oxide 103 elevated \par Hemoglobin 13.9\par Inflammatory airway disease \par \par Chest x-ray PA lateral September 9, 2020\par Sarcoidosis\par Cardiac size are normal\par Uncoiled aorta\par No parenchymal infiltrates pleural effusions or dominant pulmonary nodules\par We will change compared to chest x-ray of November 14, 2019\par \par Pulmonary 6-minute walk stress test September 9, 2020\par \par Room air O2 saturation 97\par Persistent tachycardia\par Desaturation to 89% on room air\par Impression borderline desaturation\par No indication for portable oxygen therapy\par \par \par Spirometry 2/27/2020\par  moderate  reduction flow rates\par No BD at FEV1\par Obstructive pattern\par \par FENO  11  ppb  1/30 20\par \par Chest x-ray PA lateral normal cardiac size Nov 14 2019\par Uncoiled aorta\par No clear parenchymal infiltrates pleural effusions or dominant pulmonary nodules\par No interval change compared to chest x-ray barring difference in technique dating back to November 7, 2018\par \par PFT November 14, 2019\par Mild reduction in flow rates with an obstructive pattern\par Normal total lung capacity 80% predicted\par Mild air trapping RV/TLC ratio 129% predicted.\par Moderate reduction diffusion 56% predicted with a loss of functioning alveolocapillary units\par Heme globin 13.8\par Bronchodilator response at small airways 29%\par \par High-dose flu vaccination administered Sept 9 2020\par \par DAta 1/30/20\par \par Serum electrolytes normal\par Glucose 103\par BUN 23 creatinine 1.02\par Serum calcium 9.7 in patient with sarcoidosis\par ACE level normal range\par \par High-dose flu vaccination administered September 9, 2020\par PREVNAIR 10/12/2020\par \par DEXA Bone Density\par  Normal study

## 2022-03-07 NOTE — HISTORY OF PRESENT ILLNESS
[None] : ~He/She~ has no significant interval events [Difficulty Breathing During Exertion] : stable dyspnea on exertion [Feelings Of Weakness On Exertion] : stable exercise intolerance [Cough] : stable coughing [Wheezing] : denies wheezing [Regional Soft Tissue Swelling Both Lower Extremities] : denies lower extremity edema [Chest Pain Or Discomfort] : denies chest pain [Fever] : denies fever [Never] : was never a smoker [TextBox_4] : No cough wheeze or sputum\par  post nasal congestion\par  no fever chills  sweats\par \par [facility] emergency department evaluation- admission April 1- 5 2021\par Chief complaint shortness of breath x4 days\par ED O2 saturation 98%-99 noted nasal cannula 2 L rate 124\par Blood pressure reported 166/72 ED chart review\par Reported 99.1 reviewed EKG in ED\par Reported incomplete right bundle branch block\par They noted on exam clinical exam with wheezing will score thank you clinically did not have pulmonary embolism plan troponin labs coags BNP D-dimer cardiac monitor\par CBC\par Vital count 5.97 hemoglobin 12.8 hematocrit 38 platelet count 291,000 mild lymphopenia identified rest of bloods pending\par No Covid swab noted\par No chest x-ray completed\par 65-year-old female with history of hypertension hyperlipidemia obesity sarcoidosis asthma shortness of breath x4 days\par Also noted bilateral lower extremity edema\par Intermittent cough\par Was at primary care physician's office Dr. Jones in a.m. noted to have a new right bundle branch block\par  portable chest x-ray April 1, 2021\par Limited difficult to assess cardiac size on AP view\par Widening of the ribs\par No evidence of parenchymal pulmonary opacities\par Favor an acute inflammatory airway inflammatory event widening the ribs suggestive of air trapping and a known patient of asthma\par Noted the official chest x-ray report is clear lungs\par \par ADDENDUM\par Addendum data review\par April 1, 2021\par CT angiogram protocol results pending\par Chest x-ray clear lungs COVID-19 PCR negative magnesium 2.2 phosphorus mildly reduced 2.2 serum glucose 148\par BUN 19 creatinine 1.07\par CBC White count 7.02 hemoglobin 12.7 hematocrit 37.4\par Platelet count 313,000\par \par Addendum preliminary report CT angiogram protocol\par No reported main left right upper lobe pulmonary emboli\par Partially calcified mediastinal and hilar adenopathy consistent with known diagnosis of sarcoidosis\par  [Wt Gain ___ Lbs] : no recent weight gain [Wt Loss ___ Lbs] : no recent weight loss [Oxygen] : the patient uses no supplemental oxygen

## 2022-04-03 ENCOUNTER — RX RENEWAL (OUTPATIENT)
Age: 67
End: 2022-04-03

## 2022-06-06 ENCOUNTER — APPOINTMENT (OUTPATIENT)
Dept: PULMONOLOGY | Facility: CLINIC | Age: 67
End: 2022-06-06
Payer: MEDICARE

## 2022-06-06 ENCOUNTER — APPOINTMENT (OUTPATIENT)
Dept: PULMONOLOGY | Facility: CLINIC | Age: 67
End: 2022-06-06

## 2022-06-06 VITALS
DIASTOLIC BLOOD PRESSURE: 78 MMHG | TEMPERATURE: 97.5 F | SYSTOLIC BLOOD PRESSURE: 126 MMHG | HEART RATE: 114 BPM | OXYGEN SATURATION: 94 %

## 2022-06-06 PROCEDURE — 94727 GAS DIL/WSHOT DETER LNG VOL: CPT

## 2022-06-06 PROCEDURE — 94010 BREATHING CAPACITY TEST: CPT

## 2022-06-06 PROCEDURE — 95012 NITRIC OXIDE EXP GAS DETER: CPT

## 2022-06-06 PROCEDURE — 99214 OFFICE O/P EST MOD 30 MIN: CPT | Mod: 25

## 2022-06-06 PROCEDURE — 94729 DIFFUSING CAPACITY: CPT

## 2022-06-06 RX ORDER — DULOXETINE HYDROCHLORIDE 20 MG/1
20 CAPSULE, DELAYED RELEASE PELLETS ORAL
Qty: 30 | Refills: 0 | Status: DISCONTINUED | COMMUNITY
Start: 2021-12-05

## 2022-06-06 RX ORDER — PANTOPRAZOLE 40 MG/1
40 TABLET, DELAYED RELEASE ORAL
Qty: 30 | Refills: 0 | Status: ACTIVE | COMMUNITY
Start: 2021-12-05

## 2022-06-06 RX ORDER — FINERENONE 10 MG/1
10 TABLET, FILM COATED ORAL
Qty: 90 | Refills: 0 | Status: ACTIVE | COMMUNITY
Start: 2022-05-18

## 2022-06-06 RX ORDER — AZITHROMYCIN 250 MG/1
250 TABLET, FILM COATED ORAL
Qty: 6 | Refills: 0 | Status: DISCONTINUED | COMMUNITY
Start: 2021-09-24 | End: 2022-06-06

## 2022-06-06 NOTE — DISCUSSION/SUMMARY
[FreeTextEntry1] : Asthma\par  sarcoidosis\par stable pulmonary physiology\par Reported new R BBB incomplete- concern re   cardiac sarcoid but in hospital did not tolerate Cardiac MRI\par Mod MR\par  Diastolic Dysfx\par On Dyazide\par Glaucoma\par \par  Rx updated\par Taper steroids to off\par  office f/u\par OPTH completed Jan 2020 f/u due\par \par CXR Nov 2021\par  DEXA f/u Oct 2022\par DEclines  COVID VACCINE\par and recheck PFT data \par  f/u CXR 1 year up to date

## 2022-06-06 NOTE — HISTORY OF PRESENT ILLNESS
[None] : ~He/She~ has no significant interval events [Difficulty Breathing During Exertion] : stable dyspnea on exertion [Feelings Of Weakness On Exertion] : stable exercise intolerance [Cough] : stable coughing [Wheezing] : denies wheezing [Regional Soft Tissue Swelling Both Lower Extremities] : denies lower extremity edema [Chest Pain Or Discomfort] : denies chest pain [Fever] : denies fever [Never] : was never a smoker [TextBox_4] : No cough wheeze or sputum\par  post nasal congestion\par  no fever chills  sweats\par \par [facility] emergency department evaluation- admission April 1- 5 2021\par Chief complaint shortness of breath x4 days\par ED O2 saturation 98%-99 noted nasal cannula 2 L rate 124\par Blood pressure reported 166/72 ED chart review\par Reported 99.1 reviewed EKG in ED\par Reported incomplete right bundle branch block\par They noted on exam clinical exam with wheezing will score thank you clinically did not have pulmonary embolism plan troponin labs coags BNP D-dimer cardiac monitor\par CBC\par Vital count 5.97 hemoglobin 12.8 hematocrit 38 platelet count 291,000 mild lymphopenia identified rest of bloods pending\par No Covid swab noted\par No chest x-ray completed\par 65-year-old female with history of hypertension hyperlipidemia obesity sarcoidosis asthma shortness of breath x4 days\par Also noted bilateral lower extremity edema\par Intermittent cough\par Was at primary care physician's office Dr. Jones in a.m. noted to have a new right bundle branch block\par  portable chest x-ray April 1, 2021\par Limited difficult to assess cardiac size on AP view\par Widening of the ribs\par No evidence of parenchymal pulmonary opacities\par Favor an acute inflammatory airway inflammatory event widening the ribs suggestive of air trapping and a known patient of asthma\par Noted the official chest x-ray report is clear lungs\par \par ADDENDUM\par Addendum data review\par April 1, 2021\par CT angiogram protocol results pending\par Chest x-ray clear lungs COVID-19 PCR negative magnesium 2.2 phosphorus mildly reduced 2.2 serum glucose 148\par BUN 19 creatinine 1.07\par CBC White count 7.02 hemoglobin 12.7 hematocrit 37.4\par Platelet count 313,000\par \par Addendum preliminary report CT angiogram protocol\par No reported main left right upper lobe pulmonary emboli\par Partially calcified mediastinal and hilar adenopathy consistent with known diagnosis of sarcoidosis\par  [Wt Gain ___ Lbs] : no recent weight gain [Wt Loss ___ Lbs] : no recent weight loss [Oxygen] : the patient uses no supplemental oxygen

## 2022-06-06 NOTE — PROCEDURE
[FreeTextEntry1] : PFT 6/6/22\par moderate reduction flow rates\par OAD\par  Lung Volumes pos  airtrapping\par  DLCP 47 % with severe loss fx  alveolar  capillary units\par HGB 13.3\par NIOX  10  ppb WNL\par \par PFT March 7, 2022\par Moderate obstructive ventilatory impairment\par Borderline 10% response to bronchodilator at the FEV1\par 27% response to bronchodilator at the small airways\par Overall lung volumes are normal\par Total capacity 82% predicted\par Decreased ERV 27% predicted secondary to short stature with increased weight\par RV/TLC ratio 131% predicted consistent with air trapping\par Diffusion 50% predicted with a moderate loss of functioning alveolar capillary units\par Impression moderate obstructive ventilatory impairment with reactive component associated air trapping and moderate gas exchange impairment\par NIOX 10 PPB normal range 3/7/2022\par \par Chest x-ray PA lateral 3/7/2022\par Limited study\par Small lung volumes\par Motion lateral\par Cardiac size grossly normal\par Full bilateral hilum consistent with known diagnosis of sarcoidosis\par No interval change compared to chest x-ray of 9/9/2020\par \par \par PFT No BD 8/30/21\par moderate reduction Flow rates\par  Mild restrictive ventilatory impairment\par DLCO 42 % pred with severe loss fx  alveolar  capillary units\par  HGB 15.0\par NIOX 12 ppb\par \par ECHO 4/2/21\par NO abnl reported finding\par \par PFT 5/26/21\par Moderate reduction flow rates\par Lung Volumes -nl TLC 86 % pred\par  Airtrapping\par DLCO 55 % with loss fx  alveolar  capillary units\par HGB 12.3\par \par NIOX 11 ppb WNL 5/26/21\par \par PFT 4/12/21\par mild  moderate reduction flow rates\par  Normal lung Volumes\par  DLCO 58 % moderate reduction with loss fx alveolar  capillary units\par  HGB 12.3\par \par NIOX  18  ppb WNL 4/12/21\par \par PFT body box\par September 11, 2020\par Mild reduction in flow rates with a mild obstructive ventilatory impairment\par Lung volumes are normal\par Air trapping with an RV/TLC ratio 129% predicted.\par Resistance is increased specific conductance is increased\par Mild reduction diffusion with a loss of functioning alveolocapillary units 56% of predicted.\par Impression chronic obstructive airway disease\par \par  nitric oxide 103 elevated \par Hemoglobin 13.9\par Inflammatory airway disease \par \par Chest x-ray PA lateral September 9, 2020\par Sarcoidosis\par Cardiac size are normal\par Uncoiled aorta\par No parenchymal infiltrates pleural effusions or dominant pulmonary nodules\par We will change compared to chest x-ray of November 14, 2019\par \par Pulmonary 6-minute walk stress test September 9, 2020\par \par Room air O2 saturation 97\par Persistent tachycardia\par Desaturation to 89% on room air\par Impression borderline desaturation\par No indication for portable oxygen therapy\par \par \par Spirometry 2/27/2020\par  moderate  reduction flow rates\par No BD at FEV1\par Obstructive pattern\par \par FENO  11  ppb  1/30 20\par \par Chest x-ray PA lateral normal cardiac size Nov 14 2019\par Uncoiled aorta\par No clear parenchymal infiltrates pleural effusions or dominant pulmonary nodules\par No interval change compared to chest x-ray barring difference in technique dating back to November 7, 2018\par \par PFT November 14, 2019\par Mild reduction in flow rates with an obstructive pattern\par Normal total lung capacity 80% predicted\par Mild air trapping RV/TLC ratio 129% predicted.\par Moderate reduction diffusion 56% predicted with a loss of functioning alveolocapillary units\par Heme globin 13.8\par Bronchodilator response at small airways 29%\par \par High-dose flu vaccination administered Sept 9 2020\par \par DAta 1/30/20\par \par Serum electrolytes normal\par Glucose 103\par BUN 23 creatinine 1.02\par Serum calcium 9.7 in patient with sarcoidosis\par ACE level normal range\par \par High-dose flu vaccination administered September 9, 2020\par PREVNAIR 10/12/2020\par \par DEXA Bone Density\par  Normal study

## 2022-07-22 ENCOUNTER — APPOINTMENT (OUTPATIENT)
Dept: PULMONOLOGY | Facility: CLINIC | Age: 67
End: 2022-07-22

## 2022-07-22 VITALS
RESPIRATION RATE: 16 BRPM | OXYGEN SATURATION: 93 % | DIASTOLIC BLOOD PRESSURE: 84 MMHG | SYSTOLIC BLOOD PRESSURE: 148 MMHG | HEART RATE: 106 BPM | TEMPERATURE: 96.3 F

## 2022-07-22 LAB — SARS-COV-2 AG RESP QL IA.RAPID: NEGATIVE

## 2022-07-22 PROCEDURE — 87811 SARS-COV-2 COVID19 W/OPTIC: CPT

## 2022-07-22 PROCEDURE — 95012 NITRIC OXIDE EXP GAS DETER: CPT

## 2022-07-22 PROCEDURE — 99214 OFFICE O/P EST MOD 30 MIN: CPT | Mod: 25

## 2022-07-22 PROCEDURE — 94010 BREATHING CAPACITY TEST: CPT

## 2022-07-22 RX ORDER — METFORMIN HYDROCHLORIDE 500 MG/1
500 TABLET, COATED ORAL
Qty: 90 | Refills: 0 | Status: ACTIVE | COMMUNITY
Start: 2022-07-11

## 2022-07-22 RX ORDER — METOPROLOL SUCCINATE 25 MG/1
25 TABLET, EXTENDED RELEASE ORAL
Qty: 90 | Refills: 0 | Status: ACTIVE | COMMUNITY
Start: 2022-07-11

## 2022-07-22 RX ORDER — BLOOD-GLUCOSE METER
W/DEVICE EACH MISCELLANEOUS
Qty: 1 | Refills: 0 | Status: ACTIVE | COMMUNITY
Start: 2022-07-18

## 2022-07-22 RX ORDER — ALCOHOL ANTISEPTIC PADS
70 PADS, MEDICATED (EA) TOPICAL
Qty: 100 | Refills: 0 | Status: ACTIVE | COMMUNITY
Start: 2022-07-11

## 2022-07-22 RX ORDER — CHLORHEXIDINE GLUCONATE 4 %
325 (65 FE) LIQUID (ML) TOPICAL
Qty: 90 | Refills: 0 | Status: ACTIVE | COMMUNITY
Start: 2022-07-11

## 2022-07-22 RX ORDER — IBUPROFEN 600 MG/1
600 TABLET, FILM COATED ORAL
Refills: 0 | Status: DISCONTINUED | COMMUNITY
End: 2022-07-22

## 2022-07-22 RX ORDER — TRIAMTERENE AND HYDROCHLOROTHIAZIDE 25; 37.5 MG/1; MG/1
37.5-25 TABLET ORAL
Qty: 90 | Refills: 0 | Status: DISCONTINUED | COMMUNITY
Start: 2022-07-11

## 2022-07-22 RX ORDER — ASPIRIN 81 MG/1
81 TABLET, COATED ORAL
Qty: 90 | Refills: 0 | Status: ACTIVE | COMMUNITY
Start: 2022-07-11

## 2022-07-22 RX ORDER — BLOOD SUGAR DIAGNOSTIC
STRIP MISCELLANEOUS
Qty: 100 | Refills: 0 | Status: ACTIVE | COMMUNITY
Start: 2022-07-19

## 2022-07-22 RX ORDER — ALBUTEROL SULFATE 2.5 MG/3ML
(2.5 MG/3ML) SOLUTION RESPIRATORY (INHALATION)
Qty: 75 | Refills: 0 | Status: ACTIVE | COMMUNITY
Start: 2022-07-19

## 2022-07-22 NOTE — HISTORY OF PRESENT ILLNESS
[None] : ~He/She~ has no significant interval events [Difficulty Breathing During Exertion] : stable dyspnea on exertion [Feelings Of Weakness On Exertion] : stable exercise intolerance [Cough] : stable coughing [Wheezing] : denies wheezing [Regional Soft Tissue Swelling Both Lower Extremities] : denies lower extremity edema [Chest Pain Or Discomfort] : denies chest pain [Fever] : denies fever [Never] : was never a smoker [TextBox_4] : No cough wheeze or sputum\par  post nasal congestion\par  no fever chills  sweats\par -Patient now notes that she is complaining of a little scratchy throat\par [facility] emergency department evaluation- admission April 1- 5 2021\par Chief complaint shortness of breath x4 days\par ED O2 saturation 98%-99 noted nasal cannula 2 L rate 124\par Blood pressure reported 166/72 ED chart review\par Reported 99.1 reviewed EKG in ED\par Reported incomplete right bundle branch block\par They noted on exam clinical exam with wheezing will score thank you clinically did not have pulmonary embolism plan troponin labs coags BNP D-dimer cardiac monitor\par CBC\par Vital count 5.97 hemoglobin 12.8 hematocrit 38 platelet count 291,000 mild lymphopenia identified rest of bloods pending\par No Covid swab noted\par No chest x-ray completed\par 65-year-old female with history of hypertension hyperlipidemia obesity sarcoidosis asthma shortness of breath x4 days\par Also noted bilateral lower extremity edema\par Intermittent cough\par Was at primary care physician's office Dr. Jones in a.m. noted to have a new right bundle branch block\par  portable chest x-ray April 1, 2021\par Limited difficult to assess cardiac size on AP view\par Widening of the ribs\par No evidence of parenchymal pulmonary opacities\par Favor an acute inflammatory airway inflammatory event widening the ribs suggestive of air trapping and a known patient of asthma\par Noted the official chest x-ray report is clear lungs\par \par ADDENDUM\par Addendum data review\par April 1, 2021\par CT angiogram protocol results pending\par Chest x-ray clear lungs COVID-19 PCR negative magnesium 2.2 phosphorus mildly reduced 2.2 serum glucose 148\par BUN 19 creatinine 1.07\par CBC White count 7.02 hemoglobin 12.7 hematocrit 37.4\par Platelet count 313,000\par \par Addendum preliminary report CT angiogram protocol\par No reported main left right upper lobe pulmonary emboli\par Partially calcified mediastinal and hilar adenopathy consistent with known diagnosis of sarcoidosis\par  [Wt Gain ___ Lbs] : no recent weight gain [Wt Loss ___ Lbs] : no recent weight loss [Oxygen] : the patient uses no supplemental oxygen

## 2022-07-22 NOTE — PROCEDURE
[FreeTextEntry1] : Leland 7/22/22\par Mod reduction flow rates\par stable flow rates\par  NIOX 30 ppb inc bronchial inflammation\par \par PFT 6/6/22\par moderate reduction flow rates\par OAD\par  Lung Volumes pos  airtrapping\par  DLCP 47 % with severe loss fx  alveolar  capillary units\par HGB 13.3\par NIOX  10  ppb WNL\par \par PFT March 7, 2022\par Moderate obstructive ventilatory impairment\par Borderline 10% response to bronchodilator at the FEV1\par 27% response to bronchodilator at the small airways\par Overall lung volumes are normal\par Total capacity 82% predicted\par Decreased ERV 27% predicted secondary to short stature with increased weight\par RV/TLC ratio 131% predicted consistent with air trapping\par Diffusion 50% predicted with a moderate loss of functioning alveolar capillary units\par Impression moderate obstructive ventilatory impairment with reactive component associated air trapping and moderate gas exchange impairment\par NIOX 10 PPB normal range 3/7/2022\par \par Chest x-ray PA lateral 3/7/2022\par Limited study\par Small lung volumes\par Motion lateral\par Cardiac size grossly normal\par Full bilateral hilum consistent with known diagnosis of sarcoidosis\par No interval change compared to chest x-ray of 9/9/2020\par \par \par PFT No BD 8/30/21\par moderate reduction Flow rates\par  Mild restrictive ventilatory impairment\par DLCO 42 % pred with severe loss fx  alveolar  capillary units\par  HGB 15.0\par NIOX 12 ppb\par \par ECHO 4/2/21\par NO abnl reported finding\par \par PFT 5/26/21\par Moderate reduction flow rates\par Lung Volumes -nl TLC 86 % pred\par  Airtrapping\par DLCO 55 % with loss fx  alveolar  capillary units\par HGB 12.3\par \par NIOX 11 ppb WNL 5/26/21\par \par PFT 4/12/21\par mild  moderate reduction flow rates\par  Normal lung Volumes\par  DLCO 58 % moderate reduction with loss fx alveolar  capillary units\par  HGB 12.3\par \par NIOX  18  ppb WNL 4/12/21\par \par PFT body box\par September 11, 2020\par Mild reduction in flow rates with a mild obstructive ventilatory impairment\par Lung volumes are normal\par Air trapping with an RV/TLC ratio 129% predicted.\par Resistance is increased specific conductance is increased\par Mild reduction diffusion with a loss of functioning alveolocapillary units 56% of predicted.\par Impression chronic obstructive airway disease\par \par  nitric oxide 103 elevated \par Hemoglobin 13.9\par Inflammatory airway disease \par \par Chest x-ray PA lateral September 9, 2020\par Sarcoidosis\par Cardiac size are normal\par Uncoiled aorta\par No parenchymal infiltrates pleural effusions or dominant pulmonary nodules\par We will change compared to chest x-ray of November 14, 2019\par \par Pulmonary 6-minute walk stress test September 9, 2020\par \par Room air O2 saturation 97\par Persistent tachycardia\par Desaturation to 89% on room air\par Impression borderline desaturation\par No indication for portable oxygen therapy\par \par \par Spirometry 2/27/2020\par  moderate  reduction flow rates\par No BD at FEV1\par Obstructive pattern\par \par FENO  11  ppb  1/30 20\par \par Chest x-ray PA lateral normal cardiac size Nov 14 2019\par Uncoiled aorta\par No clear parenchymal infiltrates pleural effusions or dominant pulmonary nodules\par No interval change compared to chest x-ray barring difference in technique dating back to November 7, 2018\par \par PFT November 14, 2019\par Mild reduction in flow rates with an obstructive pattern\par Normal total lung capacity 80% predicted\par Mild air trapping RV/TLC ratio 129% predicted.\par Moderate reduction diffusion 56% predicted with a loss of functioning alveolocapillary units\par Heme globin 13.8\par Bronchodilator response at small airways 29%\par \par High-dose flu vaccination administered Sept 9 2020\par \par DAta 1/30/20\par \par Serum electrolytes normal\par Glucose 103\par BUN 23 creatinine 1.02\par Serum calcium 9.7 in patient with sarcoidosis\par ACE level normal range\par \par High-dose flu vaccination administered September 9, 2020\par PREVNAIR 10/12/2020\par \par DEXA Bone Density\par  Normal study

## 2022-07-22 NOTE — DISCUSSION/SUMMARY
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

## 2022-07-23 ENCOUNTER — NON-APPOINTMENT (OUTPATIENT)
Age: 67
End: 2022-07-23

## 2022-07-23 LAB
RAPID RVP RESULT: NOT DETECTED
SARS-COV-2 RNA PNL RESP NAA+PROBE: NOT DETECTED

## 2022-08-22 ENCOUNTER — APPOINTMENT (OUTPATIENT)
Dept: PULMONOLOGY | Facility: CLINIC | Age: 67
End: 2022-08-22

## 2022-08-22 VITALS
OXYGEN SATURATION: 94 % | TEMPERATURE: 98 F | HEART RATE: 109 BPM | SYSTOLIC BLOOD PRESSURE: 146 MMHG | DIASTOLIC BLOOD PRESSURE: 83 MMHG

## 2022-08-22 PROCEDURE — 99214 OFFICE O/P EST MOD 30 MIN: CPT | Mod: 25

## 2022-08-22 PROCEDURE — 95012 NITRIC OXIDE EXP GAS DETER: CPT

## 2022-08-22 PROCEDURE — 94010 BREATHING CAPACITY TEST: CPT

## 2022-08-22 RX ORDER — METHYLPREDNISOLONE 4 MG/1
4 TABLET ORAL
Qty: 1 | Refills: 0 | Status: DISCONTINUED | COMMUNITY
Start: 2022-07-22 | End: 2022-08-22

## 2022-08-22 RX ORDER — FINERENONE 20 MG/1
20 TABLET, FILM COATED ORAL
Qty: 30 | Refills: 0 | Status: ACTIVE | COMMUNITY
Start: 2022-08-03

## 2022-08-22 RX ORDER — LANCETS 33 GAUGE
EACH MISCELLANEOUS
Qty: 100 | Refills: 0 | Status: ACTIVE | COMMUNITY
Start: 2022-07-11

## 2022-08-22 RX ORDER — ATORVASTATIN CALCIUM 20 MG/1
20 TABLET, FILM COATED ORAL
Qty: 30 | Refills: 0 | Status: ACTIVE | COMMUNITY
Start: 2022-07-25

## 2022-08-22 RX ORDER — AZITHROMYCIN 250 MG/1
250 TABLET, FILM COATED ORAL
Qty: 1 | Refills: 0 | Status: DISCONTINUED | COMMUNITY
Start: 2022-07-22 | End: 2022-08-22

## 2022-08-22 NOTE — DISCUSSION/SUMMARY
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

## 2022-08-22 NOTE — PROCEDURE
[FreeTextEntry1] : Leland 8/22/22\par  mild mod decline at flow rates Mild OAD\par NIOX  16 ppp normalized\par \par \par [place]eland 7/22/22\par Mod reduction flow rates\par stable flow rates\par  NIOX 30 ppb inc bronchial inflammation\par \par PFT 6/6/22\par moderate reduction flow rates\par OAD\par  Lung Volumes pos  airtrapping\par  DLCP 47 % with severe loss fx  alveolar  capillary units\par HGB 13.3\par NIOX  10  ppb WNL\par \par PFT March 7, 2022\par Moderate obstructive ventilatory impairment\par Borderline 10% response to bronchodilator at the FEV1\par 27% response to bronchodilator at the small airways\par Overall lung volumes are normal\par Total capacity 82% predicted\par Decreased ERV 27% predicted secondary to short stature with increased weight\par RV/TLC ratio 131% predicted consistent with air trapping\par Diffusion 50% predicted with a moderate loss of functioning alveolar capillary units\par Impression moderate obstructive ventilatory impairment with reactive component associated air trapping and moderate gas exchange impairment\par NIOX 10 PPB normal range 3/7/2022\par \par Chest x-ray PA lateral 3/7/2022\par Limited study\par Small lung volumes\par Motion lateral\par Cardiac size grossly normal\par Full bilateral hilum consistent with known diagnosis of sarcoidosis\par No interval change compared to chest x-ray of 9/9/2020\par \par \par PFT No BD 8/30/21\par moderate reduction Flow rates\par  Mild restrictive ventilatory impairment\par DLCO 42 % pred with severe loss fx  alveolar  capillary units\par  HGB 15.0\par NIOX 12 ppb\par \par ECHO 4/2/21\par NO abnl reported finding\par \par PFT 5/26/21\par Moderate reduction flow rates\par Lung Volumes -nl TLC 86 % pred\par  Airtrapping\par DLCO 55 % with loss fx  alveolar  capillary units\par HGB 12.3\par \par NIOX 11 ppb WNL 5/26/21\par \par PFT 4/12/21\par mild  moderate reduction flow rates\par  Normal lung Volumes\par  DLCO 58 % moderate reduction with loss fx alveolar  capillary units\par  HGB 12.3\par \par NIOX  18  ppb WNL 4/12/21\par \par PFT body box\par September 11, 2020\par Mild reduction in flow rates with a mild obstructive ventilatory impairment\par Lung volumes are normal\par Air trapping with an RV/TLC ratio 129% predicted.\par Resistance is increased specific conductance is increased\par Mild reduction diffusion with a loss of functioning alveolocapillary units 56% of predicted.\par Impression chronic obstructive airway disease\par \par  nitric oxide 103 elevated \par Hemoglobin 13.9\par Inflammatory airway disease \par \par Chest x-ray PA lateral September 9, 2020\par Sarcoidosis\par Cardiac size are normal\par Uncoiled aorta\par No parenchymal infiltrates pleural effusions or dominant pulmonary nodules\par We will change compared to chest x-ray of November 14, 2019\par \par Pulmonary 6-minute walk stress test September 9, 2020\par \par Room air O2 saturation 97\par Persistent tachycardia\par Desaturation to 89% on room air\par Impression borderline desaturation\par No indication for portable oxygen therapy\par \par \par Spirometry 2/27/2020\par  moderate  reduction flow rates\par No BD at FEV1\par Obstructive pattern\par \par FENO  11  ppb  1/30 20\par \par Chest x-ray PA lateral normal cardiac size Nov 14 2019\par Uncoiled aorta\par No clear parenchymal infiltrates pleural effusions or dominant pulmonary nodules\par No interval change compared to chest x-ray barring difference in technique dating back to November 7, 2018\par \par PFT November 14, 2019\par Mild reduction in flow rates with an obstructive pattern\par Normal total lung capacity 80% predicted\par Mild air trapping RV/TLC ratio 129% predicted.\par Moderate reduction diffusion 56% predicted with a loss of functioning alveolocapillary units\par Heme globin 13.8\par Bronchodilator response at small airways 29%\par \par High-dose flu vaccination administered Sept 9 2020\par \par DAta 1/30/20\par \par Serum electrolytes normal\par Glucose 103\par BUN 23 creatinine 1.02\par Serum calcium 9.7 in patient with sarcoidosis\par ACE level normal range\par \par High-dose flu vaccination administered September 9, 2020\par PREVNAIR 10/12/2020\par \par DEXA Bone Density\par  Normal study

## 2022-08-22 NOTE — HISTORY OF PRESENT ILLNESS
[None] : ~He/She~ has no significant interval events [Difficulty Breathing During Exertion] : stable dyspnea on exertion [Feelings Of Weakness On Exertion] : stable exercise intolerance [Cough] : stable coughing [Wheezing] : denies wheezing [Regional Soft Tissue Swelling Both Lower Extremities] : denies lower extremity edema [Chest Pain Or Discomfort] : denies chest pain [Fever] : denies fever [Never] : was never a smoker [TextBox_4] : Since last  visit  no change\par No cough wheeze or sputum\par  post nasal congestion\par  no fever chills  sweats\par -Patient now notes that she is complaining of a little scratchy throat\par [facility] emergency department evaluation- admission April 1- 5 2021\par Chief complaint shortness of breath x4 days\par ED O2 saturation 98%-99 noted nasal cannula 2 L rate 124\par Blood pressure reported 166/72 ED chart review\par Reported 99.1 reviewed EKG in ED\par Reported incomplete right bundle branch block\par They noted on exam clinical exam with wheezing will score thank you clinically did not have pulmonary embolism plan troponin labs coags BNP D-dimer cardiac monitor\par CBC\par Vital count 5.97 hemoglobin 12.8 hematocrit 38 platelet count 291,000 mild lymphopenia identified rest of bloods pending\par No Covid swab noted\par No chest x-ray completed\par 65-year-old female with history of hypertension hyperlipidemia obesity sarcoidosis asthma shortness of breath x4 days\par Also noted bilateral lower extremity edema\par Intermittent cough\par Was at primary care physician's office Dr. Jones in a.m. noted to have a new right bundle branch block\par  portable chest x-ray April 1, 2021\par Limited difficult to assess cardiac size on AP view\par Widening of the ribs\par No evidence of parenchymal pulmonary opacities\par Favor an acute inflammatory airway inflammatory event widening the ribs suggestive of air trapping and a known patient of asthma\par Noted the official chest x-ray report is clear lungs\par \par ADDENDUM\par Addendum data review\par April 1, 2021\par CT angiogram protocol results pending\par Chest x-ray clear lungs COVID-19 PCR negative magnesium 2.2 phosphorus mildly reduced 2.2 serum glucose 148\par BUN 19 creatinine 1.07\par CBC White count 7.02 hemoglobin 12.7 hematocrit 37.4\par Platelet count 313,000\par \par Addendum preliminary report CT angiogram protocol\par No reported main left right upper lobe pulmonary emboli\par Partially calcified mediastinal and hilar adenopathy consistent with known diagnosis of sarcoidosis\par  [Wt Gain ___ Lbs] : no recent weight gain [Wt Loss ___ Lbs] : no recent weight loss [Oxygen] : the patient uses no supplemental oxygen

## 2022-09-07 ENCOUNTER — APPOINTMENT (OUTPATIENT)
Dept: PULMONOLOGY | Facility: CLINIC | Age: 67
End: 2022-09-07

## 2022-09-07 VITALS
OXYGEN SATURATION: 92 % | DIASTOLIC BLOOD PRESSURE: 90 MMHG | TEMPERATURE: 97.6 F | HEART RATE: 97 BPM | SYSTOLIC BLOOD PRESSURE: 164 MMHG

## 2022-09-07 PROCEDURE — 94010 BREATHING CAPACITY TEST: CPT

## 2022-09-07 PROCEDURE — 95012 NITRIC OXIDE EXP GAS DETER: CPT

## 2022-09-07 PROCEDURE — 94727 GAS DIL/WSHOT DETER LNG VOL: CPT

## 2022-09-07 PROCEDURE — 99214 OFFICE O/P EST MOD 30 MIN: CPT | Mod: 25

## 2022-09-07 PROCEDURE — 90662 IIV NO PRSV INCREASED AG IM: CPT

## 2022-09-07 PROCEDURE — 94729 DIFFUSING CAPACITY: CPT

## 2022-09-07 PROCEDURE — G0008: CPT

## 2022-09-07 NOTE — PROCEDURE
[FreeTextEntry1] : PFT 9/2/22\par mod severe reduction flow rates\par Lung Volumes nl\par  Pos airtrapping\par  DLCO 41 %\par IMP\par OAD with severe gas  exchange impairment\par NIOX 19  ppb 9/7/22 nl  range\par \par [place] 8/22/22\par  mild mod decline at flow rates Mild OAD\par NIOX  16 ppp normalized\par \par [place] 7/22/22\par Mod reduction flow rates\par stable flow rates\par  NIOX 30 ppb inc bronchial inflammation\par \par PFT 6/6/22\par moderate reduction flow rates\par OAD\par  Lung Volumes pos  airtrapping\par  DLCP 47 % with severe loss fx  alveolar  capillary units\par HGB 13.3\par NIOX  10  ppb WNL\par \par PFT March 7, 2022\par Moderate obstructive ventilatory impairment\par Borderline 10% response to bronchodilator at the FEV1\par 27% response to bronchodilator at the small airways\par Overall lung volumes are normal\par Total capacity 82% predicted\par Decreased ERV 27% predicted secondary to short stature with increased weight\par RV/TLC ratio 131% predicted consistent with air trapping\par Diffusion 50% predicted with a moderate loss of functioning alveolar capillary units\par Impression moderate obstructive ventilatory impairment with reactive component associated air trapping and moderate gas exchange impairment\par NIOX 10 PPB normal range 3/7/2022\par \par Chest x-ray PA lateral 3/7/2022\par Limited study\par Small lung volumes\par Motion lateral\par Cardiac size grossly normal\par Full bilateral hilum consistent with known diagnosis of sarcoidosis\par No interval change compared to chest x-ray of 9/9/2020\par \par \par PFT No BD 8/30/21\par moderate reduction Flow rates\par  Mild restrictive ventilatory impairment\par DLCO 42 % pred with severe loss fx  alveolar  capillary units\par  HGB 15.0\par NIOX 12 ppb\par \par ECHO 4/2/21\par NO abnl reported finding\par \par PFT 5/26/21\par Moderate reduction flow rates\par Lung Volumes -nl TLC 86 % pred\par  Airtrapping\par DLCO 55 % with loss fx  alveolar  capillary units\par HGB 12.3\par \par NIOX 11 ppb WNL 5/26/21\par \par PFT 4/12/21\par mild  moderate reduction flow rates\par  Normal lung Volumes\par  DLCO 58 % moderate reduction with loss fx alveolar  capillary units\par  HGB 12.3\par \par NIOX  18  ppb WNL 4/12/21\par \par PFT body box\par September 11, 2020\par Mild reduction in flow rates with a mild obstructive ventilatory impairment\par Lung volumes are normal\par Air trapping with an RV/TLC ratio 129% predicted.\par Resistance is increased specific conductance is increased\par Mild reduction diffusion with a loss of functioning alveolocapillary units 56% of predicted.\par Impression chronic obstructive airway disease\par \par  nitric oxide 103 elevated \par Hemoglobin 13.9\par Inflammatory airway disease \par \par Chest x-ray PA lateral September 9, 2020\par Sarcoidosis\par Cardiac size are normal\par Uncoiled aorta\par No parenchymal infiltrates pleural effusions or dominant pulmonary nodules\par We will change compared to chest x-ray of November 14, 2019\par \par Pulmonary 6-minute walk stress test September 9, 2020\par \par Room air O2 saturation 97\par Persistent tachycardia\par Desaturation to 89% on room air\par Impression borderline desaturation\par No indication for portable oxygen therapy\par \par \par Spirometry 2/27/2020\par  moderate  reduction flow rates\par No BD at FEV1\par Obstructive pattern\par \par FENO  11  ppb  1/30 20\par \par Chest x-ray PA lateral normal cardiac size Nov 14 2019\par Uncoiled aorta\par No clear parenchymal infiltrates pleural effusions or dominant pulmonary nodules\par No interval change compared to chest x-ray barring difference in technique dating back to November 7, 2018\par \par PFT November 14, 2019\par Mild reduction in flow rates with an obstructive pattern\par Normal total lung capacity 80% predicted\par Mild air trapping RV/TLC ratio 129% predicted.\par Moderate reduction diffusion 56% predicted with a loss of functioning alveolocapillary units\par Heme globin 13.8\par Bronchodilator response at small airways 29%\par \par High-dose flu vaccination administered Sept 9 2020\par \par DAta 1/30/20\par \par Serum electrolytes normal\par Glucose 103\par BUN 23 creatinine 1.02\par Serum calcium 9.7 in patient with sarcoidosis\par ACE level normal range\par \par High-dose flu vaccination administered September 9, 2020\par PREVNAIR 10/12/2020\par \par DEXA Bone Density\par  Normal study\par \par HD Flu 9/7/22

## 2022-09-07 NOTE — DISCUSSION/SUMMARY
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

## 2022-09-07 NOTE — HISTORY OF PRESENT ILLNESS
[None] : ~He/She~ has no significant interval events [Difficulty Breathing During Exertion] : stable dyspnea on exertion [Feelings Of Weakness On Exertion] : stable exercise intolerance [Cough] : stable coughing [Wheezing] : denies wheezing [Regional Soft Tissue Swelling Both Lower Extremities] : denies lower extremity edema [Chest Pain Or Discomfort] : denies chest pain [Fever] : denies fever [Never] : was never a smoker [TextBox_4] : Since last  visit  no change\par No cough wheeze or sputum\par  post nasal congestion\par  no fever chills  sweats\par -Patient now notes that she is complaining of a little scratchy throat\par [facility] emergency department evaluation- admission April 1- 5 2021\par Chief complaint shortness of breath x4 days\par ED O2 saturation 98%-99 noted nasal cannula 2 L rate 124\par Blood pressure reported 166/72 ED chart review\par Reported 99.1 reviewed EKG in ED\par Reported incomplete right bundle branch block\par They noted on exam clinical exam with wheezing will score thank you clinically did not have pulmonary embolism plan troponin labs coags BNP D-dimer cardiac monitor\par CBC\par Vital count 5.97 hemoglobin 12.8 hematocrit 38 platelet count 291,000 mild lymphopenia identified rest of bloods pending\par No Covid swab noted\par No chest x-ray completed\par 65-year-old female with history of hypertension hyperlipidemia obesity sarcoidosis asthma shortness of breath x4 days\par Also noted bilateral lower extremity edema\par Intermittent cough\par Was at primary care physician's office Dr. Jones in a.m. noted to have a new right bundle branch block\par  portable chest x-ray April 1, 2021\par Limited difficult to assess cardiac size on AP view\par Widening of the ribs\par No evidence of parenchymal pulmonary opacities\par Favor an acute inflammatory airway inflammatory event widening the ribs suggestive of air trapping and a known patient of asthma\par Noted the official chest x-ray report is clear lungs\par \par ADDENDUM\par Addendum data review\par April 1, 2021\par CT angiogram protocol results pending\par Chest x-ray clear lungs COVID-19 PCR negative magnesium 2.2 phosphorus mildly reduced 2.2 serum glucose 148\par BUN 19 creatinine 1.07\par CBC White count 7.02 hemoglobin 12.7 hematocrit 37.4\par Platelet count 313,000\par \par Addendum preliminary report CT angiogram protocol\par No reported main left right upper lobe pulmonary emboli\par Partially calcified mediastinal and hilar adenopathy consistent with known diagnosis of sarcoidosis\par  [Wt Gain ___ Lbs] : no recent weight gain [Wt Loss ___ Lbs] : no recent weight loss [Oxygen] : the patient uses no supplemental oxygen

## 2022-11-03 ENCOUNTER — APPOINTMENT (OUTPATIENT)
Dept: PULMONOLOGY | Facility: CLINIC | Age: 67
End: 2022-11-03

## 2022-11-03 VITALS — SYSTOLIC BLOOD PRESSURE: 139 MMHG | DIASTOLIC BLOOD PRESSURE: 79 MMHG | OXYGEN SATURATION: 94 % | HEART RATE: 108 BPM

## 2022-11-03 PROCEDURE — 99213 OFFICE O/P EST LOW 20 MIN: CPT

## 2022-11-03 RX ORDER — TRIAMTERENE AND HYDROCHLOROTHIAZIDE 37.5; 25 MG/1; MG/1
37.5-25 CAPSULE ORAL
Qty: 90 | Refills: 1 | Status: ACTIVE | COMMUNITY
Start: 2019-05-28 | End: 1900-01-01

## 2022-11-03 RX ORDER — PREDNISONE 10 MG/1
10 TABLET ORAL
Qty: 30 | Refills: 1 | Status: COMPLETED | COMMUNITY
Start: 2022-09-12 | End: 2022-11-03

## 2022-11-03 NOTE — PROCEDURE
[FreeTextEntry1] : Spirometry November 30, 2022\par Flow rates are normal\par Ratio 71 mild obstructive ventilatory.  Significant interval improvement at flow rates\par \par PFT 9/2/22\par mod severe reduction flow rates\par Lung Volumes nl\par  Pos airtrapping\par  DLCO 41 %\par IMP\par OAD with severe gas  exchange impairment\par NIOX 19  ppb 9/7/22 nl  range\par \par [place] 8/22/22\par  mild mod decline at flow rates Mild OAD\par NIOX  16 ppp normalized\par \par [place] 7/22/22\par Mod reduction flow rates\par stable flow rates\par  NIOX 30 ppb inc bronchial inflammation\par \par PFT 6/6/22\par moderate reduction flow rates\par OAD\par  Lung Volumes pos  airtrapping\par  DLCP 47 % with severe loss fx  alveolar  capillary units\par HGB 13.3\par NIOX  10  ppb WNL\par \par PFT March 7, 2022\par Moderate obstructive ventilatory impairment\par Borderline 10% response to bronchodilator at the FEV1\par 27% response to bronchodilator at the small airways\par Overall lung volumes are normal\par Total capacity 82% predicted\par Decreased ERV 27% predicted secondary to short stature with increased weight\par RV/TLC ratio 131% predicted consistent with air trapping\par Diffusion 50% predicted with a moderate loss of functioning alveolar capillary units\par Impression moderate obstructive ventilatory impairment with reactive component associated air trapping and moderate gas exchange impairment\par NIOX 10 PPB normal range 3/7/2022\par \par Chest x-ray PA lateral 3/7/2022\par Limited study\par Small lung volumes\par Motion lateral\par Cardiac size grossly normal\par Full bilateral hilum consistent with known diagnosis of sarcoidosis\par No interval change compared to chest x-ray of 9/9/2020\par \par \par PFT No BD 8/30/21\par moderate reduction Flow rates\par  Mild restrictive ventilatory impairment\par DLCO 42 % pred with severe loss fx  alveolar  capillary units\par  HGB 15.0\par NIOX 12 ppb\par \par ECHO 4/2/21\par NO abnl reported finding\par \par PFT 5/26/21\par Moderate reduction flow rates\par Lung Volumes -nl TLC 86 % pred\par  Airtrapping\par DLCO 55 % with loss fx  alveolar  capillary units\par HGB 12.3\par \par NIOX 11 ppb WNL 5/26/21\par \par PFT 4/12/21\par mild  moderate reduction flow rates\par  Normal lung Volumes\par  DLCO 58 % moderate reduction with loss fx alveolar  capillary units\par  HGB 12.3\par \par NIOX  18  ppb WNL 4/12/21\par \par PFT body box\par September 11, 2020\par Mild reduction in flow rates with a mild obstructive ventilatory impairment\par Lung volumes are normal\par Air trapping with an RV/TLC ratio 129% predicted.\par Resistance is increased specific conductance is increased\par Mild reduction diffusion with a loss of functioning alveolocapillary units 56% of predicted.\par Impression chronic obstructive airway disease\par \par  nitric oxide 103 elevated \par Hemoglobin 13.9\par Inflammatory airway disease \par \par Chest x-ray PA lateral September 9, 2020\par Sarcoidosis\par Cardiac size are normal\par Uncoiled aorta\par No parenchymal infiltrates pleural effusions or dominant pulmonary nodules\par We will change compared to chest x-ray of November 14, 2019\par \par Pulmonary 6-minute walk stress test September 9, 2020\par \par Room air O2 saturation 97\par Persistent tachycardia\par Desaturation to 89% on room air\par Impression borderline desaturation\par No indication for portable oxygen therapy\par \par \par Spirometry 2/27/2020\par  moderate  reduction flow rates\par No BD at FEV1\par Obstructive pattern\par \par FENO  11  ppb  1/30 20\par \par Chest x-ray PA lateral normal cardiac size Nov 14 2019\par Uncoiled aorta\par No clear parenchymal infiltrates pleural effusions or dominant pulmonary nodules\par No interval change compared to chest x-ray barring difference in technique dating back to November 7, 2018\par \par PFT November 14, 2019\par Mild reduction in flow rates with an obstructive pattern\par Normal total lung capacity 80% predicted\par Mild air trapping RV/TLC ratio 129% predicted.\par Moderate reduction diffusion 56% predicted with a loss of functioning alveolocapillary units\par Heme globin 13.8\par Bronchodilator response at small airways 29%\par \par High-dose flu vaccination administered Sept 9 2020\par \par DAta 1/30/20\par \par Serum electrolytes normal\par Glucose 103\par BUN 23 creatinine 1.02\par Serum calcium 9.7 in patient with sarcoidosis\par ACE level normal range\par \par High-dose flu vaccination administered September 9, 2020\par PREVNAIR 10/12/2020\par \par DEXA Bone Density\par  Normal study\par \par HD Flu 9/7/22

## 2022-11-03 NOTE — REVIEW OF SYSTEMS
[As Noted in HPI] : as noted in HPI [Cough] : cough [Negative] : Endocrine [SOB on Exertion] : sob on exertion

## 2022-11-03 NOTE — HISTORY OF PRESENT ILLNESS
[None] : ~He/She~ has no significant interval events [Difficulty Breathing During Exertion] : stable dyspnea on exertion [Feelings Of Weakness On Exertion] : stable exercise intolerance [Cough] : stable coughing [Wheezing] : denies wheezing [Regional Soft Tissue Swelling Both Lower Extremities] : denies lower extremity edema [Chest Pain Or Discomfort] : denies chest pain [Fever] : denies fever [Never] : was never a smoker [TextBox_4] : Since last  visit; occasional cough and SOB  \par \par Chief complaint shortness of breath x4 days\par ED O2 saturation 98%-99 noted nasal cannula 2 L rate 124\par Blood pressure reported 166/72 ED chart review\par Reported 99.1 reviewed EKG in ED\par Reported incomplete right bundle branch block\par They noted on exam clinical exam with wheezing will score thank you clinically did not have pulmonary embolism plan troponin labs coags BNP D-dimer cardiac monitor\par CBC\par Vital count 5.97 hemoglobin 12.8 hematocrit 38 platelet count 291,000 mild lymphopenia identified rest of bloods pending\par No Covid swab noted\par No chest x-ray completed\par 65-year-old female with history of hypertension hyperlipidemia obesity sarcoidosis asthma shortness of breath x4 days\par Also noted bilateral lower extremity edema\par Intermittent cough\par Was at primary care physician's office Dr. Jones in a.m. noted to have a new right bundle branch block\par  portable chest x-ray April 1, 2021\par Limited difficult to assess cardiac size on AP view\par Widening of the ribs\par No evidence of parenchymal pulmonary opacities\par Favor an acute inflammatory airway inflammatory event widening the ribs suggestive of air trapping and a known patient of asthma\par Noted the official chest x-ray report is clear lungs\par \par ADDENDUM\par Addendum data review\par April 1, 2021\par CT angiogram protocol results pending\par Chest x-ray clear lungs COVID-19 PCR negative magnesium 2.2 phosphorus mildly reduced 2.2 serum glucose 148\par BUN 19 creatinine 1.07\par CBC White count 7.02 hemoglobin 12.7 hematocrit 37.4\par Platelet count 313,000\par \par Addendum preliminary report CT angiogram protocol\par No reported main left right upper lobe pulmonary emboli\par Partially calcified mediastinal and hilar adenopathy consistent with known diagnosis of sarcoidosis\par  [Wt Gain ___ Lbs] : no recent weight gain [Wt Loss ___ Lbs] : no recent weight loss [Oxygen] : the patient uses no supplemental oxygen

## 2022-12-09 ENCOUNTER — APPOINTMENT (OUTPATIENT)
Dept: PULMONOLOGY | Facility: CLINIC | Age: 67
End: 2022-12-09

## 2022-12-09 VITALS — OXYGEN SATURATION: 97 % | SYSTOLIC BLOOD PRESSURE: 145 MMHG | HEART RATE: 100 BPM | DIASTOLIC BLOOD PRESSURE: 83 MMHG

## 2022-12-09 DIAGNOSIS — Z13.820 ENCOUNTER FOR SCREENING FOR OSTEOPOROSIS: ICD-10-CM

## 2022-12-09 LAB — POCT - HEMOGLOBIN (HGB), QUANTITATIVE, TRANSCUTANEOUS: 15.1

## 2022-12-09 PROCEDURE — 94727 GAS DIL/WSHOT DETER LNG VOL: CPT

## 2022-12-09 PROCEDURE — 94729 DIFFUSING CAPACITY: CPT

## 2022-12-09 PROCEDURE — 94010 BREATHING CAPACITY TEST: CPT

## 2022-12-09 PROCEDURE — 88738 HGB QUANT TRANSCUTANEOUS: CPT

## 2022-12-09 PROCEDURE — 77085 DXA BONE DENSITY AXL VRT FX: CPT

## 2022-12-09 PROCEDURE — ZZZZZ: CPT

## 2022-12-09 PROCEDURE — 99214 OFFICE O/P EST MOD 30 MIN: CPT | Mod: 25

## 2022-12-09 RX ORDER — CIPROFLOXACIN HYDROCHLORIDE 750 MG/1
750 TABLET, FILM COATED ORAL
Qty: 20 | Refills: 0 | Status: DISCONTINUED | COMMUNITY
Start: 2022-08-29

## 2022-12-09 NOTE — DISCUSSION/SUMMARY
[FreeTextEntry1] : Asthma- normalized NIOX\par  sarcoidosis\par noted  decline pulmonary physiology\par Reported new R BBB incomplete- concern re   cardiac sarcoid but in hospital did not tolerate Cardiac MRI\par Mod MR\par  Diastolic Dysfx\par On Dyazide\par Glaucoma\par Note BB\par \par  Rx updated\par \par OPTH completed  per patient up  to date with APPT Oct 2022\par \par MODERNA  COVID VACCINE x 2 doses recommendation booster with Bivalent\par

## 2022-12-09 NOTE — PROCEDURE
[FreeTextEntry1] : PFT 12/9/22\par  moderate reduction flow  rates\par  Mild OAD\par  Lung Volumes nl\par  Pos  airtrapping\par  DLCO  54 % pred with moderate loss fx  alveolar  capillary units\par  HGB 15.1\par \par DEXA bone density 12/9/2022\par AP spine L1-L4 normal\par Left femoral neck normal\par Total left hip normal\par Impression normal study\par Follow-up bone density 2-5 years\par Spirometry November 30, 2022\par Flow rates are normal\par Ratio 71 mild obstructive ventilatory.  Significant interval improvement at flow rates\par \par PFT 9/2/22\par mod severe reduction flow rates\par Lung Volumes nl\par  Pos airtrapping\par  DLCO 41 %\par IMP\par OAD with severe gas  exchange impairment\par NIOX 19  ppb 9/7/22 nl  range\par \par [place] 8/22/22\par  mild mod decline at flow rates Mild OAD\par NIOX  16 ppp normalized\par \par [place] 7/22/22\par Mod reduction flow rates\par stable flow rates\par  NIOX 30 ppb inc bronchial inflammation\par \par PFT 6/6/22\par moderate reduction flow rates\par OAD\par  Lung Volumes pos  airtrapping\par  DLCP 47 % with severe loss fx  alveolar  capillary units\par HGB 13.3\par NIOX  10  ppb WNL\par \par PFT March 7, 2022\par Moderate obstructive ventilatory impairment\par Borderline 10% response to bronchodilator at the FEV1\par 27% response to bronchodilator at the small airways\par Overall lung volumes are normal\par Total capacity 82% predicted\par Decreased ERV 27% predicted secondary to short stature with increased weight\par RV/TLC ratio 131% predicted consistent with air trapping\par Diffusion 50% predicted with a moderate loss of functioning alveolar capillary units\par Impression moderate obstructive ventilatory impairment with reactive component associated air trapping and moderate gas exchange impairment\par NIOX 10 PPB normal range 3/7/2022\par \par Chest x-ray PA lateral 3/7/2022\par Limited study\par Small lung volumes\par Motion lateral\par Cardiac size grossly normal\par Full bilateral hilum consistent with known diagnosis of sarcoidosis\par No interval change compared to chest x-ray of 9/9/2020\par \par \par PFT No BD 8/30/21\par moderate reduction Flow rates\par  Mild restrictive ventilatory impairment\par DLCO 42 % pred with severe loss fx  alveolar  capillary units\par  HGB 15.0\par NIOX 12 ppb\par \par ECHO 4/2/21\par NO abnl reported finding\par \par PFT 5/26/21\par Moderate reduction flow rates\par Lung Volumes -nl TLC 86 % pred\par  Airtrapping\par DLCO 55 % with loss fx  alveolar  capillary units\par HGB 12.3\par \par NIOX 11 ppb WNL 5/26/21\par \par PFT 4/12/21\par mild  moderate reduction flow rates\par  Normal lung Volumes\par  DLCO 58 % moderate reduction with loss fx alveolar  capillary units\par  HGB 12.3\par \par NIOX  18  ppb WNL 4/12/21\par \par PFT body box\par September 11, 2020\par Mild reduction in flow rates with a mild obstructive ventilatory impairment\par Lung volumes are normal\par Air trapping with an RV/TLC ratio 129% predicted.\par Resistance is increased specific conductance is increased\par Mild reduction diffusion with a loss of functioning alveolocapillary units 56% of predicted.\par Impression chronic obstructive airway disease\par \par  nitric oxide 103 elevated \par Hemoglobin 13.9\par Inflammatory airway disease \par \par Chest x-ray PA lateral September 9, 2020\par Sarcoidosis\par Cardiac size are normal\par Uncoiled aorta\par No parenchymal infiltrates pleural effusions or dominant pulmonary nodules\par We will change compared to chest x-ray of November 14, 2019\par \par Pulmonary 6-minute walk stress test September 9, 2020\par \par Room air O2 saturation 97\par Persistent tachycardia\par Desaturation to 89% on room air\par Impression borderline desaturation\par No indication for portable oxygen therapy\par \par \par Spirometry 2/27/2020\par  moderate  reduction flow rates\par No BD at FEV1\par Obstructive pattern\par \par FENO  11  ppb  1/30 20\par \par Chest x-ray PA lateral normal cardiac size Nov 14 2019\par Uncoiled aorta\par No clear parenchymal infiltrates pleural effusions or dominant pulmonary nodules\par No interval change compared to chest x-ray barring difference in technique dating back to November 7, 2018\par \par PFT November 14, 2019\par Mild reduction in flow rates with an obstructive pattern\par Normal total lung capacity 80% predicted\par Mild air trapping RV/TLC ratio 129% predicted.\par Moderate reduction diffusion 56% predicted with a loss of functioning alveolocapillary units\par Heme globin 13.8\par Bronchodilator response at small airways 29%\par \par High-dose flu vaccination administered Sept 9 2020\par \par DAta 1/30/20\par \par Serum electrolytes normal\par Glucose 103\par BUN 23 creatinine 1.02\par Serum calcium 9.7 in patient with sarcoidosis\par ACE level normal range\par \par High-dose flu vaccination administered September 9, 2020\par PREVNAIR 10/12/2020\par \par DEXA Bone Density\par  Normal study\par \par HD Flu 9/7/22

## 2023-01-20 ENCOUNTER — APPOINTMENT (OUTPATIENT)
Dept: PULMONOLOGY | Facility: CLINIC | Age: 68
End: 2023-01-20

## 2023-03-02 ENCOUNTER — APPOINTMENT (OUTPATIENT)
Dept: PULMONOLOGY | Facility: CLINIC | Age: 68
End: 2023-03-02
Payer: MEDICARE

## 2023-03-02 VITALS — DIASTOLIC BLOOD PRESSURE: 66 MMHG | SYSTOLIC BLOOD PRESSURE: 116 MMHG | OXYGEN SATURATION: 96 % | HEART RATE: 106 BPM

## 2023-03-02 PROCEDURE — 94010 BREATHING CAPACITY TEST: CPT

## 2023-03-02 PROCEDURE — 71046 X-RAY EXAM CHEST 2 VIEWS: CPT

## 2023-03-02 PROCEDURE — 99214 OFFICE O/P EST MOD 30 MIN: CPT | Mod: 25

## 2023-03-02 PROCEDURE — 95012 NITRIC OXIDE EXP GAS DETER: CPT

## 2023-03-02 PROCEDURE — 94618 PULMONARY STRESS TESTING: CPT

## 2023-03-02 NOTE — PROCEDURE
[FreeTextEntry1] : Spirometry my second 2023\par Mild/moderate reduction in flow rates\par FEV1 FVC ratio 71\par Stable FEV1\par Obstructive ventilatory impairment cannot exclude restrictive component with reduced FVC\par \par Pulmonary 6-minute walk exercise study\par March 2, 2023\par Baseline O2 saturation 96%\par Positive desaturation to 88% with increased shortness of breath\par Positive vonnie desaturation 88%\par This is borderline to qualify for oxygen therapy\par Clinical correlation\par \par Chest x-ray PA lateral March 2, 2023\par Cardiac size is normal\par For bilateral hilum rule out secondary known sarcoidosis\par No parenchymal infiltrates pleural effusions or dominant pulmonary nodules\par No interval change compared to chest x-ray March 7, 2022\par \par \par PFT 12/9/22\par  moderate reduction flow  rates\par  Mild OAD\par  Lung Volumes nl\par  Pos  airtrapping\par  DLCO  54 % pred with moderate loss fx  alveolar  capillary units\par  HGB 15.1\par \par DEXA bone density 12/9/2022\par AP spine L1-L4 normal\par Left femoral neck normal\par Total left hip normal\par Impression normal study\par Follow-up bone density 2-5 years\par Spirometry November 30, 2022\par Flow rates are normal\par Ratio 71 mild obstructive ventilatory.  Significant interval improvement at flow rates\par \par PFT 9/2/22\par mod severe reduction flow rates\par Lung Volumes nl\par  Pos airtrapping\par  DLCO 41 %\par IMP\par OAD with severe gas  exchange impairment\par NIOX 19  ppb 9/7/22 nl  range\par \par [place] 8/22/22\par  mild mod decline at flow rates Mild OAD\par NIOX  16 ppp normalized\par \par [place] 7/22/22\par Mod reduction flow rates\par stable flow rates\par  NIOX 30 ppb inc bronchial inflammation\par \par PFT 6/6/22\par moderate reduction flow rates\par OAD\par  Lung Volumes pos  airtrapping\par  DLCP 47 % with severe loss fx  alveolar  capillary units\par HGB 13.3\par NIOX  10  ppb WNL\par \par PFT March 7, 2022\par Moderate obstructive ventilatory impairment\par Borderline 10% response to bronchodilator at the FEV1\par 27% response to bronchodilator at the small airways\par Overall lung volumes are normal\par Total capacity 82% predicted\par Decreased ERV 27% predicted secondary to short stature with increased weight\par RV/TLC ratio 131% predicted consistent with air trapping\par Diffusion 50% predicted with a moderate loss of functioning alveolar capillary units\par Impression moderate obstructive ventilatory impairment with reactive component associated air trapping and moderate gas exchange impairment\par NIOX 10 PPB normal range 3/7/2022\par \par Chest x-ray PA lateral 3/7/2022\par Limited study\par Small lung volumes\par Motion lateral\par Cardiac size grossly normal\par Full bilateral hilum consistent with known diagnosis of sarcoidosis\par No interval change compared to chest x-ray of 9/9/2020\par \par \par PFT No BD 8/30/21\par moderate reduction Flow rates\par  Mild restrictive ventilatory impairment\par DLCO 42 % pred with severe loss fx  alveolar  capillary units\par  HGB 15.0\par NIOX 12 ppb\par \par ECHO 4/2/21\par NO abnl reported finding\par \par PFT 5/26/21\par Moderate reduction flow rates\par Lung Volumes -nl TLC 86 % pred\par  Airtrapping\par DLCO 55 % with loss fx  alveolar  capillary units\par HGB 12.3\par \par NIOX 11 ppb WNL 5/26/21\par \par PFT 4/12/21\par mild  moderate reduction flow rates\par  Normal lung Volumes\par  DLCO 58 % moderate reduction with loss fx alveolar  capillary units\par  HGB 12.3\par \par NIOX  18  ppb WNL 4/12/21\par \par PFT body box\par September 11, 2020\par Mild reduction in flow rates with a mild obstructive ventilatory impairment\par Lung volumes are normal\par Air trapping with an RV/TLC ratio 129% predicted.\par Resistance is increased specific conductance is increased\par Mild reduction diffusion with a loss of functioning alveolocapillary units 56% of predicted.\par Impression chronic obstructive airway disease\par \par  nitric oxide 103 elevated \par Hemoglobin 13.9\par Inflammatory airway disease \par \par Chest x-ray PA lateral September 9, 2020\par Sarcoidosis\par Cardiac size are normal\par Uncoiled aorta\par No parenchymal infiltrates pleural effusions or dominant pulmonary nodules\par We will change compared to chest x-ray of November 14, 2019\par \par Pulmonary 6-minute walk stress test September 9, 2020\par \par Room air O2 saturation 97\par Persistent tachycardia\par Desaturation to 89% on room air\par Impression borderline desaturation\par No indication for portable oxygen therapy\par \par \par Spirometry 2/27/2020\par  moderate  reduction flow rates\par No BD at FEV1\par Obstructive pattern\par \par FENO  11  ppb  1/30 20\par \par Chest x-ray PA lateral normal cardiac size Nov 14 2019\par Uncoiled aorta\par No clear parenchymal infiltrates pleural effusions or dominant pulmonary nodules\par No interval change compared to chest x-ray barring difference in technique dating back to November 7, 2018\par \par PFT November 14, 2019\par Mild reduction in flow rates with an obstructive pattern\par Normal total lung capacity 80% predicted\par Mild air trapping RV/TLC ratio 129% predicted.\par Moderate reduction diffusion 56% predicted with a loss of functioning alveolocapillary units\par Heme globin 13.8\par Bronchodilator response at small airways 29%\par \par High-dose flu vaccination administered Sept 9 2020\par \par DAta 1/30/20\par \par Serum electrolytes normal\par Glucose 103\par BUN 23 creatinine 1.02\par Serum calcium 9.7 in patient with sarcoidosis\par ACE level normal range\par \par High-dose flu vaccination administered September 9, 2020\par PREVNAIR 10/12/2020\par \par DEXA Bone Density\par  Normal study\par \par HD Flu 9/7/22

## 2023-03-02 NOTE — HISTORY OF PRESENT ILLNESS
[None] : ~He/She~ has no significant interval events [Difficulty Breathing During Exertion] : stable dyspnea on exertion [Feelings Of Weakness On Exertion] : stable exercise intolerance [Cough] : stable coughing [Wheezing] : denies wheezing [Regional Soft Tissue Swelling Both Lower Extremities] : denies lower extremity edema [Chest Pain Or Discomfort] : denies chest pain [Fever] : denies fever [Never] : was never a smoker [TextBox_4] : Since last  visit; occasional cough and SOB  \par Post tx with antibx PCP for dx [place]  and noted CT CHEST ordered but denied per insurance auth\par \par Chief complaint shortness of breath  1 + year prior\par ED O2 saturation 98%-99 noted nasal cannula 2 L rate 124\par Blood pressure reported 166/72 ED chart review\par Reported 99.1 reviewed EKG in ED\par Reported incomplete right bundle branch block\par They noted on exam clinical exam with wheezing will score thank you clinically did not have pulmonary embolism plan troponin labs coags BNP D-dimer cardiac monitor\par CBC\par Vital count 5.97 hemoglobin 12.8 hematocrit 38 platelet count 291,000 mild lymphopenia identified rest of bloods pending\par No Covid swab noted\par No chest x-ray completed\par 65-year-old female with history of hypertension hyperlipidemia obesity sarcoidosis asthma shortness of breath x4 days\par Also noted bilateral lower extremity edema\par Intermittent cough\par Was at primary care physician's office Dr. Jones in a.m. noted to have a new right bundle branch block\par  portable chest x-ray April 1, 2021\par Limited difficult to assess cardiac size on AP view\par Widening of the ribs\par No evidence of parenchymal pulmonary opacities\par Favor an acute inflammatory airway inflammatory event widening the ribs suggestive of air trapping and a known patient of asthma\par Noted the official chest x-ray report is clear lungs\par \par ADDENDUM\par Addendum data review\par April 1, 2021\par CT angiogram protocol results pending\par Chest x-ray clear lungs COVID-19 PCR negative magnesium 2.2 phosphorus mildly reduced 2.2 serum glucose 148\par BUN 19 creatinine 1.07\par CBC White count 7.02 hemoglobin 12.7 hematocrit 37.4\par Platelet count 313,000\par \par Addendum preliminary report CT angiogram protocol\par No reported main left right upper lobe pulmonary emboli\par Partially calcified mediastinal and hilar adenopathy consistent with known diagnosis of sarcoidosis\par  [Wt Gain ___ Lbs] : no recent weight gain [Wt Loss ___ Lbs] : no recent weight loss [Oxygen] : the patient uses no supplemental oxygen

## 2023-03-02 NOTE — DISCUSSION/SUMMARY
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

## 2023-04-13 ENCOUNTER — APPOINTMENT (OUTPATIENT)
Dept: PULMONOLOGY | Facility: CLINIC | Age: 68
End: 2023-04-13
Payer: MEDICARE

## 2023-04-13 VITALS
RESPIRATION RATE: 16 BRPM | HEART RATE: 120 BPM | DIASTOLIC BLOOD PRESSURE: 82 MMHG | SYSTOLIC BLOOD PRESSURE: 135 MMHG | OXYGEN SATURATION: 94 %

## 2023-04-13 DIAGNOSIS — M25.562 PAIN IN RIGHT KNEE: ICD-10-CM

## 2023-04-13 DIAGNOSIS — M25.561 PAIN IN RIGHT KNEE: ICD-10-CM

## 2023-04-13 LAB — POCT - HEMOGLOBIN (HGB), QUANTITATIVE, TRANSCUTANEOUS: 16.5

## 2023-04-13 PROCEDURE — 95012 NITRIC OXIDE EXP GAS DETER: CPT

## 2023-04-13 PROCEDURE — ZZZZZ: CPT

## 2023-04-13 PROCEDURE — 88738 HGB QUANT TRANSCUTANEOUS: CPT

## 2023-04-13 PROCEDURE — 94727 GAS DIL/WSHOT DETER LNG VOL: CPT

## 2023-04-13 PROCEDURE — 94010 BREATHING CAPACITY TEST: CPT

## 2023-04-13 PROCEDURE — 99214 OFFICE O/P EST MOD 30 MIN: CPT | Mod: 25

## 2023-04-13 PROCEDURE — 94729 DIFFUSING CAPACITY: CPT

## 2023-04-13 NOTE — DISCUSSION/SUMMARY
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

## 2023-04-13 NOTE — PROCEDURE
[FreeTextEntry1] : PFT 4/13/23\par  Mild  moderate reduction flow  rates Mild OAD\par  Lung volume s nl\par Pos  airtrapping\par  DLCo 64 % with mild loss fx  alveolar  capillary units HGB 16.5\par \par NIOX  13  ppb WNL 4/13/23\par \par Spirometry March 2 nd2023\par Mild/moderate reduction in flow rates\par FEV1 FVC ratio 71\par Stable FEV1\par Obstructive ventilatory impairment cannot exclude restrictive component with reduced FVC\par \par Pulmonary 6-minute walk exercise study\par March 2, 2023\par Baseline O2 saturation 96%\par Positive desaturation to 88% with increased shortness of breath\par Positive vonnie desaturation 88%\par This is borderline to qualify for oxygen therapy\par Clinical correlation\par \par Chest x-ray PA lateral March 2, 2023\par Cardiac size is normal\par For bilateral hilum rule out secondary known sarcoidosis\par No parenchymal infiltrates pleural effusions or dominant pulmonary nodules\par No interval change compared to chest x-ray March 7, 2022\par \par \par PFT 12/9/22\par  moderate reduction flow  rates\par  Mild OAD\par  Lung Volumes nl\par  Pos  airtrapping\par  DLCO  54 % pred with moderate loss fx  alveolar  capillary units\par  HGB 15.1\par \par DEXA bone density 12/9/2022\par AP spine L1-L4 normal\par Left femoral neck normal\par Total left hip normal\par Impression normal study\par Follow-up bone density 2-5 years\par Spirometry November 30, 2022\par Flow rates are normal\par Ratio 71 mild obstructive ventilatory.  Significant interval improvement at flow rates\par \par PFT 9/2/22\par mod severe reduction flow rates\par Lung Volumes nl\par  Pos airtrapping\par  DLCO 41 %\par IMP\par OAD with severe gas  exchange impairment\par NIOX 19  ppb 9/7/22 nl  range\par \par [place] 8/22/22\par  mild mod decline at flow rates Mild OAD\par NIOX  16 ppp normalized\par \par [place] 7/22/22\par Mod reduction flow rates\par stable flow rates\par  NIOX 30 ppb inc bronchial inflammation\par \par PFT 6/6/22\par moderate reduction flow rates\par OAD\par  Lung Volumes pos  airtrapping\par  DLCP 47 % with severe loss fx  alveolar  capillary units\par HGB 13.3\par NIOX  10  ppb WNL\par \par PFT March 7, 2022\par Moderate obstructive ventilatory impairment\par Borderline 10% response to bronchodilator at the FEV1\par 27% response to bronchodilator at the small airways\par Overall lung volumes are normal\par Total capacity 82% predicted\par Decreased ERV 27% predicted secondary to short stature with increased weight\par RV/TLC ratio 131% predicted consistent with air trapping\par Diffusion 50% predicted with a moderate loss of functioning alveolar capillary units\par Impression moderate obstructive ventilatory impairment with reactive component associated air trapping and moderate gas exchange impairment\par NIOX 10 PPB normal range 3/7/2022\par \par Chest x-ray PA lateral 3/7/2022\par Limited study\par Small lung volumes\par Motion lateral\par Cardiac size grossly normal\par Full bilateral hilum consistent with known diagnosis of sarcoidosis\par No interval change compared to chest x-ray of 9/9/2020\par \par \par PFT No BD 8/30/21\par moderate reduction Flow rates\par  Mild restrictive ventilatory impairment\par DLCO 42 % pred with severe loss fx  alveolar  capillary units\par  HGB 15.0\par NIOX 12 ppb\par \par ECHO 4/2/21\par NO abnl reported finding\par \par PFT 5/26/21\par Moderate reduction flow rates\par Lung Volumes -nl TLC 86 % pred\par  Airtrapping\par DLCO 55 % with loss fx  alveolar  capillary units\par HGB 12.3\par \par NIOX 11 ppb WNL 5/26/21\par \par PFT 4/12/21\par mild  moderate reduction flow rates\par  Normal lung Volumes\par  DLCO 58 % moderate reduction with loss fx alveolar  capillary units\par  HGB 12.3\par \par NIOX  18  ppb WNL 4/12/21\par \par PFT body box\par September 11, 2020\par Mild reduction in flow rates with a mild obstructive ventilatory impairment\par Lung volumes are normal\par Air trapping with an RV/TLC ratio 129% predicted.\par Resistance is increased specific conductance is increased\par Mild reduction diffusion with a loss of functioning alveolocapillary units 56% of predicted.\par Impression chronic obstructive airway disease\par \par  nitric oxide 103 elevated \par Hemoglobin 13.9\par Inflammatory airway disease \par \par Chest x-ray PA lateral September 9, 2020\par Sarcoidosis\par Cardiac size are normal\par Uncoiled aorta\par No parenchymal infiltrates pleural effusions or dominant pulmonary nodules\par We will change compared to chest x-ray of November 14, 2019\par \par Pulmonary 6-minute walk stress test September 9, 2020\par \par Room air O2 saturation 97\par Persistent tachycardia\par Desaturation to 89% on room air\par Impression borderline desaturation\par No indication for portable oxygen therapy\par \par \par Spirometry 2/27/2020\par  moderate  reduction flow rates\par No BD at FEV1\par Obstructive pattern\par \par FENO  11  ppb  1/30 20\par \par Chest x-ray PA lateral normal cardiac size Nov 14 2019\par Uncoiled aorta\par No clear parenchymal infiltrates pleural effusions or dominant pulmonary nodules\par No interval change compared to chest x-ray barring difference in technique dating back to November 7, 2018\par \par PFT November 14, 2019\par Mild reduction in flow rates with an obstructive pattern\par Normal total lung capacity 80% predicted\par Mild air trapping RV/TLC ratio 129% predicted.\par Moderate reduction diffusion 56% predicted with a loss of functioning alveolocapillary units\par Heme globin 13.8\par Bronchodilator response at small airways 29%\par \par High-dose flu vaccination administered Sept 9 2020\par \par DAta 1/30/20\par \par Serum electrolytes normal\par Glucose 103\par BUN 23 creatinine 1.02\par Serum calcium 9.7 in patient with sarcoidosis\par ACE level normal range\par \par High-dose flu vaccination administered September 9, 2020\par PREVNAIR 10/12/2020\par \par DEXA Bone Density\par  Normal study\par \par HD Flu 9/7/22

## 2023-04-13 NOTE — PHYSICAL EXAM
[General Appearance - Well Developed] : well developed [Normal Appearance] : normal appearance [Well Groomed] : well groomed [No Deformities] : no deformities [General Appearance - Well Nourished] : well nourished [General Appearance - In No Acute Distress] : no acute distress [Normal Conjunctiva] : the conjunctiva exhibited no abnormalities [Eyelids - No Xanthelasma] : the eyelids demonstrated no xanthelasmas [Normal Oropharynx] : normal oropharynx [Neck Appearance] : the appearance of the neck was normal [Neck Cervical Mass (___cm)] : no neck mass was observed [Jugular Venous Distention Increased] : there was no jugular-venous distention [Thyroid Diffuse Enlargement] : the thyroid was not enlarged [Thyroid Nodule] : there were no palpable thyroid nodules [Heart Rate And Rhythm] : heart rate and rhythm were normal [Heart Sounds] : normal S1 and S2 [Murmurs] : no murmurs present [Arterial Pulses Normal] : the arterial pulses were normal [Edema] : no peripheral edema present [Veins - Varicosity Changes] : no varicosital changes were noted in the lower extremities [Respiration, Rhythm And Depth] : normal respiratory rhythm and effort [Exaggerated Use Of Accessory Muscles For Inspiration] : no accessory muscle use [Auscultation Breath Sounds / Voice Sounds] : lungs were clear to auscultation bilaterally [Chest Palpation] : palpation of the chest revealed no abnormalities [Lungs Percussion] : the lungs were normal to percussion [Bowel Sounds] : normal bowel sounds [Abdomen Soft] : soft [Abdomen Tenderness] : non-tender [Abdomen Mass (___ Cm)] : no abdominal mass palpated [Abnormal Walk] : normal gait [Gait - Sufficient For Exercise Testing] : the gait was sufficient for exercise testing [Nail Clubbing] : no clubbing of the fingernails [Cyanosis, Localized] : no localized cyanosis [Petechial Hemorrhages (___cm)] : no petechial hemorrhages [Skin Color & Pigmentation] : normal skin color and pigmentation [] : no rash [No Venous Stasis] : no venous stasis [Skin Lesions] : no skin lesions [No Skin Ulcers] : no skin ulcer [No Xanthoma] : no  xanthoma was observed [Deep Tendon Reflexes (DTR)] : deep tendon reflexes were 2+ and symmetric [Sensation] : the sensory exam was normal to light touch and pinprick [No Focal Deficits] : no focal deficits [FreeTextEntry1] : Cleared wheeze  rhonchi from hospital visit

## 2023-05-25 ENCOUNTER — APPOINTMENT (OUTPATIENT)
Dept: PULMONOLOGY | Facility: CLINIC | Age: 68
End: 2023-05-25
Payer: MEDICARE

## 2023-05-25 VITALS
OXYGEN SATURATION: 92 % | DIASTOLIC BLOOD PRESSURE: 88 MMHG | RESPIRATION RATE: 16 BRPM | HEART RATE: 112 BPM | SYSTOLIC BLOOD PRESSURE: 166 MMHG

## 2023-05-25 DIAGNOSIS — R05.9 COUGH, UNSPECIFIED: ICD-10-CM

## 2023-05-25 PROCEDURE — 95012 NITRIC OXIDE EXP GAS DETER: CPT

## 2023-05-25 PROCEDURE — 99214 OFFICE O/P EST MOD 30 MIN: CPT | Mod: 25

## 2023-05-25 PROCEDURE — 71046 X-RAY EXAM CHEST 2 VIEWS: CPT

## 2023-05-25 PROCEDURE — 94010 BREATHING CAPACITY TEST: CPT

## 2023-05-25 NOTE — DISCUSSION/SUMMARY
[FreeTextEntry1] : chronic cough post  URI \par RS symbicort and as prescribed PMD Spiriva  2 puffs QD  ? 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

## 2023-05-25 NOTE — HISTORY OF PRESENT ILLNESS
[None] : ~He/She~ has no significant interval events [Difficulty Breathing During Exertion] : stable dyspnea on exertion [Feelings Of Weakness On Exertion] : stable exercise intolerance [Cough] : stable coughing [Wheezing] : denies wheezing [Regional Soft Tissue Swelling Both Lower Extremities] : denies lower extremity edema [Chest Pain Or Discomfort] : denies chest pain [Fever] : denies fever [Never] : was never a smoker [TextBox_4] : Since last  visit; occasional cough and SOB  \par post URI  1  week  prior\par \par Chief complaint shortness of breath  1 + year prior\par ED O2 saturation 98%-99 noted nasal cannula 2 L rate 124\par Blood pressure reported 166/72 ED chart review\par Reported 99.1 reviewed EKG in ED\par Reported incomplete right bundle branch block\par They noted on exam clinical exam with wheezing will score thank you clinically did not have pulmonary embolism plan troponin labs coags BNP D-dimer cardiac monitor\par CBC\par Vital count 5.97 hemoglobin 12.8 hematocrit 38 platelet count 291,000 mild lymphopenia identified rest of bloods pending\par No Covid swab noted\par No chest x-ray completed\par 65-year-old female with history of hypertension hyperlipidemia obesity sarcoidosis asthma shortness of breath x4 days\par Also noted bilateral lower extremity edema\par Intermittent cough\par Was at primary care physician's office Dr. Jones in a.m. noted to have a new right bundle branch block\par  portable chest x-ray April 1, 2021\par Limited difficult to assess cardiac size on AP view\par Widening of the ribs\par No evidence of parenchymal pulmonary opacities\par Favor an acute inflammatory airway inflammatory event widening the ribs suggestive of air trapping and a known patient of asthma\par Noted the official chest x-ray report is clear lungs\par \par ADDENDUM\par Addendum data review\par April 1, 2021\par CT angiogram protocol results pending\par Chest x-ray clear lungs COVID-19 PCR negative magnesium 2.2 phosphorus mildly reduced 2.2 serum glucose 148\par BUN 19 creatinine 1.07\par CBC White count 7.02 hemoglobin 12.7 hematocrit 37.4\par Platelet count 313,000\par \par Addendum preliminary report CT angiogram protocol\par No reported main left right upper lobe pulmonary emboli\par Partially calcified mediastinal and hilar adenopathy consistent with known diagnosis of sarcoidosis\par  [Wt Gain ___ Lbs] : no recent weight gain [Wt Loss ___ Lbs] : no recent weight loss [Oxygen] : the patient uses no supplemental oxygen

## 2023-05-25 NOTE — PROCEDURE
[FreeTextEntry1] : Remains normal range May 25, 2023\par Spirometry May 25, 2023\par Moderate reduction in flow rates to severe\par FEV1 51% predicted\par Ratio 66\par Positive decline compared to April 13, 2023\par Patient has not returned to baseline of November 2022\par \par Chest x-ray PA lateral May 23, 2023\par Normal cardiac size\par Bilateral hilar fullness consistent with known diagnosis of sarcoidosis\par No parenchymal infiltrates pleural effusions or dominant pulmonary nodules\par No appreciable signs of interstitial lung disease\par \par \par PFT 4/13/23\par  Mild  moderate reduction flow  rates Mild OAD\par  Lung volume s nl\par Pos  airtrapping\par  DLCo 64 % with mild loss fx  alveolar  capillary units HGB 16.5\par \par NIOX  13  ppb WNL 4/13/23\par \par Spirometry March 2 nd2023\par Mild/moderate reduction in flow rates\par FEV1 FVC ratio 71\par Stable FEV1\par Obstructive ventilatory impairment cannot exclude restrictive component with reduced FVC\par \par Pulmonary 6-minute walk exercise study\par March 2, 2023\par Baseline O2 saturation 96%\par Positive desaturation to 88% with increased shortness of breath\par Positive vonnie desaturation 88%\par This is borderline to qualify for oxygen therapy\par Clinical correlation\par \par Chest x-ray PA lateral March 2, 2023\par Cardiac size is normal\par bilateral hilum rule out secondary known sarcoidosis\par No parenchymal infiltrates pleural effusions or dominant pulmonary nodules\par No interval change compared to chest x-ray March 7, 2022\par \par \par PFT 12/9/22\par  moderate reduction flow  rates\par  Mild OAD\par  Lung Volumes nl\par  Pos  airtrapping\par  DLCO  54 % pred with moderate loss fx  alveolar  capillary units\par  HGB 15.1\par \par DEXA bone density 12/9/2022\par AP spine L1-L4 normal\par Left femoral neck normal\par Total left hip normal\par Impression normal study\par Follow-up bone density 2-5 years\par Spirometry November 30, 2022\par Flow rates are normal\par Ratio 71 mild obstructive ventilatory.  Significant interval improvement at flow rates\par \par PFT 9/2/22\par mod severe reduction flow rates\par Lung Volumes nl\par  Pos airtrapping\par  DLCO 41 %\par IMP\par OAD with severe gas  exchange impairment\par NIOX 19  ppb 9/7/22 nl  range\par \par [place] 8/22/22\par  mild mod decline at flow rates Mild OAD\par NIOX  16 ppp normalized\par \par [place] 7/22/22\par Mod reduction flow rates\par stable flow rates\par  NIOX 30 ppb inc bronchial inflammation\par \par PFT 6/6/22\par moderate reduction flow rates\par OAD\par  Lung Volumes pos  airtrapping\par  DLCP 47 % with severe loss fx  alveolar  capillary units\par HGB 13.3\par NIOX  10  ppb WNL\par \par PFT March 7, 2022\par Moderate obstructive ventilatory impairment\par Borderline 10% response to bronchodilator at the FEV1\par 27% response to bronchodilator at the small airways\par Overall lung volumes are normal\par Total capacity 82% predicted\par Decreased ERV 27% predicted secondary to short stature with increased weight\par RV/TLC ratio 131% predicted consistent with air trapping\par Diffusion 50% predicted with a moderate loss of functioning alveolar capillary units\par Impression moderate obstructive ventilatory impairment with reactive component associated air trapping and moderate gas exchange impairment\par NIOX 10 PPB normal range 3/7/2022\par \par Chest x-ray PA lateral 3/7/2022\par Limited study\par Small lung volumes\par Motion lateral\par Cardiac size grossly normal\par Full bilateral hilum consistent with known diagnosis of sarcoidosis\par No interval change compared to chest x-ray of 9/9/2020\par \par \par PFT No BD 8/30/21\par moderate reduction Flow rates\par  Mild restrictive ventilatory impairment\par DLCO 42 % pred with severe loss fx  alveolar  capillary units\par  HGB 15.0\par NIOX 12 ppb\par \par ECHO 4/2/21\par NO abnl reported finding\par \par PFT 5/26/21\par Moderate reduction flow rates\par Lung Volumes -nl TLC 86 % pred\par  Airtrapping\par DLCO 55 % with loss fx  alveolar  capillary units\par HGB 12.3\par \par NIOX 11 ppb WNL 5/26/21\par \par PFT 4/12/21\par mild  moderate reduction flow rates\par  Normal lung Volumes\par  DLCO 58 % moderate reduction with loss fx alveolar  capillary units\par  HGB 12.3\par \par NIOX  18  ppb WNL 4/12/21\par \par PFT body box\par September 11, 2020\par Mild reduction in flow rates with a mild obstructive ventilatory impairment\par Lung volumes are normal\par Air trapping with an RV/TLC ratio 129% predicted.\par Resistance is increased specific conductance is increased\par Mild reduction diffusion with a loss of functioning alveolocapillary units 56% of predicted.\par Impression chronic obstructive airway disease\par \par  nitric oxide 103 elevated \par Hemoglobin 13.9\par Inflammatory airway disease \par \par Chest x-ray PA lateral September 9, 2020\par Sarcoidosis\par Cardiac size are normal\par Uncoiled aorta\par No parenchymal infiltrates pleural effusions or dominant pulmonary nodules\par We will change compared to chest x-ray of November 14, 2019\par \par Pulmonary 6-minute walk stress test September 9, 2020\par \par Room air O2 saturation 97\par Persistent tachycardia\par Desaturation to 89% on room air\par Impression borderline desaturation\par No indication for portable oxygen therapy\par \par \par Spirometry 2/27/2020\par  moderate  reduction flow rates\par No BD at FEV1\par Obstructive pattern\par \par FENO  11  ppb  1/30 20\par \par Chest x-ray PA lateral normal cardiac size Nov 14 2019\par Uncoiled aorta\par No clear parenchymal infiltrates pleural effusions or dominant pulmonary nodules\par No interval change compared to chest x-ray barring difference in technique dating back to November 7, 2018\par \par PFT November 14, 2019\par Mild reduction in flow rates with an obstructive pattern\par Normal total lung capacity 80% predicted\par Mild air trapping RV/TLC ratio 129% predicted.\par Moderate reduction diffusion 56% predicted with a loss of functioning alveolocapillary units\par Heme globin 13.8\par Bronchodilator response at small airways 29%\par \par High-dose flu vaccination administered Sept 9 2020\par \par DAta 1/30/20\par \par Serum electrolytes normal\par Glucose 103\par BUN 23 creatinine 1.02\par Serum calcium 9.7 in patient with sarcoidosis\par ACE level normal range\par \par High-dose flu vaccination administered September 9, 2020\par PREVNAIR 10/12/2020\par \par DEXA Bone Density\par  Normal study\par \par HD Flu 9/7/22

## 2023-07-06 ENCOUNTER — APPOINTMENT (OUTPATIENT)
Dept: PULMONOLOGY | Facility: CLINIC | Age: 68
End: 2023-07-06

## 2023-07-10 ENCOUNTER — NON-APPOINTMENT (OUTPATIENT)
Age: 68
End: 2023-07-10

## 2023-07-10 ENCOUNTER — INPATIENT (INPATIENT)
Facility: HOSPITAL | Age: 68
LOS: 2 days | Discharge: HOME | DRG: 291 | End: 2023-07-13
Attending: INTERNAL MEDICINE | Admitting: INTERNAL MEDICINE
Payer: MEDICARE

## 2023-07-10 VITALS
TEMPERATURE: 99 F | WEIGHT: 293 LBS | SYSTOLIC BLOOD PRESSURE: 142 MMHG | DIASTOLIC BLOOD PRESSURE: 89 MMHG | HEART RATE: 98 BPM | RESPIRATION RATE: 18 BRPM | OXYGEN SATURATION: 99 %

## 2023-07-10 DIAGNOSIS — Z98.49 CATARACT EXTRACTION STATUS, UNSPECIFIED EYE: Chronic | ICD-10-CM

## 2023-07-10 DIAGNOSIS — R05.9 COUGH, UNSPECIFIED: ICD-10-CM

## 2023-07-10 DIAGNOSIS — Z90.710 ACQUIRED ABSENCE OF BOTH CERVIX AND UTERUS: Chronic | ICD-10-CM

## 2023-07-10 LAB
ALBUMIN SERPL ELPH-MCNC: 3.3 G/DL — LOW (ref 3.5–5)
ALP SERPL-CCNC: 92 U/L — SIGNIFICANT CHANGE UP (ref 40–120)
ALT FLD-CCNC: 21 U/L DA — SIGNIFICANT CHANGE UP (ref 10–60)
ANION GAP SERPL CALC-SCNC: 4 MMOL/L — LOW (ref 5–17)
APTT BLD: 28.4 SEC — SIGNIFICANT CHANGE UP (ref 27.5–35.5)
AST SERPL-CCNC: 9 U/L — LOW (ref 10–40)
BILIRUB SERPL-MCNC: 0.6 MG/DL — SIGNIFICANT CHANGE UP (ref 0.2–1.2)
BUN SERPL-MCNC: 28 MG/DL — HIGH (ref 7–18)
CALCIUM SERPL-MCNC: 9.1 MG/DL — SIGNIFICANT CHANGE UP (ref 8.4–10.5)
CHLORIDE SERPL-SCNC: 108 MMOL/L — SIGNIFICANT CHANGE UP (ref 96–108)
CO2 SERPL-SCNC: 30 MMOL/L — SIGNIFICANT CHANGE UP (ref 22–31)
CREAT SERPL-MCNC: 0.98 MG/DL — SIGNIFICANT CHANGE UP (ref 0.5–1.3)
EGFR: 63 ML/MIN/1.73M2 — SIGNIFICANT CHANGE UP
GLUCOSE SERPL-MCNC: 111 MG/DL — HIGH (ref 70–99)
HCT VFR BLD CALC: 38.2 % — SIGNIFICANT CHANGE UP (ref 34.5–45)
HGB BLD-MCNC: 12.9 G/DL — SIGNIFICANT CHANGE UP (ref 11.5–15.5)
INR BLD: 1.04 RATIO — SIGNIFICANT CHANGE UP (ref 0.88–1.16)
MCHC RBC-ENTMCNC: 26.4 PG — LOW (ref 27–34)
MCHC RBC-ENTMCNC: 33.8 GM/DL — SIGNIFICANT CHANGE UP (ref 32–36)
MCV RBC AUTO: 78.1 FL — LOW (ref 80–100)
NRBC # BLD: 0 /100 WBCS — SIGNIFICANT CHANGE UP (ref 0–0)
PLATELET # BLD AUTO: 289 K/UL — SIGNIFICANT CHANGE UP (ref 150–400)
POTASSIUM SERPL-MCNC: 3.9 MMOL/L — SIGNIFICANT CHANGE UP (ref 3.5–5.3)
POTASSIUM SERPL-SCNC: 3.9 MMOL/L — SIGNIFICANT CHANGE UP (ref 3.5–5.3)
PROT SERPL-MCNC: 7.1 G/DL — SIGNIFICANT CHANGE UP (ref 6–8.3)
PROTHROM AB SERPL-ACNC: 12.4 SEC — SIGNIFICANT CHANGE UP (ref 10.5–13.4)
RBC # BLD: 4.89 M/UL — SIGNIFICANT CHANGE UP (ref 3.8–5.2)
RBC # FLD: 16.8 % — HIGH (ref 10.3–14.5)
SODIUM SERPL-SCNC: 142 MMOL/L — SIGNIFICANT CHANGE UP (ref 135–145)
WBC # BLD: 6.45 K/UL — SIGNIFICANT CHANGE UP (ref 3.8–10.5)
WBC # FLD AUTO: 6.45 K/UL — SIGNIFICANT CHANGE UP (ref 3.8–10.5)

## 2023-07-10 PROCEDURE — 71046 X-RAY EXAM CHEST 2 VIEWS: CPT | Mod: 26

## 2023-07-10 PROCEDURE — 93010 ELECTROCARDIOGRAM REPORT: CPT

## 2023-07-10 PROCEDURE — 99285 EMERGENCY DEPT VISIT HI MDM: CPT

## 2023-07-10 RX ORDER — DORZOLAMIDE HYDROCHLORIDE 20 MG/ML
1 SOLUTION/ DROPS OPHTHALMIC
Qty: 0 | Refills: 0 | DISCHARGE

## 2023-07-10 RX ORDER — ASPIRIN/CALCIUM CARB/MAGNESIUM 324 MG
81 TABLET ORAL DAILY
Refills: 0 | Status: DISCONTINUED | OUTPATIENT
Start: 2023-07-10 | End: 2023-07-13

## 2023-07-10 RX ORDER — DULOXETINE HYDROCHLORIDE 30 MG/1
20 CAPSULE, DELAYED RELEASE ORAL DAILY
Refills: 0 | Status: DISCONTINUED | OUTPATIENT
Start: 2023-07-10 | End: 2023-07-13

## 2023-07-10 RX ORDER — AMLODIPINE BESYLATE 2.5 MG/1
5 TABLET ORAL DAILY
Refills: 0 | Status: DISCONTINUED | OUTPATIENT
Start: 2023-07-10 | End: 2023-07-11

## 2023-07-10 RX ORDER — ENOXAPARIN SODIUM 100 MG/ML
40 INJECTION SUBCUTANEOUS EVERY 24 HOURS
Refills: 0 | Status: DISCONTINUED | OUTPATIENT
Start: 2023-07-10 | End: 2023-07-13

## 2023-07-10 RX ORDER — TIOTROPIUM BROMIDE 18 UG/1
2 CAPSULE ORAL; RESPIRATORY (INHALATION) DAILY
Refills: 0 | Status: DISCONTINUED | OUTPATIENT
Start: 2023-07-10 | End: 2023-07-13

## 2023-07-10 RX ORDER — LANOLIN ALCOHOL/MO/W.PET/CERES
3 CREAM (GRAM) TOPICAL AT BEDTIME
Refills: 0 | Status: DISCONTINUED | OUTPATIENT
Start: 2023-07-10 | End: 2023-07-13

## 2023-07-10 RX ORDER — CEFTRIAXONE 500 MG/1
1000 INJECTION, POWDER, FOR SOLUTION INTRAMUSCULAR; INTRAVENOUS ONCE
Refills: 0 | Status: COMPLETED | OUTPATIENT
Start: 2023-07-10 | End: 2023-07-10

## 2023-07-10 RX ORDER — BRIMONIDINE TARTRATE 2 MG/MG
0 SOLUTION/ DROPS OPHTHALMIC
Qty: 0 | Refills: 0 | DISCHARGE

## 2023-07-10 RX ORDER — AZITHROMYCIN 500 MG/1
500 TABLET, FILM COATED ORAL ONCE
Refills: 0 | Status: COMPLETED | OUTPATIENT
Start: 2023-07-10 | End: 2023-07-10

## 2023-07-10 RX ORDER — ONDANSETRON 8 MG/1
4 TABLET, FILM COATED ORAL EVERY 8 HOURS
Refills: 0 | Status: DISCONTINUED | OUTPATIENT
Start: 2023-07-10 | End: 2023-07-13

## 2023-07-10 RX ORDER — ALBUTEROL 90 UG/1
2 AEROSOL, METERED ORAL EVERY 6 HOURS
Refills: 0 | Status: DISCONTINUED | OUTPATIENT
Start: 2023-07-10 | End: 2023-07-13

## 2023-07-10 RX ORDER — METOPROLOL TARTRATE 50 MG
1 TABLET ORAL
Qty: 0 | Refills: 0 | DISCHARGE

## 2023-07-10 RX ORDER — BUDESONIDE AND FORMOTEROL FUMARATE DIHYDRATE 160; 4.5 UG/1; UG/1
2 AEROSOL RESPIRATORY (INHALATION)
Refills: 0 | Status: DISCONTINUED | OUTPATIENT
Start: 2023-07-10 | End: 2023-07-13

## 2023-07-10 RX ORDER — LATANOPROST 0.05 MG/ML
1 SOLUTION/ DROPS OPHTHALMIC; TOPICAL
Qty: 0 | Refills: 0 | DISCHARGE

## 2023-07-10 RX ORDER — PANTOPRAZOLE SODIUM 20 MG/1
40 TABLET, DELAYED RELEASE ORAL
Refills: 0 | Status: DISCONTINUED | OUTPATIENT
Start: 2023-07-10 | End: 2023-07-13

## 2023-07-10 RX ORDER — IPRATROPIUM/ALBUTEROL SULFATE 18-103MCG
3 AEROSOL WITH ADAPTER (GRAM) INHALATION ONCE
Refills: 0 | Status: COMPLETED | OUTPATIENT
Start: 2023-07-10 | End: 2023-07-10

## 2023-07-10 RX ORDER — ACETAMINOPHEN 500 MG
650 TABLET ORAL EVERY 6 HOURS
Refills: 0 | Status: DISCONTINUED | OUTPATIENT
Start: 2023-07-10 | End: 2023-07-13

## 2023-07-10 RX ORDER — FERROUS SULFATE 325(65) MG
325 TABLET ORAL DAILY
Refills: 0 | Status: DISCONTINUED | OUTPATIENT
Start: 2023-07-10 | End: 2023-07-13

## 2023-07-10 RX ORDER — ATORVASTATIN CALCIUM 80 MG/1
20 TABLET, FILM COATED ORAL AT BEDTIME
Refills: 0 | Status: DISCONTINUED | OUTPATIENT
Start: 2023-07-10 | End: 2023-07-13

## 2023-07-10 RX ORDER — MONTELUKAST 4 MG/1
1 TABLET, CHEWABLE ORAL
Qty: 0 | Refills: 0 | DISCHARGE

## 2023-07-10 RX ADMIN — AZITHROMYCIN 500 MILLIGRAM(S): 500 TABLET, FILM COATED ORAL at 18:51

## 2023-07-10 RX ADMIN — Medication 3 MILLILITER(S): at 18:51

## 2023-07-10 RX ADMIN — CEFTRIAXONE 100 MILLIGRAM(S): 500 INJECTION, POWDER, FOR SOLUTION INTRAMUSCULAR; INTRAVENOUS at 18:51

## 2023-07-10 NOTE — H&P ADULT - PROBLEM SELECTOR PLAN 1
Patient complains of shortness of breath and dry cough for the past  5 days.  Crackles present. No wheezing,  ED: albuterol/ipratropium for nebulization 3ml   Chest xray shows pulmonary edema, likely due to non-compliance with medication hydrocholothiazide vs CHF vs Asthma. Patient complains of shortness of breath and dry cough for the past  5 days.  Crackles present. No wheezing,  ED: albuterol/ipratropium for nebulization 3ml   Chest xray shows pulmonary edema, likely due to non-compliance with medication hydrochlorothiazide vs CHF vs Asthma.  f/u proBNP   Restarted home medication HCTZ Patient complains of shortness of breath and dry cough for the past  5 days.  Crackles present. No wheezing,  ED: albuterol/ipratropium for nebulization 3ml   Chest xray shows pulmonary edema, likely due to non-compliance with medication hydrochlorothiazide vs CHF vs Asthma.  f/u proBNP   Restarted home medication HCTZ  Asthma:   albuterol 3mL inhalation  tiotropium 2.mcg inhalation  montelukast 10mg. Patient complains of shortness of breath and dry cough for the past  5 days.  Crackles present. No wheezing,  ED: albuterol/ipratropium for nebulization 3ml   Chest xray shows pulmonary edema, likely due to non-compliance with medication hydrochlorothiazide vs CHF vs Asthma.  f/u proBNP   Restarted home medication HCTZ  Asthma:   albuterol 3mL inhalation  tiotropium 2.mcg inhalation  montelukast 10mg.  ID: Dr. Arana consulted  Pulm: Dr. Courtney consulted  Cardio: Dr. Bejarano consulted

## 2023-07-10 NOTE — CHART NOTE - NSCHARTNOTEFT_GEN_A_CORE
pt was seen and examined  sob in office rhonchi sp treatment  x 2 and steroids in office  full note to follow

## 2023-07-10 NOTE — H&P ADULT - ASSESSMENT
67 yr old F ambulating with a cane, came to the hospital from doctor office due to non productive cough and short of breath for 5 days. PMHx HTN, Asthma, HLD, pre-DM ( not on medication). She mentions non- compliance with her meidcation. She has recently experienced loss in the family 5 days ago for which she attributes her symptoms. No hemoptysis, fever, sick contacts, travel history, no URT symptoms.     Patient appears comfortabe. Has crackles present on examination.  Patient's chest xray shows pulmonary edema.  67yr old F ambulating with a cane, came from doctors office Dr. Jones for non productive cough. Patient's outside chest rays was suspicious of pneumonia hence she was told to go to the hospital. PMHx of HTN, Asthma, pre-DM ( not on medication), HLD.  She complains of experiencing non productive cough for the past 5 days accompanied with shortness of breath. She experienced a loss in the family (her daughter) 5 days ago and attribute that to the cause of her symptoms. She has been wheezing at home and is being nebulized in the morning everday. No hemoptysis, fever, sick contacts, travel history, no URT symptoms.     Patient appears comfortable. Crackles present on examination.  Patient's chest xray shows pulmonary edema.      67yr old F ambulating with a cane, came from doctors office for non productive cough. Patient's outpatient chest ray was suspicious of pneumonia hence she was told to go to the hospital. PMHx of HTN, Asthma, pre-DM ( not on medication), HLD.  She complains of experiencing non productive cough for the past 5 days accompanied with shortness of breath. She experienced a loss in the family (her daughter) 5 days ago and attribute that to the cause of her symptoms. She has been wheezing at home and is being nebulized in the morning everday. No hemoptysis, fever, sick contacts, travel history, no URT symptoms. Patient's chest xray shows pulmonary edema. Patient admitted for shortness of breath likely due to CHF vs Asthma.

## 2023-07-10 NOTE — H&P ADULT - NSHPREVIEWOFSYSTEMS_GEN_ALL_CORE
REVIEW OF SYSTEMS:    CONSTITUTIONAL: No weakness, fevers or chills  EYES/ENT: No visual changes;  No vertigo or throat pain   NECK: No pain or stiffness  RESPIRATORY : Cough + non productive. Wheezing +. shortness of breath + No hemoptysis;   CARDIOVASCULAR: No chest pain palpitations, dizziness, orthopnea, pedal edema.  GASTROINTESTINAL: No abdominal or epigastric pain. No nausea, vomiting, or hematemesis; No diarrhea or constipation. No melena or hematochezia.  GENITOURINARY: No dysuria, frequency or hematuria  NEUROLOGICAL: No numbness or weakness  SKIN: No itching, rashes REVIEW OF SYSTEMS:    CONSTITUTIONAL: No weakness, fevers or chills  EYES/ENT: No visual changes;  No vertigo or throat pain   NECK: No pain or stiffness  RESPIRATORY : + Cough non productive. + Shortness of breath. No hemoptysis; No Wheezing   CARDIOVASCULAR: No chest pain palpitations, dizziness, orthopnea, pedal edema.  GASTROINTESTINAL: No abdominal or epigastric pain. No nausea, vomiting, or hematemesis; No diarrhea or constipation. No melena or hematochezia.  GENITOURINARY: No dysuria, frequency or hematuria  NEUROLOGICAL: No numbness or weakness  SKIN: No itching, rashes

## 2023-07-10 NOTE — H&P ADULT - PROBLEM SELECTOR PLAN 2
Patient is over 59 years old and is obese making her a high risk for DVT.  Starting her on DVT prophylaxsis : Levonox 40mg h/o HTN on  Monitor BP  c/w home meds -  Lower MAP 20-25% in next 2-4 hrs h/o HTN on  Monitor BP  c/w home meds - HCTZ and Amlodipine

## 2023-07-10 NOTE — ED PROVIDER NOTE - OBJECTIVE STATEMENT
66 yo F pmh of obesity, HTN, HLD, asthma  Sent in for admission by Dr Jones  Pt evaluated for SOB in PCP office today, with 5 days of non productive cough; had out pt xray showing PNA; Dr Jones requests admission  Steroids given by EMS  Denies other acute complaints

## 2023-07-10 NOTE — H&P ADULT - PROBLEM SELECTOR PLAN 5
Patient is over 59 years old and is obese making her a high risk for DVT.  Starting her on DVT prophylaxsis : Levonox 40mg Patient is over 59 years old and is obese making her a high risk for DVT.  Starting her on DVT prophylaxsis : Levonox 40mg  GI - PPI

## 2023-07-10 NOTE — H&P ADULT - NSICDXPASTMEDICALHX_GEN_ALL_CORE_FT
PAST MEDICAL HISTORY:  Asthma, unspecified asthma severity, unspecified whether complicated, unspecified whether persistent     Essential hypertension     Glaucoma of both eyes, unspecified glaucoma type     Hyperlipemia     Morbid obesity with BMI of 50.0-59.9, adult     Sarcoidosis

## 2023-07-10 NOTE — H&P ADULT - PROBLEM SELECTOR PLAN 4
h/o DM previously on metformin   currently not on any medication   starting sliding scale   Adjust as necessary

## 2023-07-10 NOTE — ED PROVIDER NOTE - PHYSICAL EXAMINATION
GENERAL: well appearing, no acute distress   HEAD: atraumatic   EYES: EOMI   ENT: moist oral mucosa   CARDIAC: regular rate  RESPIRATORY: no increased work of breathing, lungs clear, no wheezing   MUSCULOSKELETAL: no deformity   NEUROLOGICAL: alert, spontaneous movement of extremities   SKIN: no visible rash  PSYCHIATRIC: cooperative

## 2023-07-10 NOTE — H&P ADULT - HISTORY OF PRESENT ILLNESS
Patient 67yr old F ambulating with a cane, came from doctors office Dr. Jones for non productive cough. Patient's outside chest rays was suspicious of pneumonia hence she was told to go to the hospital. PMHx of HTN, Asthma, pre-DM ( not on medication), HLD.  She complains of experiencing non productive cough for the past 5 days accompanied with shortness of breath. She experienced a loss in the family (her daughter) 5 days ago and attribute that to the cause of her symptoms.The patient's cough has remained the same over the course of 5 days. She has had wheezing for the past 5 day for which she is being nebulized in the morning. She does not complain of fever, runny nose, sore throat, hemoptysis, no sick contacts, no recent travel history.  No chest pain, palpitations, dizziness, orthopnea, bendopnea,  Patient mentions being non-compliant with her medications.    .  Patient 67yr old F ambulating with a cane, came from doctors office Dr. Jones for non productive cough. Patient's outside chest rays was suspicious of pneumonia hence she was told to go to the hospital. PMHx of HTN, Asthma, pre-DM ( not on medication), HLD.  She complains of experiencing non productive cough for the past 5 days accompanied with shortness of breath. She experienced a loss in the family (her daughter) 5 days ago and attribute that to the cause of her symptoms.The patient's cough has remained the same over the course of 5 days. She has been wheezing for the past 5 day for which she is being nebulized in the morning. She does not complain of fever, runny nose, sore throat, hemoptysis, no sick contacts, no recent travel history.  No chest pain, palpitations, dizziness, orthopnea,  Patient mentions being non-compliant with her medications.    .  This is a 67yr old F ambulating with a cane from home, with PMHx of HTN, Asthma, pre-DM ( not on medication), HLD, sent from doctors office (Dr. Jones) for a non productive cough. Patient's outpatient chest xays was suspicious of pneumonia hence she was told to go to the hospital She complains of experiencing non productive cough for the past 5 days accompanied with shortness of breath. She experienced a loss in the family (her daughter) 5 days ago and attribute that to the cause of her symptoms.The patient's cough has remained the same over the course of 5 days. She has been wheezing for the past 5 day for which she is being nebulized in the morning. She does not complain of fever, runny nose, sore throat, hemoptysis, no sick contacts, no recent travel history.  No chest pain, palpitations, dizziness, orthopnea,  Patient mentions being non-compliant with her medications.    .

## 2023-07-10 NOTE — H&P ADULT - NSHPPHYSICALEXAM_GEN_ALL_CORE
PHYSICAL EXAM:  GENERAL: NAD, comfortably sitting on bed, speaks in full sentences, no signs of respiratory distress  HEAD:  Atraumatic, Normocephalic  EYES: EOMI, PERRLA, conjunctiva and sclera clear  NECK: Supple, No JVD  CHEST/LUNG: No crackles; No wheeze; No accessory muscles used  HEART: Regular rate and rhythm; No murmurs;   ABDOMEN: Soft, Nontender, Nondistended; Bowel sounds present; No guarding  EXTREMITIES:  2+ Peripheral Pulses, No cyanosis or edema  PSYCH: AAOx3  NEUROLOGY: non-focal  SKIN: No rashes or lesions PHYSICAL EXAM:  GENERAL: NAD, comfortably sitting on bed, speaks in full sentences, no signs of respiratory distress  HEAD:  Atraumatic, Normocephalic  EYES: EOMI, PERRLA, conjunctiva and sclera clear  NECK: Supple, No JVD  CHEST/LUNG: + crackles; No wheeze; No accessory muscles used  HEART: Regular rate and rhythm; No murmurs;   ABDOMEN: Soft, Nontender, Nondistended; Bowel sounds present; No guarding  EXTREMITIES:  2+ Peripheral Pulses, No cyanosis or edema  PSYCH: AAOx3  NEUROLOGY: non-focal  SKIN: No rashes or lesions Vital Signs Last 24 Hrs  T(C): 37.1 (10 Jul 2023 23:13), Max: 37.2 (10 Jul 2023 16:38)  T(F): 98.7 (10 Jul 2023 23:13), Max: 98.9 (10 Jul 2023 16:38)  HR: 88 (10 Jul 2023 23:13) (87 - 98)  BP: 128/74 (10 Jul 2023 23:13) (128/74 - 142/89)  BP(mean): 92 (10 Jul 2023 23:13) (92 - 92)  RR: 20 (10 Jul 2023 23:13) (18 - 20)  SpO2: 97% (10 Jul 2023 23:13) (97% - 99%)    Parameters below as of 10 Jul 2023 23:13  Patient On (Oxygen Delivery Method): room air    PHYSICAL EXAM:  GENERAL: NAD, comfortably sitting on bed, speaks in full sentences, no signs of respiratory distress  HEAD:  Atraumatic, Normocephalic  EYES: EOMI, PERRLA, conjunctiva and sclera clear  NECK: Supple, No JVD  CHEST/LUNG: + crackles; No wheeze; No accessory muscles used  HEART: Regular rate and rhythm; No murmurs;   ABDOMEN: Soft, Nontender, Nondistended; Bowel sounds present; No guarding  EXTREMITIES:  2+ Peripheral Pulses, No cyanosis or edema  PSYCH: AAOx3  NEUROLOGY: non-focal  SKIN: No rashes or lesions

## 2023-07-11 ENCOUNTER — NON-APPOINTMENT (OUTPATIENT)
Age: 68
End: 2023-07-11

## 2023-07-11 DIAGNOSIS — E11.9 TYPE 2 DIABETES MELLITUS WITHOUT COMPLICATIONS: ICD-10-CM

## 2023-07-11 DIAGNOSIS — E66.01 MORBID (SEVERE) OBESITY DUE TO EXCESS CALORIES: ICD-10-CM

## 2023-07-11 DIAGNOSIS — J45.901 UNSPECIFIED ASTHMA WITH (ACUTE) EXACERBATION: ICD-10-CM

## 2023-07-11 DIAGNOSIS — R06.02 SHORTNESS OF BREATH: ICD-10-CM

## 2023-07-11 DIAGNOSIS — I10 ESSENTIAL (PRIMARY) HYPERTENSION: ICD-10-CM

## 2023-07-11 DIAGNOSIS — Z29.9 ENCOUNTER FOR PROPHYLACTIC MEASURES, UNSPECIFIED: ICD-10-CM

## 2023-07-11 DIAGNOSIS — E78.5 HYPERLIPIDEMIA, UNSPECIFIED: ICD-10-CM

## 2023-07-11 DIAGNOSIS — I50.9 HEART FAILURE, UNSPECIFIED: ICD-10-CM

## 2023-07-11 LAB
ANION GAP SERPL CALC-SCNC: 5 MMOL/L — SIGNIFICANT CHANGE UP (ref 5–17)
APPEARANCE UR: CLEAR — SIGNIFICANT CHANGE UP
BACTERIA # UR AUTO: ABNORMAL /HPF
BILIRUB UR-MCNC: NEGATIVE — SIGNIFICANT CHANGE UP
BUN SERPL-MCNC: 30 MG/DL — HIGH (ref 7–18)
CALCIUM SERPL-MCNC: 8.9 MG/DL — SIGNIFICANT CHANGE UP (ref 8.4–10.5)
CHLORIDE SERPL-SCNC: 111 MMOL/L — HIGH (ref 96–108)
CO2 SERPL-SCNC: 29 MMOL/L — SIGNIFICANT CHANGE UP (ref 22–31)
COLOR SPEC: YELLOW — SIGNIFICANT CHANGE UP
CREAT SERPL-MCNC: 1.09 MG/DL — SIGNIFICANT CHANGE UP (ref 0.5–1.3)
DIFF PNL FLD: NEGATIVE — SIGNIFICANT CHANGE UP
EGFR: 55 ML/MIN/1.73M2 — LOW
EPI CELLS # UR: SIGNIFICANT CHANGE UP /HPF
GLUCOSE SERPL-MCNC: 101 MG/DL — HIGH (ref 70–99)
GLUCOSE UR QL: NEGATIVE — SIGNIFICANT CHANGE UP
GRAM STN FLD: SIGNIFICANT CHANGE UP
HCT VFR BLD CALC: 37 % — SIGNIFICANT CHANGE UP (ref 34.5–45)
HGB BLD-MCNC: 12.5 G/DL — SIGNIFICANT CHANGE UP (ref 11.5–15.5)
HYALINE CASTS # UR AUTO: ABNORMAL /LPF
KETONES UR-MCNC: NEGATIVE — SIGNIFICANT CHANGE UP
LEUKOCYTE ESTERASE UR-ACNC: ABNORMAL
MAGNESIUM SERPL-MCNC: 2.1 MG/DL — SIGNIFICANT CHANGE UP (ref 1.6–2.6)
MCHC RBC-ENTMCNC: 26.4 PG — LOW (ref 27–34)
MCHC RBC-ENTMCNC: 33.8 GM/DL — SIGNIFICANT CHANGE UP (ref 32–36)
MCV RBC AUTO: 78.1 FL — LOW (ref 80–100)
MRSA PCR RESULT.: SIGNIFICANT CHANGE UP
NITRITE UR-MCNC: NEGATIVE — SIGNIFICANT CHANGE UP
NRBC # BLD: 0 /100 WBCS — SIGNIFICANT CHANGE UP (ref 0–0)
NT-PROBNP SERPL-SCNC: 110 PG/ML — SIGNIFICANT CHANGE UP (ref 0–125)
PH UR: 5 — SIGNIFICANT CHANGE UP (ref 5–8)
PHOSPHATE SERPL-MCNC: 4.4 MG/DL — SIGNIFICANT CHANGE UP (ref 2.5–4.5)
PLATELET # BLD AUTO: 276 K/UL — SIGNIFICANT CHANGE UP (ref 150–400)
POTASSIUM SERPL-MCNC: 3.8 MMOL/L — SIGNIFICANT CHANGE UP (ref 3.5–5.3)
POTASSIUM SERPL-SCNC: 3.8 MMOL/L — SIGNIFICANT CHANGE UP (ref 3.5–5.3)
PROCALCITONIN SERPL-MCNC: 0.04 NG/ML — SIGNIFICANT CHANGE UP (ref 0.02–0.1)
PROT UR-MCNC: 30 MG/DL
RAPID RVP RESULT: SIGNIFICANT CHANGE UP
RBC # BLD: 4.74 M/UL — SIGNIFICANT CHANGE UP (ref 3.8–5.2)
RBC # FLD: 16.6 % — HIGH (ref 10.3–14.5)
RBC CASTS # UR COMP ASSIST: SIGNIFICANT CHANGE UP /HPF (ref 0–2)
S AUREUS DNA NOSE QL NAA+PROBE: DETECTED
S PNEUM AG UR QL: NEGATIVE — SIGNIFICANT CHANGE UP
SARS-COV-2 RNA SPEC QL NAA+PROBE: SIGNIFICANT CHANGE UP
SODIUM SERPL-SCNC: 145 MMOL/L — SIGNIFICANT CHANGE UP (ref 135–145)
SP GR SPEC: 1.02 — SIGNIFICANT CHANGE UP (ref 1.01–1.02)
SPECIMEN SOURCE: SIGNIFICANT CHANGE UP
UROBILINOGEN FLD QL: NEGATIVE — SIGNIFICANT CHANGE UP
WBC # BLD: 7.27 K/UL — SIGNIFICANT CHANGE UP (ref 3.8–10.5)
WBC # FLD AUTO: 7.27 K/UL — SIGNIFICANT CHANGE UP (ref 3.8–10.5)
WBC UR QL: SIGNIFICANT CHANGE UP /HPF (ref 0–5)

## 2023-07-11 PROCEDURE — 71250 CT THORAX DX C-: CPT | Mod: 26

## 2023-07-11 PROCEDURE — 93970 EXTREMITY STUDY: CPT | Mod: 26

## 2023-07-11 RX ORDER — FUROSEMIDE 40 MG
20 TABLET ORAL
Refills: 0 | Status: DISCONTINUED | OUTPATIENT
Start: 2023-07-11 | End: 2023-07-13

## 2023-07-11 RX ORDER — AZITHROMYCIN 500 MG/1
500 TABLET, FILM COATED ORAL EVERY 24 HOURS
Refills: 0 | Status: DISCONTINUED | OUTPATIENT
Start: 2023-07-11 | End: 2023-07-12

## 2023-07-11 RX ORDER — CEFTRIAXONE 500 MG/1
1000 INJECTION, POWDER, FOR SOLUTION INTRAMUSCULAR; INTRAVENOUS EVERY 24 HOURS
Refills: 0 | Status: DISCONTINUED | OUTPATIENT
Start: 2023-07-11 | End: 2023-07-12

## 2023-07-11 RX ORDER — MONTELUKAST 4 MG/1
10 TABLET, CHEWABLE ORAL AT BEDTIME
Refills: 0 | Status: DISCONTINUED | OUTPATIENT
Start: 2023-07-11 | End: 2023-07-13

## 2023-07-11 RX ORDER — LOSARTAN POTASSIUM 100 MG/1
25 TABLET, FILM COATED ORAL DAILY
Refills: 0 | Status: DISCONTINUED | OUTPATIENT
Start: 2023-07-11 | End: 2023-07-13

## 2023-07-11 RX ADMIN — LOSARTAN POTASSIUM 25 MILLIGRAM(S): 100 TABLET, FILM COATED ORAL at 21:03

## 2023-07-11 RX ADMIN — CEFTRIAXONE 100 MILLIGRAM(S): 500 INJECTION, POWDER, FOR SOLUTION INTRAMUSCULAR; INTRAVENOUS at 14:15

## 2023-07-11 RX ADMIN — Medication 100 MILLIGRAM(S): at 14:54

## 2023-07-11 RX ADMIN — ATORVASTATIN CALCIUM 20 MILLIGRAM(S): 80 TABLET, FILM COATED ORAL at 21:02

## 2023-07-11 RX ADMIN — PANTOPRAZOLE SODIUM 40 MILLIGRAM(S): 20 TABLET, DELAYED RELEASE ORAL at 05:42

## 2023-07-11 RX ADMIN — BUDESONIDE AND FORMOTEROL FUMARATE DIHYDRATE 2 PUFF(S): 160; 4.5 AEROSOL RESPIRATORY (INHALATION) at 21:01

## 2023-07-11 RX ADMIN — ENOXAPARIN SODIUM 40 MILLIGRAM(S): 100 INJECTION SUBCUTANEOUS at 05:43

## 2023-07-11 RX ADMIN — Medication 81 MILLIGRAM(S): at 12:29

## 2023-07-11 RX ADMIN — AMLODIPINE BESYLATE 5 MILLIGRAM(S): 2.5 TABLET ORAL at 05:46

## 2023-07-11 RX ADMIN — DULOXETINE HYDROCHLORIDE 20 MILLIGRAM(S): 30 CAPSULE, DELAYED RELEASE ORAL at 12:31

## 2023-07-11 RX ADMIN — ALBUTEROL 2 PUFF(S): 90 AEROSOL, METERED ORAL at 05:42

## 2023-07-11 RX ADMIN — Medication 100 MILLIGRAM(S): at 21:00

## 2023-07-11 RX ADMIN — Medication 20 MILLIGRAM(S): at 14:16

## 2023-07-11 RX ADMIN — Medication 100 MILLIGRAM(S): at 01:32

## 2023-07-11 RX ADMIN — Medication 325 MILLIGRAM(S): at 12:29

## 2023-07-11 RX ADMIN — TIOTROPIUM BROMIDE 2 PUFF(S): 18 CAPSULE ORAL; RESPIRATORY (INHALATION) at 14:51

## 2023-07-11 RX ADMIN — ALBUTEROL 2 PUFF(S): 90 AEROSOL, METERED ORAL at 10:18

## 2023-07-11 RX ADMIN — ALBUTEROL 2 PUFF(S): 90 AEROSOL, METERED ORAL at 21:02

## 2023-07-11 RX ADMIN — BUDESONIDE AND FORMOTEROL FUMARATE DIHYDRATE 2 PUFF(S): 160; 4.5 AEROSOL RESPIRATORY (INHALATION) at 11:24

## 2023-07-11 RX ADMIN — AZITHROMYCIN 255 MILLIGRAM(S): 500 TABLET, FILM COATED ORAL at 17:44

## 2023-07-11 RX ADMIN — ALBUTEROL 2 PUFF(S): 90 AEROSOL, METERED ORAL at 14:53

## 2023-07-11 RX ADMIN — MONTELUKAST 10 MILLIGRAM(S): 4 TABLET, CHEWABLE ORAL at 21:03

## 2023-07-11 NOTE — PROGRESS NOTE ADULT - ASSESSMENT
67yr old F ambulating with a cane, came from doctors office for non productive cough, outpatient chest ray was suspicious of pneumonia hence she was told to go to the hospital. PMHx of HTN, Asthma, pre-DM ( not on medication), HLD.  She complains of experiencing non productive cough for the past 5 days accompanied with shortness of breath. She experienced a loss in the family (her daughter) 5 days ago and attribute that to the cause of her symptoms. She has been wheezing at home and is being nebulized in the morning everday. No hemoptysis, fever, sick contacts, travel history, no URT symptoms. Patient's chest xray shows pulmonary edema. Patient admitted for shortness of breath likely due to CHF vs Asthma.       67yr old F ambulating with a cane PMHx of HTN, Asthma, pre-DM ( not on medication), HLD., came from doctors office for non productive cough, Outpatient chest ray was suspicious of pneumonia. CXR shows mild central CHF. ID, Pulm, Cardiology consulted

## 2023-07-11 NOTE — CONSULT NOTE ADULT - SUBJECTIVE AND OBJECTIVE BOX
Patient is a 67y old  Female who presents with a chief complaint of non productive cough for 5days (2023 12:13)      INTERVAL HPI/OVERNIGHT EVENTS:        PAST MEDICAL & SURGICAL HISTORY:  Asthma, unspecified asthma severity, unspecified whether complicated, unspecified whether persistent      Sarcoidosis      Essential hypertension      Glaucoma of both eyes, unspecified glaucoma type      Morbid obesity with BMI of 50.0-59.9, adult      Hyperlipemia      History of hysterectomy for benign disease  2006      S/P cataract surgery          REVIEW OF SYSTEMS: Total of twelve systems have been reviewed with patient and found to be negative unless mentioned in HPI      SOCIAL HISTORY  Alcohol: Does not drink  Tobacco: Does not smoke  Illicit substance use: None      FAMILY HISTORY: Non contributory to the present illness        No Known Allergies        T(C): 36.8 (23 @ 05:05), Max: 37.2 (07-10-23 @ 16:38)  HR: 98 (23 @ 05:05) (87 - 98)  BP: 108/65 (23 @ 05:05) (108/65 - 142/89)  RR: 20 (23 @ 05:05) (18 - 20)  SpO2: 93% (23 @ 05:05) (93% - 99%)        PHYSICAL EXAM:  GENERAL: Not in distress   CHEST/LUNG:  Aire ntry bilaterally  HEART: s1 and s2 present  ABDOMEN:  Nontender and  Nondistended  EXTREMITIES: No pedal  edema  CNS: Awake and Alert      LABS:                        12.5   7.27  )-----------( 276      ( 2023 05:55 )             37.0     07-11    145  |  111<H>  |  30<H>  ----------------------------<  101<H>  3.8   |  29  |  1.09    Ca    8.9      2023 05:55  Phos  4.4     -11  Mg     2.1     -11    TPro  7.1  /  Alb  3.3<L>  /  TBili  0.6  /  DBili  x   /  AST  9<L>  /  ALT  21  /  AlkPhos  92  07-10    PT/INR - ( 10 Jul 2023 18:58 )   PT: 12.4 sec;   INR: 1.04 ratio         PTT - ( 10 Jul 2023 18:58 )  PTT:28.4 sec        Urinalysis Basic - ( 2023 13:30 )  Color: Yellow / Appearance: Clear / S.025 / pH: x  Gluc: x / Ketone: Negative  / Bili: Negative / Urobili: Negative   Blood: x / Protein: 30 mg/dL / Nitrite: Negative   Leuk Esterase: Trace / RBC: x / WBC x   Sq Epi: x / Non Sq Epi: x / Bacteria: x        MEDICATIONS  (STANDING):  albuterol    90 MICROgram(s) HFA Inhaler 2 Puff(s) Inhalation every 6 hours  aspirin enteric coated 81 milliGRAM(s) Oral daily  atorvastatin 20 milliGRAM(s) Oral at bedtime  azithromycin  IVPB 500 milliGRAM(s) IV Intermittent every 24 hours  budesonide 160 MICROgram(s)/formoterol 4.5 MICROgram(s) Inhaler 2 Puff(s) Inhalation two times a day  cefTRIAXone   IVPB 1000 milliGRAM(s) IV Intermittent every 24 hours  DULoxetine 20 milliGRAM(s) Oral daily  enoxaparin Injectable 40 milliGRAM(s) SubCutaneous every 24 hours  ferrous    sulfate 325 milliGRAM(s) Oral daily  furosemide   Injectable 20 milliGRAM(s) IV Push two times a day  losartan 25 milliGRAM(s) Oral daily  montelukast 10 milliGRAM(s) Oral at bedtime  pantoprazole    Tablet 40 milliGRAM(s) Oral before breakfast  tiotropium 2.5 MICROgram(s) Inhaler 2 Puff(s) Inhalation daily    MEDICATIONS  (PRN):  acetaminophen     Tablet .. 650 milliGRAM(s) Oral every 6 hours PRN Temp greater or equal to 38C (100.4F), Mild Pain (1 - 3)  aluminum hydroxide/magnesium hydroxide/simethicone Suspension 30 milliLiter(s) Oral every 4 hours PRN Dyspepsia  guaiFENesin Oral Liquid (Sugar-Free) 100 milliGRAM(s) Oral every 6 hours PRN Cough  melatonin 3 milliGRAM(s) Oral at bedtime PRN Insomnia  ondansetron Injectable 4 milliGRAM(s) IV Push every 8 hours PRN Nausea and/or Vomiting        RADIOLOGY & ADDITIONAL TESTS:    rad< from: CT Chest No Cont (23 @ 10:10) >    No enlarged axillary lymph nodes. Multiple mediastinal and hilar lymph   nodes many of which have internal foci of calcification and a few of   which are mildly enlarged are without significant interval change since   2021 and are likely related to the given history of sarcoidosis.    No pleural or pericardial effusion. Heart size is normal.    Evaluation of the upper abdomen demonstrate nodularity of the left   adrenal gland unchanged.    Evaluation of the lungs demonstrate no pneumonia. Minimal bilateral lower   lung areas of linear or subsegmental atelectasis.        < from: Xray Chest 2 Views PA/Lat (07.10.23 @ 17:17) >    IMPRESSION: Mild central CHF somewhat increased from prior.      MICROBIOLOGY DATA:    Respiratory Viral Panel with COVID-19 by DARVIN (23 @ 05:50)   Rapid RVP Result: NotDetec  SARS-CoV-2: NotDetec:            Patient is a 67y old  Female with a cane from home, with PMHx of HTN, Asthma, pre-DM ( not on medication), HLD, Sarcoidosis, sent from doctors office (Dr. Jones) for evaluation of non productive cough. Patient's outpatient chest xay was suspicious of pneumonia hence she was told to go to the hospital. She is experiencing non productive cough for the past 5 days accompanied with shortness of breath. She experienced a loss in the family (her daughter) 5 days ago and attribute that to the cause of her symptoms. The patient's cough has remained the same over the course of 5 days. She has been wheezing for the past 5 days for which she is being nebulized in the morning. On admission, she found to have no fever, no leukocytosis, and CXR, CT chest shows no pneumonia.  She has started on Ceftriaxone and Azithromycin, and the ID consult requested to assist with further evaluation and antibiotic management.       REVIEW OF SYSTEMS: Total of twelve systems have been reviewed with patient and found to be negative unless mentioned in HPI      PAST MEDICAL & SURGICAL HISTORY:  Asthma, unspecified asthma severity, unspecified whether complicated, unspecified whether persistent  Sarcoidosis  Essential hypertension  Glaucoma of both eyes, unspecified glaucoma type  Morbid obesity with BMI of 50.0-59.9, adult  Hyperlipemia  History of hysterectomy for benign disease 2006  S/P cataract surgery      SOCIAL HISTORY  Alcohol: Does not drink  Tobacco: Does not smoke  Illicit substance use: None      FAMILY HISTORY: Non contributory to the present illness      ALLERGIES: No Known Allergies      Vital Signs Last 24 Hrs  T(C): 36.9 (2023 13:30), Max: 37.1 (10 Jul 2023 23:13)  T(F): 98.4 (2023 13:30), Max: 98.7 (10 Jul 2023 23:13)  HR: 92 (:30) (87 - 98)  BP: 120/78 (:30) (108/65 - 130/68)  BP(mean): 92 (10 Jul 2023 23:13) (92 - 92)  RR: 20 (:30) (20 - 20)  SpO2: 98% (:30) (93% - 98%)    Parameters below as of 2023 13:30  Patient On (Oxygen Delivery Method): room air      PHYSICAL EXAM:  GENERAL: Not in distress   CHEST/LUNG: Not using accessory  muscles   HEART: s1 and s2 present  ABDOMEN:  Grossly obese  EXTREMITIES: No pedal  edema  CNS: Awake and Alert      LABS:                        12.5   7.27  )-----------( 276      ( 2023 05:55 )             37.0       07-11    145  |  111<H>  |  30<H>  ----------------------------<  101<H>  3.8   |  29  |  1.09    Ca    8.9      2023 05:55  Phos  4.4     07-11  Mg     2.1     07-11    TPro  7.1  /  Alb  3.3<L>  /  TBili  0.6  /  DBili  x   /  AST  9<L>  /  ALT  21  /  AlkPhos  92  07-10    PT/INR - ( 10 Jul 2023 18:58 )   PT: 12.4 sec;   INR: 1.04 ratio      PTT - ( 10 Jul 2023 18:58 )  PTT:28.4 sec      Urinalysis Basic - ( 2023 13:30 )  Color: Yellow / Appearance: Clear / S.025 / pH: x  Gluc: x / Ketone: Negative  / Bili: Negative / Urobili: Negative   Blood: x / Protein: 30 mg/dL / Nitrite: Negative   Leuk Esterase: Trace / RBC: x / WBC x   Sq Epi: x / Non Sq Epi: x / Bacteria: x      MEDICATIONS  (STANDING):  albuterol    90 MICROgram(s) HFA Inhaler 2 Puff(s) Inhalation every 6 hours  aspirin enteric coated 81 milliGRAM(s) Oral daily  atorvastatin 20 milliGRAM(s) Oral at bedtime  azithromycin  IVPB 500 milliGRAM(s) IV Intermittent every 24 hours  budesonide 160 MICROgram(s)/formoterol 4.5 MICROgram(s) Inhaler 2 Puff(s) Inhalation two times a day  cefTRIAXone   IVPB 1000 milliGRAM(s) IV Intermittent every 24 hours  DULoxetine 20 milliGRAM(s) Oral daily  enoxaparin Injectable 40 milliGRAM(s) SubCutaneous every 24 hours  ferrous    sulfate 325 milliGRAM(s) Oral daily  furosemide   Injectable 20 milliGRAM(s) IV Push two times a day  losartan 25 milliGRAM(s) Oral daily  montelukast 10 milliGRAM(s) Oral at bedtime  pantoprazole    Tablet 40 milliGRAM(s) Oral before breakfast  tiotropium 2.5 MICROgram(s) Inhaler 2 Puff(s) Inhalation daily    MEDICATIONS  (PRN):  acetaminophen     Tablet .. 650 milliGRAM(s) Oral every 6 hours PRN Temp greater or equal to 38C (100.4F), Mild Pain (1 - 3)  aluminum hydroxide/magnesium hydroxide/simethicone Suspension 30 milliLiter(s) Oral every 4 hours PRN Dyspepsia  guaiFENesin Oral Liquid (Sugar-Free) 100 milliGRAM(s) Oral every 6 hours PRN Cough  melatonin 3 milliGRAM(s) Oral at bedtime PRN Insomnia  ondansetron Injectable 4 milliGRAM(s) IV Push every 8 hours PRN Nausea and/or Vomiting        RADIOLOGY & ADDITIONAL TESTS:    23: CT Chest No Cont (23 @ 10:10) No enlarged axillary lymph nodes. Multiple mediastinal and hilar lymph   nodes many of which have internal foci of calcification and a few of which are mildly enlarged are without significant interval change since   2021 and are likely related to the given history of sarcoidosis.    No pleural or pericardial effusion. Heart size is normal.    Evaluation of the upper abdomen demonstrate nodularity of the left adrenal gland unchanged.    Evaluation of the lungs demonstrate no pneumonia. Minimal bilateral lower lung areas of linear or subsegmental atelectasis.      7/10/23: Xray Chest 2 Views PA/Lat (07.10.23 @ 17:17) : Mild central CHF somewhat increased from prior.      MICROBIOLOGY DATA:    Respiratory Viral Panel with COVID-19 by DARVIN (23 @ 05:50)   [facility]  SARS-CoV-2: NotDete:

## 2023-07-11 NOTE — CONSULT NOTE ADULT - PROBLEM SELECTOR RECOMMENDATION 9
Bronchodilators  steroids  monitor peak flow  oxygen supp prn  montelukast 10 mg spo Qhs  PFTs as OP  No evidence of pneumonia on CT chest

## 2023-07-11 NOTE — CONSULT NOTE ADULT - SUBJECTIVE AND OBJECTIVE BOX
PULMONARY CONSULT NOTE      DERIAN PEACOCK  MRN-[id number]    History of Present Illness:  Reason for Admission: non productive cough for 5days  History of Present Illness:   This is a 67yr old F ambulating with a cane from home, with PMHx of HTN, Asthma, pre-DM ( not on medication), HLD, sent from doctors office (Dr. Jones) for a non productive cough. Patient's outpatient chest xays was suspicious of pneumonia hence she was told to go to the hospital She complains of experiencing non productive cough for the past 5 days accompanied with shortness of breath. She experienced a loss in the family (her daughter) 5 days ago and attribute that to the cause of her symptoms.The patient's cough has remained the same over the course of 5 days. She has been wheezing for the past 5 day for which she is being nebulized in the morning. She does not complain of fever, runny nose, sore throat, hemoptysis, no sick contacts, no recent travel history.  No chest pain, palpitations, dizziness, orthopnea,  Patient mentions being non-compliant with her medications.    .       HISTORY OF PRESENT ILLNESS: As Above. Awake, alert, comfortable in bed in NAD    MEDICATIONS  (STANDING):  albuterol    90 MICROgram(s) HFA Inhaler 2 Puff(s) Inhalation every 6 hours  aspirin enteric coated 81 milliGRAM(s) Oral daily  atorvastatin 20 milliGRAM(s) Oral at bedtime  azithromycin  IVPB 500 milliGRAM(s) IV Intermittent every 24 hours  budesonide 160 MICROgram(s)/formoterol 4.5 MICROgram(s) Inhaler 2 Puff(s) Inhalation two times a day  cefTRIAXone   IVPB 1000 milliGRAM(s) IV Intermittent every 24 hours  DULoxetine 20 milliGRAM(s) Oral daily  enoxaparin Injectable 40 milliGRAM(s) SubCutaneous every 24 hours  ferrous    sulfate 325 milliGRAM(s) Oral daily  furosemide   Injectable 20 milliGRAM(s) IV Push two times a day  losartan 25 milliGRAM(s) Oral daily  montelukast 10 milliGRAM(s) Oral at bedtime  pantoprazole    Tablet 40 milliGRAM(s) Oral before breakfast  tiotropium 2.5 MICROgram(s) Inhaler 2 Puff(s) Inhalation daily      MEDICATIONS  (PRN):  acetaminophen     Tablet .. 650 milliGRAM(s) Oral every 6 hours PRN Temp greater or equal to 38C (100.4F), Mild Pain (1 - 3)  aluminum hydroxide/magnesium hydroxide/simethicone Suspension 30 milliLiter(s) Oral every 4 hours PRN Dyspepsia  guaiFENesin Oral Liquid (Sugar-Free) 100 milliGRAM(s) Oral every 6 hours PRN Cough  melatonin 3 milliGRAM(s) Oral at bedtime PRN Insomnia  ondansetron Injectable 4 milliGRAM(s) IV Push every 8 hours PRN Nausea and/or Vomiting      Allergies    No Known Allergies    Intolerances        PAST MEDICAL & SURGICAL HISTORY:  Asthma, unspecified asthma severity, unspecified whether complicated, unspecified whether persistent      Sarcoidosis      Essential hypertension      Glaucoma of both eyes, unspecified glaucoma type      Morbid obesity with BMI of 50.0-59.9, adult      Hyperlipemia      History of hysterectomy for benign disease  2006      S/P cataract surgery          FAMILY HISTORY:  No pertinent family history in first degree relatives        SOCIAL HISTORY  Smoking History:     REVIEW OF SYSTEMS:    CONSTITUTIONAL:  No fevers, chills, sweats    HEENT:  Eyes:  No diplopia or blurred vision. ENT:  No earache, sore throat or runny nose.    CARDIOVASCULAR:  No pressure, squeezing, tightness, or heaviness about the chest; no palpitations.    RESPIRATORY:  Per HPI    GASTROINTESTINAL:  No abdominal pain, nausea, vomiting or diarrhea.    GENITOURINARY:  No dysuria, frequency or urgency.    NEUROLOGIC:  No paresthesias, fasciculations, seizures or weakness.    PSYCHIATRIC:  No disorder of thought or mood.    Vital Signs Last 24 Hrs  T(C): 36.8 (11 Jul 2023 05:05), Max: 37.2 (10 Jul 2023 16:38)  T(F): 98.2 (11 Jul 2023 05:05), Max: 98.9 (10 Jul 2023 16:38)  HR: 98 (11 Jul 2023 05:05) (87 - 98)  BP: 108/65 (11 Jul 2023 05:05) (108/65 - 142/89)  BP(mean): 92 (10 Jul 2023 23:13) (92 - 92)  RR: 20 (11 Jul 2023 05:05) (18 - 20)  SpO2: 93% (11 Jul 2023 05:05) (93% - 99%)    Parameters below as of 11 Jul 2023 05:05  Patient On (Oxygen Delivery Method): room air      I&O's Detail      PHYSICAL EXAMINATION:    GENERAL: The patient is a well-developed, well-nourished _____in no apparent distress.     HEENT: Head is normocephalic and atraumatic. Extraocular muscles are intact. Mucous membranes are moist.     NECK: Supple.     LUNGS: Fine wheezes post    HEART: Regular rate and rhythm without murmur.    ABDOMEN: Soft, nontender, and nondistended.  No hepatosplenomegaly is noted.    EXTREMITIES: Without any cyanosis, clubbing, rash, lesions or edema.    NEUROLOGIC: Grossly intact.      LABS:                        12.5   7.27  )-----------( 276      ( 11 Jul 2023 05:55 )             37.0     07-11    145  |  111<H>  |  30<H>  ----------------------------<  101<H>  3.8   |  29  |  1.09    Ca    8.9      11 Jul 2023 05:55  Phos  4.4     07-11  Mg     2.1     07-11    TPro  7.1  /  Alb  3.3<L>  /  TBili  0.6  /  DBili  x   /  AST  9<L>  /  ALT  21  /  AlkPhos  92  07-10    PT/INR - ( 10 Jul 2023 18:58 )   PT: 12.4 sec;   INR: 1.04 ratio         PTT - ( 10 Jul 2023 18:58 )  PTT:28.4 sec  Urinalysis Basic - ( 11 Jul 2023 05:55 )    Color: x / Appearance: x / SG: x / pH: x  Gluc: 101 mg/dL / Ketone: x  / Bili: x / Urobili: x   Blood: x / Protein: x / Nitrite: x   Leuk Esterase: x / RBC: x / WBC x   Sq Epi: x / Non Sq Epi: x / Bacteria: x                      MICROBIOLOGY:    RADIOLOGY & ADDITIONAL STUDIES:    CXR:  < from: Xray Chest 2 Views PA/Lat (07.10.23 @ 17:17) >  IMPRESSION: Mild central CHF somewhat increased from prior.    < end of copied text >  < from: CT Chest No Cont (07.11.23 @ 10:10) >  IMPRESSION: No pneumonia.    5 mm right lower lobe partially calcified nodule is unchanged since April 1, 2021.A 1 year follow-up noncontrast chest CT is recommended to ensure   stability      < end of copied text >    Ct scan chest;    ekg;    echo:

## 2023-07-11 NOTE — CONSULT NOTE ADULT - SUBJECTIVE AND OBJECTIVE BOX
CHIEF COMPLAINT:Patient is a 67y old  Female who presents with a chief complaint of non productive cough for 5days.      HPI:  This is a 67yr old F ambulating with a cane from home, with PMHx of HTN, Asthma, pre-DM ( not on medication), HLD, sent from doctors office (Dr. Jones) for a non productive cough. Patient's outpatient chest xays was suspicious of pneumonia hence she was told to go to the hospital She complains of experiencing non productive cough for the past 5 days accompanied with shortness of breath. She experienced a loss in the family (her daughter) 5 days ago and attribute that to the cause of her symptoms.The patient's cough has remained the same over the course of 5 days. She has been wheezing for the past 5 day for which she is being nebulized in the morning. She does not complain of fever, runny nose, sore throat, hemoptysis, no sick contacts, no recent travel history.  No chest pain, palpitations, dizziness, orthopnea,  Patient mentions being non-compliant with her medications.    .  (10 Jul 2023 23:22)      PAST MEDICAL & SURGICAL HISTORY:  Asthma, unspecified asthma severity, unspecified whether complicated, unspecified whether persistent      Sarcoidosis      Essential hypertension      Glaucoma of both eyes, unspecified glaucoma type      Morbid obesity with BMI of 50.0-59.9, adult      Hyperlipemia      History of hysterectomy for benign disease  2006      S/P cataract surgery          MEDICATIONS  (STANDING):  albuterol    90 MICROgram(s) HFA Inhaler 2 Puff(s) Inhalation every 6 hours  amLODIPine   Tablet 5 milliGRAM(s) Oral daily  aspirin enteric coated 81 milliGRAM(s) Oral daily  atorvastatin 20 milliGRAM(s) Oral at bedtime  azithromycin  IVPB 500 milliGRAM(s) IV Intermittent every 24 hours  budesonide 160 MICROgram(s)/formoterol 4.5 MICROgram(s) Inhaler 2 Puff(s) Inhalation two times a day  cefTRIAXone   IVPB 1000 milliGRAM(s) IV Intermittent every 24 hours  DULoxetine 20 milliGRAM(s) Oral daily  enoxaparin Injectable 40 milliGRAM(s) SubCutaneous every 24 hours  ferrous    sulfate 325 milliGRAM(s) Oral daily  montelukast 10 milliGRAM(s) Oral at bedtime  pantoprazole    Tablet 40 milliGRAM(s) Oral before breakfast  tiotropium 2.5 MICROgram(s) Inhaler 2 Puff(s) Inhalation daily    MEDICATIONS  (PRN):  acetaminophen     Tablet .. 650 milliGRAM(s) Oral every 6 hours PRN Temp greater or equal to 38C (100.4F), Mild Pain (1 - 3)  aluminum hydroxide/magnesium hydroxide/simethicone Suspension 30 milliLiter(s) Oral every 4 hours PRN Dyspepsia  guaiFENesin Oral Liquid (Sugar-Free) 100 milliGRAM(s) Oral every 6 hours PRN Cough  melatonin 3 milliGRAM(s) Oral at bedtime PRN Insomnia  ondansetron Injectable 4 milliGRAM(s) IV Push every 8 hours PRN Nausea and/or Vomiting      FAMILY HISTORY:  No pertinent family history in first degree relatives        SOCIAL HISTORY:    [x ] Non-smoker    [ x] Alcohol-denies    Allergies    No Known Allergies    Intolerances    	    REVIEW OF SYSTEMS:  CONSTITUTIONAL: No fever, weight loss, or fatigue  EYES: No eye pain, visual disturbances, or discharge  ENT:  No difficulty hearing, tinnitus, vertigo; No sinus or throat pain  NECK: No pain or stiffness  RESPIRATORY: + cough, wheezing, chills or hemoptysis; + Shortness of Breath  CARDIOVASCULAR: No chest pain, palpitations, passing out, dizziness, or leg swelling  GASTROINTESTINAL: No abdominal or epigastric pain. No nausea, vomiting, or hematemesis; No diarrhea or constipation. No melena or hematochezia.  GENITOURINARY: No dysuria, frequency, hematuria, or incontinence  NEUROLOGICAL: No headaches, memory loss, loss of strength, numbness, or tremors  SKIN: No itching, burning, rashes, or lesions   LYMPH Nodes: No enlarged glands  ENDOCRINE: No heat or cold intolerance; No hair loss  MUSCULOSKELETAL: No joint pain or swelling; No muscle, back, or extremity pain  PSYCHIATRIC: No depression, anxiety, mood swings, or difficulty sleeping  HEME/LYMPH: No easy bruising, or bleeding gums  ALLERGY AND IMMUNOLOGIC: No hives or eczema	        PHYSICAL EXAM:  T(C): 36.8 (07-11-23 @ 05:05), Max: 37.2 (07-10-23 @ 16:38)  HR: 98 (07-11-23 @ 05:05) (87 - 98)  BP: 108/65 (07-11-23 @ 05:05) (108/65 - 142/89)  RR: 20 (07-11-23 @ 05:05) (18 - 20)  SpO2: 93% (07-11-23 @ 05:05) (93% - 99%)  Wt(kg): --  I&O's Summary      Appearance: Normal	  HEENT:   Normal oral mucosa, PERRL, EOMI	  Lymphatic: No lymphadenopathy  Cardiovascular: Normal S1 S2, No JVD, No murmurs, +1 edema  Respiratory: Lungs clear to auscultation	  Psychiatry: A & O x 3, Mood & affect appropriate  Gastrointestinal:  Soft, Non-tender, + BS	  Skin: No rashes, No ecchymoses, No cyanosis	  Neurologic: Non-focal  Extremities: Normal range of motion, No clubbing, cyanosis +1 edema  Vascular: Peripheral pulses palpable 2+ bilaterally    	    ECG:  nsr,rbbb	  	  	  LABS:	 	                        12.5   7.27  )-----------( 276      ( 11 Jul 2023 05:55 )             37.0     07-11    145  |  111<H>  |  30<H>  ----------------------------<  101<H>  3.8   |  29  |  1.09    Ca    8.9      11 Jul 2023 05:55  Phos  4.4     07-11  Mg     2.1     07-11    TPro  7.1  /  Alb  3.3<L>  /  TBili  0.6  /  DBili  x   /  AST  9<L>  /  ALT  21  /  AlkPhos  92  07-10    < from: Xray Chest 2 Views PA/Lat (07.10.23 @ 17:17) >  ACC: [id number] EXAM:  XR CHEST PA LAT 2V   ORDERED BY: RADHA SHARMA     PROCEDURE DATE:  07/10/2023          INTERPRETATION:  PA and lateral chest on July 10, 2023 at 5:08 PM.   Patient is short of breath.    Heart shows mild enlargement.    There is a mild central congestive picture slightly increased from April 1, 2021.    IMPRESSION: Heart enlargement again noted.    IMPRESSION: Mild central CHF somewhat increased from prior.    < end of copied text >       Inpatient Inpatient Inpatient Inpatient Inpatient-On Service Note Inpatient Inpatient Inpatient Inpatient

## 2023-07-11 NOTE — PATIENT PROFILE ADULT - NSPROPTRIGHTCAREGIVER_GEN_A_NUR
[FreeTextEntry1] : 72 Godwin (Rad Onc) and Dr. Liriano (Med Onc). \par 
yes

## 2023-07-11 NOTE — PROGRESS NOTE ADULT - PROBLEM SELECTOR PLAN 2
h/o HTN on  Monitor BP  c/w home meds - HCTZ and Amlodipine - noted to be on amlodipine and HCTZ at home  - BP controlled  - continue cozaar per Cardio reccs  - monitor BP

## 2023-07-11 NOTE — PROGRESS NOTE ADULT - PROBLEM SELECTOR PLAN 1
Patient complains of shortness of breath and dry cough for the past  5 days.  Crackles present. No wheezing,  ED: albuterol/ipratropium for nebulization 3ml   Chest xray shows pulmonary edema, likely due to non-compliance with medication hydrochlorothiazide vs CHF vs Asthma.  f/u proBNP   Restarted home medication HCTZ  Asthma:   albuterol 3mL inhalation  tiotropium 2.mcg inhalation  montelukast 10mg.  ID: Dr. Arana consulted  Pulm: Dr. Courtney consulted  Cardio: Dr. Bejarano consulted - complained of shortness of breath and dry cough  - CXR: shows pulmonary edema  - CT chest as above  - proBNP  110  - continue IV lasix   - cotninue cozaar  - f/u LE dopplers  - f/u [facility]  - Cardiology Dr. Jay following

## 2023-07-11 NOTE — PROGRESS NOTE ADULT - PROBLEM SELECTOR PLAN 3
- Pt has a history of HLD.   - Will continue pts home medication Atorvastatin. - continue home dose atorvastatin

## 2023-07-11 NOTE — PATIENT PROFILE ADULT - FALL HARM RISK - UNIVERSAL INTERVENTIONS
Bed in lowest position, wheels locked, appropriate side rails in place/Call bell, personal items and telephone in reach/Instruct patient to call for assistance before getting out of bed or chair/Non-slip footwear when patient is out of bed/Media to call system/Physically safe environment - no spills, clutter or unnecessary equipment/Purposeful Proactive Rounding/Room/bathroom lighting operational, light cord in reach

## 2023-07-11 NOTE — PROGRESS NOTE ADULT - PROBLEM SELECTOR PLAN 4
h/o DM previously on metformin   currently not on any medication   starting sliding scale   Adjust as necessary - h/o DM previously on metformin  - currently not on any medication   - starting sliding scale   - Adjust as needed

## 2023-07-11 NOTE — PROGRESS NOTE ADULT - PROBLEM SELECTOR PLAN 5
Patient is over 59 years old and is obese making her a high risk for DVT.  Starting her on DVT prophylaxsis : Levonox 40mg  GI - PPI - dvt ppx: lovenox

## 2023-07-11 NOTE — CONSULT NOTE ADULT - ASSESSMENT
67yr old F ambulating with a cane from home, with PMHx of HTN, Asthma, pre-DM ( not on medication), HLD, sent from doctors office (Dr. Jones) for a non productive cough,acute diastolic hf.  1.Echocardiogram.  2.Dopplers-r/o dvt.  3.Acute diastolic HF-IV lasix.  4.HTN-d/c norvasc due to edma, add cozaar 25mg qd.  5.Asthma-MDI.  6.Check TSH,FLP,A1c.  7.UA-r/o proteinuria.  8.GI and DVT prophylaxis.
Patient is a 67y old  Female with a cane from home, with PMHx of HTN, Asthma, pre-DM ( not on medication), HLD, Sarcoidosis, sent from doctors office (Dr. Jones) for evaluation of non productive cough. Patient's outpatient chest xay was suspicious of pneumonia hence she was told to go to the hospital. She is experiencing non productive cough for the past 5 days accompanied with shortness of breath. She experienced a loss in the family (her daughter) 5 days ago and attribute that to the cause of her symptoms. The patient's cough has remained the same over the course of 5 days. She has been wheezing for the past 5 days for which she is being nebulized in the morning. On admission, she found to have no fever, no leukocytosis, and CXR, CT chest shows no pneumonia.  She has started on Ceftriaxone and Azithromycin, and the ID consult requested to assist with further evaluation and antibiotic management.     # Suspected Pneumonia     would recommend:    1. Please obtain procalcitonin level  2. Continue Ceftriaxone and Azithromycin until work up is done  3. Supplemental oxygenation and Bronchodilator as needed  4. OOB to chair     will follow the patient with you and make further recommendation based on the clinical course and Lab results  Thank you for the opportunity to participate in Ms. PEACOCK's care    Attending Attestation:   Spent more than 65 minutes on total encounter, more than 50 % of the visit was spent counseling and/or coordinating care by the Attending physician.

## 2023-07-12 ENCOUNTER — TRANSCRIPTION ENCOUNTER (OUTPATIENT)
Age: 68
End: 2023-07-12

## 2023-07-12 DIAGNOSIS — R91.1 SOLITARY PULMONARY NODULE: ICD-10-CM

## 2023-07-12 DIAGNOSIS — J44.9 CHRONIC OBSTRUCTIVE PULMONARY DISEASE, UNSPECIFIED: ICD-10-CM

## 2023-07-12 LAB
24R-OH-CALCIDIOL SERPL-MCNC: 30.1 NG/ML — SIGNIFICANT CHANGE UP (ref 30–80)
A1C WITH ESTIMATED AVERAGE GLUCOSE RESULT: 6.2 % — HIGH (ref 4–5.6)
ANION GAP SERPL CALC-SCNC: 6 MMOL/L — SIGNIFICANT CHANGE UP (ref 5–17)
BUN SERPL-MCNC: 31 MG/DL — HIGH (ref 7–18)
CALCIUM SERPL-MCNC: 9 MG/DL — SIGNIFICANT CHANGE UP (ref 8.4–10.5)
CHLORIDE SERPL-SCNC: 105 MMOL/L — SIGNIFICANT CHANGE UP (ref 96–108)
CHOLEST SERPL-MCNC: 175 MG/DL — SIGNIFICANT CHANGE UP
CO2 SERPL-SCNC: 30 MMOL/L — SIGNIFICANT CHANGE UP (ref 22–31)
CREAT SERPL-MCNC: 1.1 MG/DL — SIGNIFICANT CHANGE UP (ref 0.5–1.3)
EGFR: 55 ML/MIN/1.73M2 — LOW
ESTIMATED AVERAGE GLUCOSE: 131 MG/DL — HIGH (ref 68–114)
GLUCOSE SERPL-MCNC: 112 MG/DL — HIGH (ref 70–99)
HDLC SERPL-MCNC: 64 MG/DL — SIGNIFICANT CHANGE UP
LEGIONELLA AG UR QL: NEGATIVE — SIGNIFICANT CHANGE UP
LIPID PNL WITH DIRECT LDL SERPL: 95 MG/DL — SIGNIFICANT CHANGE UP
NON HDL CHOLESTEROL: 111 MG/DL — SIGNIFICANT CHANGE UP
POTASSIUM SERPL-MCNC: 3.8 MMOL/L — SIGNIFICANT CHANGE UP (ref 3.5–5.3)
POTASSIUM SERPL-SCNC: 3.8 MMOL/L — SIGNIFICANT CHANGE UP (ref 3.5–5.3)
PROCALCITONIN SERPL-MCNC: 0.05 NG/ML — SIGNIFICANT CHANGE UP (ref 0.02–0.1)
SODIUM SERPL-SCNC: 141 MMOL/L — SIGNIFICANT CHANGE UP (ref 135–145)
TRIGL SERPL-MCNC: 79 MG/DL — SIGNIFICANT CHANGE UP
TSH SERPL-MCNC: 2.25 UU/ML — SIGNIFICANT CHANGE UP (ref 0.34–4.82)
VIT B12 SERPL-MCNC: 710 PG/ML — SIGNIFICANT CHANGE UP (ref 232–1245)

## 2023-07-12 RX ORDER — MUPIROCIN 20 MG/G
1 OINTMENT TOPICAL
Refills: 0 | Status: DISCONTINUED | OUTPATIENT
Start: 2023-07-12 | End: 2023-07-13

## 2023-07-12 RX ORDER — CHLORHEXIDINE GLUCONATE 213 G/1000ML
1 SOLUTION TOPICAL
Refills: 0 | Status: DISCONTINUED | OUTPATIENT
Start: 2023-07-12 | End: 2023-07-13

## 2023-07-12 RX ORDER — FUROSEMIDE 40 MG
20 TABLET ORAL ONCE
Refills: 0 | Status: COMPLETED | OUTPATIENT
Start: 2023-07-12 | End: 2023-07-12

## 2023-07-12 RX ADMIN — ENOXAPARIN SODIUM 40 MILLIGRAM(S): 100 INJECTION SUBCUTANEOUS at 05:54

## 2023-07-12 RX ADMIN — ALBUTEROL 2 PUFF(S): 90 AEROSOL, METERED ORAL at 05:51

## 2023-07-12 RX ADMIN — CEFTRIAXONE 100 MILLIGRAM(S): 500 INJECTION, POWDER, FOR SOLUTION INTRAMUSCULAR; INTRAVENOUS at 15:01

## 2023-07-12 RX ADMIN — ATORVASTATIN CALCIUM 20 MILLIGRAM(S): 80 TABLET, FILM COATED ORAL at 21:22

## 2023-07-12 RX ADMIN — DULOXETINE HYDROCHLORIDE 20 MILLIGRAM(S): 30 CAPSULE, DELAYED RELEASE ORAL at 11:39

## 2023-07-12 RX ADMIN — BUDESONIDE AND FORMOTEROL FUMARATE DIHYDRATE 2 PUFF(S): 160; 4.5 AEROSOL RESPIRATORY (INHALATION) at 21:25

## 2023-07-12 RX ADMIN — Medication 20 MILLIGRAM(S): at 21:22

## 2023-07-12 RX ADMIN — PANTOPRAZOLE SODIUM 40 MILLIGRAM(S): 20 TABLET, DELAYED RELEASE ORAL at 06:04

## 2023-07-12 RX ADMIN — ALBUTEROL 2 PUFF(S): 90 AEROSOL, METERED ORAL at 17:36

## 2023-07-12 RX ADMIN — Medication 81 MILLIGRAM(S): at 11:39

## 2023-07-12 RX ADMIN — MUPIROCIN 1 APPLICATION(S): 20 OINTMENT TOPICAL at 17:37

## 2023-07-12 RX ADMIN — TIOTROPIUM BROMIDE 2 PUFF(S): 18 CAPSULE ORAL; RESPIRATORY (INHALATION) at 11:37

## 2023-07-12 RX ADMIN — MONTELUKAST 10 MILLIGRAM(S): 4 TABLET, CHEWABLE ORAL at 21:22

## 2023-07-12 RX ADMIN — Medication 100 MILLIGRAM(S): at 05:59

## 2023-07-12 RX ADMIN — ALBUTEROL 2 PUFF(S): 90 AEROSOL, METERED ORAL at 10:41

## 2023-07-12 RX ADMIN — Medication 20 MILLIGRAM(S): at 05:51

## 2023-07-12 RX ADMIN — Medication 325 MILLIGRAM(S): at 11:38

## 2023-07-12 RX ADMIN — CHLORHEXIDINE GLUCONATE 1 APPLICATION(S): 213 SOLUTION TOPICAL at 17:39

## 2023-07-12 RX ADMIN — AZITHROMYCIN 255 MILLIGRAM(S): 500 TABLET, FILM COATED ORAL at 17:36

## 2023-07-12 RX ADMIN — BUDESONIDE AND FORMOTEROL FUMARATE DIHYDRATE 2 PUFF(S): 160; 4.5 AEROSOL RESPIRATORY (INHALATION) at 10:40

## 2023-07-12 RX ADMIN — LOSARTAN POTASSIUM 25 MILLIGRAM(S): 100 TABLET, FILM COATED ORAL at 06:02

## 2023-07-12 RX ADMIN — ALBUTEROL 2 PUFF(S): 90 AEROSOL, METERED ORAL at 22:56

## 2023-07-12 NOTE — PROGRESS NOTE ADULT - PROBLEM SELECTOR PLAN 2
- noted to be on amlodipine and HCTZ at home  - BP controlled  - continue cozaar per Cardio reccs  - monitor BP

## 2023-07-12 NOTE — DISCHARGE NOTE PROVIDER - CARE PROVIDER_API CALL
Tim Jones  Internal Medicine  [address], [address]  [place] [address]  Phone: [phone]  Fax: [phone]  Follow Up Time: 1 week    Denise Bejarano  Cardiology  [address] [place] [address]  Phone: [phone]  Fax: [phone]  Follow Up Time: 1 week   Tim Jones  Internal Medicine  [address], [address]  [place] [address]  Phone: [phone]  Fax: [phone]  Follow Up Time: 1 week    Denise Bejarano  Cardiology  [address] [place] [address]  Phone: [phone]  Fax: [phone]  Follow Up Time: 1 week    Macario Courtney  Pulmonary Disease  [address] [place] [id number]  Phone: [phone]  Fax: [phone]  Follow Up Time: 1 week

## 2023-07-12 NOTE — DISCHARGE NOTE PROVIDER - NSDCCPCAREPLAN_GEN_ALL_CORE_FT
PRINCIPAL DISCHARGE DIAGNOSIS  Diagnosis: Acute diastolic congestive heart failure  Assessment and Plan of Treatment:    Problem/Plan - 1:  ·  Problem: Congestive heart failure.   ·  Plan: - complained of shortness of breath and dry cough  - CXR: shows pulmonary edema  - CT chest as above  - proBNP  110  - continue IV lasix for now  - cotninue cozaar  - LE dopplers no DVT  - ECHo G1DD normal EF  - Cardiology Dr. Jay following.      SECONDARY DISCHARGE DIAGNOSES  Diagnosis: Chronic obstructive asthma  Assessment and Plan of Treatment: HOME CARE INSTRUCTIONS  Take medicines as directed by your caregiver.  Control your home environment in the following ways to help prevent asthma attacks:  Do not smoke or stay in places where others are smoking.  Get rid of pests (such as roaches and mice) & their droppings.  If you see mold on a plant, throw it away.  CALL YOUR DOCTOR IF:  You have wheezing, shortness of breath, or a cough.   You have thickening of sputum or if your sputum changes to yellow, green, gray, or bloody.   You have any problems that may be related to the medicines you are taking (such as a rash, itching, swelling, or trouble breathing).  You are using a reliever medicine more than 2–3 times per week.  SEEK IMMEDIATE MEDICAL CARE IF:  You are short of breath even at rest or from doing very little physical activity.  You have difficulty eating, drinking, or talking due to asthma symptoms.  You have chest pain or you feel that your heart is beating fast.   You have a bluish color to your lips or fingernails.  You are lightheaded, dizzy, or faint.  You have a fever or persistent symptoms for more than 2–3 days.   You seem to be getting worse and are unresponsive to treatment during an asthma attack.      Diagnosis: Lung nodule  Assessment and Plan of Treatment: CT chest shows 5mm right lower lobe calcified nodule unchanged from previous test.       Diagnosis: Hypertension  Assessment and Plan of Treatment:    noted to be on amlodipine and HCTZ at home  - BP controlled  - continue cozaar per Cardio reccs, d'c/d norvasc due to edema  - monitor BP.    Diagnosis: DM (diabetes mellitus)  Assessment and Plan of Treatment: h/o DM previously on metformin  - currently not on any medication   - starting sliding scale   - Adjust as needed.    Diagnosis: Hyperlipidemia  Assessment and Plan of Treatment: continue home dose atorvastatin.     PRINCIPAL DISCHARGE DIAGNOSIS  Diagnosis: Acute diastolic congestive heart failure  Assessment and Plan of Treatment: You were admitted for evaluation shortness of breath and dry cough.    - Chest xray shows pulmonary edema  - CT chest shows 5mm Right lower lobe calcified nodule no change from prior study.   - proBNP resulted  110  You were followed by cardiology and  treated with IV diuretics called Lasix.   Lower leg doppler US shows no DVT. Echo resulted grade 1 diastolic dysfunction with normal ejection fraction.   You were followed by cardiology.  continue medication as prescribed.    Weigh yourself daily.  If you gain 3lbs in 3 days, or 5lbs in a week call your Health Care Provider.  Do not eat or drink foods containing more than 2000mg of salt (sodium) in your diet every day.  Call your Health Care Provider if you have any swelling or increased swelling in your feet, ankles, and/or stomach.  Follow up with your primary doctor [facility] and Cardiology dr. Bejarano after discharge.         SECONDARY DISCHARGE DIAGNOSES  Diagnosis: Chronic obstructive asthma  Assessment and Plan of Treatment: continue home medications as prescribed.   Follow up with pulmonary MD dr. Courtney or your own pulmonology.   Report your doctor if you has symptoms as:   You have wheezing, shortness of breath, or a cough.   You have thickening of sputum or if your sputum changes to yellow, green, gray, or bloody.   You have any problems that may be related to the medicines you are taking (such as a rash, itching, swelling, or trouble breathing).  You are using a reliever medicine more than 2–3 times per week.  SEEK IMMEDIATE MEDICAL CARE IF:  You are short of breath even at rest or from doing very little physical activity.  You have difficulty eating, drinking, or talking due to asthma symptoms.  You have chest pain or you feel that your heart is beating fast.   You have a bluish color to your lips or fingernails.  You are lightheaded, dizzy, or faint.  You have a fever or persistent symptoms for more than 2–3 days.   You seem to be getting worse and are unresponsive to treatment during an asthma attack.      Diagnosis: Lung nodule  Assessment and Plan of Treatment: CT chest shows 5mm right lower lobe calcified nodule unchanged from previous test.   You were followed by pulmonary doctor.   Follow up CT chest without contrast in one year. Follow up with pulmonary and /or PMD upon discharge.       Diagnosis: Hypertension  Assessment and Plan of Treatment: Your home medication Amlodipine was changed to Cozaar due to leg edema.   continue lasix and amlodipine as prescribed.   Low salt diet  Activity as tolerated.  Take all medication as prescribed.  Follow up with your PMD and/or cardiology for routine blood pressure monitoring at your next visit.  Notify your doctor if you have any of the following symptoms:   Dizziness, Lightheadedness, Blurry vision, Headache, Chest pain, Shortness of breath      Diagnosis: DM (diabetes mellitus)  Assessment and Plan of Treatment: Your hgb A1C shows 6.2 this admission.   continue diabetic diet.  continue home medication.   Follow up with your primary MD.  Make sure you get your HgA1c checked every three months.  If you take oral diabetes medications, check your blood glucose two times a day.  It's important not to skip any meals.  Keep a log of your blood glucose results and always take it with you to your doctor appointments.  Keep a list of your current medications including injectables and over the counter medications and bring this medication list with you to all your doctor appointments.  If you have not seen your ophthalmologist this year call for appointment.  Check your feet daily for redness, sores, or openings. Do not self treat. If no improvement in two days call your primary care physician for an appointment.  Low blood sugar (hypoglycemia) is a blood sugar below 70mg/dl. Check your blood sugar if you feel signs/symptoms of hypoglycemia. If your blood sugar is below 70 take 15 grams of carbohydrates (ex 4 oz of apple juice, 3-4 glucose tablets, or 4-6 oz of regular soda) wait 15 minutes and repeat blood sugar to make sure it comes up above 70.  If your blood sugar is above 70 and you are due for a meal, have a meal.  If you are not due for a meal have a snack.  This snack helps keeps your blood sugar at a safe range.      Diagnosis: Hyperlipidemia  Assessment and Plan of Treatment: continue home dose atorvastatin. Follow up with your primary MD for monitoring liver function.

## 2023-07-12 NOTE — PROGRESS NOTE ADULT - PROBLEM SELECTOR PLAN 3
- continue home dose atorvastatin -CT chest shows RLL 5mm calcified nodule, unchanged  -f/u Quantiferon TB Gold test  -Follow up CT chest without contrast in one year.  -pulm dr. Courtney

## 2023-07-12 NOTE — PROGRESS NOTE ADULT - ASSESSMENT
Patient is a 67y old  Female with a cane from home, with PMHx of HTN, Asthma, pre-DM ( not on medication), HLD, Sarcoidosis, sent from doctors office (Dr. Jones) for evaluation of non productive cough. Patient's outpatient chest xay was suspicious of pneumonia hence she was told to go to the hospital. She is experiencing non productive cough for the past 5 days accompanied with shortness of breath. She experienced a loss in the family (her daughter) 5 days ago and attribute that to the cause of her symptoms. The patient's cough has remained the same over the course of 5 days. She has been wheezing for the past 5 days for which she is being nebulized in the morning. On admission, she found to have no fever, no leukocytosis, and CXR, CT chest shows no pneumonia.  She has started on Ceftriaxone and Azithromycin, and the ID consult requested to assist with further evaluation and antibiotic management.     # Suspected Pneumonia - Less likely in the setting of Low Procalcitonin level and negative CT chest for pneumonia    would recommend:    1. Please Discontinue Ceftriaxone and Azithromycin since Procalcitonin level is low and negative CT chest for pneumonia  2. Supportive care  3. OOB to chair     d/w covering Angelita MARMOLEJO    Attending Attestation:     Spent more than 45 minutes on total encounter, more than 50 % of the visit was spent counseling and/or coordinating care by the Attending physician.

## 2023-07-12 NOTE — DISCHARGE NOTE PROVIDER - HOSPITAL COURSE
67yr old F ambulating with a cane PMHx of HTN, Asthma, pre-DM ( not on medication), HLD., came from doctors office for non productive cough, Outpatient chest ray was suspicious of pneumonia. CXR shows mild central CHF. CT chest shows 5mm RLL calcified nodule not changed from prior study. no PNA. ID, Pulm, Cardiology consulted. on IV lasix. Zithro and Rocephin. b/l Lower legs Doppler US shows no DVT. Echo resulted G1DD with normal EF. changed Norvasc to Cozaar per cardiology.   Patient is clinically improving and decision made for DC home with outpatient follow up.   Please refer to medical records for in depth hospital course.     updated 7/12.        67yr old F ambulating with a cane PMHx of HTN, Asthma, pre-DM ( not on medication), HLD., came from doctors office for non productive cough, Outpatient chest ray was suspicious of pneumonia. CXR shows mild central CHF. CT chest shows 5mm RLL calcified nodule not changed from prior study. no PNA. ID, Pulm, Cardiology consulted. started on IV lasix. Zithro and Rocephin. b/l Lower legs Doppler US shows no DVT. Echo resulted G1DD with normal EF. changed Norvasc to Cozaar per cardiology. Procalcitonin remains low.   Antibiotic discontinued as per ID due to no evidence of PNA. Patient remains stable on room air, dry cough improving.   Patient is clinically improving and decision made for DC home with outpatient follow up.   Please refer to medical records for in depth hospital course.

## 2023-07-12 NOTE — DISCHARGE NOTE PROVIDER - NSDCQMERRANDS_GEN_ALL_CORE
Patient: Yvrose Christian    YOB: 1962    Date: 06/26/2019    Primary Care Provider: Gerson Merino DO    Chief Complaint   Patient presents with   • Follow-up     colonoscopy evaluation.  Pt is s/p colon polypectomy with tattooing.       SUBJECTIVE:    History of present illness:  I saw the patient in the office today as a consultation for follow-up colon polyp removal with colon tattooing which was done on 11/20/2018.  Pt denies change in bowel habits including dark colored stool and/or visible rectal bleeding. Pt does have a family history of colon cancer in her father.    As mentioned above the patient does have a strong family history of colon carcinoma in her family in terms of her father.  She also had a colonoscopy by myself in November of last year in which several tubular adenomas were removed, there was a flat tubular adenoma removed from the right colon piecemeal and tattooing was performed.    The following portions of the patient's history were reviewed and updated as appropriate: allergies, current medications, past family history, past medical history, past social history, past surgical history and problem list.      Review of Systems   Constitutional: Negative for chills, fever and unexpected weight change.   HENT: Negative for hearing loss, trouble swallowing and voice change.    Eyes: Negative for visual disturbance.   Respiratory: Negative for apnea, cough, chest tightness, shortness of breath and wheezing.    Cardiovascular: Negative for chest pain, palpitations and leg swelling.   Gastrointestinal: Negative for abdominal distention, abdominal pain, anal bleeding, blood in stool, constipation, diarrhea, nausea, rectal pain and vomiting.   Endocrine: Negative for cold intolerance and heat intolerance.   Genitourinary: Negative for difficulty urinating, dysuria and flank pain.   Musculoskeletal: Negative for back pain and gait problem.   Skin: Negative for color change,  rash and wound.   Neurological: Negative for dizziness, syncope, speech difficulty, weakness, light-headedness, numbness and headaches.   Hematological: Negative for adenopathy. Does not bruise/bleed easily.   Psychiatric/Behavioral: Negative for confusion. The patient is not nervous/anxious.        History:  Past Medical History:   Diagnosis Date   • Anemia    • Body piercing     BOTH EARS   • Depression    • Difficulty swallowing     FOOD   • GERD (gastroesophageal reflux disease)    • Left inguinal hernia    • PONV (postoperative nausea and vomiting)     WITH HERNIA SURGERY   • Wears glasses        Past Surgical History:   Procedure Laterality Date   • APPENDECTOMY     • BLADDER SURGERY     • COLONOSCOPY  2017   • COLONOSCOPY N/A 11/20/2018    Procedure: COLONOSCOPY WITH HOT FORCEP POLYPECTOMY X3 AND HOT SNARE POLYPECTOMYX1, tatooing;  Surgeon: Néstor Marin MD;  Location: [facility] ENDOSCOPY;  Service: Gastroenterology   • CYST REMOVAL      FROM FOOT.  UNSURE OF LATERALITY   • INGUINAL HERNIA REPAIR Left    • PARTIAL HYSTERECTOMY     • TUBAL ABDOMINAL LIGATION     • WISDOM TOOTH EXTRACTION         Family History   Problem Relation Age of Onset   • Colon cancer Father    • Liver cancer Father    • Arthritis Maternal Grandmother    • Diabetes Maternal Grandmother    • Hypertension Maternal Grandmother    • Stroke Maternal Grandmother    • Stroke Paternal Grandmother    • Hypertension Paternal Grandfather    • Hyperlipidemia Paternal Grandfather        Social History     Tobacco Use   • Smoking status: Never Smoker   • Smokeless tobacco: Never Used   Substance Use Topics   • Alcohol use: No   • Drug use: No       Allergies:  Allergies   Allergen Reactions   • Amoxicillin Hives and GI Intolerance   • Sulfa Antibiotics Hives and Swelling       Medications:    Current Outpatient Medications:   •  bisacodyl (DULCOLAX) 5 MG EC tablet, Take 2 tablets by mouth 2 (Two) Times a Day for 1 day., Disp: 4 tablet, Rfl: 0  •   "omeprazole (priLOSEC) 40 MG capsule, , Disp: , Rfl:   •  Polyethylene Glycol powder, 1 bottle 1 (One) Time for 1 dose. To be used for bowel prep., Disp: 238 g, Rfl: 0    OBJECTIVE:    Vital Signs:   Vitals:    06/26/19 1613   BP: 122/70   Pulse: 80   Resp: 16   Temp: 98 °F (36.7 °C)   TempSrc: Temporal   SpO2: 99%   Weight: 60.8 kg (134 lb)   Height: 156.2 cm (61.5\")       Physical Exam:   General Appearance:    Alert, cooperative, in no acute distress   Head:    Normocephalic, without obvious abnormality, atraumatic   Eyes:            Lids and lashes normal, conjunctivae and sclerae normal, no   icterus, no pallor, corneas clear, PERRL   Ears:    Ears appear intact with no abnormalities noted   Throat:   No oral lesions, no thrush, oral mucosa moist   Neck:   No adenopathy, supple, trachea midline, no thyromegaly,  no JVD   Lungs:     Clear to auscultation,respirations regular, even and                  unlabored    Heart:    Regular rhythm and normal rate, normal S1 and S2, no            murmur   Abdomen:     no masses, no organomegaly, soft non-tender, non-distended, no guarding, there is no evidence of tenderness, no evidence of peritoneal signs   Extremities:   Moves all extremities well, no edema, no cyanosis, no             redness   Pulses:   Pulses palpable and equal bilaterally   Skin:   No bleeding, bruising or rash   Lymph nodes:   No palpable adenopathy   Neurologic:   Cranial nerves 2 - 12 grossly intact, sensation intact      Results Review:   I reviewed the patient's new clinical results.  I reviewed the patient's new imaging results and agree with the interpretation.  I reviewed the patient's other test results and agree with the interpretation    Review of Systems was reviewed and confirmed as accurate today.    ASSESSMENT/PLAN:    1. History of colon polyps        I recommend a colonoscopy for further evaluation. The procedure was explained as well as the risks which include but are not limited to " bleeding, infection, perforation, abdominal pain etc. The patient understands these risks and the procedure and wishes to proceed.     Electronically signed by Néstor Marin MD  06/26/19 4:14 PM        Portions of this note have been scribed for Néstor Marin MD by Herminia Jaramillo. 6/26/2019  4:43 PM     No

## 2023-07-12 NOTE — PROGRESS NOTE ADULT - PROBLEM SELECTOR PLAN 4
- h/o DM previously on metformin  - currently not on any medication   - starting sliding scale   - Adjust as needed - noted to be on amlodipine and HCTZ at home  - BP controlled  - continue cozaar per Cardio reccs, d'c/d norvasc due to edema  - monitor BP

## 2023-07-12 NOTE — DISCHARGE NOTE PROVIDER - PROVIDER TOKENS
PROVIDER:[TOKEN:[[id number]],FOLLOWUP:[1 week]],PROVIDER:[TOKEN:[[id number]],FOLLOWUP:[1 week]] PROVIDER:[TOKEN:[[id number]],FOLLOWUP:[1 week]],PROVIDER:[TOKEN:[[id number]],FOLLOWUP:[1 week]],PROVIDER:[TOKEN:[[id number]],FOLLOWUP:[1 week]]

## 2023-07-12 NOTE — PROGRESS NOTE ADULT - PROBLEM SELECTOR PLAN 1
- complained of shortness of breath and dry cough  - CXR: shows pulmonary edema  - CT chest as above  - proBNP  110  - continue IV lasix for now  - cotninue cozaar  - LE dopplers no DVT  - ECHo G1DD normal EF  - Cardiology Dr. Jay following

## 2023-07-12 NOTE — PROGRESS NOTE ADULT - ASSESSMENT
67yr old F ambulating with a cane PMHx of HTN, Asthma, pre-DM ( not on medication), HLD., came from doctors office for non productive cough, Outpatient chest ray was suspicious of pneumonia. CXR shows mild central CHF. ID, Pulm, Cardiology consulted  bronchitis change to po abx once ok by id

## 2023-07-12 NOTE — PROGRESS NOTE ADULT - PROBLEM SELECTOR PLAN 5
- dvt ppx: lovenox    DC plan  --likely DC home tomorrow per cardiology, on IV lasix. no needs at home. - continue home dose atorvastatin

## 2023-07-12 NOTE — PROGRESS NOTE ADULT - ASSESSMENT
67yr old F ambulating with a cane from home, with PMHx of HTN, Asthma, pre-DM ( not on medication), HLD, sent from doctors office (Dr. Jones) for a non productive cough,acute diastolic hf.  1.Acute diastolic HF-IV lasix.  2.HTN- cozaar 25mg qd.  3.Asthma-MDI.Pulm f/u.  4.UA-+ proteinuria,cont cozaar.  5.Sleep study as outpatient-r/o bebeto.  6.GI and DVT prophylaxis.

## 2023-07-12 NOTE — PROGRESS NOTE ADULT - PROBLEM SELECTOR PLAN 4
- h/o DM previously on metformin  - currently not on any medication   - starting sliding scale   - Adjust as needed

## 2023-07-12 NOTE — PROGRESS NOTE ADULT - PROBLEM SELECTOR PLAN 1
- complained of shortness of breath and dry cough  - CXR: shows pulmonary edema  - CT chest as above  - proBNP  110  - continue IV lasix   - cotninue cozaar  - f/u LE dopplers  - f/u ECHo  - Cardiology Dr. Jay following

## 2023-07-12 NOTE — PROGRESS NOTE ADULT - PROBLEM SELECTOR PLAN 2
- noted to be on amlodipine and HCTZ at home  - BP controlled  - continue cozaar per Cardio reccs, d'c/d norvasc due to edema  - monitor BP -Morbid obese, hx asthma  -c/w Symbicort, Spiriva, albuterol  -montelukast 10 mg spo Qhs  -supportive care with cough syrup, tylenol.   -PFTs and TESSA screening as OP.  -pulm dr. Courtney  -ID dr. Arana -d/c'd abt as no evidence PNA.

## 2023-07-12 NOTE — DISCHARGE NOTE PROVIDER - NSDCMRMEDTOKEN_GEN_ALL_CORE_FT
amLODIPine 5 mg oral tablet: 1 tab(s) orally once a day  Aspirin Enteric Coated 81 mg oral delayed release tablet: 1 tab(s) orally once a day  DULoxetine 20 mg oral delayed release capsule: 1 orally once a day  ferrous sulfate 325 mg (65 mg elemental iron) oral delayed release tablet: 1 tab(s) orally once a day  ipratropium-albuterol 0.5 mg-2.5 mg/3 mLinhalation solution: 3 milliliter(s) inhaled every 8 hours  Kerendia 10 mg oral tablet: 2 orally once a day  Lipitor 20 mg oral tablet: 1 orally once a day  metFORMIN 500 mg oral tablet: 1 orally once a day  pantoprazole 40 mg oral delayed release tablet: 1 tab(s) orally once a day (before a meal)  ProAir HFA 90 mcg/inh inhalation aerosol: 2 puff(s) inhaled 4 times a day, As Needed  Symbicort 160 mcg-4.5 mcg/inh inhalation aerosol: 2 puff(s) inhaled 2 times a day  triamterene-hydrochlorothiazide 37.5 mg-25 mg oral capsule: 1 orally once a day  Vitamin D2 50,000 intl units (1.25 mg) oral capsule: 1 cap(s) orally once a day   Aspirin Enteric Coated 81 mg oral delayed release tablet: 1 tab(s) orally once a day  DULoxetine 20 mg oral delayed release capsule: 1 orally once a day  ferrous sulfate 325 mg (65 mg elemental iron) oral delayed release tablet: 1 tab(s) orally once a day  guaiFENesin 100 mg/5 mL oral liquid: 5 milliliter(s) orally every 6 hours As needed Cough  ipratropium-albuterol 0.5 mg-2.5 mg/3 mLinhalation solution: 3 milliliter(s) inhaled every 8 hours  Kerendia 10 mg oral tablet: 2 orally once a day  Lasix 40 mg oral tablet: 1 tab(s) orally once a day  Lipitor 20 mg oral tablet: 1 orally once a day  losartan 25 mg oral tablet: 1 tab(s) orally once a day  metFORMIN 500 mg oral tablet: 1 orally once a day  montelukast 10 mg oral tablet: 1 tab(s) orally once a day (at bedtime)  pantoprazole 40 mg oral delayed release tablet: 1 tab(s) orally once a day (before a meal)  ProAir HFA 90 mcg/inh inhalation aerosol: 2 puff(s) inhaled 4 times a day, As Needed  Symbicort 160 mcg-4.5 mcg/inh inhalation aerosol: 2 puff(s) inhaled 2 times a day  Vitamin D2 50,000 intl units (1.25 mg) oral capsule: 1 cap(s) orally once a day

## 2023-07-12 NOTE — PROGRESS NOTE ADULT - ASSESSMENT
67yr old F ambulating with a cane PMHx of HTN, Asthma, pre-DM ( not on medication), HLD., came from doctors office for non productive cough, Outpatient chest ray was suspicious of pneumonia. CXR shows mild central CHF. ID, Pulm, Cardiology consulted. on IV lasix. Zithro and Rocephin.

## 2023-07-13 ENCOUNTER — TRANSCRIPTION ENCOUNTER (OUTPATIENT)
Age: 68
End: 2023-07-13

## 2023-07-13 VITALS
DIASTOLIC BLOOD PRESSURE: 80 MMHG | TEMPERATURE: 98 F | OXYGEN SATURATION: 97 % | RESPIRATION RATE: 18 BRPM | WEIGHT: 293 LBS | HEART RATE: 89 BPM | SYSTOLIC BLOOD PRESSURE: 143 MMHG

## 2023-07-13 LAB
ANION GAP SERPL CALC-SCNC: 10 MMOL/L — SIGNIFICANT CHANGE UP (ref 5–17)
BUN SERPL-MCNC: 25 MG/DL — HIGH (ref 7–18)
CALCIUM SERPL-MCNC: 9 MG/DL — SIGNIFICANT CHANGE UP (ref 8.4–10.5)
CHLORIDE SERPL-SCNC: 102 MMOL/L — SIGNIFICANT CHANGE UP (ref 96–108)
CO2 SERPL-SCNC: 28 MMOL/L — SIGNIFICANT CHANGE UP (ref 22–31)
CREAT SERPL-MCNC: 1.11 MG/DL — SIGNIFICANT CHANGE UP (ref 0.5–1.3)
CULTURE RESULTS: SIGNIFICANT CHANGE UP
EGFR: 54 ML/MIN/1.73M2 — LOW
GLUCOSE SERPL-MCNC: 116 MG/DL — HIGH (ref 70–99)
M PNEUMO IGM SER-ACNC: 0.43 INDEX — SIGNIFICANT CHANGE UP (ref 0–0.9)
MYCOPLASMA AG SPEC QL: NEGATIVE — SIGNIFICANT CHANGE UP
POTASSIUM SERPL-MCNC: 4.2 MMOL/L — SIGNIFICANT CHANGE UP (ref 3.5–5.3)
POTASSIUM SERPL-SCNC: 4.2 MMOL/L — SIGNIFICANT CHANGE UP (ref 3.5–5.3)
SODIUM SERPL-SCNC: 140 MMOL/L — SIGNIFICANT CHANGE UP (ref 135–145)
SPECIMEN SOURCE: SIGNIFICANT CHANGE UP

## 2023-07-13 PROCEDURE — 86738 MYCOPLASMA ANTIBODY: CPT

## 2023-07-13 PROCEDURE — 87641 MR-STAPH DNA AMP PROBE: CPT

## 2023-07-13 PROCEDURE — 84100 ASSAY OF PHOSPHORUS: CPT

## 2023-07-13 PROCEDURE — 80048 BASIC METABOLIC PNL TOTAL CA: CPT

## 2023-07-13 PROCEDURE — 81001 URINALYSIS AUTO W/SCOPE: CPT

## 2023-07-13 PROCEDURE — 87070 CULTURE OTHR SPECIMN AEROBIC: CPT

## 2023-07-13 PROCEDURE — 93970 EXTREMITY STUDY: CPT

## 2023-07-13 PROCEDURE — 85027 COMPLETE CBC AUTOMATED: CPT

## 2023-07-13 PROCEDURE — 71046 X-RAY EXAM CHEST 2 VIEWS: CPT

## 2023-07-13 PROCEDURE — 82607 VITAMIN B-12: CPT

## 2023-07-13 PROCEDURE — 99285 EMERGENCY DEPT VISIT HI MDM: CPT

## 2023-07-13 PROCEDURE — 87640 STAPH A DNA AMP PROBE: CPT

## 2023-07-13 PROCEDURE — 87899 AGENT NOS ASSAY W/OPTIC: CPT

## 2023-07-13 PROCEDURE — 85730 THROMBOPLASTIN TIME PARTIAL: CPT

## 2023-07-13 PROCEDURE — 83880 ASSAY OF NATRIURETIC PEPTIDE: CPT

## 2023-07-13 PROCEDURE — 71250 CT THORAX DX C-: CPT

## 2023-07-13 PROCEDURE — 84443 ASSAY THYROID STIM HORMONE: CPT

## 2023-07-13 PROCEDURE — 36415 COLL VENOUS BLD VENIPUNCTURE: CPT

## 2023-07-13 PROCEDURE — 93005 ELECTROCARDIOGRAM TRACING: CPT

## 2023-07-13 PROCEDURE — 80061 LIPID PANEL: CPT

## 2023-07-13 PROCEDURE — 85610 PROTHROMBIN TIME: CPT

## 2023-07-13 PROCEDURE — 83735 ASSAY OF MAGNESIUM: CPT

## 2023-07-13 PROCEDURE — 82306 VITAMIN D 25 HYDROXY: CPT

## 2023-07-13 PROCEDURE — 94640 AIRWAY INHALATION TREATMENT: CPT

## 2023-07-13 PROCEDURE — 83036 HEMOGLOBIN GLYCOSYLATED A1C: CPT

## 2023-07-13 PROCEDURE — 84145 PROCALCITONIN (PCT): CPT

## 2023-07-13 PROCEDURE — 87449 NOS EACH ORGANISM AG IA: CPT

## 2023-07-13 PROCEDURE — 0225U NFCT DS DNA&RNA 21 SARSCOV2: CPT

## 2023-07-13 PROCEDURE — 80053 COMPREHEN METABOLIC PANEL: CPT

## 2023-07-13 PROCEDURE — 93306 TTE W/DOPPLER COMPLETE: CPT

## 2023-07-13 RX ORDER — TRIAMTERENE/HYDROCHLOROTHIAZID 75 MG-50MG
1 TABLET ORAL
Refills: 0 | DISCHARGE

## 2023-07-13 RX ORDER — MONTELUKAST 4 MG/1
1 TABLET, CHEWABLE ORAL
Qty: 30 | Refills: 0
Start: 2023-07-13 | End: 2023-08-11

## 2023-07-13 RX ORDER — FUROSEMIDE 40 MG
1 TABLET ORAL
Qty: 30 | Refills: 0
Start: 2023-07-13 | End: 2023-08-11

## 2023-07-13 RX ORDER — AMLODIPINE BESYLATE 2.5 MG/1
1 TABLET ORAL
Qty: 0 | Refills: 0 | DISCHARGE

## 2023-07-13 RX ORDER — LOSARTAN POTASSIUM 100 MG/1
1 TABLET, FILM COATED ORAL
Qty: 30 | Refills: 0
Start: 2023-07-13 | End: 2023-08-11

## 2023-07-13 RX ADMIN — ALBUTEROL 2 PUFF(S): 90 AEROSOL, METERED ORAL at 05:51

## 2023-07-13 RX ADMIN — ENOXAPARIN SODIUM 40 MILLIGRAM(S): 100 INJECTION SUBCUTANEOUS at 05:50

## 2023-07-13 RX ADMIN — CHLORHEXIDINE GLUCONATE 1 APPLICATION(S): 213 SOLUTION TOPICAL at 05:55

## 2023-07-13 RX ADMIN — MUPIROCIN 1 APPLICATION(S): 20 OINTMENT TOPICAL at 05:53

## 2023-07-13 RX ADMIN — PANTOPRAZOLE SODIUM 40 MILLIGRAM(S): 20 TABLET, DELAYED RELEASE ORAL at 05:46

## 2023-07-13 RX ADMIN — Medication 20 MILLIGRAM(S): at 05:53

## 2023-07-13 RX ADMIN — LOSARTAN POTASSIUM 25 MILLIGRAM(S): 100 TABLET, FILM COATED ORAL at 05:45

## 2023-07-13 NOTE — PROGRESS NOTE ADULT - PROVIDER SPECIALTY LIST ADULT
Cardiology
Infectious Disease
Cardiology
Internal Medicine
Pulmonology
Internal Medicine
Pulmonology

## 2023-07-13 NOTE — PROGRESS NOTE ADULT - REASON FOR ADMISSION
non productive cough for 5days

## 2023-07-13 NOTE — PROGRESS NOTE ADULT - PROBLEM SELECTOR PLAN 4
- noted to be on amlodipine and HCTZ at home  - BP controlled  - continue cozaar per Cardio reccs, d'c/d norvasc due to edema  - monitor BP

## 2023-07-13 NOTE — PROGRESS NOTE ADULT - PROBLEM SELECTOR PLAN 3
-CT chest shows RLL 5mm calcified nodule, unchanged  -f/u Quantiferon TB Gold test  -Follow up CT chest without contrast in one year.  -pulm dr. Courtney

## 2023-07-13 NOTE — PROGRESS NOTE ADULT - PROBLEM SELECTOR PLAN 6
Accuchecks with reg insulin coverage  HBA1C
- h/o DM previously on metformin  - currently not on any medication   - starting sliding scale   - Adjust as needed
Accuchecks with reg insulin coverage  HBA1C
- h/o DM previously on metformin  - currently not on any medication   - starting sliding scale   - Adjust as needed

## 2023-07-13 NOTE — DISCHARGE NOTE NURSING/CASE MANAGEMENT/SOCIAL WORK - PATIENT PORTAL LINK FT
You can access the FollowMyHealth Patient Portal offered by [facility] by registering at the following website: [email or web address]. By joining [facility] FollowMyHealth portal, you will also be able to view your health information using other applications (apps) compatible with our system.
64

## 2023-07-13 NOTE — PROGRESS NOTE ADULT - PROBLEM SELECTOR PLAN 7
- dvt ppx: lovenox    DC plan  --likely DC home today per cardiology, on IV lasix. no needs at home.
lasix IV (may switch to po)  follow up CXR  Echo
lasix prn  follow up CXR  Echo
- dvt ppx: lovenox    DC plan  --likely DC home tomorrow per cardiology, on IV lasix. no needs at home.

## 2023-07-13 NOTE — PROGRESS NOTE ADULT - SUBJECTIVE AND OBJECTIVE BOX
CHIEF COMPLAINT:Patient is a 67y old  Female who presents with a chief complaint of non productive cough for 5days.Pt appears comfortable.    	  REVIEW OF SYSTEMS:  CONSTITUTIONAL: No fever, weight loss, or fatigue  EYES: No eye pain, visual disturbances, or discharge  ENT:  No difficulty hearing, tinnitus, vertigo; No sinus or throat pain  NECK: No pain or stiffness  RESPIRATORY: No cough, wheezing, chills or hemoptysis; No Shortness of Breath  CARDIOVASCULAR: No chest pain, palpitations, passing out, dizziness, or leg swelling  GASTROINTESTINAL: No abdominal or epigastric pain. No nausea, vomiting, or hematemesis; No diarrhea or constipation. No melena or hematochezia.  GENITOURINARY: No dysuria, frequency, hematuria, or incontinence  NEUROLOGICAL: No headaches, memory loss, loss of strength, numbness, or tremors  SKIN: No itching, burning, rashes, or lesions   LYMPH Nodes: No enlarged glands  ENDOCRINE: No heat or cold intolerance; No hair loss  MUSCULOSKELETAL: No joint pain or swelling; No muscle, back, or extremity pain  PSYCHIATRIC: No depression, anxiety, mood swings, or difficulty sleeping  HEME/LYMPH: No easy bruising, or bleeding gums  ALLERGY AND IMMUNOLOGIC: No hives or eczema	    PHYSICAL EXAM:  T(C): 36.9 (07-13-23 @ 05:56), Max: 37.2 (07-12-23 @ 20:31)  HR: 89 (07-13-23 @ 05:56) (89 - 105)  BP: 143/80 (07-13-23 @ 05:56) (109/61 - 143/80)  RR: 18 (07-13-23 @ 05:56) (18 - 18)  SpO2: 97% (07-13-23 @ 05:56) (94% - 97%)  Wt(kg): --  I&O's Summary      Appearance: Normal	  HEENT:   Normal oral mucosa, PERRL, EOMI	  Lymphatic: No lymphadenopathy  Cardiovascular: Normal S1 S2, No JVD, No murmurs, No edema  Respiratory: Lungs clear to auscultation	  Psychiatry: A & O x 3, Mood & affect appropriate  Gastrointestinal:  Soft, Non-tender, + BS	  Skin: No rashes, No ecchymoses, No cyanosis	  Neurologic: Non-focal  Extremities: Normal range of motion, No clubbing, cyanosis or edema  Vascular: Peripheral pulses palpable 2+ bilaterally    MEDICATIONS  (STANDING):  albuterol    90 MICROgram(s) HFA Inhaler 2 Puff(s) Inhalation every 6 hours  aspirin enteric coated 81 milliGRAM(s) Oral daily  atorvastatin 20 milliGRAM(s) Oral at bedtime  budesonide 160 MICROgram(s)/formoterol 4.5 MICROgram(s) Inhaler 2 Puff(s) Inhalation two times a day  chlorhexidine 2% Cloths 1 Application(s) Topical <User Schedule>  DULoxetine 20 milliGRAM(s) Oral daily  enoxaparin Injectable 40 milliGRAM(s) SubCutaneous every 24 hours  ferrous    sulfate 325 milliGRAM(s) Oral daily  furosemide   Injectable 20 milliGRAM(s) IV Push two times a day  losartan 25 milliGRAM(s) Oral daily  montelukast 10 milliGRAM(s) Oral at bedtime  mupirocin 2% Ointment 1 Application(s) Both Nostrils two times a day  pantoprazole    Tablet 40 milliGRAM(s) Oral before breakfast  tiotropium 2.5 MICROgram(s) Inhaler 2 Puff(s) Inhalation daily        	  LABS:	 	    7-13    140  |  102  |  25<H>  ----------------------------<  116<H>  4.2   |  28  |  1.11    Ca    9.0      13 Jul 2023 06:05      proBNP:   Lipid Profile: Cholesterol 175  LDL --  HDL 64  TG 79  Ldl calc 95  Ratio --    HgA1c:   TSH: Thyroid Stimulating Hormone, Serum: 2.25 uU/mL (07-12 @ 06:10)      	        
  Patient is seen and examined at the bed side, is afebrile.  The procalcitonin level is low, 0.05,      REVIEW OF SYSTEMS: All other review systems are negative      ALLERGIES: No Known Allergies      Vital Signs Last 24 Hrs  T(C): 37.1 (2023 13:55), Max: 37.1 (2023 13:55)  T(F): 98.7 (2023 13:55), Max: 98.7 (2023 13:55)  HR: 105 (2023 13:55) (88 - 105)  BP: 109/61 (2023 13:55) (109/61 - 130/63)  BP(mean): --  RR: 18 (2023 13:55) (18 - 20)  SpO2: 95% (2023 13:55) (95% - 95%)    Parameters below as of 2023 13:55  Patient On (Oxygen Delivery Method): room air      PHYSICAL EXAM:  GENERAL: Not in distress   CHEST/LUNG: Not using accessory  muscles   HEART: s1 and s2 present  ABDOMEN:  Grossly obese  EXTREMITIES: No pedal  edema  CNS: Awake and Alert      LABS: No new Labs                         12.5   7.27  )-----------( 276      ( 2023 05:55 )             37.0       07-11    145  |  111<H>  |  30<H>  ----------------------------<  101<H>  3.8   |  29  |  1.09    Ca    8.9      2023 05:55  Phos  4.4     07-11  Mg     2.1     07-11    TPro  7.1  /  Alb  3.3<L>  /  TBili  0.6  /  DBili  x   /  AST  9<L>  /  ALT  21  /  AlkPhos  92  07-10    PT/INR - ( 10 Jul 2023 18:58 )   PT: 12.4 sec;   INR: 1.04 ratio      PTT - ( 10 Jul 2023 18:58 )  PTT:28.4 sec      Urinalysis Basic - ( 2023 13:30 )  Color: Yellow / Appearance: Clear / S.025 / pH: x  Gluc: x / Ketone: Negative  / Bili: Negative / Urobili: Negative   Blood: x / Protein: 30 mg/dL / Nitrite: Negative   Leuk Esterase: Trace / RBC: x / WBC x   Sq Epi: x / Non Sq Epi: x / Bacteria: x      Procalcitonin, Serum (23 @ 06:10) : 0.05:     MEDICATIONS  (STANDING):    albuterol    90 MICROgram(s) HFA Inhaler 2 Puff(s) Inhalation every 6 hours  aspirin enteric coated 81 milliGRAM(s) Oral daily  atorvastatin 20 milliGRAM(s) Oral at bedtime  azithromycin  IVPB 500 milliGRAM(s) IV Intermittent every 24 hours  budesonide 160 MICROgram(s)/formoterol 4.5 MICROgram(s) Inhaler 2 Puff(s) Inhalation two times a day  cefTRIAXone   IVPB 1000 milliGRAM(s) IV Intermittent every 24 hours  chlorhexidine 2% Cloths 1 Application(s) Topical <User Schedule>  DULoxetine 20 milliGRAM(s) Oral daily  enoxaparin Injectable 40 milliGRAM(s) SubCutaneous every 24 hours  ferrous    sulfate 325 milliGRAM(s) Oral daily  furosemide   Injectable 20 milliGRAM(s) IV Push two times a day  losartan 25 milliGRAM(s) Oral daily  montelukast 10 milliGRAM(s) Oral at bedtime  mupirocin 2% Ointment 1 Application(s) Both Nostrils two times a day  pantoprazole    Tablet 40 milliGRAM(s) Oral before breakfast  tiotropium 2.5 MICROgram(s) Inhaler 2 Puff(s) Inhalation daily        RADIOLOGY & ADDITIONAL TESTS:    23: CT Chest No Cont (23 @ 10:10) No enlarged axillary lymph nodes. Multiple mediastinal and hilar lymph   nodes many of which have internal foci of calcification and a few of which are mildly enlarged are without significant interval change since   2021 and are likely related to the given history of sarcoidosis.    No pleural or pericardial effusion. Heart size is normal.    Evaluation of the upper abdomen demonstrate nodularity of the left adrenal gland unchanged.    Evaluation of the lungs demonstrate no pneumonia. Minimal bilateral lower lung areas of linear or subsegmental atelectasis.      7/10/23: Xray Chest 2 Views PA/Lat (07.10.23 @ 17:17) : Mild central CHF somewhat increased from prior.      MICROBIOLOGY DATA:    Respiratory Viral Panel with COVID-19 by DARVIN (23 @ 05:50)   Rapid RVP Result: NotDetec  SARS-CoV-2: NotDetec:           
  CHIEF COMPLAINT:Patient is a 67y old  Female who presents with a chief complaint of non productive cough for 5days.Pt appears comfortable.    	  REVIEW OF SYSTEMS:  CONSTITUTIONAL: No fever, weight loss, or fatigue  EYES: No eye pain, visual disturbances, or discharge  ENT:  No difficulty hearing, tinnitus, vertigo; No sinus or throat pain  NECK: No pain or stiffness  RESPIRATORY: No cough, wheezing, chills or hemoptysis; No Shortness of Breath  CARDIOVASCULAR: No chest pain, palpitations, passing out, dizziness, or leg swelling  GASTROINTESTINAL: No abdominal or epigastric pain. No nausea, vomiting, or hematemesis; No diarrhea or constipation. No melena or hematochezia.  GENITOURINARY: No dysuria, frequency, hematuria, or incontinence  NEUROLOGICAL: No headaches, memory loss, loss of strength, numbness, or tremors  SKIN: No itching, burning, rashes, or lesions   LYMPH Nodes: No enlarged glands  ENDOCRINE: No heat or cold intolerance; No hair loss  MUSCULOSKELETAL: No joint pain or swelling; No muscle, back, or extremity pain  PSYCHIATRIC: No depression, anxiety, mood swings, or difficulty sleeping  HEME/LYMPH: No easy bruising, or bleeding gums  ALLERGY AND IMMUNOLOGIC: No hives or eczema	      PHYSICAL EXAM:  T(C): 36.9 (07-12-23 @ 05:47), Max: 36.9 (07-11-23 @ 13:30)  HR: 88 (07-12-23 @ 05:47) (88 - 94)  BP: 130/63 (07-12-23 @ 05:47) (119/79 - 130/63)  RR: 18 (07-12-23 @ 05:47) (18 - 20)  SpO2: 95% (07-12-23 @ 05:47) (95% - 98%)  Wt(kg): --  I&O's Summary      Appearance: Normal	  HEENT:   Normal oral mucosa, PERRL, EOMI	  Lymphatic: No lymphadenopathy  Cardiovascular: Normal S1 S2, No JVD, No murmurs, +1 edema  Respiratory: Lungs clear to auscultation	  Psychiatry: A & O x 3, Mood & affect appropriate  Gastrointestinal:  Soft, Non-tender, + BS	  Skin: No rashes, No ecchymoses, No cyanosis	  Neurologic: Non-focal  Extremities: Normal range of motion, No clubbing, cyanosis +1 edema  Vascular: Peripheral pulses palpable 2+ bilaterally    MEDICATIONS  (STANDING):  albuterol    90 MICROgram(s) HFA Inhaler 2 Puff(s) Inhalation every 6 hours  aspirin enteric coated 81 milliGRAM(s) Oral daily  atorvastatin 20 milliGRAM(s) Oral at bedtime  azithromycin  IVPB 500 milliGRAM(s) IV Intermittent every 24 hours  budesonide 160 MICROgram(s)/formoterol 4.5 MICROgram(s) Inhaler 2 Puff(s) Inhalation two times a day  cefTRIAXone   IVPB 1000 milliGRAM(s) IV Intermittent every 24 hours  DULoxetine 20 milliGRAM(s) Oral daily  enoxaparin Injectable 40 milliGRAM(s) SubCutaneous every 24 hours  ferrous    sulfate 325 milliGRAM(s) Oral daily  furosemide   Injectable 20 milliGRAM(s) IV Push two times a day  losartan 25 milliGRAM(s) Oral daily  montelukast 10 milliGRAM(s) Oral at bedtime  pantoprazole    Tablet 40 milliGRAM(s) Oral before breakfast  tiotropium 2.5 MICROgram(s) Inhaler 2 Puff(s) Inhalation daily        LABS:	 	                              12.5   7.27  )-----------( 276      ( 11 Jul 2023 05:55 )             37.0     07-12    141  |  105  |  31<H>  ----------------------------<  112<H>  3.8   |  30  |  1.10    Ca    9.0      12 Jul 2023 06:10  Phos  4.4     07-11  Mg     2.1     07-11    TPro  7.1  /  Alb  3.3<L>  /  TBili  0.6  /  DBili  x   /  AST  9<L>  /  ALT  21  /  AlkPhos  92  07-10      Lipid Profile: Cholesterol 175  LDL --  HDL 64  TG 79  Ldl calc 95    TSH: Thyroid Stimulating Hormone, Serum: 2.25 uU/mL (07-12 @ 06:10)      doppler-no dvt	      Urinalysis (07.11.23 @ 13:30)   Glucose Qualitative, Urine: Negative  Blood, Urine: Negative  pH Urine: 5.0  Color: Yellow  Urine Appearance: Clear  Bilirubin: Negative  Ketone - Urine: Negative  Specific Gravity: 1.025  Protein, Urine: 30 mg/dL  Urobilinogen: Negative  Nitrite: Negative  Leukocyte Esterase Concentration: Trace  < from: CT Chest No Cont (07.11.23 @ 10:10) >    ACC: [id number] EXAM:  CT CHEST   ORDERED BY: ARYAN CORTEZ     PROCEDURE DATE:  07/11/2023          INTERPRETATION:  Clinical indication: Shortness of breath, evaluate for   pneumonia, history of sarcoidosis.    Axial CT images of the chest are obtained without intravenous   administration of contrast.    Comparison is made with the chest CT of April 1, 2021.    No enlarged axillary lymph nodes. Multiple mediastinal and hilar lymph   nodes many of which have internal foci of calcification and a few of   which are mildly enlarged are without significant interval change since   April 1, 2021 and are likely related to the given history of sarcoidosis.    No pleural or pericardial effusion. Heart size is normal.    Evaluation of the upper abdomen demonstrate nodularity of the left   adrenal gland unchanged.    Evaluation of the lungs demonstrate no pneumonia. Minimal bilateral lower   lung areas of linear or subsegmental atelectasis.    5 mm right lower lobe partially calcified nodule on image92 of series 3   is unchanged.  3 mm nodule associated with the left fissure suggestive of an   intrafissural lymph node.    No bronchiectasis or honeycombing. Some mosaic attenuation of the lungs   or alternating pulmonary parenchymal density appears unchanged suggestive   of areas of pulmonary air entrapment. No central endobronchial lesions.    Degenerative changes of the spine.    IMPRESSION: No pneumonia.    5 mm right lower lobe partially calcified nodule is unchanged since April 1, 2021.A 1 year follow-up noncontrast chest CT is recommended to ensure   stability    --- End of Report ---            ANGELA MADISON MD; Attending Radiologist  This document has been electronically signed. Jul 11 2023 10:52AM    < end of copied text >  < from: Transthoracic Echocardiogram (07.11.23 @ 10:40) >  OBSERVATIONS:  Mitral Valve: Mitral annular calcification. Mild to  moderate mitral regurgitation.  Aortic Root: Aortic Root: 3.5 cm.    Aortic Valve: Aortic valve not well visualized.  Left Atrium: Normal left atrium.  LA volume index = 33  cc/m2.  Left Ventricle: Normal Left Ventricular Systolic Function,  (EF = 55%) Normal left ventricular internal dimensions and  wall thicknesses. Grade I diastolic dysfunction (Impaired  relaxation, mild).  Right Heart: Normal right atrium. Normal right ventricular  size and systolic function (TAPSE 2.4 cm). Normal tricuspid  valve. Pulmonic valve not well seen.  Pericardium/PleuraNormal pericardium with no pericardial  effusion.  Hemodynamic: Unable to estimate RVSP.    < end of copied text >  
NP Note discussed with  Primary Attending    INTERVAL HPI/OVERNIGHT EVENTS: seen at bedside, pt states feeling a lot better today, dry cough improving today. no other complaints.     MEDICATIONS  (STANDING):  albuterol    90 MICROgram(s) HFA Inhaler 2 Puff(s) Inhalation every 6 hours  aspirin enteric coated 81 milliGRAM(s) Oral daily  atorvastatin 20 milliGRAM(s) Oral at bedtime  azithromycin  IVPB 500 milliGRAM(s) IV Intermittent every 24 hours  budesonide 160 MICROgram(s)/formoterol 4.5 MICROgram(s) Inhaler 2 Puff(s) Inhalation two times a day  cefTRIAXone   IVPB 1000 milliGRAM(s) IV Intermittent every 24 hours  chlorhexidine 2% Cloths 1 Application(s) Topical <User Schedule>  DULoxetine 20 milliGRAM(s) Oral daily  enoxaparin Injectable 40 milliGRAM(s) SubCutaneous every 24 hours  ferrous    sulfate 325 milliGRAM(s) Oral daily  furosemide   Injectable 20 milliGRAM(s) IV Push two times a day  losartan 25 milliGRAM(s) Oral daily  montelukast 10 milliGRAM(s) Oral at bedtime  mupirocin 2% Ointment 1 Application(s) Both Nostrils two times a day  pantoprazole    Tablet 40 milliGRAM(s) Oral before breakfast  tiotropium 2.5 MICROgram(s) Inhaler 2 Puff(s) Inhalation daily    MEDICATIONS  (PRN):  acetaminophen     Tablet .. 650 milliGRAM(s) Oral every 6 hours PRN Temp greater or equal to 38C (100.4F), Mild Pain (1 - 3)  aluminum hydroxide/magnesium hydroxide/simethicone Suspension 30 milliLiter(s) Oral every 4 hours PRN Dyspepsia  guaiFENesin Oral Liquid (Sugar-Free) 100 milliGRAM(s) Oral every 6 hours PRN Cough  melatonin 3 milliGRAM(s) Oral at bedtime PRN Insomnia  ondansetron Injectable 4 milliGRAM(s) IV Push every 8 hours PRN Nausea and/or Vomiting      __________________________________________________  REVIEW OF SYSTEMS:    CONSTITUTIONAL: No fever,   EYES: no acute visual disturbances  NECK: No pain or stiffness  RESPIRATORY: No cough; No shortness of breath  CARDIOVASCULAR: No chest pain, no palpitations  GASTROINTESTINAL: No pain. No nausea or vomiting; No diarrhea   NEUROLOGICAL: No headache or numbness, no tremors  MUSCULOSKELETAL: No joint pain, no muscle pain  GENITOURINARY: no dysuria, no frequency, no hesitancy  PSYCHIATRY: no depression , no anxiety  ALL OTHER  ROS negative        Vital Signs Last 24 Hrs  T(C): 37.1 (12 Jul 2023 13:55), Max: 37.1 (12 Jul 2023 13:55)  T(F): 98.7 (12 Jul 2023 13:55), Max: 98.7 (12 Jul 2023 13:55)  HR: 105 (12 Jul 2023 13:55) (88 - 105)  BP: 109/61 (12 Jul 2023 13:55) (109/61 - 130/63)  BP(mean): --  RR: 18 (12 Jul 2023 13:55) (18 - 20)  SpO2: 95% (12 Jul 2023 13:55) (95% - 95%)    Parameters below as of 12 Jul 2023 13:55  Patient On (Oxygen Delivery Method): room air        ________________________________________________  PHYSICAL EXAM:  GENERAL: NAD, obese  HEENT: Normocephalic;  conjunctivae and sclerae clear; moist mucous membranes;   NECK : supple  CHEST/LUNG: Clear to auscultation bilaterally with fair air entry no wheezing or rhonchi  HEART: S1 S2  regular; no murmurs, gallops or rubs  ABDOMEN: Soft, Nontender, Nondistended; Bowel sounds present  EXTREMITIES: no cyanosis; no edema; no calf tenderness  SKIN: warm and dry; no rash  NERVOUS SYSTEM:  Awake and alert; Oriented  to place, person and time ; no new deficits    _________________________________________________  LABS:                        12.5   7.27  )-----------( 276      ( 11 Jul 2023 05:55 )             37.0     07-12    141  |  105  |  31<H>  ----------------------------<  112<H>  3.8   |  30  |  1.10    Ca    9.0      12 Jul 2023 06:10  Phos  4.4     07-11  Mg     2.1     07-11    TPro  7.1  /  Alb  3.3<L>  /  TBili  0.6  /  DBili  x   /  AST  9<L>  /  ALT  21  /  AlkPhos  92  07-10    PT/INR - ( 10 Jul 2023 18:58 )   PT: 12.4 sec;   INR: 1.04 ratio         PTT - ( 10 Jul 2023 18:58 )  PTT:28.4 sec  Urinalysis Basic - ( 12 Jul 2023 06:10 )    Color: x / Appearance: x / SG: x / pH: x  Gluc: 112 mg/dL / Ketone: x  / Bili: x / Urobili: x   Blood: x / Protein: x / Nitrite: x   Leuk Esterase: x / RBC: x / WBC x   Sq Epi: x / Non Sq Epi: x / Bacteria: x      CAPILLARY BLOOD GLUCOSE    RADIOLOGY & ADDITIONAL TESTS:  < from: CT Chest No Cont (07.11.23 @ 10:10) >  IMPRESSION: No pneumonia.    5 mm right lower lobe partially calcified nodule is unchanged since April 1, 2021.A 1 year follow-up noncontrast chest CT is recommended to ensure   stability    < end of copied text >      Imaging  Reviewed:  YES  < from: Xray Chest 2 Views PA/Lat (07.10.23 @ 17:17) >    ACC: [id number] EXAM:  XR CHEST PA LAT 2V   ORDERED BY: RADHA SHARMA     PROCEDURE DATE:  07/10/2023          INTERPRETATION:  PA and lateral chest on July 10, 2023 at 5:08 PM.   Patient is short of breath.    Heart shows mild enlargement.    There is a mild central congestive picture slightly increased from April 1, 2021.    IMPRESSION: Heart enlargement again noted.    IMPRESSION: Mild central CHF somewhat increased from prior.    --- End of Report ---            CAROLYN BROWN MD; Attending Radiologist  This document has been electronically signed. Jul 11 2023  9:29AM    < end of copied text >    Consultant(s) Notes Reviewed:   YES      Plan of care was discussed with patient and /or primary care giver; all questions and concerns were addressed 
NP Note discussed with  Primary Attending    Patient is a 67y old  Female who presents with a chief complaint of non productive cough for 5days (2023 14:26)      INTERVAL HPI/OVERNIGHT EVENTS: no acute events overnight    MEDICATIONS  (STANDING):  albuterol    90 MICROgram(s) HFA Inhaler 2 Puff(s) Inhalation every 6 hours  aspirin enteric coated 81 milliGRAM(s) Oral daily  atorvastatin 20 milliGRAM(s) Oral at bedtime  azithromycin  IVPB 500 milliGRAM(s) IV Intermittent every 24 hours  budesonide 160 MICROgram(s)/formoterol 4.5 MICROgram(s) Inhaler 2 Puff(s) Inhalation two times a day  cefTRIAXone   IVPB 1000 milliGRAM(s) IV Intermittent every 24 hours  DULoxetine 20 milliGRAM(s) Oral daily  enoxaparin Injectable 40 milliGRAM(s) SubCutaneous every 24 hours  ferrous    sulfate 325 milliGRAM(s) Oral daily  furosemide   Injectable 20 milliGRAM(s) IV Push two times a day  losartan 25 milliGRAM(s) Oral daily  montelukast 10 milliGRAM(s) Oral at bedtime  pantoprazole    Tablet 40 milliGRAM(s) Oral before breakfast  tiotropium 2.5 MICROgram(s) Inhaler 2 Puff(s) Inhalation daily    MEDICATIONS  (PRN):  acetaminophen     Tablet .. 650 milliGRAM(s) Oral every 6 hours PRN Temp greater or equal to 38C (100.4F), Mild Pain (1 - 3)  aluminum hydroxide/magnesium hydroxide/simethicone Suspension 30 milliLiter(s) Oral every 4 hours PRN Dyspepsia  guaiFENesin Oral Liquid (Sugar-Free) 100 milliGRAM(s) Oral every 6 hours PRN Cough  melatonin 3 milliGRAM(s) Oral at bedtime PRN Insomnia  ondansetron Injectable 4 milliGRAM(s) IV Push every 8 hours PRN Nausea and/or Vomiting      __________________________________________________  REVIEW OF SYSTEMS:    CONSTITUTIONAL: No fever,   EYES: no acute visual disturbances  NECK: No pain or stiffness  RESPIRATORY: + cough; No shortness of breath  CARDIOVASCULAR: No chest pain, no palpitations  GASTROINTESTINAL: No pain. No nausea or vomiting; No diarrhea   NEUROLOGICAL: No headache or numbness, no tremors  MUSCULOSKELETAL: No joint pain, no muscle pain  GENITOURINARY: no dysuria, no frequency, no hesitancy  PSYCHIATRY: no depression , no anxiety  ALL OTHER  ROS negative        Vital Signs Last 24 Hrs  T(C): 36.9 (2023 13:30), Max: 37.2 (10 Jul 2023 16:38)  T(F): 98.4 (2023 13:30), Max: 98.9 (10 Jul 2023 16:38)  HR: 92 (2023 13:30) (87 - 98)  BP: 120/78 (2023 13:30) (108/65 - 142/89)  BP(mean): 92 (10 Jul 2023 23:13) (92 - 92)  RR: 20 (2023 13:30) (18 - 20)  SpO2: 98% (2023 13:30) (93% - 99%)    Parameters below as of 2023 13:30  Patient On (Oxygen Delivery Method): room air        ________________________________________________  PHYSICAL EXAM:  GENERAL: NAD  HEENT: Normocephalic  NECK : supple  CHEST/LUNG: Clear to auscultation bilaterally  HEART: S1 S2  regular  ABDOMEN: Soft, Nontender, Nondistended; Bowel sounds present  EXTREMITIES: no edema  SKIN: warm and dry; no rash  NERVOUS SYSTEM:  Awake and alert; Oriented  to place, person and time    _________________________________________________  LABS:                        12.5   7.27  )-----------( 276      ( 2023 05:55 )             37.0     07-11    145  |  111<H>  |  30<H>  ----------------------------<  101<H>  3.8   |  29  |  1.09    Ca    8.9      2023 05:55  Phos  4.4       Mg     2.1     -11    TPro  7.1  /  Alb  3.3<L>  /  TBili  0.6  /  DBili  x   /  AST  9<L>  /  ALT  21  /  AlkPhos  92  07-10    PT/INR - ( 10 Jul 2023 18:58 )   PT: 12.4 sec;   INR: 1.04 ratio         PTT - ( 10 Jul 2023 18:58 )  PTT:28.4 sec  Urinalysis Basic - ( 2023 13:30 )    Color: Yellow / Appearance: Clear / S.025 / pH: x  Gluc: x / Ketone: Negative  / Bili: Negative / Urobili: Negative   Blood: x / Protein: 30 mg/dL / Nitrite: Negative   Leuk Esterase: Trace / RBC: 0-2 /HPF / WBC 3-5 /HPF   Sq Epi: x / Non Sq Epi: x / Bacteria: Few /HPF      CAPILLARY BLOOD GLUCOSE            RADIOLOGY & ADDITIONAL TESTS:  < from: CT Chest No Cont (23 @ 10:10) >  INTERPRETATION:  Clinical indication: Shortness of breath, evaluate for   pneumonia, history of sarcoidosis.    Axial CT images of the chest are obtained without intravenous   administration of contrast.    Comparison is made with the chest CT of 2021.    No enlarged axillary lymph nodes. Multiple mediastinal and hilar lymph   nodes many of which have internal foci of calcification and a few of   which are mildly enlarged are without significant interval change since   2021 and are likely related to the given history of sarcoidosis.    No pleural or pericardial effusion. Heart size is normal.    Evaluation of the upper abdomen demonstrate nodularity of the left   adrenal gland unchanged.    Evaluation of the lungs demonstrate no pneumonia. Minimal bilateral lower   lung areas of linear or subsegmental atelectasis.    5 mm right lower lobe partially calcified nodule on image92 of series 3   is unchanged.  3 mm nodule associated with the left fissure suggestive of an   intrafissural lymph node.    No bronchiectasis or honeycombing. Some mosaic attenuation of the lungs   or alternating pulmonary parenchymal density appears unchanged suggestive   of areas of pulmonary air entrapment. No central endobronchial lesions.    Degenerative changes of the spine.    IMPRESSION: No pneumonia.    5 mm right lower lobe partially calcified nodule is unchanged since 2021.A 1 year follow-up noncontrast chest CT is recommended to ensure   stability    --- End of Report ---    < end of copied text >    < from: Xray Chest 2 Views PA/Lat (07.10.23 @ 17:17) >  INTERPRETATION:  PA and lateral chest on July 10, 2023 at 5:08 PM.   Patient is short of breath.    Heart shows mild enlargement.    There is a mild central congestive picture slightly increased from 2021.    IMPRESSION: Heart enlargement again noted.    IMPRESSION: Mild central CHF somewhat increased from prior.    --- End of Report ---    < end of copied text >    Imaging Personally Reviewed:  YES    Consultant(s) Notes Reviewed:   YES        Plan of care was discussed with patient and /or primary care giver; all questions and concerns were addressed and care was aligned with patient's wishes.    
Patient was seen and examined  Patient is a 67y old  Female who presents with a chief complaint of non productive cough for 5days (12 Jul 2023 18:17)      INTERVAL HPI/OVERNIGHT EVENTS:  T(C): 36.9 (07-13-23 @ 05:56), Max: 37.2 (07-12-23 @ 20:31)  HR: 89 (07-13-23 @ 05:56) (89 - 105)  BP: 143/80 (07-13-23 @ 05:56) (109/61 - 143/80)  RR: 18 (07-13-23 @ 05:56) (18 - 18)  SpO2: 97% (07-13-23 @ 05:56) (94% - 97%)  Wt(kg): --  I&O's Summary      LABS:    07-13    140  |  102  |  25<H>  ----------------------------<  116<H>  4.2   |  28  |  1.11    Ca    9.0      13 Jul 2023 06:05        Urinalysis Basic - ( 13 Jul 2023 06:05 )    Color: x / Appearance: x / SG: x / pH: x  Gluc: 116 mg/dL / Ketone: x  / Bili: x / Urobili: x   Blood: x / Protein: x / Nitrite: x   Leuk Esterase: x / RBC: x / WBC x   Sq Epi: x / Non Sq Epi: x / Bacteria: x      CAPILLARY BLOOD GLUCOSE        LIPID PANEL  Cholesterol 175  LDL --  HDL 64  RATIO HDL/Total Cholesterol --  Triglyceride 79        Urinalysis Basic - ( 13 Jul 2023 06:05 )    Color: x / Appearance: x / SG: x / pH: x  Gluc: 116 mg/dL / Ketone: x  / Bili: x / Urobili: x   Blood: x / Protein: x / Nitrite: x   Leuk Esterase: x / RBC: x / WBC x   Sq Epi: x / Non Sq Epi: x / Bacteria: x        MEDICATIONS  (STANDING):  albuterol    90 MICROgram(s) HFA Inhaler 2 Puff(s) Inhalation every 6 hours  aspirin enteric coated 81 milliGRAM(s) Oral daily  atorvastatin 20 milliGRAM(s) Oral at bedtime  budesonide 160 MICROgram(s)/formoterol 4.5 MICROgram(s) Inhaler 2 Puff(s) Inhalation two times a day  chlorhexidine 2% Cloths 1 Application(s) Topical <User Schedule>  DULoxetine 20 milliGRAM(s) Oral daily  enoxaparin Injectable 40 milliGRAM(s) SubCutaneous every 24 hours  ferrous    sulfate 325 milliGRAM(s) Oral daily  furosemide   Injectable 20 milliGRAM(s) IV Push two times a day  losartan 25 milliGRAM(s) Oral daily  montelukast 10 milliGRAM(s) Oral at bedtime  mupirocin 2% Ointment 1 Application(s) Both Nostrils two times a day  pantoprazole    Tablet 40 milliGRAM(s) Oral before breakfast  tiotropium 2.5 MICROgram(s) Inhaler 2 Puff(s) Inhalation daily    MEDICATIONS  (PRN):  acetaminophen     Tablet .. 650 milliGRAM(s) Oral every 6 hours PRN Temp greater or equal to 38C (100.4F), Mild Pain (1 - 3)  aluminum hydroxide/magnesium hydroxide/simethicone Suspension 30 milliLiter(s) Oral every 4 hours PRN Dyspepsia  guaiFENesin Oral Liquid (Sugar-Free) 100 milliGRAM(s) Oral every 6 hours PRN Cough  melatonin 3 milliGRAM(s) Oral at bedtime PRN Insomnia  ondansetron Injectable 4 milliGRAM(s) IV Push every 8 hours PRN Nausea and/or Vomiting      RADIOLOGY & ADDITIONAL TESTS:    Imaging Personally Reviewed:  [ ] YES  [ ] NO    REVIEW OF SYSTEMS:  CONSTITUTIONAL: No fever, weight loss, or fatigue  EYES: No eye pain, visual disturbances, or discharge  ENMT:  No difficulty hearing, tinnitus, vertigo; No sinus or throat pain  NECK: No pain or stiffness  BREASTS: No pain, masses, or nipple discharge  RESPIRATORY: No cough, wheezing, chills or hemoptysis; No shortness of breath  CARDIOVASCULAR: No chest pain, palpitations, dizziness, or leg swelling  GASTROINTESTINAL: No abdominal or epigastric pain. No nausea, vomiting, or hematemesis; No diarrhea or constipation. No melena or hematochezia.  GENITOURINARY: No dysuria, frequency, hematuria, or incontinence  NEUROLOGICAL: No headaches, memory loss, loss of strength, numbness, or tremors  SKIN: No itching, burning, rashes, or lesions   LYMPH NODES: No enlarged glands  ENDOCRINE: No heat or cold intolerance; No hair loss  MUSCULOSKELETAL: No joint pain or swelling; No muscle, back, or extremity pain  PSYCHIATRIC: No depression, anxiety, mood swings, or difficulty sleeping  HEME/LYMPH: No easy bruising, or bleeding gums  ALLERY AND IMMUNOLOGIC: No hives or eczema      Consultant(s) Notes Reviewed:  [ x ] YES  [ ] NO    PHYSICAL EXAM:  GENERAL: NAD, well-groomed, well-developed  HEAD:  Atraumatic, Normocephalic  EYES: EOMI, PERRLA, conjunctiva and sclera clear  ENMT: No tonsillar erythema, exudates, or enlargement; Moist mucous membranes, Good dentition, No lesions  NECK: Supple, No JVD, Normal thyroid  NERVOUS SYSTEM:  Alert & Oriented X3, Good concentration; Motor Strength 5/5 B/L upper and lower extremities; DTRs 2+ intact and symmetric  CHEST/LUNG: Clear to percussion bilaterally; No rales, rhonchi, wheezing, or rubs  HEART: Regular rate and rhythm; No murmurs, rubs, or gallops  ABDOMEN: Soft, Nontender, Nondistended; Bowel sounds present  EXTREMITIES:  2+ Peripheral Pulses, No clubbing, cyanosis, or edema  LYMPH: No lymphadenopathy noted  SKIN: No rashes or lesions    Care Discussed with Consultants/Other Providers [ x] YES  [ ] NO
Patient is a 67y old  Female who presents with a chief complaint of non productive cough for 5days (12 Jul 2023 11:29)  Awake, alert, comfortable in bed in NAD. Feeling better. No cough or sob    INTERVAL HPI/OVERNIGHT EVENTS:      VITAL SIGNS:  T(F): 98.4 (07-12-23 @ 05:47)  HR: 88 (07-12-23 @ 05:47)  BP: 130/63 (07-12-23 @ 05:47)  RR: 18 (07-12-23 @ 05:47)  SpO2: 95% (07-12-23 @ 05:47)  Wt(kg): --  I&O's Detail          REVIEW OF SYSTEMS:    CONSTITUTIONAL:  No fevers, chills, sweats    HEENT:  Eyes:  No diplopia or blurred vision. ENT:  No earache, sore throat or runny nose.    CARDIOVASCULAR:  No pressure, squeezing, tightness, or heaviness about the chest; no palpitations.    RESPIRATORY:  Per HPI    GASTROINTESTINAL:  No abdominal pain, nausea, vomiting or diarrhea.    GENITOURINARY:  No dysuria, frequency or urgency.    NEUROLOGIC:  No paresthesias, fasciculations, seizures or weakness.    PSYCHIATRIC:  No disorder of thought or mood.      PHYSICAL EXAM:    Constitutional: Well developed and nourished  Eyes:Perrla  ENMT: normal  Neck:supple  Respiratory: good air entry  Cardiovascular: S1 S2 regular  Gastrointestinal: Soft, Non tender  Extremities: No edema  Vascular:normal  Neurological:Awake, alert,Ox3  Musculoskeletal:Normal      MEDICATIONS  (STANDING):  albuterol    90 MICROgram(s) HFA Inhaler 2 Puff(s) Inhalation every 6 hours  aspirin enteric coated 81 milliGRAM(s) Oral daily  atorvastatin 20 milliGRAM(s) Oral at bedtime  azithromycin  IVPB 500 milliGRAM(s) IV Intermittent every 24 hours  budesonide 160 MICROgram(s)/formoterol 4.5 MICROgram(s) Inhaler 2 Puff(s) Inhalation two times a day  cefTRIAXone   IVPB 1000 milliGRAM(s) IV Intermittent every 24 hours  chlorhexidine 2% Cloths 1 Application(s) Topical <User Schedule>  DULoxetine 20 milliGRAM(s) Oral daily  enoxaparin Injectable 40 milliGRAM(s) SubCutaneous every 24 hours  ferrous    sulfate 325 milliGRAM(s) Oral daily  furosemide   Injectable 20 milliGRAM(s) IV Push two times a day  losartan 25 milliGRAM(s) Oral daily  montelukast 10 milliGRAM(s) Oral at bedtime  mupirocin 2% Ointment 1 Application(s) Both Nostrils two times a day  pantoprazole    Tablet 40 milliGRAM(s) Oral before breakfast  tiotropium 2.5 MICROgram(s) Inhaler 2 Puff(s) Inhalation daily    MEDICATIONS  (PRN):  acetaminophen     Tablet .. 650 milliGRAM(s) Oral every 6 hours PRN Temp greater or equal to 38C (100.4F), Mild Pain (1 - 3)  aluminum hydroxide/magnesium hydroxide/simethicone Suspension 30 milliLiter(s) Oral every 4 hours PRN Dyspepsia  guaiFENesin Oral Liquid (Sugar-Free) 100 milliGRAM(s) Oral every 6 hours PRN Cough  melatonin 3 milliGRAM(s) Oral at bedtime PRN Insomnia  ondansetron Injectable 4 milliGRAM(s) IV Push every 8 hours PRN Nausea and/or Vomiting      Allergies    No Known Allergies    Intolerances        LABS:                        12.5   7.27  )-----------( 276      ( 11 Jul 2023 05:55 )             37.0     07-12    141  |  105  |  31<H>  ----------------------------<  112<H>  3.8   |  30  |  1.10    Ca    9.0      12 Jul 2023 06:10  Phos  4.4     07-11  Mg     2.1     07-11    TPro  7.1  /  Alb  3.3<L>  /  TBili  0.6  /  DBili  x   /  AST  9<L>  /  ALT  21  /  AlkPhos  92  07-10    PT/INR - ( 10 Jul 2023 18:58 )   PT: 12.4 sec;   INR: 1.04 ratio         PTT - ( 10 Jul 2023 18:58 )  PTT:28.4 sec  Urinalysis Basic - ( 12 Jul 2023 06:10 )    Color: x / Appearance: x / SG: x / pH: x  Gluc: 112 mg/dL / Ketone: x  / Bili: x / Urobili: x   Blood: x / Protein: x / Nitrite: x   Leuk Esterase: x / RBC: x / WBC x   Sq Epi: x / Non Sq Epi: x / Bacteria: x            CAPILLARY BLOOD GLUCOSE            RADIOLOGY & ADDITIONAL TESTS:    CXR:  < from: Xray Chest 2 Views PA/Lat (07.10.23 @ 17:17) >  IMPRESSION: Mild central CHF somewhat increased from prior.    < end of copied text >  < from: CT Chest No Cont (07.11.23 @ 10:10) >  IMPRESSION: No pneumonia.    5 mm right lower lobe partially calcified nodule is unchanged since April 1, 2021.A 1 year follow-up noncontrast chest CT is recommended to ensure   stability      < end of copied text >    Ct scan chest:    ekg;    echo:
Patient was seen and examined  Patient is a 67y old  Female who presents with a chief complaint of productive cough for 5days (12 Jul 2023 13:54)  clinically better today       INTERVAL HPI/OVERNIGHT EVENTS:  T(C): 36.9 (07-12-23 @ 05:47), Max: 36.9 (07-12-23 @ 05:47)  HR: 88 (07-12-23 @ 05:47) (88 - 94)  BP: 130/63 (07-12-23 @ 05:47) (119/79 - 130/63)  RR: 18 (07-12-23 @ 05:47) (18 - 20)  SpO2: 95% (07-12-23 @ 05:47) (95% - 95%)  Wt(kg): --  I&O's Summary      LABS:                        12.5   7.27  )-----------( 276      ( 11 Jul 2023 05:55 )             37.0     07-12    141  |  105  |  31<H>  ----------------------------<  112<H>  3.8   |  30  |  1.10    Ca    9.0      12 Jul 2023 06:10  Phos  4.4     07-11  Mg     2.1     07-11    TPro  7.1  /  Alb  3.3<L>  /  TBili  0.6  /  DBili  x   /  AST  9<L>  /  ALT  21  /  AlkPhos  92  07-10    PT/INR - ( 10 Jul 2023 18:58 )   PT: 12.4 sec;   INR: 1.04 ratio         PTT - ( 10 Jul 2023 18:58 )  PTT:28.4 sec  Urinalysis Basic - ( 12 Jul 2023 06:10 )    Color: x / Appearance: x / SG: x / pH: x  Gluc: 112 mg/dL / Ketone: x  / Bili: x / Urobili: x   Blood: x / Protein: x / Nitrite: x   Leuk Esterase: x / RBC: x / WBC x   Sq Epi: x / Non Sq Epi: x / Bacteria: x      CAPILLARY BLOOD GLUCOSE        LIPID PANEL  Cholesterol 175  LDL --  HDL 64  RATIO HDL/Total Cholesterol --  Triglyceride 79        Urinalysis Basic - ( 12 Jul 2023 06:10 )    Color: x / Appearance: x / SG: x / pH: x  Gluc: 112 mg/dL / Ketone: x  / Bili: x / Urobili: x   Blood: x / Protein: x / Nitrite: x   Leuk Esterase: x / RBC: x / WBC x   Sq Epi: x / Non Sq Epi: x / Bacteria: x        MEDICATIONS  (STANDING):  albuterol    90 MICROgram(s) HFA Inhaler 2 Puff(s) Inhalation every 6 hours  aspirin enteric coated 81 milliGRAM(s) Oral daily  atorvastatin 20 milliGRAM(s) Oral at bedtime  azithromycin  IVPB 500 milliGRAM(s) IV Intermittent every 24 hours  budesonide 160 MICROgram(s)/formoterol 4.5 MICROgram(s) Inhaler 2 Puff(s) Inhalation two times a day  cefTRIAXone   IVPB 1000 milliGRAM(s) IV Intermittent every 24 hours  chlorhexidine 2% Cloths 1 Application(s) Topical <User Schedule>  DULoxetine 20 milliGRAM(s) Oral daily  enoxaparin Injectable 40 milliGRAM(s) SubCutaneous every 24 hours  ferrous    sulfate 325 milliGRAM(s) Oral daily  furosemide   Injectable 20 milliGRAM(s) IV Push two times a day  losartan 25 milliGRAM(s) Oral daily  montelukast 10 milliGRAM(s) Oral at bedtime  mupirocin 2% Ointment 1 Application(s) Both Nostrils two times a day  pantoprazole    Tablet 40 milliGRAM(s) Oral before breakfast  tiotropium 2.5 MICROgram(s) Inhaler 2 Puff(s) Inhalation daily    MEDICATIONS  (PRN):  acetaminophen     Tablet .. 650 milliGRAM(s) Oral every 6 hours PRN Temp greater or equal to 38C (100.4F), Mild Pain (1 - 3)  aluminum hydroxide/magnesium hydroxide/simethicone Suspension 30 milliLiter(s) Oral every 4 hours PRN Dyspepsia  guaiFENesin Oral Liquid (Sugar-Free) 100 milliGRAM(s) Oral every 6 hours PRN Cough  melatonin 3 milliGRAM(s) Oral at bedtime PRN Insomnia  ondansetron Injectable 4 milliGRAM(s) IV Push every 8 hours PRN Nausea and/or Vomiting      RADIOLOGY & ADDITIONAL TESTS:    Imaging Personally Reviewed:  [ ] YES  [ ] NO    REVIEW OF SYSTEMS:  CONSTITUTIONAL: No fever, weight loss, or fatigue  EYES: No eye pain, visual disturbances, or discharge  ENMT:  No difficulty hearing, tinnitus, vertigo; No sinus or throat pain  NECK: No pain or stiffness  BREASTS: No pain, masses, or nipple discharge  RESPIRATORY: No cough, wheezing, chills or hemoptysis; No shortness of breath  CARDIOVASCULAR: No chest pain, palpitations, dizziness, or leg swelling  GASTROINTESTINAL: No abdominal or epigastric pain. No nausea, vomiting, or hematemesis; No diarrhea or constipation. No melena or hematochezia.  GENITOURINARY: No dysuria, frequency, hematuria, or incontinence  NEUROLOGICAL: No headaches, memory loss, loss of strength, numbness, or tremors  SKIN: No itching, burning, rashes, or lesions   LYMPH NODES: No enlarged glands  ENDOCRINE: No heat or cold intolerance; No hair loss  MUSCULOSKELETAL: No joint pain or swelling; No muscle, back, or extremity pain  PSYCHIATRIC: No depression, anxiety, mood swings, or difficulty sleeping  HEME/LYMPH: No easy bruising, or bleeding gums  ALLERY AND IMMUNOLOGIC: No hives or eczema      Consultant(s) Notes Reviewed:  [ x ] YES  [ ] NO    PHYSICAL EXAM:  GENERAL: NAD, well-groomed, well-developed  HEAD:  Atraumatic, Normocephalic  EYES: EOMI, PERRLA, conjunctiva and sclera clear  ENMT: No tonsillar erythema, exudates, or enlargement; Moist mucous membranes, Good dentition, No lesions  NECK: Supple, No JVD, Normal thyroid  NERVOUS SYSTEM:  Alert & Oriented X3, Good concentration; Motor Strength 5/5 B/L upper and lower extremities; DTRs 2+ intact and symmetric  CHEST/LUNG: bl rhonchi   HEART: Regular rate and rhythm; No murmurs, rubs, or gallops  ABDOMEN: Soft, Nontender, Nondistended; Bowel sounds present  EXTREMITIES:  2+ Peripheral Pulses, No clubbing, cyanosis, or edema  LYMPH: No lymphadenopathy noted  SKIN: No rashes or lesions    Care Discussed with Consultants/Other Providers [ x] YES  [ ] NO
Patient is a 67y old  Female who presents with a chief complaint of non productive cough for 5days (13 Jul 2023 08:17)  Awake, alert, comfortable in bed in NAD. Clinically improved    INTERVAL HPI/OVERNIGHT EVENTS:      VITAL SIGNS:  T(F): 98.4 (07-13-23 @ 05:56)  HR: 89 (07-13-23 @ 05:56)  BP: 143/80 (07-13-23 @ 05:56)  RR: 18 (07-13-23 @ 05:56)  SpO2: 97% (07-13-23 @ 05:56)  Wt(kg): --  I&O's Detail          REVIEW OF SYSTEMS:    CONSTITUTIONAL:  No fevers, chills, sweats    HEENT:  Eyes:  No diplopia or blurred vision. ENT:  No earache, sore throat or runny nose.    CARDIOVASCULAR:  No pressure, squeezing, tightness, or heaviness about the chest; no palpitations.    RESPIRATORY:  Per HPI    GASTROINTESTINAL:  No abdominal pain, nausea, vomiting or diarrhea.    GENITOURINARY:  No dysuria, frequency or urgency.    NEUROLOGIC:  No paresthesias, fasciculations, seizures or weakness.    PSYCHIATRIC:  No disorder of thought or mood.      PHYSICAL EXAM:    Constitutional: Well developed and nourished  Eyes:Perrla  ENMT: normal  Neck:supple  Respiratory: good air entry  Cardiovascular: S1 S2 regular  Gastrointestinal: Soft, Non tender  Extremities: No edema  Vascular:normal  Neurological:Awake, alert,Ox3  Musculoskeletal:Normal      MEDICATIONS  (STANDING):  albuterol    90 MICROgram(s) HFA Inhaler 2 Puff(s) Inhalation every 6 hours  aspirin enteric coated 81 milliGRAM(s) Oral daily  atorvastatin 20 milliGRAM(s) Oral at bedtime  budesonide 160 MICROgram(s)/formoterol 4.5 MICROgram(s) Inhaler 2 Puff(s) Inhalation two times a day  chlorhexidine 2% Cloths 1 Application(s) Topical <User Schedule>  DULoxetine 20 milliGRAM(s) Oral daily  enoxaparin Injectable 40 milliGRAM(s) SubCutaneous every 24 hours  ferrous    sulfate 325 milliGRAM(s) Oral daily  furosemide   Injectable 20 milliGRAM(s) IV Push two times a day  losartan 25 milliGRAM(s) Oral daily  montelukast 10 milliGRAM(s) Oral at bedtime  mupirocin 2% Ointment 1 Application(s) Both Nostrils two times a day  pantoprazole    Tablet 40 milliGRAM(s) Oral before breakfast  tiotropium 2.5 MICROgram(s) Inhaler 2 Puff(s) Inhalation daily    MEDICATIONS  (PRN):  acetaminophen     Tablet .. 650 milliGRAM(s) Oral every 6 hours PRN Temp greater or equal to 38C (100.4F), Mild Pain (1 - 3)  aluminum hydroxide/magnesium hydroxide/simethicone Suspension 30 milliLiter(s) Oral every 4 hours PRN Dyspepsia  guaiFENesin Oral Liquid (Sugar-Free) 100 milliGRAM(s) Oral every 6 hours PRN Cough  melatonin 3 milliGRAM(s) Oral at bedtime PRN Insomnia  ondansetron Injectable 4 milliGRAM(s) IV Push every 8 hours PRN Nausea and/or Vomiting      Allergies    No Known Allergies    Intolerances        LABS:    07-13    140  |  102  |  25<H>  ----------------------------<  116<H>  4.2   |  28  |  1.11    Ca    9.0      13 Jul 2023 06:05        Urinalysis Basic - ( 13 Jul 2023 06:05 )    Color: x / Appearance: x / SG: x / pH: x  Gluc: 116 mg/dL / Ketone: x  / Bili: x / Urobili: x   Blood: x / Protein: x / Nitrite: x   Leuk Esterase: x / RBC: x / WBC x   Sq Epi: x / Non Sq Epi: x / Bacteria: x            CAPILLARY BLOOD GLUCOSE            RADIOLOGY & ADDITIONAL TESTS:    CXR:  < from: Xray Chest 2 Views PA/Lat (07.10.23 @ 17:17) >  IMPRESSION: Mild central CHF somewhat increased from prior.    < end of copied text >    Ct scan chest:    ekg;    echo:

## 2023-07-13 NOTE — PROGRESS NOTE ADULT - PROBLEM SELECTOR PLAN 2
-Morbid obese, hx asthma  -c/w Symbicort, Spiriva, albuterol  -montelukast 10 mg spo Qhs  -supportive care with cough syrup, tylenol.   -PFTs and TESSA screening as OP.  -pulm dr. Courtney  -ID dr. Arana -d/c'd abt as no evidence PNA.

## 2023-07-13 NOTE — DISCHARGE NOTE NURSING/CASE MANAGEMENT/SOCIAL WORK - NSDCPEFALRISK_GEN_ALL_CORE
For information on Fall & Injury Prevention, visit: [email or web address] OR  [email or web address] OR  https://www.cdc.gov/steadi/patient.html

## 2023-07-13 NOTE — PROGRESS NOTE ADULT - ASSESSMENT
67yr old F ambulating with a cane from home, with PMHx of HTN, Asthma, pre-DM ( not on medication), HLD, sent from doctors office (Dr. Jones) for a non productive cough,acute diastolic hf.  1.Acute diastolic HF-Change to po lasix 40mg qd.outpatient f/u in 2 weeks.  2.HTN- cozaar 25mg qd.  3.Asthma-MDI.Pulm f/u.  4.UA-+ proteinuria,cont cozaar.  5.Sleep study as outpatient-r/o bebeto.  6.GI and DVT prophylaxis.

## 2023-07-23 ENCOUNTER — RX RENEWAL (OUTPATIENT)
Age: 68
End: 2023-07-23

## 2023-07-23 RX ORDER — BUDESONIDE AND FORMOTEROL FUMARATE DIHYDRATE 160; 4.5 UG/1; UG/1
160-4.5 AEROSOL RESPIRATORY (INHALATION) TWICE DAILY
Qty: 1 | Refills: 3 | Status: ACTIVE | COMMUNITY
Start: 2019-01-11 | End: 1900-01-01

## 2023-08-21 PROBLEM — E78.5 HYPERLIPIDEMIA, UNSPECIFIED: Chronic | Status: ACTIVE | Noted: 2023-07-10

## 2023-09-21 ENCOUNTER — APPOINTMENT (OUTPATIENT)
Dept: PULMONOLOGY | Facility: CLINIC | Age: 68
End: 2023-09-21
Payer: MEDICARE

## 2023-09-21 VITALS
OXYGEN SATURATION: 95 % | DIASTOLIC BLOOD PRESSURE: 85 MMHG | RESPIRATION RATE: 16 BRPM | SYSTOLIC BLOOD PRESSURE: 136 MMHG | HEART RATE: 102 BPM

## 2023-09-21 DIAGNOSIS — Z23 ENCOUNTER FOR IMMUNIZATION: ICD-10-CM

## 2023-09-21 PROCEDURE — G0008: CPT

## 2023-09-21 PROCEDURE — ZZZZZ: CPT

## 2023-09-21 PROCEDURE — 94729 DIFFUSING CAPACITY: CPT

## 2023-09-21 PROCEDURE — 90662 IIV NO PRSV INCREASED AG IM: CPT

## 2023-09-21 PROCEDURE — 94727 GAS DIL/WSHOT DETER LNG VOL: CPT

## 2023-09-21 PROCEDURE — 99214 OFFICE O/P EST MOD 30 MIN: CPT | Mod: 25

## 2023-09-21 PROCEDURE — 94618 PULMONARY STRESS TESTING: CPT

## 2023-09-21 PROCEDURE — 94060 EVALUATION OF WHEEZING: CPT

## 2023-10-08 ENCOUNTER — INPATIENT (INPATIENT)
Facility: HOSPITAL | Age: 68
LOS: 2 days | Discharge: HOME | DRG: 189 | End: 2023-10-11
Attending: INTERNAL MEDICINE | Admitting: INTERNAL MEDICINE
Payer: MEDICARE

## 2023-10-08 VITALS
SYSTOLIC BLOOD PRESSURE: 206 MMHG | DIASTOLIC BLOOD PRESSURE: 164 MMHG | HEIGHT: 65 IN | OXYGEN SATURATION: 94 % | WEIGHT: 293 LBS | HEART RATE: 124 BPM | RESPIRATION RATE: 24 BRPM

## 2023-10-08 DIAGNOSIS — Z98.49 CATARACT EXTRACTION STATUS, UNSPECIFIED EYE: Chronic | ICD-10-CM

## 2023-10-08 DIAGNOSIS — J45.901 UNSPECIFIED ASTHMA WITH (ACUTE) EXACERBATION: ICD-10-CM

## 2023-10-08 DIAGNOSIS — Z90.710 ACQUIRED ABSENCE OF BOTH CERVIX AND UTERUS: Chronic | ICD-10-CM

## 2023-10-08 LAB
ALBUMIN SERPL ELPH-MCNC: 3.2 G/DL — LOW (ref 3.5–5)
ALBUMIN SERPL ELPH-MCNC: 3.4 G/DL — LOW (ref 3.5–5)
ALP SERPL-CCNC: 100 U/L — SIGNIFICANT CHANGE UP (ref 40–120)
ALP SERPL-CCNC: 111 U/L — SIGNIFICANT CHANGE UP (ref 40–120)
ALT FLD-CCNC: 25 U/L DA — SIGNIFICANT CHANGE UP (ref 10–60)
ALT FLD-CCNC: 29 U/L DA — SIGNIFICANT CHANGE UP (ref 10–60)
ANION GAP SERPL CALC-SCNC: 3 MMOL/L — LOW (ref 5–17)
ANION GAP SERPL CALC-SCNC: 6 MMOL/L — SIGNIFICANT CHANGE UP (ref 5–17)
AST SERPL-CCNC: 11 U/L — SIGNIFICANT CHANGE UP (ref 10–40)
AST SERPL-CCNC: 58 U/L — HIGH (ref 10–40)
BASE EXCESS BLDV CALC-SCNC: 5.2 MMOL/L — SIGNIFICANT CHANGE UP
BASOPHILS # BLD AUTO: 0.05 K/UL — SIGNIFICANT CHANGE UP (ref 0–0.2)
BASOPHILS NFR BLD AUTO: 0.6 % — SIGNIFICANT CHANGE UP (ref 0–2)
BILIRUB SERPL-MCNC: 0.5 MG/DL — SIGNIFICANT CHANGE UP (ref 0.2–1.2)
BILIRUB SERPL-MCNC: 0.5 MG/DL — SIGNIFICANT CHANGE UP (ref 0.2–1.2)
BLOOD GAS COMMENTS, VENOUS: SIGNIFICANT CHANGE UP
BUN SERPL-MCNC: 21 MG/DL — HIGH (ref 7–18)
BUN SERPL-MCNC: 23 MG/DL — HIGH (ref 7–18)
CA-I SERPL-SCNC: SIGNIFICANT CHANGE UP MMOL/L (ref 1.15–1.33)
CALCIUM SERPL-MCNC: 8.6 MG/DL — SIGNIFICANT CHANGE UP (ref 8.4–10.5)
CALCIUM SERPL-MCNC: 9.1 MG/DL — SIGNIFICANT CHANGE UP (ref 8.4–10.5)
CHLORIDE SERPL-SCNC: 102 MMOL/L — SIGNIFICANT CHANGE UP (ref 96–108)
CHLORIDE SERPL-SCNC: 105 MMOL/L — SIGNIFICANT CHANGE UP (ref 96–108)
CO2 SERPL-SCNC: 29 MMOL/L — SIGNIFICANT CHANGE UP (ref 22–31)
CO2 SERPL-SCNC: 29 MMOL/L — SIGNIFICANT CHANGE UP (ref 22–31)
CREAT SERPL-MCNC: 0.95 MG/DL — SIGNIFICANT CHANGE UP (ref 0.5–1.3)
CREAT SERPL-MCNC: 1.01 MG/DL — SIGNIFICANT CHANGE UP (ref 0.5–1.3)
EGFR: 60 ML/MIN/1.73M2 — SIGNIFICANT CHANGE UP
EGFR: 65 ML/MIN/1.73M2 — SIGNIFICANT CHANGE UP
EOSINOPHIL # BLD AUTO: 0.14 K/UL — SIGNIFICANT CHANGE UP (ref 0–0.5)
EOSINOPHIL NFR BLD AUTO: 1.8 % — SIGNIFICANT CHANGE UP (ref 0–6)
GAS PNL BLDV: 138 MMOL/L — SIGNIFICANT CHANGE UP (ref 136–145)
GAS PNL BLDV: SIGNIFICANT CHANGE UP
GLUCOSE SERPL-MCNC: 147 MG/DL — HIGH (ref 70–99)
GLUCOSE SERPL-MCNC: 173 MG/DL — HIGH (ref 70–99)
HCO3 BLDV-SCNC: 33 MMOL/L — HIGH (ref 22–29)
HCT VFR BLD CALC: 39.6 % — SIGNIFICANT CHANGE UP (ref 34.5–45)
HGB BLD-MCNC: 13.2 G/DL — SIGNIFICANT CHANGE UP (ref 11.5–15.5)
HOROWITZ INDEX BLDV+IHG-RTO: 40 — SIGNIFICANT CHANGE UP
IMM GRANULOCYTES NFR BLD AUTO: 0.4 % — SIGNIFICANT CHANGE UP (ref 0–0.9)
LACTATE BLDV-MCNC: 1.3 MMOL/L — SIGNIFICANT CHANGE UP (ref 0.5–2)
LYMPHOCYTES # BLD AUTO: 1.81 K/UL — SIGNIFICANT CHANGE UP (ref 1–3.3)
LYMPHOCYTES # BLD AUTO: 23.4 % — SIGNIFICANT CHANGE UP (ref 13–44)
MAGNESIUM SERPL-MCNC: 2 MG/DL — SIGNIFICANT CHANGE UP (ref 1.6–2.6)
MCHC RBC-ENTMCNC: 26.5 PG — LOW (ref 27–34)
MCHC RBC-ENTMCNC: 33.3 GM/DL — SIGNIFICANT CHANGE UP (ref 32–36)
MCV RBC AUTO: 79.5 FL — LOW (ref 80–100)
MONOCYTES # BLD AUTO: 0.64 K/UL — SIGNIFICANT CHANGE UP (ref 0–0.9)
MONOCYTES NFR BLD AUTO: 8.3 % — SIGNIFICANT CHANGE UP (ref 2–14)
NEUTROPHILS # BLD AUTO: 5.08 K/UL — SIGNIFICANT CHANGE UP (ref 1.8–7.4)
NEUTROPHILS NFR BLD AUTO: 65.5 % — SIGNIFICANT CHANGE UP (ref 43–77)
NRBC # BLD: 0 /100 WBCS — SIGNIFICANT CHANGE UP (ref 0–0)
NT-PROBNP SERPL-SCNC: 85 PG/ML — SIGNIFICANT CHANGE UP (ref 0–125)
PCO2 BLDV: 61 MMHG — HIGH (ref 39–42)
PH BLDV: 7.34 — SIGNIFICANT CHANGE UP (ref 7.32–7.43)
PHOSPHATE SERPL-MCNC: 3.2 MG/DL — SIGNIFICANT CHANGE UP (ref 2.5–4.5)
PLATELET # BLD AUTO: 299 K/UL — SIGNIFICANT CHANGE UP (ref 150–400)
PO2 BLDV: 51 MMHG — SIGNIFICANT CHANGE UP
POTASSIUM BLDV-SCNC: 4.4 MMOL/L — SIGNIFICANT CHANGE UP (ref 3.5–5.1)
POTASSIUM SERPL-MCNC: 4 MMOL/L — SIGNIFICANT CHANGE UP (ref 3.5–5.3)
POTASSIUM SERPL-MCNC: SIGNIFICANT CHANGE UP (ref 3.5–5.3)
POTASSIUM SERPL-SCNC: 4 MMOL/L — SIGNIFICANT CHANGE UP (ref 3.5–5.3)
POTASSIUM SERPL-SCNC: SIGNIFICANT CHANGE UP (ref 3.5–5.3)
PROT SERPL-MCNC: 7.5 G/DL — SIGNIFICANT CHANGE UP (ref 6–8.3)
PROT SERPL-MCNC: 7.7 G/DL — SIGNIFICANT CHANGE UP (ref 6–8.3)
RAPID RVP RESULT: SIGNIFICANT CHANGE UP
RBC # BLD: 4.98 M/UL — SIGNIFICANT CHANGE UP (ref 3.8–5.2)
RBC # FLD: 17 % — HIGH (ref 10.3–14.5)
SAO2 % BLDV: 71.6 % — SIGNIFICANT CHANGE UP
SARS-COV-2 RNA SPEC QL NAA+PROBE: SIGNIFICANT CHANGE UP
SODIUM SERPL-SCNC: 134 MMOL/L — LOW (ref 135–145)
SODIUM SERPL-SCNC: 140 MMOL/L — SIGNIFICANT CHANGE UP (ref 135–145)
TROPONIN I, HIGH SENSITIVITY RESULT: 30.9 NG/L — SIGNIFICANT CHANGE UP
WBC # BLD: 7.75 K/UL — SIGNIFICANT CHANGE UP (ref 3.8–10.5)
WBC # FLD AUTO: 7.75 K/UL — SIGNIFICANT CHANGE UP (ref 3.8–10.5)

## 2023-10-08 PROCEDURE — 99222 1ST HOSP IP/OBS MODERATE 55: CPT | Mod: GC

## 2023-10-08 PROCEDURE — 99291 CRITICAL CARE FIRST HOUR: CPT

## 2023-10-08 PROCEDURE — 71045 X-RAY EXAM CHEST 1 VIEW: CPT | Mod: 26

## 2023-10-08 PROCEDURE — 93010 ELECTROCARDIOGRAM REPORT: CPT

## 2023-10-08 RX ORDER — FINERENONE 20 MG/1
2 TABLET, FILM COATED ORAL
Refills: 0 | DISCHARGE

## 2023-10-08 RX ORDER — GLUCAGON INJECTION, SOLUTION 0.5 MG/.1ML
1 INJECTION, SOLUTION SUBCUTANEOUS ONCE
Refills: 0 | Status: DISCONTINUED | OUTPATIENT
Start: 2023-10-08 | End: 2023-10-11

## 2023-10-08 RX ORDER — BUDESONIDE AND FORMOTEROL FUMARATE DIHYDRATE 160; 4.5 UG/1; UG/1
2 AEROSOL RESPIRATORY (INHALATION)
Qty: 0 | Refills: 0 | DISCHARGE

## 2023-10-08 RX ORDER — IPRATROPIUM/ALBUTEROL SULFATE 18-103MCG
3 AEROSOL WITH ADAPTER (GRAM) INHALATION
Refills: 0 | Status: COMPLETED | OUTPATIENT
Start: 2023-10-08 | End: 2023-10-08

## 2023-10-08 RX ORDER — FERROUS SULFATE 325(65) MG
1 TABLET ORAL
Qty: 0 | Refills: 0 | DISCHARGE

## 2023-10-08 RX ORDER — IPRATROPIUM/ALBUTEROL SULFATE 18-103MCG
3 AEROSOL WITH ADAPTER (GRAM) INHALATION EVERY 6 HOURS
Refills: 0 | Status: DISCONTINUED | OUTPATIENT
Start: 2023-10-08 | End: 2023-10-11

## 2023-10-08 RX ORDER — SALICYLIC ACID 0.5 %
1 CLEANSER (GRAM) TOPICAL DAILY
Refills: 0 | Status: DISCONTINUED | OUTPATIENT
Start: 2023-10-08 | End: 2023-10-11

## 2023-10-08 RX ORDER — METFORMIN HYDROCHLORIDE 850 MG/1
1 TABLET ORAL
Refills: 0 | DISCHARGE

## 2023-10-08 RX ORDER — DEXTROSE 50 % IN WATER 50 %
15 SYRINGE (ML) INTRAVENOUS ONCE
Refills: 0 | Status: DISCONTINUED | OUTPATIENT
Start: 2023-10-08 | End: 2023-10-08

## 2023-10-08 RX ORDER — DEXTROSE 50 % IN WATER 50 %
25 SYRINGE (ML) INTRAVENOUS ONCE
Refills: 0 | Status: DISCONTINUED | OUTPATIENT
Start: 2023-10-08 | End: 2023-10-08

## 2023-10-08 RX ORDER — FUROSEMIDE 40 MG
40 TABLET ORAL DAILY
Refills: 0 | Status: DISCONTINUED | OUTPATIENT
Start: 2023-10-08 | End: 2023-10-08

## 2023-10-08 RX ORDER — ASPIRIN/CALCIUM CARB/MAGNESIUM 324 MG
81 TABLET ORAL DAILY
Refills: 0 | Status: DISCONTINUED | OUTPATIENT
Start: 2023-10-08 | End: 2023-10-11

## 2023-10-08 RX ORDER — DULOXETINE HYDROCHLORIDE 30 MG/1
1 CAPSULE, DELAYED RELEASE ORAL
Refills: 0 | DISCHARGE

## 2023-10-08 RX ORDER — ATORVASTATIN CALCIUM 80 MG/1
1 TABLET, FILM COATED ORAL
Refills: 0 | DISCHARGE

## 2023-10-08 RX ORDER — MONTELUKAST 4 MG/1
10 TABLET, CHEWABLE ORAL AT BEDTIME
Refills: 0 | Status: DISCONTINUED | OUTPATIENT
Start: 2023-10-08 | End: 2023-10-11

## 2023-10-08 RX ORDER — FUROSEMIDE 40 MG
40 TABLET ORAL DAILY
Refills: 0 | Status: DISCONTINUED | OUTPATIENT
Start: 2023-10-08 | End: 2023-10-11

## 2023-10-08 RX ORDER — BUDESONIDE AND FORMOTEROL FUMARATE DIHYDRATE 160; 4.5 UG/1; UG/1
2 AEROSOL RESPIRATORY (INHALATION)
Refills: 0 | Status: DISCONTINUED | OUTPATIENT
Start: 2023-10-08 | End: 2023-10-11

## 2023-10-08 RX ORDER — ALBUTEROL 90 UG/1
2 AEROSOL, METERED ORAL EVERY 6 HOURS
Refills: 0 | Status: DISCONTINUED | OUTPATIENT
Start: 2023-10-08 | End: 2023-10-11

## 2023-10-08 RX ORDER — ENOXAPARIN SODIUM 100 MG/ML
40 INJECTION SUBCUTANEOUS EVERY 24 HOURS
Refills: 0 | Status: DISCONTINUED | OUTPATIENT
Start: 2023-10-08 | End: 2023-10-11

## 2023-10-08 RX ORDER — LOSARTAN POTASSIUM 100 MG/1
25 TABLET, FILM COATED ORAL DAILY
Refills: 0 | Status: DISCONTINUED | OUTPATIENT
Start: 2023-10-08 | End: 2023-10-09

## 2023-10-08 RX ORDER — DULOXETINE HYDROCHLORIDE 30 MG/1
20 CAPSULE, DELAYED RELEASE ORAL DAILY
Refills: 0 | Status: DISCONTINUED | OUTPATIENT
Start: 2023-10-08 | End: 2023-10-11

## 2023-10-08 RX ORDER — INSULIN LISPRO 100/ML
VIAL (ML) SUBCUTANEOUS
Refills: 0 | Status: DISCONTINUED | OUTPATIENT
Start: 2023-10-08 | End: 2023-10-11

## 2023-10-08 RX ORDER — DEXTROSE 50 % IN WATER 50 %
12.5 SYRINGE (ML) INTRAVENOUS ONCE
Refills: 0 | Status: DISCONTINUED | OUTPATIENT
Start: 2023-10-08 | End: 2023-10-08

## 2023-10-08 RX ORDER — FERROUS SULFATE 325(65) MG
325 TABLET ORAL DAILY
Refills: 0 | Status: DISCONTINUED | OUTPATIENT
Start: 2023-10-08 | End: 2023-10-11

## 2023-10-08 RX ORDER — PANTOPRAZOLE SODIUM 20 MG/1
40 TABLET, DELAYED RELEASE ORAL
Refills: 0 | Status: DISCONTINUED | OUTPATIENT
Start: 2023-10-08 | End: 2023-10-11

## 2023-10-08 RX ORDER — SODIUM CHLORIDE 9 MG/ML
1000 INJECTION, SOLUTION INTRAVENOUS
Refills: 0 | Status: DISCONTINUED | OUTPATIENT
Start: 2023-10-08 | End: 2023-10-08

## 2023-10-08 RX ORDER — TIOTROPIUM BROMIDE 18 UG/1
2 CAPSULE ORAL; RESPIRATORY (INHALATION) DAILY
Refills: 0 | Status: DISCONTINUED | OUTPATIENT
Start: 2023-10-08 | End: 2023-10-09

## 2023-10-08 RX ORDER — ATORVASTATIN CALCIUM 80 MG/1
20 TABLET, FILM COATED ORAL AT BEDTIME
Refills: 0 | Status: DISCONTINUED | OUTPATIENT
Start: 2023-10-08 | End: 2023-10-11

## 2023-10-08 RX ORDER — ALBUTEROL 90 UG/1
2 AEROSOL, METERED ORAL
Qty: 0 | Refills: 0 | DISCHARGE

## 2023-10-08 RX ORDER — ERGOCALCIFEROL 1.25 MG/1
1 CAPSULE ORAL
Qty: 0 | Refills: 0 | DISCHARGE

## 2023-10-08 RX ORDER — INSULIN LISPRO 100/ML
VIAL (ML) SUBCUTANEOUS AT BEDTIME
Refills: 0 | Status: DISCONTINUED | OUTPATIENT
Start: 2023-10-08 | End: 2023-10-11

## 2023-10-08 RX ORDER — FLUTICASONE FUROATE, UMECLIDINIUM BROMIDE AND VILANTEROL TRIFENATATE 200; 62.5; 25 UG/1; UG/1; UG/1
1 POWDER RESPIRATORY (INHALATION)
Refills: 0 | DISCHARGE

## 2023-10-08 RX ORDER — ASPIRIN/CALCIUM CARB/MAGNESIUM 324 MG
1 TABLET ORAL
Qty: 0 | Refills: 0 | DISCHARGE

## 2023-10-08 RX ADMIN — TIOTROPIUM BROMIDE 2 PUFF(S): 18 CAPSULE ORAL; RESPIRATORY (INHALATION) at 21:47

## 2023-10-08 RX ADMIN — Medication 3 MILLILITER(S): at 04:12

## 2023-10-08 RX ADMIN — Medication 3 MILLILITER(S): at 09:58

## 2023-10-08 RX ADMIN — Medication 81 MILLIGRAM(S): at 11:49

## 2023-10-08 RX ADMIN — MONTELUKAST 10 MILLIGRAM(S): 4 TABLET, CHEWABLE ORAL at 21:43

## 2023-10-08 RX ADMIN — ENOXAPARIN SODIUM 40 MILLIGRAM(S): 100 INJECTION SUBCUTANEOUS at 11:49

## 2023-10-08 RX ADMIN — Medication 40 MILLIGRAM(S): at 16:05

## 2023-10-08 RX ADMIN — DULOXETINE HYDROCHLORIDE 20 MILLIGRAM(S): 30 CAPSULE, DELAYED RELEASE ORAL at 12:29

## 2023-10-08 RX ADMIN — Medication 40 MILLIGRAM(S): at 17:26

## 2023-10-08 RX ADMIN — Medication 1 APPLICATION(S): at 17:48

## 2023-10-08 RX ADMIN — Medication 325 MILLIGRAM(S): at 11:49

## 2023-10-08 RX ADMIN — Medication 3 MILLILITER(S): at 15:00

## 2023-10-08 RX ADMIN — Medication 3 MILLILITER(S): at 04:11

## 2023-10-08 RX ADMIN — Medication 3 MILLILITER(S): at 20:11

## 2023-10-08 RX ADMIN — PANTOPRAZOLE SODIUM 40 MILLIGRAM(S): 20 TABLET, DELAYED RELEASE ORAL at 08:03

## 2023-10-08 RX ADMIN — Medication 3 MILLILITER(S): at 04:13

## 2023-10-08 RX ADMIN — ATORVASTATIN CALCIUM 20 MILLIGRAM(S): 80 TABLET, FILM COATED ORAL at 21:43

## 2023-10-08 RX ADMIN — BUDESONIDE AND FORMOTEROL FUMARATE DIHYDRATE 2 PUFF(S): 160; 4.5 AEROSOL RESPIRATORY (INHALATION) at 21:46

## 2023-10-08 NOTE — PROGRESS NOTE ADULT - ASSESSMENT
68 year old female from home with medical history of HTN, glaucoma, Pre DM, HLD, and Asthma presenting to ED for increased work of breathing and wheezing x2 days admitted for asthma exacerbation               _________CNS___________  No active issues      _________CVS___________  #HTN emergency  -/ 164 on admission, now down to 136/ 80. Chest xray negative for pulm edema    #HTN  -c/w home meds losartan 25mg daily    #HLD  -c/w lipitor 20mg daily    #G1DD  - ECHO in july 2023 showing G1DD       _________RESP__________  #Asthma exacerbation  -s/p 1 dose albuterol  -Pt was placed on BiPAP by ED  -Pt now transitioned to 4LNC saturating 100%  -c/w duoneb tx q6h, solumederol 40mg q6h, montelukast, albuterol inhaler 2 puffs q6h, and trelegy therapeutic interchange with spiriva and symnbicort   -RVP negative   -f/u official xray read  -will keep BiPAP on standby at bedside       ___________GI____________  #no active issues    ________ RENAL__________  #no active issues      __________MSK___________  #no active issues      ___________ID____________  #no active issues      _________ENDO__________  #Hx of Pre DM   -on metformin at home  -c/w SSI     ______HEME/ONC_______  #no active issues      _________SKIN____________  #no active issues      ________PROPHY_______  #DVT Lovenox   #GI PPI      ______GOC/DISPO___________  ICU   FULL CODE      68 year old female from home with medical history of HTN, glaucoma, Pre DM, HLD, and Asthma presenting to ED for increased work of breathing and wheezing x2 days admitted for asthma exacerbation     #Acute Asthma exacerbation  #HTN emergency  # Chronic Grade 1 diastolic dysfunction     _________CNS___________  No active issues      _________CVS___________  #HTN emergency  -/ 164 on admission, now down to 136/ 80. Chest xray negative for pulm edema    #HTN  -c/w home meds losartan 25mg daily    #HLD  -c/w lipitor 20mg daily    #G1DD  - ECHO in july 2023 showing G1DD       _________RESP__________  #Asthma exacerbation  -s/p 1 dose albuterol  -Pt was placed on BiPAP by ED  -Pt now transitioned to 4LNC saturating 100%  -c/w duoneb tx q6h, solumederol 40mg q6h, montelukast, albuterol inhaler 2 puffs q6h, and trelegy therapeutic interchange with spiriva and symnbicort   -RVP negative   -f/u official xray read  -Bipap PRN       ___________GI____________  #no active issues    ________ RENAL__________  #no active issues      __________MSK___________  #no active issues      ___________ID____________  #no active issues      _________ENDO__________  #Hx of Pre DM   -on metformin at home  -c/w SSI     ______HEME/ONC_______  #no active issues      _________SKIN____________  #no active issues      ________PROPHY_______  #DVT Lovenox   #GI PPI      ______GOC/DISPO___________  ICU   FULL CODE

## 2023-10-08 NOTE — ED PROVIDER NOTE - CLINICAL SUMMARY MEDICAL DECISION MAKING FREE TEXT BOX
Will obtain RVP, labs along with troponin and proBNP given hypertension.  Will start BiPAP for increased work of breathing and respiratory support.  We will continue nebs.

## 2023-10-08 NOTE — H&P ADULT - ATTENDING COMMENTS
Patient seen and examined upon consultation request at 5AM today. Data reviewed. Case and plan of care discussed with ICU resident.  This is 68F with PMH as listed above including Asthma (on trelegy daily and symbicort prn) presented to the ED with worsening SOB with associated wheezing preceded by URI symptoms. In the ED she was found to be in asthma exacerbation and given bronchodilator nebulizers, steroids and placed on bipap with some improvement. At time of ICU evaluation, patient appeared comfortable on bipap and was able to be put on NC oxygen. She  will be accepted to ICU for further management and closer monitoring.     VS reviewed: BP stable, RR 18-20, SpO2 of 100% on 4L NC, Morbidly obese woman awake, alert, oriented x3, no focal deficits, heart- normal heart sounds, no murmurs, Lungs- bilateral air entry with end expiratory wheeze, Abdomen- obese, soft, no tenderness, Ext- no edema.    Labs/Imaging reviewed: CXR did not show focal infiltrates.     Assessment  Acute exacerbation of asthma  Viral URI, R/o SARS-CoV-2 infection   H/o Asthma  H/o HTN  H/o Glaucoma  H/o Pre-DM  H/o Hyperlipidemia    Plan  Accepted to ICU for closer monitoring and management.  Continue NC oxygen and as needed  bi-level support with bipap therapy. NPO for now.  Low threshold for endotracheal intubation if any worsening of respiratory status.  Albuterol/Atrovent nebulizer q2-4hrs for now  IV Solu-medrol 40mg q6hrs  Singulair daily.  Trend troponins, 12 lead EKG.   Obtain SARS-CoV-2 PCR  DVT prophylaxis with Lovenox.

## 2023-10-08 NOTE — ED PROVIDER NOTE - PROGRESS NOTE DETAILS
Wheezing improving but still wheezing, no longer diaphoretic/tripoding on BiPAP, reporting feeling improved.

## 2023-10-08 NOTE — ED ADULT NURSE NOTE - NSFALLRISKINTERV_ED_ALL_ED

## 2023-10-08 NOTE — CONSULT NOTE ADULT - ATTENDING SUPERVISION STATEMENT
General Pediatrics  General Pediatrics  [address] [place] [address]  Phone: [phone]  Fax: [phone]     Resident

## 2023-10-08 NOTE — ED ADULT NURSE NOTE - OBJECTIVE STATEMENT
Patient presents to ED with c/o worsening  shortness of breath and wheezing  started yesterday. Patient presents to ED with c/o worsening  shortness of breath and wheezing  started yesterday. Patient is placed on cardiac monitor with continuous pulse ox. Sinus tachycardia noted. Patient is placed on BIPAP by RT. Lab and med completed per order.

## 2023-10-08 NOTE — ED PROVIDER NOTE - PHYSICAL EXAMINATION
Exam:  General: Patient hypertensive, tachypenic, tripoding, hypoxic  HEENT: airway patent with moist mucous membranes  Cardiac: RRR S1/S2 with strong peripheral pulses  Respiratory: poor air movement, accessory muscle usage, diffuse inspiratory/expiratory wheezing  GI: abdomen soft, non tender, non distended  Neuro: no gross neurologic deficits  Skin: warm, well perfused

## 2023-10-08 NOTE — PATIENT PROFILE ADULT - NSTOBACCONEVERSMOKERY/N_GEN_A
Continued Stay Note  [facility]     Patient Name: Froilan Majano  MRN: [id number]  Today's Date: 2023    Admit Date: 2023    Plan: ok to d/c to mother   Discharge Plan     Row Name 04/03/23 1120       Plan    Plan ok to d/c to mother    Plan Comments Pt's mother had + UDS for THC on 11/20/2022 and - UDS upon admit. Awaiting cord stat results.    Final Discharge Disposition Code 01 - home or self-care               Discharge Codes    No documentation.                     BRUNO Ziegler    
No

## 2023-10-08 NOTE — H&P ADULT - ASSESSMENT
ASSESSMENT: 68 year old female from home with medical history of HTN, glaucoma, Pre DM, HLD, and Asthma presenting to ED for increased work of breathing and wheezing x2 days admitted for asthma exacerbation               _________CNS___________  No active issues      _________CVS___________  #HTN emergency  -/ 164 on admission, now down to 136/ 80. Chest xray negative for pulm edema    #HTN  -c/w home meds losartan 25mg daily    #HLD  -c/w lipitor 20mg daily    #G1DD  - ECHO in july 2023 showing G1DD       _________RESP__________  #Asthma exacerbation  -s/p 1 dose albuterol  -Pt was placed on BiPAP by ED  -Pt now transitioned to 4LNC saturating 100%  -c/w duoneb tx q6h, solumederol 40mg q6h, montelukast, trelegy, and albuterol inhaler 2 puffs q6h   -RVP negative   -f/u official xray read  -will keep BiPAP on standby at bedside       ___________GI____________  #no active issues    ________ RENAL__________  #no active issues      __________MSK___________  #no active issues      ___________ID____________  #no active issues      _________ENDO__________  #Hx of Pre DM   -on metformin at home  -c/w SSI     ______HEME/ONC_______  #no active issues      _________SKIN____________  #no active issues      ________PROPHY_______  #DVT Lovenox   #GI PPI      ______GOC/DISPO___________  ICU   FULL CODE   ASSESSMENT: 68 year old female from home with medical history of HTN, glaucoma, Pre DM, HLD, and Asthma presenting to ED for increased work of breathing and wheezing x2 days admitted for asthma exacerbation               _________CNS___________  No active issues      _________CVS___________  #HTN emergency  -/ 164 on admission, now down to 136/ 80. Chest xray negative for pulm edema    #HTN  -c/w home meds losartan 25mg daily    #HLD  -c/w lipitor 20mg daily    #G1DD  - ECHO in july 2023 showing G1DD       _________RESP__________  #Asthma exacerbation  -s/p 1 dose albuterol  -Pt was placed on BiPAP by ED  -Pt now transitioned to 4LNC saturating 100%  -c/w duoneb tx q6h, solumederol 40mg q6h, montelukast, albuterol inhaler 2 puffs q6h, and trelegy therapeutic interchange with spiriva and symnbicort   -RVP negative   -f/u official xray read  -will keep BiPAP on standby at bedside       ___________GI____________  #no active issues    ________ RENAL__________  #no active issues      __________MSK___________  #no active issues      ___________ID____________  #no active issues      _________ENDO__________  #Hx of Pre DM   -on metformin at home  -c/w SSI     ______HEME/ONC_______  #no active issues      _________SKIN____________  #no active issues      ________PROPHY_______  #DVT Lovenox   #GI PPI      ______GOC/DISPO___________  ICU   FULL CODE

## 2023-10-08 NOTE — H&P ADULT - HISTORY OF PRESENT ILLNESS
HPI:68 year old female from home with medical history of HTN, glaucoma, Pre DM, HLD, and Asthma presenting to ED for increased work of breathing and wheezing x2 days. Patient states that she developed cold like symptoms earlier this week described as a non productive cough that is now resolved. Despite using her inhalers patient still had trouble breathing so came to ED for further evaluation. Patient states that she was never hospitalized for an asthma exacerbation and has never been intubated. She denies any other symptoms including fevers, chills, headaches, dizziness, chest pain, abd pain, n/v, diarrhea, constipation, hematuria, dysuria, sick contacts, and recent travel.     In ED VS: , / 164, RR 24, Spo2 94% RA   Cxray: negative, follow up official read    s/p 1 dose duoneb     PAST MEDICAL & SURGICAL HISTORY: Asthma, Essential hypertension, Glaucoma of both eyes, unspecified glaucoma type, Morbid obesity with BMI of 50.0-59.9, adult, Hyperlipemia, History of hysterectomy for benign disease  2006, S/P cataract surgery  SOCIAL HX: No smoking etoh or drug use   FAMILY HISTORY:No pertinent family history in first degree relatives  Allergies: No Known Allergies            PHYSICAL EXAM    ICU Vital Signs Last 24 Hrs  T(C): --  T(F): --  HR: 108 (08 Oct 2023 05:21) (108 - 124)  BP: 136/80 (08 Oct 2023 05:21) (136/80 - 206/164)  BP(mean): --  ABP: --  ABP(mean): --  RR: 18 (08 Oct 2023 05:21) (18 - 24)  SpO2: 100% (08 Oct 2023 05:21) (94% - 100%)    O2 Parameters below as of 08 Oct 2023 05:21  Patient On (Oxygen Delivery Method): BiPAP/CPAP            General: morbidly obese female in no mild resp distress   HEENT:  NAZARIO              Lymphatic system: No LN  Lungs :decreased breath sounds b/l   Cardiovascular: tachycardic   Gastrointestinal: Soft, Positive BS  Musculoskeletal: No clubbing.  Moves all extremities.    Skin: Warm.  Intact  Neurological: No motor or sensory deficit         LABS:                          13.2   7.75  )-----------( 299      ( 08 Oct 2023 03:25 )             39.6                                               10-08    134<L>  |  102  |  23<H>  ----------------------------<  147<H>  SEE TEXT   |  29  |  1.01    Ca    8.6      08 Oct 2023 03:25    TPro  7.7  /  Alb  3.2<L>  /  TBili  0.5  /  DBili  x   /  AST  58<H>  /  ALT  29  /  AlkPhos  100  10-08                                             Urinalysis Basic - ( 08 Oct 2023 03:25 )    Color: x / Appearance: x / SG: x / pH: x  Gluc: 147 mg/dL / Ketone: x  / Bili: x / Urobili: x   Blood: x / Protein: x / Nitrite: x   Leuk Esterase: x / RBC: x / WBC x   Sq Epi: x / Non Sq Epi: x / Bacteria: x                                                  LIVER FUNCTIONS - ( 08 Oct 2023 03:25 )  Alb: 3.2 g/dL / Pro: 7.7 g/dL / ALK PHOS: 100 U/L / ALT: 29 U/L DA / AST: 58 U/L / GGT: x                                                                                                                                       CXR:    ECHO:    MEDICATIONS  (STANDING):    MEDICATIONS  (PRN):

## 2023-10-08 NOTE — ED PROVIDER NOTE - OBJECTIVE STATEMENT
68-year-old woman with past medical history as below presenting complaining of difficulty breathing beginning yesterday.  History of asthma.  Has been trying home medications without improvement.  Received  multiple nebs from EMS as well as 12 mg of Decadron with no improvement.

## 2023-10-08 NOTE — ED ADULT NURSE REASSESSMENT NOTE - NS ED NURSE REASSESS COMMENT FT1
Patient is alert and oriented x3. Patient's condition has improved. Patient was seen by the ICU team and admitted to ICU. MD discontinued BIPAP and placed the patient on 4l of oxygen via NC. Patient tolerated well.

## 2023-10-08 NOTE — H&P ADULT - PATIENT'S PREFERRED PRONOUN
----- Message from Torey Sánchez MD sent at 9/26/2023 11:21 AM CDT -----  Pt needs orders for dx right breast mammo and u/s please   Her/She

## 2023-10-08 NOTE — ED ADULT NURSE NOTE - ED STAT RN HANDOFF DETAILS
Patient is alert and oriented x3. Portable cardiac monitor applied. Sinus tachycardia noted. Patient is transferred to ICU via stretcher in stable condition, accompanied by MD and RN. No distress noted during transport.

## 2023-10-08 NOTE — PROGRESS NOTE ADULT - SUBJECTIVE AND OBJECTIVE BOX
Patient is a 68y old  Female who presents with a chief complaint of asthma exacerbation (08 Oct 2023 06:11)      OVERNIGHT EVENTS:   No overnight events   Afebrile, hemodynamically stable     SUBJECTIVE/INTERVAL HPI: Patient seen and examined at bedside. Denies headache, vision changes, chest pain, shortness of breath, abdominal pain, nausea, vomiting, diarrhea, constipation, dysuria, swelling, and rash.      PRESSORS: [ ] YES [ ] NO  WHICH:    ICU Vital Signs Last 24 Hrs  T(C): --  T(F): --  HR: 108 (08 Oct 2023 05:21) (108 - 124)  BP: 136/80 (08 Oct 2023 05:21) (136/80 - 206/164)  BP(mean): --  ABP: --  ABP(mean): --  RR: 18 (08 Oct 2023 05:21) (18 - 24)  SpO2: 100% (08 Oct 2023 05:21) (94% - 100%)    O2 Parameters below as of 08 Oct 2023 05:21  Patient On (Oxygen Delivery Method): BiPAP/CPAP          I&O's Summary        PHYSICAL EXAM:  GENERAL: No acute distress   HEAD:  Atraumatic, Normocephalic  EYES: EOMI, PERRLA, conjunctiva and sclera clear  ENMT: No tonsillar erythema, exudates, or enlargement; Moist mucous membranes  NECK: Supple, No JVD, Normal thyroid  HEART: Regular rate and rhythm; No murmurs, rubs, or gallops  RESPIRATORY: CTA B/L, No W/R/R  ABDOMEN: Soft, Nontender, Nondistended; Bowel sounds present  NEUROLOGY: A&Ox3, nonfocal, moving all extremities  EXTREMITIES:  2+ Peripheral Pulses, No clubbing, cyanosis, or edema  SKIN: warm, dry, normal color, no rash or abnormal lesions    LABS:                        13.2   7.75  )-----------( 299      ( 08 Oct 2023 03:25 )             39.6     10-08    134<L>  |  102  |  23<H>  ----------------------------<  147<H>  SEE TEXT   |  29  |  1.01    Ca    8.6      08 Oct 2023 03:25    TPro  7.7  /  Alb  3.2<L>  /  TBili  0.5  /  DBili  x   /  AST  58<H>  /  ALT  29  /  AlkPhos  100  10-08      Urinalysis Basic - ( 08 Oct 2023 03:25 )    Color: x / Appearance: x / SG: x / pH: x  Gluc: 147 mg/dL / Ketone: x  / Bili: x / Urobili: x   Blood: x / Protein: x / Nitrite: x   Leuk Esterase: x / RBC: x / WBC x   Sq Epi: x / Non Sq Epi: x / Bacteria: x      CAPILLARY BLOOD GLUCOSE            Consultant(s) Notes Reviewed:  [x ] YES  [ ] NO    MEDICATIONS  (STANDING):  albuterol    90 MICROgram(s) HFA Inhaler 2 Puff(s) Inhalation every 6 hours  albuterol/ipratropium for Nebulization 3 milliLiter(s) Nebulizer every 6 hours  aspirin enteric coated 81 milliGRAM(s) Oral daily  atorvastatin 20 milliGRAM(s) Oral at bedtime  budesonide  80 MICROgram(s)/formoterol 4.5 MICROgram(s) Inhaler 2 Puff(s) Inhalation two times a day  dextrose 5%. 1000 milliLiter(s) (100 mL/Hr) IV Continuous <Continuous>  dextrose 5%. 1000 milliLiter(s) (50 mL/Hr) IV Continuous <Continuous>  dextrose 50% Injectable 12.5 Gram(s) IV Push once  dextrose 50% Injectable 25 Gram(s) IV Push once  dextrose 50% Injectable 25 Gram(s) IV Push once  DULoxetine 20 milliGRAM(s) Oral daily  enoxaparin Injectable 40 milliGRAM(s) SubCutaneous every 24 hours  ferrous    sulfate 325 milliGRAM(s) Oral daily  furosemide    Tablet 40 milliGRAM(s) Oral daily  glucagon  Injectable 1 milliGRAM(s) IntraMuscular once  insulin lispro (ADMELOG) corrective regimen sliding scale   SubCutaneous at bedtime  insulin lispro (ADMELOG) corrective regimen sliding scale   SubCutaneous three times a day before meals  losartan 25 milliGRAM(s) Oral daily  methylPREDNISolone sodium succinate Injectable 40 milliGRAM(s) IV Push every 6 hours  montelukast 10 milliGRAM(s) Oral at bedtime  pantoprazole    Tablet 40 milliGRAM(s) Oral before breakfast  tiotropium 2.5 MICROgram(s) Inhaler 2 Puff(s) Inhalation daily    MEDICATIONS  (PRN):  dextrose Oral Gel 15 Gram(s) Oral once PRN Blood Glucose LESS THAN 70 milliGRAM(s)/deciliter      Care Discussed with Consultants/Other Providers [ x] YES  [ ] NO    RADIOLOGY & ADDITIONAL TESTS: Patient is a 68y old  Female who presents with a chief complaint of asthma exacerbation (08 Oct 2023 06:11)      OVERNIGHT EVENTS:   No overnight events   Afebrile, hemodynamically stable, saturating well on Nasal canula     SUBJECTIVE/INTERVAL HPI: Patient seen and examined at bedside. Denies headache, vision changes, chest pain, shortness of breath, abdominal pain, nausea, vomiting, diarrhea, constipation, dysuria, swelling, and rash.      PRESSORS: [ ] YES [X ] NO  WHICH:    ICU Vital Signs Last 24 Hrs  T(C): --  T(F): --  HR: 108 (08 Oct 2023 05:21) (108 - 124)  BP: 136/80 (08 Oct 2023 05:21) (136/80 - 206/164)  BP(mean): --  ABP: --  ABP(mean): --  RR: 18 (08 Oct 2023 05:21) (18 - 24)  SpO2: 100% (08 Oct 2023 05:21) (94% - 100%)    O2 Parameters below as of 08 Oct 2023 05:21  Patient On (Oxygen Delivery Method): BiPAP/CPAP          I&O's Summary        PHYSICAL EXAM:  GENERAL: No acute distress   HEAD:  Atraumatic, Normocephalic  EYES: EOMI, PERRLA, conjunctiva and sclera clear  ENMT: No tonsillar erythema, exudates, or enlargement; Moist mucous membranes  NECK: Supple  HEART: Regular rate and rhythm; No murmurs, rubs, or gallops  RESPIRATORY: CTA B/L, No W/R/R  ABDOMEN: Soft, Nontender, Nondistended; Bowel sounds present  NEUROLOGY: A&Ox3, nonfocal, moving all extremities  EXTREMITIES:  2+ Peripheral Pulses, No clubbing, cyanosis, or edema  SKIN: warm, dry, normal color, no rash or abnormal lesions    LABS:                        13.2   7.75  )-----------( 299      ( 08 Oct 2023 03:25 )             39.6     10-08    134<L>  |  102  |  23<H>  ----------------------------<  147<H>  SEE TEXT   |  29  |  1.01    Ca    8.6      08 Oct 2023 03:25    TPro  7.7  /  Alb  3.2<L>  /  TBili  0.5  /  DBili  x   /  AST  58<H>  /  ALT  29  /  AlkPhos  100  10-08      Urinalysis Basic - ( 08 Oct 2023 03:25 )    Color: x / Appearance: x / SG: x / pH: x  Gluc: 147 mg/dL / Ketone: x  / Bili: x / Urobili: x   Blood: x / Protein: x / Nitrite: x   Leuk Esterase: x / RBC: x / WBC x   Sq Epi: x / Non Sq Epi: x / Bacteria: x      CAPILLARY BLOOD GLUCOSE            Consultant(s) Notes Reviewed:  [x ] YES  [ ] NO    MEDICATIONS  (STANDING):  albuterol    90 MICROgram(s) HFA Inhaler 2 Puff(s) Inhalation every 6 hours  albuterol/ipratropium for Nebulization 3 milliLiter(s) Nebulizer every 6 hours  aspirin enteric coated 81 milliGRAM(s) Oral daily  atorvastatin 20 milliGRAM(s) Oral at bedtime  budesonide  80 MICROgram(s)/formoterol 4.5 MICROgram(s) Inhaler 2 Puff(s) Inhalation two times a day  dextrose 5%. 1000 milliLiter(s) (100 mL/Hr) IV Continuous <Continuous>  dextrose 5%. 1000 milliLiter(s) (50 mL/Hr) IV Continuous <Continuous>  dextrose 50% Injectable 12.5 Gram(s) IV Push once  dextrose 50% Injectable 25 Gram(s) IV Push once  dextrose 50% Injectable 25 Gram(s) IV Push once  DULoxetine 20 milliGRAM(s) Oral daily  enoxaparin Injectable 40 milliGRAM(s) SubCutaneous every 24 hours  ferrous    sulfate 325 milliGRAM(s) Oral daily  furosemide    Tablet 40 milliGRAM(s) Oral daily  glucagon  Injectable 1 milliGRAM(s) IntraMuscular once  insulin lispro (ADMELOG) corrective regimen sliding scale   SubCutaneous at bedtime  insulin lispro (ADMELOG) corrective regimen sliding scale   SubCutaneous three times a day before meals  losartan 25 milliGRAM(s) Oral daily  methylPREDNISolone sodium succinate Injectable 40 milliGRAM(s) IV Push every 6 hours  montelukast 10 milliGRAM(s) Oral at bedtime  pantoprazole    Tablet 40 milliGRAM(s) Oral before breakfast  tiotropium 2.5 MICROgram(s) Inhaler 2 Puff(s) Inhalation daily    MEDICATIONS  (PRN):  dextrose Oral Gel 15 Gram(s) Oral once PRN Blood Glucose LESS THAN 70 milliGRAM(s)/deciliter      Care Discussed with Consultants/Other Providers [ x] YES  [ ] NO    RADIOLOGY & ADDITIONAL TESTS:

## 2023-10-08 NOTE — PROGRESS NOTE ADULT - ATTENDING COMMENTS
IMP: This is a  68 year old morbid obese woman  from home with  HTN, glaucoma, Pre DM, HLD, and Asthma presenting to ED for increased work of breathing and wheezing x2 days admitted for acute hypoxic resp failue due to  asthma exacerbation requiring BiPaP        ASSESSMENT     - Acute hypoxic resp failure   - Acute Asthma exacerbation  - HTN emergency  - Morbid obesity   - Clinical TESSA / OHS  - HTN HLD   - Prediabetic   - Chronic Grade 1 diastolic dysfunction       Plan     - Alternate BiPaP 12/5 50% with O2 supp  - High risk for intubation   - Change solumedrol 40 mg q12h   - Duonebs   - No antibx   - Received steroids on presentation , will not check IgE level now   - Hemodynamic monitoring   - proBNP normal   - 2 DECHO   - RVP and covid-19 neg   - Continue BP meds .. better control   - Diet, NPO while on BiPaP  - Monitor blood glucose wit coverage   - Out pat Pulmo f/u   - DVT GI prophy   - Advise wt loss

## 2023-10-09 LAB
A1C WITH ESTIMATED AVERAGE GLUCOSE RESULT: 6.1 % — HIGH (ref 4–5.6)
ALBUMIN SERPL ELPH-MCNC: 3.2 G/DL — LOW (ref 3.5–5)
ALP SERPL-CCNC: 95 U/L — SIGNIFICANT CHANGE UP (ref 40–120)
ALT FLD-CCNC: 23 U/L DA — SIGNIFICANT CHANGE UP (ref 10–60)
ANION GAP SERPL CALC-SCNC: 4 MMOL/L — LOW (ref 5–17)
AST SERPL-CCNC: 10 U/L — SIGNIFICANT CHANGE UP (ref 10–40)
BILIRUB SERPL-MCNC: 0.4 MG/DL — SIGNIFICANT CHANGE UP (ref 0.2–1.2)
BUN SERPL-MCNC: 26 MG/DL — HIGH (ref 7–18)
CALCIUM SERPL-MCNC: 9 MG/DL — SIGNIFICANT CHANGE UP (ref 8.4–10.5)
CHLORIDE SERPL-SCNC: 104 MMOL/L — SIGNIFICANT CHANGE UP (ref 96–108)
CO2 SERPL-SCNC: 32 MMOL/L — HIGH (ref 22–31)
CREAT SERPL-MCNC: 1.1 MG/DL — SIGNIFICANT CHANGE UP (ref 0.5–1.3)
EGFR: 54 ML/MIN/1.73M2 — LOW
ESTIMATED AVERAGE GLUCOSE: 128 MG/DL — HIGH (ref 68–114)
GLUCOSE SERPL-MCNC: 158 MG/DL — HIGH (ref 70–99)
HCT VFR BLD CALC: 38.7 % — SIGNIFICANT CHANGE UP (ref 34.5–45)
HGB BLD-MCNC: 13.2 G/DL — SIGNIFICANT CHANGE UP (ref 11.5–15.5)
MAGNESIUM SERPL-MCNC: 2.3 MG/DL — SIGNIFICANT CHANGE UP (ref 1.6–2.6)
MCHC RBC-ENTMCNC: 26.8 PG — LOW (ref 27–34)
MCHC RBC-ENTMCNC: 34.1 GM/DL — SIGNIFICANT CHANGE UP (ref 32–36)
MCV RBC AUTO: 78.7 FL — LOW (ref 80–100)
NRBC # BLD: 0 /100 WBCS — SIGNIFICANT CHANGE UP (ref 0–0)
PHOSPHATE SERPL-MCNC: 4.5 MG/DL — SIGNIFICANT CHANGE UP (ref 2.5–4.5)
PLATELET # BLD AUTO: 295 K/UL — SIGNIFICANT CHANGE UP (ref 150–400)
POTASSIUM SERPL-MCNC: 4.5 MMOL/L — SIGNIFICANT CHANGE UP (ref 3.5–5.3)
POTASSIUM SERPL-SCNC: 4.5 MMOL/L — SIGNIFICANT CHANGE UP (ref 3.5–5.3)
PROT SERPL-MCNC: 7.3 G/DL — SIGNIFICANT CHANGE UP (ref 6–8.3)
RBC # BLD: 4.92 M/UL — SIGNIFICANT CHANGE UP (ref 3.8–5.2)
RBC # FLD: 16.8 % — HIGH (ref 10.3–14.5)
SODIUM SERPL-SCNC: 140 MMOL/L — SIGNIFICANT CHANGE UP (ref 135–145)
TROPONIN I, HIGH SENSITIVITY RESULT: 123.9 NG/L — HIGH
TROPONIN I, HIGH SENSITIVITY RESULT: 136.3 NG/L — HIGH
WBC # BLD: 6.57 K/UL — SIGNIFICANT CHANGE UP (ref 3.8–10.5)
WBC # FLD AUTO: 6.57 K/UL — SIGNIFICANT CHANGE UP (ref 3.8–10.5)

## 2023-10-09 RX ORDER — SODIUM CHLORIDE 9 MG/ML
1000 INJECTION INTRAMUSCULAR; INTRAVENOUS; SUBCUTANEOUS ONCE
Refills: 0 | Status: DISCONTINUED | OUTPATIENT
Start: 2023-10-09 | End: 2023-10-09

## 2023-10-09 RX ORDER — LOSARTAN POTASSIUM 100 MG/1
12.5 TABLET, FILM COATED ORAL DAILY
Refills: 0 | Status: DISCONTINUED | OUTPATIENT
Start: 2023-10-09 | End: 2023-10-11

## 2023-10-09 RX ORDER — CHLORHEXIDINE GLUCONATE 213 G/1000ML
1 SOLUTION TOPICAL
Refills: 0 | Status: DISCONTINUED | OUTPATIENT
Start: 2023-10-09 | End: 2023-10-11

## 2023-10-09 RX ADMIN — BUDESONIDE AND FORMOTEROL FUMARATE DIHYDRATE 2 PUFF(S): 160; 4.5 AEROSOL RESPIRATORY (INHALATION) at 22:02

## 2023-10-09 RX ADMIN — Medication 40 MILLIGRAM(S): at 06:46

## 2023-10-09 RX ADMIN — Medication 3 MILLILITER(S): at 15:16

## 2023-10-09 RX ADMIN — Medication 3 MILLILITER(S): at 08:22

## 2023-10-09 RX ADMIN — Medication 81 MILLIGRAM(S): at 11:43

## 2023-10-09 RX ADMIN — Medication 325 MILLIGRAM(S): at 11:43

## 2023-10-09 RX ADMIN — Medication 3 MILLILITER(S): at 03:29

## 2023-10-09 RX ADMIN — ENOXAPARIN SODIUM 40 MILLIGRAM(S): 100 INJECTION SUBCUTANEOUS at 11:43

## 2023-10-09 RX ADMIN — ATORVASTATIN CALCIUM 20 MILLIGRAM(S): 80 TABLET, FILM COATED ORAL at 21:09

## 2023-10-09 RX ADMIN — BUDESONIDE AND FORMOTEROL FUMARATE DIHYDRATE 2 PUFF(S): 160; 4.5 AEROSOL RESPIRATORY (INHALATION) at 11:44

## 2023-10-09 RX ADMIN — Medication 1 APPLICATION(S): at 11:48

## 2023-10-09 RX ADMIN — DULOXETINE HYDROCHLORIDE 20 MILLIGRAM(S): 30 CAPSULE, DELAYED RELEASE ORAL at 11:43

## 2023-10-09 RX ADMIN — PANTOPRAZOLE SODIUM 40 MILLIGRAM(S): 20 TABLET, DELAYED RELEASE ORAL at 06:46

## 2023-10-09 RX ADMIN — MONTELUKAST 10 MILLIGRAM(S): 4 TABLET, CHEWABLE ORAL at 21:09

## 2023-10-09 RX ADMIN — Medication 3 MILLILITER(S): at 20:20

## 2023-10-09 RX ADMIN — LOSARTAN POTASSIUM 25 MILLIGRAM(S): 100 TABLET, FILM COATED ORAL at 06:46

## 2023-10-09 NOTE — PHYSICAL THERAPY INITIAL EVALUATION ADULT - GAIT DISTANCE, PT EVAL
15ft. with amb and getting into bed, Spo2 decreased to 87% on RA, HR max 133 very briefly,  mborg 5/10, improved to baseline with ~2-3 minutes rest with guided breathing

## 2023-10-09 NOTE — PROGRESS NOTE ADULT - SUBJECTIVE AND OBJECTIVE BOX
DERIAN PEACOCK  MR# [id number]  68yFemale        Patient is a 68y old  Female who presents with a chief complaint of asthma exacerbation (09 Oct 2023 12:25)      INTERVAL HPI/OVERNIGHT EVENTS:  Patient seen and examined at bedside. No notations of chest pain, palpitation, SOB, orthopnea, nausea, vomiting or abdominal pain.    ALLERGIES  Duck (Unknown)  No Known Allergies      MEDICATIONS  albuterol    90 MICROgram(s) HFA Inhaler 2 Puff(s) Inhalation every 6 hours  albuterol/ipratropium for Nebulization 3 milliLiter(s) Nebulizer every 6 hours  aspirin enteric coated 81 milliGRAM(s) Oral daily  atorvastatin 20 milliGRAM(s) Oral at bedtime  budesonide  80 MICROgram(s)/formoterol 4.5 MICROgram(s) Inhaler 2 Puff(s) Inhalation two times a day  chlorhexidine 2% Cloths 1 Application(s) Topical <User Schedule>  DULoxetine 20 milliGRAM(s) Oral daily  enoxaparin Injectable 40 milliGRAM(s) SubCutaneous every 24 hours  ferrous    sulfate 325 milliGRAM(s) Oral daily  furosemide    Tablet 40 milliGRAM(s) Oral daily  glucagon  Injectable 1 milliGRAM(s) IntraMuscular once  insulin lispro (ADMELOG) corrective regimen sliding scale   SubCutaneous at bedtime  insulin lispro (ADMELOG) corrective regimen sliding scale   SubCutaneous three times a day before meals  losartan 12.5 milliGRAM(s) Oral daily  montelukast 10 milliGRAM(s) Oral at bedtime  pantoprazole    Tablet 40 milliGRAM(s) Oral before breakfast  predniSONE   Tablet 40 milliGRAM(s) Oral daily  sodium chloride 0.9% Bolus 1000 milliLiter(s) IV Bolus once  vitamin A &amp; D Ointment 1 Application(s) Topical daily              REVIEW OF SYSTEMS:  CONSTITUTIONAL: No fever, weight loss, or fatigue  EYES: No eye pain, visual disturbances, or discharge  ENT:  No difficulty hearing, tinnitus, vertigo; No sinus or throat pain  NECK: No pain or stiffness  RESPIRATORY: No cough, wheezing, chills or hemoptysis; No Shortness of Breath  CARDIOVASCULAR: No chest pain, palpitations, passing out, dizziness, or leg swelling  GASTROINTESTINAL: No abdominal or epigastric pain. No nausea, vomiting, or hematemesis; No diarrhea or constipation. No melena or hematochezia.  GENITOURINARY: No dysuria, frequency, hematuria, or incontinence  NEUROLOGICAL: No headaches, memory loss, loss of strength, numbness, or tremors  SKIN: No itching, burning, rashes, or lesions   LYMPH Nodes: No enlarged glands  ENDOCRINE: No heat or cold intolerance; No hair loss  MUSCULOSKELETAL: No joint pain or swelling; No muscle, back, or extremity pain  PSYCHIATRIC: No depression, anxiety, mood swings, or difficulty sleeping  HEME/LYMPH: No easy bruising, or bleeding gums  ALLERGY AND IMMUNOLOGIC: No hives or eczema	    [ ] All others negative	  [ ] Unable to obtain      T(C): 36.5 (10-09-23 @ 08:00), Max: 36.9 (10-08-23 @ 16:00)  T(F): 97.7 (10-09-23 @ 08:00), Max: 98.4 (10-08-23 @ 16:00)  HR: 97 (10-09-23 @ 13:00) (86 - 119)  BP: 81/47 (10-09-23 @ 13:00) (81/47 - 143/72)  RR: 19 (10-09-23 @ 13:00) (12 - 40)  SpO2: 92% (10-09-23 @ 13:00) (91% - 100%)  Wt(kg): --    I&O's Summary    08 Oct 2023 07:01  -  09 Oct 2023 07:00  --------------------------------------------------------  IN: 790 mL / OUT: 1550 mL / NET: -760 mL    09 Oct 2023 07:01  -  09 Oct 2023 13:21  --------------------------------------------------------  IN: 0 mL / OUT: 1500 mL / NET: -1500 mL          PHYSICAL EXAM:  A X O x  HEAD:  Atraumatic, Normocephalic  EYES: EOMI, PERRLA, conjunctiva and sclera clear  NECK: Supple, No JVD, Normal thyroid  Resp: CTAB, No crackles, wheezing,   CVS: Regular rate and rhythm; No discernable murmurs, rubs, or gallops  ABD: Soft, Nontender, Nondistended; Bowel sounds present  EXTREMITIES:  2+ Peripheral Pulses, No edema  LYMPH: No dicernable lymphadenopathy noted  GENERAL: NAD, well-groomed, well-developed      LABS:                        13.2   6.57  )-----------( 295      ( 09 Oct 2023 03:47 )             38.7     10-09    140  |  104  |  26<H>  ----------------------------<  158<H>  4.5   |  32<H>  |  1.10    Ca    9.0      09 Oct 2023 03:47  Phos  4.5     10-09  Mg     2.3     10-09    TPro  7.3  /  Alb  3.2<L>  /  TBili  0.4  /  DBili  x   /  AST  10  /  ALT  23  /  AlkPhos  95  10-09      Urinalysis Basic - ( 09 Oct 2023 03:47 )    Color: x / Appearance: x / SG: x / pH: x  Gluc: 158 mg/dL / Ketone: x  / Bili: x / Urobili: x   Blood: x / Protein: x / Nitrite: x   Leuk Esterase: x / RBC: x / WBC x   Sq Epi: x / Non Sq Epi: x / Bacteria: x      CAPILLARY BLOOD GLUCOSE      POCT Blood Glucose.: 118 mg/dL (09 Oct 2023 11:20)  POCT Blood Glucose.: 130 mg/dL (09 Oct 2023 06:37)  POCT Blood Glucose.: 170 mg/dL (08 Oct 2023 21:46)  POCT Blood Glucose.: 112 mg/dL (08 Oct 2023 16:51)      Troponins:  ProBNP:  Lipid Profile:   HgA1c:  TSH:           RADIOLOGY & ADDITIONAL TESTS:    Imaging Personally Reviewed:  [ ] YES  [ ] NO      Consultant(s) Notes Reviewed:  [x ] YES  [ ] NO    Care Discussed with Consultants/Other Providers [ x] YES  [ ] NO          PAST MEDICAL & SURGICAL HISTORY:  Asthma, unspecified asthma severity, unspecified whether complicated, unspecified whether persistent      Sarcoidosis      Essential hypertension      Glaucoma of both eyes, unspecified glaucoma type      Morbid obesity with BMI of 50.0-59.9, adult      Hyperlipemia      History of hysterectomy for benign disease  2006      S/P cataract surgery            Asthma with acute exacerbation    No pertinent family history in first degree relatives    Handoff    MEWS Score    Asthma, unspecified asthma severity, unspecified whether complicated, unspecified whether persistent    Sarcoidosis    Essential hypertension    Glaucoma of both eyes, unspecified glaucoma type    Morbid obesity with BMI of 50.0-59.9, adult    Hyperlipemia    Acute asthma exacerbation    History of hysterectomy for benign disease    S/P cataract surgery    History of cataract surgery    A) ASTHMA SOB    85    [email or web address]

## 2023-10-09 NOTE — CONSULT NOTE ADULT - SUBJECTIVE AND OBJECTIVE BOX
Date of Service  10-09-23 @ 12:26    CHIEF COMPLAINT:Patient is a 68y old  Female who presents with a chief complaint of asthma exacerbation.      HPI:    HPI:68 year old female from home with medical history of HTN, glaucoma, Pre DM, HLD, and Asthma presenting to ED for increased work of breathing and wheezing x2 days. Patient states that she developed cold like symptoms earlier this week described as a non productive cough that is now resolved. Despite using her inhalers patient still had trouble breathing so came to ED for further evaluation. Patient states that she was never hospitalized for an asthma exacerbation and has never been intubated. She denies any other symptoms including fevers, chills, headaches, dizziness, chest pain, abd pain, n/v, diarrhea, constipation, hematuria, dysuria, sick contacts, and recent travel.     In ED VS: , / 164, RR 24, Spo2 94% RA   Cxray: negative, follow up official read    s/p 1 dose duoneb                                                                                                    PAST MEDICAL & SURGICAL HISTORY:  Asthma, unspecified asthma severity, unspecified whether complicated, unspecified whether persistent      Sarcoidosis      Essential hypertension      Glaucoma of both eyes, unspecified glaucoma type      Morbid obesity with BMI of 50.0-59.9, adult      Hyperlipemia      History of hysterectomy for benign disease  2006      S/P cataract surgery          MEDICATIONS  (STANDING):  albuterol    90 MICROgram(s) HFA Inhaler 2 Puff(s) Inhalation every 6 hours  albuterol/ipratropium for Nebulization 3 milliLiter(s) Nebulizer every 6 hours  aspirin enteric coated 81 milliGRAM(s) Oral daily  atorvastatin 20 milliGRAM(s) Oral at bedtime  budesonide  80 MICROgram(s)/formoterol 4.5 MICROgram(s) Inhaler 2 Puff(s) Inhalation two times a day  chlorhexidine 2% Cloths 1 Application(s) Topical <User Schedule>  DULoxetine 20 milliGRAM(s) Oral daily  enoxaparin Injectable 40 milliGRAM(s) SubCutaneous every 24 hours  ferrous    sulfate 325 milliGRAM(s) Oral daily  furosemide    Tablet 40 milliGRAM(s) Oral daily  glucagon  Injectable 1 milliGRAM(s) IntraMuscular once  insulin lispro (ADMELOG) corrective regimen sliding scale   SubCutaneous at bedtime  insulin lispro (ADMELOG) corrective regimen sliding scale   SubCutaneous three times a day before meals  losartan 12.5 milliGRAM(s) Oral daily  montelukast 10 milliGRAM(s) Oral at bedtime  pantoprazole    Tablet 40 milliGRAM(s) Oral before breakfast  predniSONE   Tablet 40 milliGRAM(s) Oral daily  vitamin A &amp; D Ointment 1 Application(s) Topical daily    MEDICATIONS  (PRN):      FAMILY HISTORY:  No pertinent family history in first degree relatives        SOCIAL HISTORY:    [x ] Non-smoker    [x ] Alcohol-denies    Allergies    No Known Allergies    Intolerances    Duck (Unknown)  	    REVIEW OF SYSTEMS:  CONSTITUTIONAL: No fever, weight loss, or fatigue  EYES: No eye pain, visual disturbances, or discharge  ENT:  No difficulty hearing, tinnitus, vertigo; No sinus or throat pain  NECK: No pain or stiffness  RESPIRATORY: No cough, wheezing, chills or hemoptysis; + Shortness of Breath  CARDIOVASCULAR: No chest pain, palpitations, passing out, dizziness, or leg swelling  GASTROINTESTINAL: No abdominal or epigastric pain. No nausea, vomiting, or hematemesis; No diarrhea or constipation. No melena or hematochezia.  GENITOURINARY: No dysuria, frequency, hematuria, or incontinence  NEUROLOGICAL: No headaches, memory loss, loss of strength, numbness, or tremors  SKIN: No itching, burning, rashes, or lesions   LYMPH Nodes: No enlarged glands  ENDOCRINE: No heat or cold intolerance; No hair loss  MUSCULOSKELETAL: No joint pain or swelling; No muscle, back, or extremity pain  PSYCHIATRIC: No depression, anxiety, mood swings, or difficulty sleeping  HEME/LYMPH: No easy bruising, or bleeding gums  ALLERGY AND IMMUNOLOGIC: No hives or eczema	      PHYSICAL EXAM:  T(C): 36.5 (10-09-23 @ 08:00), Max: 36.9 (10-08-23 @ 16:00)  HR: 102 (10-09-23 @ 11:40) (86 - 119)  BP: 89/51 (10-09-23 @ 11:00) (89/51 - 143/72)  RR: 26 (10-09-23 @ 11:00) (12 - 40)  SpO2: 95% (10-09-23 @ 11:40) (91% - 100%)  Wt(kg): --  I&O's Summary    08 Oct 2023 07:01  -  09 Oct 2023 07:00  --------------------------------------------------------  IN: 790 mL / OUT: 1550 mL / NET: -760 mL        Appearance: Normal	  HEENT:   Normal oral mucosa, PERRL, EOMI	  Lymphatic: No lymphadenopathy  Cardiovascular: Normal S1 S2, No JVD, No murmurs, No edema  Respiratory: Lungs clear to auscultation	  Psychiatry: A & O x 3, Mood & affect appropriate  Gastrointestinal:  Soft, Non-tender, + BS	  Skin: No rashes, No ecchymoses, No cyanosis	  Neurologic: Non-focal  Extremities: Normal range of motion, No clubbing, cyanosis or edema  Vascular: Peripheral pulses palpable 2+ bilaterally        ECG:  	sinus tach,pvc,rbbb  	  	  LABS:	 	  Troponin I, High Sensitivity Result: 123.9 ng/L (10-09-23 @ 08:20)  Troponin I, High Sensitivity Result: 136.3 ng/L (10-09-23 @ 03:47)  Troponin I, High Sensitivity Result: 30.9 ng/L (10-08-23 @ 03:25)                          13.2   6.57  )-----------( 295      ( 09 Oct 2023 03:47 )             38.7     10-09    140  |  104  |  26<H>  ----------------------------<  158<H>  4.5   |  32<H>  |  1.10    Ca    9.0      09 Oct 2023 03:47  Phos  4.5     10-09  Mg     2.3     10-09    TPro  7.3  /  Alb  3.2<L>  /  TBili  0.4  /  DBili  x   /  AST  10  /  ALT  23  /  AlkPhos  95  10-09  < from: Xray Chest 1 View- PORTABLE-Urgent (Xray Chest 1 View- PORTABLE-Urgent .) (10.08.23 @ 03:45) >    ACC: [id number] EXAM:  XR CHEST PORTABLE URGENT 1V   ORDERED BY: JEET JOHNSON DATE:  10/08/2023          INTERPRETATION:  AP chest on October 8, 2023 at 3:36 AM. Patient is short   of breath.    Heart likely enlarged.    There is acongestive picture mild to moderate increased from July 10   this year.    In addition there is a small consolidated area at the left base also new.    IMPRESSION: Heart enlargement and mild to moderate CHF. Possible small   pneumonic infiltrate leftlower lung.    --- End of Report ---            CAROLYN BROWN MD; Attending Radiologist  This document has been electronically signed. Oct  8 2023  6:01PM          < from: Transthoracic Echocardiogram (07.11.23 @ 10:40) >  OBSERVATIONS:  Mitral Valve: Mitral annular calcification. Mild to  moderate mitral regurgitation.  Aortic Root: Aortic Root: 3.5 cm.    Aortic Valve: Aortic valve not well visualized.  Left Atrium: Normal left atrium.  LA volume index = 33  cc/m2.  Left Ventricle: Normal Left Ventricular Systolic Function,  (EF = 55%) Normal left ventricular internal dimensions and  wall thicknesses. Grade I diastolic dysfunction (Impaired  relaxation, mild).  Right Heart: Normal right atrium. Normal right ventricular  size and systolic function (TAPSE 2.4 cm). Normal tricuspid  valve. Pulmonic valve not well seen.  Pericardium/PleuraNormal pericardium with no pericardial  effusion.  Hemodynamic: Unable to estimate RVSP.  ------------------------------------------------------------------------      
Time of visit:    CHIEF COMPLAINT: Patient is a 68y old  Female who presents with a chief complaint of asthma exacerbation (09 Oct 2023 13:21)      HPI:    HPI:68 year old female from home with medical history of HTN, glaucoma, Pre DM, HLD, and Asthma presenting to ED for increased work of breathing and wheezing x2 days. Patient states that she developed cold like symptoms earlier this week described as a non productive cough that is now resolved. Despite using her inhalers patient still had trouble breathing so came to ED for further evaluation. Patient states that she was never hospitalized for an asthma exacerbation and has never been intubated. She denies any other symptoms including fevers, chills, headaches, dizziness, chest pain, abd pain, n/v, diarrhea, constipation, hematuria, dysuria, sick contacts, and recent travel.     In ED VS: , / 164, RR 24, Spo2 94% RA   Cxray: negative, follow up official read    s/p 1 dose duoneb     PAST MEDICAL & SURGICAL HISTORY: Asthma, Essential hypertension, Glaucoma of both eyes, unspecified glaucoma type, Morbid obesity with BMI of 50.0-59.9, adult, Hyperlipemia, History of hysterectomy for benign disease  2006, S/P cataract surgery  SOCIAL HX: No smoking etoh or drug use   FAMILY HISTORY:No pertinent family history in first degree relatives  Allergies: No Known Allergies            PHYSICAL EXAM    ICU Vital Signs Last 24 Hrs  T(C): --  T(F): --  HR: 108 (08 Oct 2023 05:21) (108 - 124)  BP: 136/80 (08 Oct 2023 05:21) (136/80 - 206/164)  BP(mean): --  ABP: --  ABP(mean): --  RR: 18 (08 Oct 2023 05:21) (18 - 24)  SpO2: 100% (08 Oct 2023 05:21) (94% - 100%)    O2 Parameters below as of 08 Oct 2023 05:21  Patient On (Oxygen Delivery Method): BiPAP/CPAP            General: morbidly obese female in no mild resp distress   HEENT:  PERRLA              Lymphatic system: No LN  Lungs :decreased breath sounds b/l   Cardiovascular: tachycardic   Gastrointestinal: Soft, Positive BS  Musculoskeletal: No clubbing.  Moves all extremities.    Skin: Warm.  Intact  Neurological: No motor or sensory deficit         LABS:                          13.2   7.75  )-----------( 299      ( 08 Oct 2023 03:25 )             39.6                                               10-08    134<L>  |  102  |  23<H>  ----------------------------<  147<H>  SEE TEXT   |  29  |  1.01    Ca    8.6      08 Oct 2023 03:25    TPro  7.7  /  Alb  3.2<L>  /  TBili  0.5  /  DBili  x   /  AST  58<H>  /  ALT  29  /  AlkPhos  100  10-08                                             Urinalysis Basic - ( 08 Oct 2023 03:25 )    Color: x / Appearance: x / SG: x / pH: x  Gluc: 147 mg/dL / Ketone: x  / Bili: x / Urobili: x   Blood: x / Protein: x / Nitrite: x   Leuk Esterase: x / RBC: x / WBC x   Sq Epi: x / Non Sq Epi: x / Bacteria: x                                                  LIVER FUNCTIONS - ( 08 Oct 2023 03:25 )  Alb: 3.2 g/dL / Pro: 7.7 g/dL / ALK PHOS: 100 U/L / ALT: 29 U/L DA / AST: 58 U/L / GGT: x                                                                                                                                       CXR:< from: Xray Chest 1 View- PORTABLE-Urgent (Xray Chest 1 View- PORTABLE-Urgent .) (10.08.23 @ 03:45) >    ACC: [id number] EXAM:  XR CHEST PORTABLE URGENT 1V   ORDERED BY: JEET LARSEN     PROCEDURE DATE:  10/08/2023          INTERPRETATION:  AP chest on October 8, 2023 at 3:36 AM. Patient is short   of breath.    Heart likely enlarged.    There is acongestive picture mild to moderate increased from July 10   this year.    In addition there is a small consolidated area at the left base also new.    IMPRESSION: Heart enlargement and mild to moderate CHF. Possible small   pneumonic infiltrate leftlower lung.    --- End of Report ---            CAROLYN BROWN MD; Attending Radiologist  This document has been electronically signed. Oct  8 2023  6:01PM    < end of copied text >      ECHO:    MEDICATIONS  (STANDING):    MEDICATIONS  (PRN):         (08 Oct 2023 06:11)   Patient seen and examined.     PAST MEDICAL & SURGICAL HISTORY:  Asthma, unspecified asthma severity, unspecified whether complicated, unspecified whether persistent      Sarcoidosis      Essential hypertension      Glaucoma of both eyes, unspecified glaucoma type      Morbid obesity with BMI of 50.0-59.9, adult      Hyperlipemia      History of hysterectomy for benign disease  2006      S/P cataract surgery          Allergies    No Known Allergies    Intolerances    Duck (Unknown)      MEDICATIONS  (STANDING):  albuterol    90 MICROgram(s) HFA Inhaler 2 Puff(s) Inhalation every 6 hours  albuterol/ipratropium for Nebulization 3 milliLiter(s) Nebulizer every 6 hours  aspirin enteric coated 81 milliGRAM(s) Oral daily  atorvastatin 20 milliGRAM(s) Oral at bedtime  budesonide  80 MICROgram(s)/formoterol 4.5 MICROgram(s) Inhaler 2 Puff(s) Inhalation two times a day  chlorhexidine 2% Cloths 1 Application(s) Topical <User Schedule>  DULoxetine 20 milliGRAM(s) Oral daily  enoxaparin Injectable 40 milliGRAM(s) SubCutaneous every 24 hours  ferrous    sulfate 325 milliGRAM(s) Oral daily  furosemide    Tablet 40 milliGRAM(s) Oral daily  glucagon  Injectable 1 milliGRAM(s) IntraMuscular once  insulin lispro (ADMELOG) corrective regimen sliding scale   SubCutaneous at bedtime  insulin lispro (ADMELOG) corrective regimen sliding scale   SubCutaneous three times a day before meals  losartan 12.5 milliGRAM(s) Oral daily  montelukast 10 milliGRAM(s) Oral at bedtime  pantoprazole    Tablet 40 milliGRAM(s) Oral before breakfast  predniSONE   Tablet 40 milliGRAM(s) Oral daily  sodium chloride 0.9% Bolus 1000 milliLiter(s) IV Bolus once  vitamin A &amp; D Ointment 1 Application(s) Topical daily      MEDICATIONS  (PRN):   Medications up to date at time of exam.    Medications up to date at time of exam.    FAMILY HISTORY:  No pertinent family history in first degree relatives        SOCIAL HISTORY as per EMR   Smoking History: [   ] smoking/smoke exposure, [   ] former smoker  Living Condition: [   ] apartment, [   ] private house  Work History:   Travel History: denies recent travel  Illicit Substance Use: denies  Alcohol Use: denies    REVIEW OF SYSTEMS: as per SIMEON     CONSTITUTIONAL:  denies fevers, chills, sweats, weight loss    HEENT:  denies diplopia or blurred vision, sore throat or runny nose.    CARDIOVASCULAR:  denies pressure, squeezing, tightness, or heaviness about the chest; no palpitations.    RESPIRATORY:  denies SOB, cough, ALEXANDER, wheezing.    GASTROINTESTINAL:  denies abdominal pain, nausea, vomiting or diarrhea.    GENITOURINARY: denies dysuria, frequency or urgency.    NEUROLOGIC:  denies numbness, tingling, seizures or weakness.    PSYCHIATRIC:  denies disorder of thought or mood.    MSK: denies swelling, redness      PHYSICAL EXAMINATION:    GENERAL: The patient is a well-developed, well-nourished, in no apparent distress.     Vital Signs Last 24 Hrs  T(C): 36.5 (09 Oct 2023 08:00), Max: 36.9 (08 Oct 2023 16:00)  T(F): 97.7 (09 Oct 2023 08:00), Max: 98.4 (08 Oct 2023 16:00)  HR: 102 (09 Oct 2023 14:15) (86 - 119)  BP: 99/46 (09 Oct 2023 14:15) (81/47 - 143/72)  BP(mean): 62 (09 Oct 2023 14:15) (59 - 103)  RR: 20 (09 Oct 2023 14:15) (12 - 40)  SpO2: 95% (09 Oct 2023 14:15) (91% - 100%)    Parameters below as of 09 Oct 2023 08:00  Patient On (Oxygen Delivery Method): nasal cannula  O2 Flow (L/min): 3     (if applicable)    Chest Tube (if applicable)    HEENT: Head is normocephalic and atraumatic. Extraocular muscles are intact. Mucous membranes are moist.     NECK: Supple, no palpable adenopathy.    LUNGS: Clear to auscultation, no wheezing, rales, or rhonchi.    HEART: Regular rate and rhythm without murmur.    ABDOMEN: Soft, nontender, and nondistended.  No hepatosplenomegaly is noted.    RENAL: No difficulty voiding, no pelvic pain    EXTREMITIES: Without any cyanosis, clubbing, rash, lesions or edema.    NEUROLOGIC: Awake, alert, oriented, grossly intact    SKIN: Warm, dry, good turgor.      LABS:                        13.2   6.57  )-----------( 295      ( 09 Oct 2023 03:47 )             38.7     10-09    140  |  104  |  26<H>  ----------------------------<  158<H>  4.5   |  32<H>  |  1.10    Ca    9.0      09 Oct 2023 03:47  Phos  4.5     10-09  Mg     2.3     10-09    TPro  7.3  /  Alb  3.2<L>  /  TBili  0.4  /  DBili  x   /  AST  10  /  ALT  23  /  AlkPhos  95  10-09      Urinalysis Basic - ( 09 Oct 2023 03:47 )    Color: x / Appearance: x / SG: x / pH: x  Gluc: 158 mg/dL / Ketone: x  / Bili: x / Urobili: x   Blood: x / Protein: x / Nitrite: x   Leuk Esterase: x / RBC: x / WBC x   Sq Epi: x / Non Sq Epi: x / Bacteria: x    2DECHO:< from: Transthoracic Echocardiogram (07.11.23 @ 10:40) >    Patient name: EDRIAN PEACOCK  YOB: 1955   Age: 67 (F)   MR#: [id number]  Study Date: 7/11/2023  Location: [address] [place], [place] [address]Sonographer: Stefan Singleton KIRAN  Study quality: Technically good  Referring Physician:  JAQUELIN PARK MD  Blood Pressure:120/78 mmHg  Height: 162 cm  Weight: 149 kg  BSA: 2.4 m2  ------------------------------------------------------------------------    PROCEDURE: Transthoracic echocardiogram with 2-D, M-Mode  and complete spectral and color flow Doppler.  INDICATION:Dyspnea, unspecified (R06.00)  HISTORY:  ------------------------------------------------------------------------  DIMENSIONS:  Dimensions:     Normal Values:  LA:     3.1 cm    2.0 - 4.0 cm  Ao:     3.5 cm    2.0 - 3.8 cm  SEPTUM: 1.2 cm    0.6 - 1.2 cm  PWT:    1.0 cm    0.6 - 1.1 cm  LVIDd:  4.5 cm    3.0 - 5.6 cm  LVIDs:  3.3 cm    1.8 - 4.0 cm      Derived Variables:  LVMI: 73 g/m2  RWT: 0.44  Ejection Fraction Visual Estimate: 55 %  Ejection Fraction Corona: 53 %    ------------------------------------------------------------------------  OBSERVATIONS:  Mitral Valve: Mitral annular calcification. Mild to  moderate mitral regurgitation.  Aortic Root: Aortic Root: 3.5 cm.    Aortic Valve: Aortic valve not well visualized.  Left Atrium: Normal left atrium.  LA volume index = 33  cc/m2.  Left Ventricle: Normal Left Ventricular Systolic Function,  (EF = 55%) Normal left ventricular internal dimensions and  wall thicknesses. Grade I diastolic dysfunction (Impaired  relaxation, mild).  Right Heart: Normal right atrium. Normal right ventricular  size and systolic function (TAPSE 2.4 cm). Normal tricuspid  valve. Pulmonic valve not well seen.  Pericardium/PleuraNormal pericardium with no pericardial  effusion.  Hemodynamic: Unable to estimate RVSP.  ------------------------------------------------------------------------  CONCLUSIONS:  1. Mitral annular calcification. Mild to moderate mitral  regurgitation.  2. Aortic valve not well visualized.  3. Aortic Root: 3.5 cm.  4. Normal left atrium.  LA volume index = 33 cc/m2.  5. Normal left ventricular internal dimensions and wall  thicknesses.  6. Normal Left Ventricular Systolic Function,  (EF = 55%)  7. Grade I diastolic dysfunction (Impaired relaxation,  mild).  8. Normal right atrium.  9. Normal right ventricular size and systolic function  (TAPSE 2.4 cm).  10. Unable to estimate RVSP.  11. Normal tricuspid valve.  12. Pulmonic valve not well seen.  13. Normal pericardium with no pericardial effusion.    ------------------------------------------------------------------------  Confirmed on  7/12/2023 - 08:11:20 by Denise Bejarano MD  ------------------------------------------------------------------------    < end of copied text >        MICROBIOLOGY: (if applicable)    RADIOLOGY & ADDITIONAL STUDIES:  EKG:   CXR:  < from: Xray Chest 1 View- PORTABLE-Urgent (Xray Chest 1 View- PORTABLE-Urgent .) (10.08.23 @ 03:45) >    ACC: [id number] EXAM:  XR CHEST PORTABLE URGENT 1V   ORDERED BY: JEET LARSEN     PROCEDURE DATE:  10/08/2023          INTERPRETATION:  AP chest on October 8, 2023 at 3:36 AM. Patient is short   of breath.    Heart likely enlarged.    There is acongestive picture mild to moderate increased from July 10   this year.    In addition there is a small consolidated area at the left base also new.    IMPRESSION: Heart enlargement and mild to moderate CHF. Possible small   pneumonic infiltrate leftlower lung.    --- End of Report ---            CAROLYN BROWN MD; Attending Radiologist  This document has been electronically signed. Oct  8 2023  6:01PM    < end of copied text >  ECHO:    IMPRESSION: 68y Female PAST MEDICAL & SURGICAL HISTORY:  Asthma, unspecified asthma severity, unspecified whether complicated, unspecified whether persistent  Sarcoidosis  Essential hypertension  Glaucoma of both eyes, unspecified glaucoma type  Morbid obesity with BMI of 50.0-59.9, adult  Hyperlipemia  History of hysterectomy for benign disease  2006  S/P cataract surgery           IMP: This is a  68 year old morbid obese woman  from home with  HTN, glaucoma, Pre DM, HLD, and Asthma presenting to ED for increased work of breathing and wheezing x2 days admitted for acute hypoxic resp failue due to  asthma exacerbation requiring BiPaP        ASSESSMENT     - Acute hypoxic resp failure   - Acute Asthma exacerbation  - HTN emergency  - Morbid obesity   - Clinical TESSA / OHS  - HTN HLD   - Prediabetic   - Chronic Grade 1 diastolic dysfunction       Plan     - Alternate BiPaP 12/5 50% with O2 supp  - Prednisone   - Duonebs prn rescue   - Symbicaort   - Received steroids on presentation , will not check IgE level now   - 2 DECHO noted as per above   - Continue BP meds .. better control   - Diet, NPO while on BiPaP  - Monitor blood glucose wit coverage   - Out pat Pulmo f/u   - OHS / TESSA work up as out patient   - DVT GI prophy   - Advise wt loss .       discussed with ICU attending     
DERIAN PEACOCK  MR# [id number]  68yFemale        Patient is a 68y old  Female who presents with a chief complaint of asthma exacerbation (08 Oct 2023 07:11)      INTERVAL HPI/OVERNIGHT EVENTS:  Patient seen and examined at bedside. No notations of chest pain, palpitation, SOB, orthopnea, nausea, vomiting or abdominal pain.    ALLERGIES  Duck (Unknown)  No Known Allergies      MEDICATIONS  albuterol    90 MICROgram(s) HFA Inhaler 2 Puff(s) Inhalation every 6 hours  albuterol/ipratropium for Nebulization 3 milliLiter(s) Nebulizer every 6 hours  aspirin enteric coated 81 milliGRAM(s) Oral daily  atorvastatin 20 milliGRAM(s) Oral at bedtime  budesonide  80 MICROgram(s)/formoterol 4.5 MICROgram(s) Inhaler 2 Puff(s) Inhalation two times a day  DULoxetine 20 milliGRAM(s) Oral daily  enoxaparin Injectable 40 milliGRAM(s) SubCutaneous every 24 hours  ferrous    sulfate 325 milliGRAM(s) Oral daily  furosemide    Tablet 40 milliGRAM(s) Oral daily  glucagon  Injectable 1 milliGRAM(s) IntraMuscular once  insulin lispro (ADMELOG) corrective regimen sliding scale   SubCutaneous three times a day before meals  insulin lispro (ADMELOG) corrective regimen sliding scale   SubCutaneous at bedtime  losartan 25 milliGRAM(s) Oral daily  methylPREDNISolone sodium succinate Injectable 40 milliGRAM(s) IV Push every 12 hours  montelukast 10 milliGRAM(s) Oral at bedtime  pantoprazole    Tablet 40 milliGRAM(s) Oral before breakfast  tiotropium 2.5 MICROgram(s) Inhaler 2 Puff(s) Inhalation daily              REVIEW OF SYSTEMS:  CONSTITUTIONAL: No fever, weight loss, or fatigue  EYES: No eye pain, visual disturbances, or discharge  ENT:  No difficulty hearing, tinnitus, vertigo; No sinus or throat pain  NECK: No pain or stiffness  RESPIRATORY: No cough, wheezing, chills or hemoptysis; No Shortness of Breath  CARDIOVASCULAR: No chest pain, palpitations, passing out, dizziness, or leg swelling  GASTROINTESTINAL: No abdominal or epigastric pain. No nausea, vomiting, or hematemesis; No diarrhea or constipation. No melena or hematochezia.  GENITOURINARY: No dysuria, frequency, hematuria, or incontinence  NEUROLOGICAL: No headaches, memory loss, loss of strength, numbness, or tremors  SKIN: No itching, burning, rashes, or lesions   LYMPH Nodes: No enlarged glands  ENDOCRINE: No heat or cold intolerance; No hair loss  MUSCULOSKELETAL: No joint pain or swelling; No muscle, back, or extremity pain  PSYCHIATRIC: No depression, anxiety, mood swings, or difficulty sleeping  HEME/LYMPH: No easy bruising, or bleeding gums  ALLERGY AND IMMUNOLOGIC: No hives or eczema	    [ ] All others negative	  [ ] Unable to obtain      T(C): 36.9 (10-08-23 @ 07:15), Max: 36.9 (10-08-23 @ 07:15)  T(F): 98.4 (10-08-23 @ 07:15), Max: 98.4 (10-08-23 @ 07:15)  HR: 110 (10-08-23 @ 13:00) (99 - 124)  BP: 106/53 (10-08-23 @ 13:00) (106/53 - 206/164)  RR: 25 (10-08-23 @ 13:00) (16 - 25)  SpO2: 96% (10-08-23 @ 13:00) (94% - 100%)  Wt(kg): --  Height (cm): 165.1 (10-08 @ 07:15)  Weight (kg): 152.9 (10-08 @ 07:15)  BMI (kg/m2): 56.1 (10-08 @ 07:15)  BSA (m2): 2.47 (10-08 @ 07:15)  I&O's Summary    08 Oct 2023 07:01  -  08 Oct 2023 14:14  --------------------------------------------------------  IN: 350 mL / OUT: 600 mL / NET: -250 mL          PHYSICAL EXAM:  A X O x  HEAD:  Atraumatic, Normocephalic  EYES: EOMI, PERRLA, conjunctiva and sclera clear  NECK: Supple, No JVD, Normal thyroid  Resp: CTAB, No crackles, wheezing,   CVS: Regular rate and rhythm; No discernable murmurs, rubs, or gallops  ABD: Soft, Nontender, Nondistended; Bowel sounds present  EXTREMITIES:  2+ Peripheral Pulses, No edema  LYMPH: No dicernable lymphadenopathy noted  GENERAL: NAD, well-groomed, well-developed      LABS:                        13.2   7.75  )-----------( 299      ( 08 Oct 2023 03:25 )             39.6     10-08    140  |  105  |  21<H>  ----------------------------<  173<H>  4.0   |  29  |  0.95    Ca    9.1      08 Oct 2023 07:54  Phos  3.2     10-08  Mg     2.0     10-08    TPro  7.5  /  Alb  3.4<L>  /  TBili  0.5  /  DBili  x   /  AST  11  /  ALT  25  /  AlkPhos  111  10-08      Urinalysis Basic - ( 08 Oct 2023 07:54 )    Color: x / Appearance: x / SG: x / pH: x  Gluc: 173 mg/dL / Ketone: x  / Bili: x / Urobili: x   Blood: x / Protein: x / Nitrite: x   Leuk Esterase: x / RBC: x / WBC x   Sq Epi: x / Non Sq Epi: x / Bacteria: x      CAPILLARY BLOOD GLUCOSE      POCT Blood Glucose.: 145 mg/dL (08 Oct 2023 11:31)  POCT Blood Glucose.: 149 mg/dL (08 Oct 2023 07:38)      Troponins:  ProBNP:  Lipid Profile:   HgA1c:  TSH:           RADIOLOGY & ADDITIONAL TESTS:    Imaging Personally Reviewed:  [ ] YES  [ ] NO      Consultant(s) Notes Reviewed:  [x ] YES  [ ] NO    Care Discussed with Consultants/Other Providers [ x] YES  [ ] NO          PAST MEDICAL & SURGICAL HISTORY:  Asthma, unspecified asthma severity, unspecified whether complicated, unspecified whether persistent      Sarcoidosis      Essential hypertension      Glaucoma of both eyes, unspecified glaucoma type      Morbid obesity with BMI of 50.0-59.9, adult      Hyperlipemia      History of hysterectomy for benign disease  2006      S/P cataract surgery            Asthma with acute exacerbation    No pertinent family history in first degree relatives    Handoff    MEWS Score    Asthma, unspecified asthma severity, unspecified whether complicated, unspecified whether persistent    Sarcoidosis    Essential hypertension    Glaucoma of both eyes, unspecified glaucoma type    Morbid obesity with BMI of 50.0-59.9, adult    Hyperlipemia    Acute asthma exacerbation    History of hysterectomy for benign disease    S/P cataract surgery    History of cataract surgery    A) ASTHMA SOB    85    [email or web address]

## 2023-10-09 NOTE — PROGRESS NOTE ADULT - SUBJECTIVE AND OBJECTIVE BOX
INTERVAL HPI/OVERNIGHT EVENTS:       PRESSORS: [ ] YES [ ] NO  WHICH:    ANTIBIOTICS:                  DATE STARTED:  ANTIBIOTICS:                  DATE STARTED:    Antimicrobial:    Cardiovascular:  furosemide    Tablet 40 milliGRAM(s) Oral daily  losartan 25 milliGRAM(s) Oral daily    Pulmonary:  albuterol    90 MICROgram(s) HFA Inhaler 2 Puff(s) Inhalation every 6 hours  albuterol/ipratropium for Nebulization 3 milliLiter(s) Nebulizer every 6 hours  budesonide  80 MICROgram(s)/formoterol 4.5 MICROgram(s) Inhaler 2 Puff(s) Inhalation two times a day  montelukast 10 milliGRAM(s) Oral at bedtime  tiotropium 2.5 MICROgram(s) Inhaler 2 Puff(s) Inhalation daily    Hematalogic:  aspirin enteric coated 81 milliGRAM(s) Oral daily  enoxaparin Injectable 40 milliGRAM(s) SubCutaneous every 24 hours    Other:  atorvastatin 20 milliGRAM(s) Oral at bedtime  DULoxetine 20 milliGRAM(s) Oral daily  ferrous    sulfate 325 milliGRAM(s) Oral daily  glucagon  Injectable 1 milliGRAM(s) IntraMuscular once  insulin lispro (ADMELOG) corrective regimen sliding scale   SubCutaneous at bedtime  insulin lispro (ADMELOG) corrective regimen sliding scale   SubCutaneous three times a day before meals  methylPREDNISolone sodium succinate Injectable 40 milliGRAM(s) IV Push every 12 hours  pantoprazole    Tablet 40 milliGRAM(s) Oral before breakfast  vitamin A &amp; D Ointment 1 Application(s) Topical daily    albuterol    90 MICROgram(s) HFA Inhaler 2 Puff(s) Inhalation every 6 hours  albuterol/ipratropium for Nebulization 3 milliLiter(s) Nebulizer every 6 hours  aspirin enteric coated 81 milliGRAM(s) Oral daily  atorvastatin 20 milliGRAM(s) Oral at bedtime  budesonide  80 MICROgram(s)/formoterol 4.5 MICROgram(s) Inhaler 2 Puff(s) Inhalation two times a day  DULoxetine 20 milliGRAM(s) Oral daily  enoxaparin Injectable 40 milliGRAM(s) SubCutaneous every 24 hours  ferrous    sulfate 325 milliGRAM(s) Oral daily  furosemide    Tablet 40 milliGRAM(s) Oral daily  glucagon  Injectable 1 milliGRAM(s) IntraMuscular once  insulin lispro (ADMELOG) corrective regimen sliding scale   SubCutaneous at bedtime  insulin lispro (ADMELOG) corrective regimen sliding scale   SubCutaneous three times a day before meals  losartan 25 milliGRAM(s) Oral daily  methylPREDNISolone sodium succinate Injectable 40 milliGRAM(s) IV Push every 12 hours  montelukast 10 milliGRAM(s) Oral at bedtime  pantoprazole    Tablet 40 milliGRAM(s) Oral before breakfast  tiotropium 2.5 MICROgram(s) Inhaler 2 Puff(s) Inhalation daily  vitamin A &amp; D Ointment 1 Application(s) Topical daily    Drug Dosing Weight  Height (cm): 165.1 (08 Oct 2023 07:15)  Weight (kg): 152.9 (08 Oct 2023 07:15)  BMI (kg/m2): 56.1 (08 Oct 2023 07:15)  BSA (m2): 2.47 (08 Oct 2023 07:15)    PHYSICAL EXAM:  GENERAL: NAD  EYES: EOMI, PERRLA  NECK: Supple, No JVD; Normal thyroid; Trachea midline: No LAD   NERVOUS SYSTEM:  Alert & Oriented X3,  Motor Strength 5/5 B/L upper and lower extremities; DTRs 2+ intact and symmetric  CHEST/LUNG: No rales, rhonchi, wheezing, breath sounds present bilaterally  HEART: Regular rate and rhythm; No murmurs, no gallops  ABDOMEN: Soft, Nontender, Nondistended; Bowel sounds present, no pain or masses on palpation  : voiding well, Sharma in place  EXTREMITIES:  2+ Peripheral Pulses, No clubbing, cyanosis, or edema  SKIN: warm, intact, no lesions     LINES/DRAINS/DEVICES  CENTRAL LINE: [ ] YES [ ] NO  LOCATION:     SHARMA: [ ] YES [ ] NO     A-LINE:  [ ] YES [ ] NO  LOCATION:       ICU Vital Signs Last 24 Hrs  T(C): 35.6 (09 Oct 2023 04:00), Max: 36.9 (08 Oct 2023 12:00)  T(F): 96.1 (09 Oct 2023 04:00), Max: 98.4 (08 Oct 2023 12:00)  HR: 98 (09 Oct 2023 07:00) (86 - 119)  BP: 95/62 (09 Oct 2023 07:00) (95/62 - 143/72)  BP(mean): 74 (09 Oct 2023 07:00) (65 - 103)  ABP: --  ABP(mean): --  RR: 40 (09 Oct 2023 07:00) (12 - 40)  SpO2: 93% (09 Oct 2023 07:00) (93% - 100%)    O2 Parameters below as of 09 Oct 2023 07:00  Patient On (Oxygen Delivery Method): nasal cannula  O2 Flow (L/min): 4                10-08 @ 07:01  -  10-09 @ 07:00  --------------------------------------------------------  IN: 790 mL / OUT: 1550 mL / NET: -760 mL              LABS:  CBC Full  -  ( 09 Oct 2023 03:47 )  WBC Count : 6.57 K/uL  RBC Count : 4.92 M/uL  Hemoglobin : 13.2 g/dL  Hematocrit : 38.7 %  Platelet Count - Automated : 295 K/uL  Mean Cell Volume : 78.7 fl  Mean Cell Hemoglobin : 26.8 pg  Mean Cell Hemoglobin Concentration : 34.1 gm/dL  Auto Neutrophil # : x  Auto Lymphocyte # : x  Auto Monocyte # : x  Auto Eosinophil # : x  Auto Basophil # : x  Auto Neutrophil % : x  Auto Lymphocyte % : x  Auto Monocyte % : x  Auto Eosinophil % : x  Auto Basophil % : x    10-09    140  |  104  |  26<H>  ----------------------------<  158<H>  4.5   |  32<H>  |  1.10    Ca    9.0      09 Oct 2023 03:47  Phos  4.5     10-09  Mg     2.3     10-09    TPro  7.3  /  Alb  3.2<L>  /  TBili  0.4  /  DBili  x   /  AST  10  /  ALT  23  /  AlkPhos  95  10-09      Urinalysis Basic - ( 09 Oct 2023 03:47 )    Color: x / Appearance: x / SG: x / pH: x  Gluc: 158 mg/dL / Ketone: x  / Bili: x / Urobili: x   Blood: x / Protein: x / Nitrite: x   Leuk Esterase: x / RBC: x / WBC x   Sq Epi: x / Non Sq Epi: x / Bacteria: x          RADIOLOGY & ADDITIONAL STUDIES REVIEWED DURING TEAM ROUNDS    [ ]GOALS OF CARE DISCUSSION WITH PATIENT/FAMILY/PROXY:    CRITICAL CARE TIME SPENT: 35 minutes   INTERVAL HPI/OVERNIGHT EVENTS:   Vitals stable. Trops elevated overnight, downtrended  Patient S/P BiPAP overnight, this morning satting well on 3 L NC, now weaned to RA- satting well.  Endorses that SOB has resolved. C/o mild dry cough with scant whitish sputum.      PRESSORS: [ ] YES [ ] NO  WHICH:    ANTIBIOTICS:                  DATE STARTED:  ANTIBIOTICS:                  DATE STARTED:    Antimicrobial:    Cardiovascular:  furosemide    Tablet 40 milliGRAM(s) Oral daily  losartan 25 milliGRAM(s) Oral daily    Pulmonary:  albuterol    90 MICROgram(s) HFA Inhaler 2 Puff(s) Inhalation every 6 hours  albuterol/ipratropium for Nebulization 3 milliLiter(s) Nebulizer every 6 hours  budesonide  80 MICROgram(s)/formoterol 4.5 MICROgram(s) Inhaler 2 Puff(s) Inhalation two times a day  montelukast 10 milliGRAM(s) Oral at bedtime  tiotropium 2.5 MICROgram(s) Inhaler 2 Puff(s) Inhalation daily    Hematalogic:  aspirin enteric coated 81 milliGRAM(s) Oral daily  enoxaparin Injectable 40 milliGRAM(s) SubCutaneous every 24 hours    Other:  atorvastatin 20 milliGRAM(s) Oral at bedtime  DULoxetine 20 milliGRAM(s) Oral daily  ferrous    sulfate 325 milliGRAM(s) Oral daily  glucagon  Injectable 1 milliGRAM(s) IntraMuscular once  insulin lispro (ADMELOG) corrective regimen sliding scale   SubCutaneous at bedtime  insulin lispro (ADMELOG) corrective regimen sliding scale   SubCutaneous three times a day before meals  methylPREDNISolone sodium succinate Injectable 40 milliGRAM(s) IV Push every 12 hours  pantoprazole    Tablet 40 milliGRAM(s) Oral before breakfast  vitamin A &amp; D Ointment 1 Application(s) Topical daily    albuterol    90 MICROgram(s) HFA Inhaler 2 Puff(s) Inhalation every 6 hours  albuterol/ipratropium for Nebulization 3 milliLiter(s) Nebulizer every 6 hours  aspirin enteric coated 81 milliGRAM(s) Oral daily  atorvastatin 20 milliGRAM(s) Oral at bedtime  budesonide  80 MICROgram(s)/formoterol 4.5 MICROgram(s) Inhaler 2 Puff(s) Inhalation two times a day  DULoxetine 20 milliGRAM(s) Oral daily  enoxaparin Injectable 40 milliGRAM(s) SubCutaneous every 24 hours  ferrous    sulfate 325 milliGRAM(s) Oral daily  furosemide    Tablet 40 milliGRAM(s) Oral daily  glucagon  Injectable 1 milliGRAM(s) IntraMuscular once  insulin lispro (ADMELOG) corrective regimen sliding scale   SubCutaneous at bedtime  insulin lispro (ADMELOG) corrective regimen sliding scale   SubCutaneous three times a day before meals  losartan 25 milliGRAM(s) Oral daily  methylPREDNISolone sodium succinate Injectable 40 milliGRAM(s) IV Push every 12 hours  montelukast 10 milliGRAM(s) Oral at bedtime  pantoprazole    Tablet 40 milliGRAM(s) Oral before breakfast  tiotropium 2.5 MICROgram(s) Inhaler 2 Puff(s) Inhalation daily  vitamin A &amp; D Ointment 1 Application(s) Topical daily    Drug Dosing Weight  Height (cm): 165.1 (08 Oct 2023 07:15)  Weight (kg): 152.9 (08 Oct 2023 07:15)  BMI (kg/m2): 56.1 (08 Oct 2023 07:15)  BSA (m2): 2.47 (08 Oct 2023 07:15)    PHYSICAL EXAM:  GENERAL: NAD  EYES: EOMI, PERRLA  NECK: Supple, No JVD; Normal thyroid; Trachea midline: No LAD   NERVOUS SYSTEM:  Alert & Oriented X3,  Motor Strength 5/5 B/L upper and lower extremities; DTRs 2+ intact and symmetric  CHEST/LUNG: No rales, rhonchi, wheezing, breath sounds present bilaterally  HEART: Regular rate and rhythm; No murmurs, no gallops  ABDOMEN: Soft, Nontender, Nondistended; Bowel sounds present, no pain or masses on palpation  : voiding well, Sharma in place  EXTREMITIES:  2+ Peripheral Pulses, No clubbing, cyanosis, or edema  SKIN: warm, intact, no lesions     LINES/DRAINS/DEVICES  CENTRAL LINE: [ ] YES [ ] NO  LOCATION:     SHARMA: [ ] YES [ ] NO     A-LINE:  [ ] YES [ ] NO  LOCATION:       ICU Vital Signs Last 24 Hrs  T(C): 35.6 (09 Oct 2023 04:00), Max: 36.9 (08 Oct 2023 12:00)  T(F): 96.1 (09 Oct 2023 04:00), Max: 98.4 (08 Oct 2023 12:00)  HR: 98 (09 Oct 2023 07:00) (86 - 119)  BP: 95/62 (09 Oct 2023 07:00) (95/62 - 143/72)  BP(mean): 74 (09 Oct 2023 07:00) (65 - 103)  ABP: --  ABP(mean): --  RR: 40 (09 Oct 2023 07:00) (12 - 40)  SpO2: 93% (09 Oct 2023 07:00) (93% - 100%)    O2 Parameters below as of 09 Oct 2023 07:00  Patient On (Oxygen Delivery Method): nasal cannula  O2 Flow (L/min): 4                10-08 @ 07:01  -  10-09 @ 07:00  --------------------------------------------------------  IN: 790 mL / OUT: 1550 mL / NET: -760 mL              LABS:  CBC Full  -  ( 09 Oct 2023 03:47 )  WBC Count : 6.57 K/uL  RBC Count : 4.92 M/uL  Hemoglobin : 13.2 g/dL  Hematocrit : 38.7 %  Platelet Count - Automated : 295 K/uL  Mean Cell Volume : 78.7 fl  Mean Cell Hemoglobin : 26.8 pg  Mean Cell Hemoglobin Concentration : 34.1 gm/dL  Auto Neutrophil # : x  Auto Lymphocyte # : x  Auto Monocyte # : x  Auto Eosinophil # : x  Auto Basophil # : x  Auto Neutrophil % : x  Auto Lymphocyte % : x  Auto Monocyte % : x  Auto Eosinophil % : x  Auto Basophil % : x    10-09    140  |  104  |  26<H>  ----------------------------<  158<H>  4.5   |  32<H>  |  1.10    Ca    9.0      09 Oct 2023 03:47  Phos  4.5     10-09  Mg     2.3     10-09    TPro  7.3  /  Alb  3.2<L>  /  TBili  0.4  /  DBili  x   /  AST  10  /  ALT  23  /  AlkPhos  95  10-09      Urinalysis Basic - ( 09 Oct 2023 03:47 )    Color: x / Appearance: x / SG: x / pH: x  Gluc: 158 mg/dL / Ketone: x  / Bili: x / Urobili: x   Blood: x / Protein: x / Nitrite: x   Leuk Esterase: x / RBC: x / WBC x   Sq Epi: x / Non Sq Epi: x / Bacteria: x          RADIOLOGY & ADDITIONAL STUDIES REVIEWED DURING TEAM ROUNDS    [ ]GOALS OF CARE DISCUSSION WITH PATIENT/FAMILY/PROXY:    CRITICAL CARE TIME SPENT: 35 minutes

## 2023-10-09 NOTE — PHYSICAL THERAPY INITIAL EVALUATION ADULT - NSPTDISCHREC_GEN_A_CORE
pending anticipate improvement, for endurance trainign as well as pt. reports chronic Rt knee pain and "catching/clicking""/Outpatient PT

## 2023-10-09 NOTE — PHYSICAL THERAPY INITIAL EVALUATION ADULT - IMPAIRED TRANSFERS: SIT/STAND, REHAB EVAL
dizziness ( pt. reports chronic dizziness when sit up from bed, and she usualy waits a few minutes prior to standing)/decreased strength

## 2023-10-09 NOTE — PHYSICAL THERAPY INITIAL EVALUATION ADULT - PATIENT/FAMILY AGREES WITH PLAN
Problem: Infection  Goal: Will remain free from infection  Outcome: PROGRESSING AS EXPECTED   Pt reports feeling better and is afebrile so far. On IV abx. Encouraged to perform proper hand washing.       Problem: Communication  Goal: The ability to communicate needs accurately and effectively will improve  Outcome: PROGRESSING AS EXPECTED   Pt able to communicate needs appropriately to staff. Plan of care discussed to pt. Questions and concerns answered. Pt. Verbalized understanding. Communication board updated.    pt./yes

## 2023-10-09 NOTE — CHART NOTE - NSCHARTNOTEFT_GEN_A_CORE
Hospital course-  In the ED-    68 year old female from home with medical history of HTN, glaucoma, Pre DM, HLD, and Asthma presenting to ED for increased work of breathing and wheezing x2 days. Patient states that she developed cold like symptoms earlier this week described as a non productive cough that is now resolved. Despite using her inhalers patient still had trouble breathing so came to ED for further evaluation. Patient states that she was never hospitalized for an asthma exacerbation and has never been intubated. She denies any other symptoms including fevers, chills, headaches, dizziness, chest pain, abd pain, n/v, diarrhea, constipation, hematuria, dysuria, sick contacts, and recent travel. VS: , / 164, RR 24, Spo2 94% RA , portable Cxray: negative. s/p 1 dose duoneb, started on BiPAP. Patient was admitted to ICU for management of  acute asthma exacerbation.    In the ICU- Hospital course-  In the ED-    68 year old female from home with medical history of HTN, glaucoma, Pre DM, HLD, and Asthma presenting to ED for increased work of breathing and wheezing x2 days. Patient states that she developed cold like symptoms earlier this week described as a non productive cough that is now resolved. Despite using her inhalers patient still had trouble breathing so came to ED for further evaluation. Patient states that she was never hospitalized for an asthma exacerbation and has never been intubated. She denies any other symptoms including fevers, chills, headaches, dizziness, chest pain, abd pain, n/v, diarrhea, constipation, hematuria, dysuria, sick contacts, and recent travel. VS: , / 164, RR 24, Spo2 94% RA , portable Cxray: negative. s/p 1 dose duoneb, started on BiPAP. Patient was admitted to ICU for management of  acute asthma exacerbation.    In the ICU-  Patient was evaluated and treated for following problems-    HTN emergency-/ 164 on admission, trended down to 136/ 80 without meds.  Chest xray negative for pulm edema.  Patient on home meds losartan 25mg daily and lasix 40 mg po daily. 10/9/23 Patient was continued on reduced dosage losartan- 12.5 mg with holding parameters and daily home dose lasix with holding parameters, in view of BP on lower side on monitoring.    HLD-c/w lipitor 20mg daily    G1DD- ECHO in july 2023 showing G1DD and EKG showed mild RBBB. ECHO 10/23 - G1DD, EF 55 %. 10/9-  Morning  labs- trops were found to be elevated( 136.3), EKG showed mild RBBB( unchanged) and trops downtrended( 123.9) on 10/9 AM. Dr. Bejarano consulted.    Asthma exacerbation- Is on home medication - Trelegy and Proventil home medication. s/p 1 dose albuterol in ED. Pt was placed on BiPAP by ED, transitioned to 3LNC saturating 100% on 10/9/23 AM , now weaned off to RA- 97 %. On 10/9/23- Patient endorsed marked symptomatic improvement. Discontinued spiriva and c/w duoneb tx q6h, Symbicort- BID,  montelukast OD, change IV solumedrol to PO prednisone 40 mg OD. RVP negative , C xray formal read-Mild central CHF somewhat increased from prior, c/w nocturnal Bipap.  PT consulted - recommended home PT for now, out of bed to chair. Pulmonology Dr. De Luna consulted, recommended Sleep study as OP to r/o TESSA component.     Hx of Pre DM -on metformin at home was started on SSI in patient, blood glucose monitored daily    Prophylaxis-DVT with Lovenox and GI with PPI     As of 10/9/23 Patients vitals are stable and patient's symptoms have considerably improved and is ready to be downgraded to telemetry floor (room 50 A 5N)    ICU downgrade sign-out given to   Internal medicine attending -Carmelo Lopez   NP-    Primary team to f/u on the following-    1. Morning labs- CBC, CMP, Mg, P  2. Patient is to be continued on nocturnal BiPAP- evaluate acid -base status and manage accordingly  3. Patient BP on the lower side- monitor for hypotension and consider holding BP meds all together v/s administering reduced dose and fluid bolus Hospital course-  In the ED-    68 year old female from home with medical history of HTN, glaucoma, Pre DM, HLD, and Asthma presenting to ED for increased work of breathing and wheezing x2 days. Patient states that she developed cold like symptoms earlier this week described as a non productive cough that is now resolved. Despite using her inhalers patient still had trouble breathing so came to ED for further evaluation. Patient states that she was never hospitalized for an asthma exacerbation and has never been intubated. She denies any other symptoms including fevers, chills, headaches, dizziness, chest pain, abd pain, n/v, diarrhea, constipation, hematuria, dysuria, sick contacts, and recent travel. VS: , / 164, RR 24, Spo2 94% RA , portable Cxray: negative. s/p 1 dose duoneb, started on BiPAP. Patient was admitted to ICU for management of  acute asthma exacerbation.    In the ICU-  Patient was evaluated and treated for following problems-    HTN emergency-/ 164 on admission, trended down to 136/ 80 without meds.  Chest xray negative for pulm edema.  Patient on home meds losartan 25mg daily and lasix 40 mg po daily. 10/9/23 Patient was continued on reduced dosage losartan- 12.5 mg with holding parameters and daily home dose lasix with holding parameters, in view of BP on lower side on monitoring.    HLD-c/w lipitor 20mg daily    G1DD- ECHO in july 2023 showing G1DD and EKG showed mild RBBB. ECHO 10/23 - G1DD, EF 55 %. 10/9-  Morning  labs- trops were found to be elevated( 136.3), EKG showed mild RBBB( unchanged) and trops downtrended( 123.9) on 10/9 AM. Dr. Bejarano consulted.    Asthma exacerbation- Is on home medication - Trelegy and Proventil home medication. s/p 1 dose albuterol in ED. Pt was placed on BiPAP by ED, transitioned to 3LNC saturating 100% on 10/9/23 AM , now weaned off to RA- 97 %. On 10/9/23- Patient endorsed marked symptomatic improvement. Discontinued spiriva and c/w duoneb tx q6h, Symbicort- BID,  montelukast OD, change IV solumedrol to PO prednisone 40 mg OD. RVP negative , C xray formal read-Mild central CHF somewhat increased from prior, c/w nocturnal Bipap.  PT consulted - recommended home PT for now, out of bed to chair. Pulmonology Dr. De Luna consulted, recommended Sleep study as OP to r/o TESSA component.     Hx of Pre DM -on metformin at home was started on SSI in patient, blood glucose monitored daily    Prophylaxis-DVT with Lovenox and GI with PPI     As of 10/9/23 Patients vitals are stable and patient's symptoms have considerably improved and is ready to be downgraded to telemetry floor (room 50 A 5N)    ICU downgrade sign-out given to   Internal medicine attending -Carmelo Lopez   NP-    Primary team to f/u on the following-    1. Morning labs- CBC, CMP, Mg, P and TELE  2. Patient is to be continued on nocturnal BiPAP- evaluate acid -base status and manage accordingly  3. Patient BP on the lower side- monitor for hypotension and consider holding BP meds all together v/s administering reduced dose and fluid bolus  4. PT initial recs- Home PT- f/u on final recs prior to d/c  5. F/u with Pulmonology Dr. De Luna recs  6. F/u cardiology Dr. Bejarano recs Hospital course-  In the ED-    68 year old female from home with medical history of HTN, glaucoma, Pre DM, HLD, and Asthma presenting to ED for increased work of breathing and wheezing x2 days. Patient states that she developed cold like symptoms earlier this week described as a non productive cough that is now resolved. Despite using her inhalers patient still had trouble breathing so came to ED for further evaluation. Patient states that she was never hospitalized for an asthma exacerbation and has never been intubated. She denies any other symptoms including fevers, chills, headaches, dizziness, chest pain, abd pain, n/v, diarrhea, constipation, hematuria, dysuria, sick contacts, and recent travel. VS: , / 164, RR 24, Spo2 94% RA , portable Cxray: negative. s/p 1 dose duoneb, started on BiPAP. Patient was admitted to ICU for management of  acute asthma exacerbation.    In the ICU-  Patient was evaluated and treated for following problems-    HTN emergency-/ 164 on admission, trended down to 136/ 80 without meds.  Chest xray negative for pulm edema.  Patient on home meds losartan 25mg daily and lasix 40 mg po daily. 10/9/23 Patient was continued on reduced dosage losartan- 12.5 mg with holding parameters and daily home dose lasix with holding parameters, in view of BP on lower side on monitoring.    HLD-c/w lipitor 20mg daily    G1DD- ECHO in july 2023 showing G1DD and EKG showed mild RBBB. ECHO 10/23 - G1DD, EF 55 %. 10/9-  Morning  labs- trops were found to be elevated( 136.3), EKG showed mild RBBB( unchanged) and trops downtrended( 123.9) on 10/9 AM. Dr. Bejarano consulted.    Asthma exacerbation- Is on home medication - Trelegy and Proventil home medication. s/p 1 dose albuterol in ED. Pt was placed on BiPAP by ED, transitioned to 3LNC saturating 100% on 10/9/23 AM , now weaned off to RA- 97 %. On 10/9/23- Patient endorsed marked symptomatic improvement. Discontinued spiriva and c/w duoneb tx q6h, Symbicort- BID,  montelukast OD, change IV solumedrol to PO prednisone 40 mg OD. RVP negative , C xray formal read-Mild central CHF somewhat increased from prior, c/w nocturnal Bipap.  PT consulted - recommended home PT for now, out of bed to chair. Pulmonology Dr. De Luna consulted, recommended Sleep study as OP to r/o TESSA component.     Hx of Pre DM -on metformin at home was started on SSI in patient, blood glucose monitored daily    Prophylaxis-DVT with Lovenox and GI with PPI     As of 10/9/23 Patients vitals are stable and patient's symptoms have considerably improved and is ready to be downgraded to telemetry floor (room 50 A 5N)    ICU downgrade sign-out given to   Internal medicine attending -Carmelo Lopez   NP- Jodi    Primary team to f/u on the following-    1. Morning labs- CBC, CMP, Mg, P and TELE  2. Patient is to be continued on nocturnal BiPAP- evaluate acid -base status and manage accordingly  3. Patient BP on the lower side- monitor for hypotension and consider holding BP meds all together v/s administering reduced dose and fluid bolus  4. PT initial recs- Home PT- f/u on final recs prior to d/c  5. F/u with Pulmonology Dr. De Luna recs  6. F/u cardiology Dr. Bejarano recs Hospital course-  In the ED-    68 year old female from home with medical history of HTN, glaucoma, Pre DM, HLD, and Asthma presenting to ED for increased work of breathing and wheezing x2 days. Patient states that she developed cold like symptoms earlier this week described as a non productive cough that is now resolved. Despite using her inhalers patient still had trouble breathing so came to ED for further evaluation. Patient states that she was never hospitalized for an asthma exacerbation and has never been intubated. She denies any other symptoms including fevers, chills, headaches, dizziness, chest pain, abd pain, n/v, diarrhea, constipation, hematuria, dysuria, sick contacts, and recent travel. VS: , / 164, RR 24, Spo2 94% RA , portable Cxray: negative. s/p 1 dose duoneb, started on BiPAP. Patient was admitted to ICU for management of  acute asthma exacerbation.    In the ICU-  Patient was evaluated and treated for following problems-    HTN emergency-/ 164 on admission, trended down to 136/ 80 without meds.  Chest xray negative for pulm edema.  Patient on home meds losartan 25mg daily and lasix 40 mg po daily. 10/9/23 Patient was continued on reduced dosage losartan- 12.5 mg with holding parameters and daily home dose lasix with holding parameters, in view of BP on lower side on monitoring.    HLD-c/w lipitor 20mg daily    G1DD- ECHO in july 2023 showing G1DD and EKG showed mild RBBB. ECHO 10/23 - G1DD, EF 55 %. 10/9-  Morning  labs- trops were found to be elevated( 136.3), EKG showed mild RBBB( unchanged) and trops downtrended( 123.9) on 10/9 AM. Dr. Bejarano consulted.    Asthma exacerbation- Is on home medication - Trelegy and Proventil home medication. s/p 1 dose albuterol in ED. Pt was placed on BiPAP by ED, transitioned to 3LNC saturating 100% on 10/9/23 AM , now weaned off to RA- 97 %. On 10/9/23- Patient endorsed marked symptomatic improvement. Discontinued spiriva and c/w duoneb tx q6h, Symbicort- BID,  montelukast OD, change IV solumedrol to PO prednisone 40 mg OD. RVP negative , C xray formal read-Mild central CHF somewhat increased from prior, c/w nocturnal Bipap.  PT consulted - recommended home PT for now, out of bed to chair. Pulmonology Dr. De Luna consulted, recommended Sleep study as OP to r/o TESSA component.     Hx of Pre DM -on metformin at home was started on SSI in patient, blood glucose monitored daily    Prophylaxis-DVT with Lovenox and GI with PPI     As of 10/9/23 Patients vitals are stable and patient's symptoms have considerably improved and is ready to be downgraded to telemetry floor (room 50 A 5N)    ICU downgrade sign-out given to   Internal medicine attending -Carmelo Lopez   NP- Jodi    Primary team to f/u on the following-  PATIENT IS FULL CODE    1. Morning labs- CBC, CMP, Mg, P and TELE  2. Patient is to be continued on nocturnal BiPAP- evaluate acid -base status and manage accordingly  3. Patient BP on the lower side- monitor for hypotension and consider holding BP meds all together v/s administering reduced dose and fluid bolus  4. PT initial recs- Home PT- f/u on final recs prior to d/c  5. F/u with Pulmonology Dr. De Luna recs  6. F/u cardiology Dr. Bejarano recs

## 2023-10-09 NOTE — PROGRESS NOTE ADULT - ASSESSMENT
68 year old female from home with medical history of HTN, glaucoma, Pre DM, HLD, and Asthma presenting to ED for increased work of breathing and wheezing x2 days admitted for asthma exacerbation     #Acute Asthma exacerbation  #HTN emergency  # Chronic Grade 1 diastolic dysfunction     _________CNS___________  No active issues      _________CVS___________  #HTN emergency  -/ 164 on admission, now down to 136/ 80. Chest xray negative for pulm edema    #HTN  - home meds losartan 25mg daily  - c/w reduced dosage losartan- 12.5 mg with holding parameters  - c/w home dose of lasix 40 mg po with holding parameters      #HLD  -c/w lipitor 20mg daily    #G1DD  - ECHO in july 2023 showing G1DD   - ECHO 10/23 - G1DD, EF 55 %      _________RESP__________  #Asthma exacerbation  -s/p 1 dose albuterol  -Pt was placed on BiPAP by ED, transitioned to 3LNC saturating 100%, now weaned off to RA- 97 %  - STOp spiriva  - c/duoneb tx q6h, Symbicort- BID  -olumederol 40mg [id number]  -RVP negative   -C xray-Mild central CHF somewhat increased from prior  -c/w nocturnal Bipap  - PT - home PT for now  - Out of bed to chair  -Sleep study as OP to r/o TESSA component      ___________GI____________  #no active issues    ________ RENAL__________  #no active issues      __________MSK___________  #no active issues      ___________ID____________  #no active issues      _________ENDO__________  #Hx of Pre DM   -on metformin at home  -c/w SSI     ______HEME/ONC_______  #no active issues      _________SKIN____________  #no active issues      ________PROPHY_______  #DVT Lovenox   #GI PPI      ______GOC/DISPO___________  ICU   FULL CODE  -------DISPO-----------------  Downgrade to [facility] floor      68 year old female from home with medical history of HTN, glaucoma, Pre DM, HLD, and Asthma presenting to ED for increased work of breathing and wheezing x2 days admitted for asthma exacerbation     #Acute Asthma exacerbation  #HTN emergency  # Chronic Grade 1 diastolic dysfunction     _________CNS___________  No active issues      _________CVS___________  #HTN emergency  -/ 164 on admission, now down to 136/ 80. Chest xray negative for pulm edema    #HTN  - home meds losartan 25mg daily  - c/w reduced dosage losartan- 12.5 mg with holding parameters  - c/w home dose of lasix 40 mg po with holding parameters      #HLD  -c/w lipitor 20mg daily    #G1DD  - ECHO in july 2023 showing G1DD   - ECHO 10/23 - G1DD, EF 55 %      _________RESP__________  #Asthma exacerbation  -s/p 1 dose albuterol  -Pt was placed on BiPAP by ED, transitioned to 3LNC saturating 100%, now weaned off to RA- 97 %  - STOp spiriva  - c/w duoneb tx q6h, Symbicort- BID  -c/w montelukast OD  - change IV solumedrol to PO prednisone 40 mg OD  -RVP negative   -C xray-Mild central CHF somewhat increased from prior  -c/w nocturnal Bipap  - PT - home PT for now  - Out of bed to chair  -Sleep study as OP to r/o TESSA component      ___________GI____________  #no active issues    ________ RENAL__________  #no active issues      __________MSK___________  #no active issues      ___________ID____________  #no active issues      _________ENDO__________  #Hx of Pre DM   -on metformin at home  -c/w SSI     ______HEME/ONC_______  #no active issues      _________SKIN____________  #no active issues      ________PROPHY_______  #DVT Lovenox   #GI PPI      ______GOONEL/DISPO___________  ICU   FULL CODE  -------DISPO-----------------  Downgrade to [facility] floor

## 2023-10-09 NOTE — PHYSICAL THERAPY INITIAL EVALUATION ADULT - PASSIVE RANGE OF MOTION EXAMINATION, REHAB EVAL
bilateral lower extremity Passive ROM was WNL/bilateral lower extremity Passive ROM was WFL (within functional limits)

## 2023-10-09 NOTE — PROGRESS NOTE ADULT - ATTENDING COMMENTS
68 year old morbid obese woman  from home with  HTN, glaucoma, Pre DM, HLD, and Asthma presenting to ED for increased work of breathing and wheezing x2 days admitted for acute hypoxic resp failue due to  asthma exacerbation requiring BiPaP    ASSESSMENT   - Acute hypoxic resp failure   - Acute Asthma exacerbation  - HTN emergency  - Morbid obesity   - Obesity - suspect TESSA / OHS  - Prediabetic   - Chronic Grade 1 diastolic dysfunction       Plan   - Off bipap, comfortable on NC oxygen  - Bipap at night time suspect may have underlying TESSA   - Will likely need out patient PSG to further evaluate  - Transition to PO steroids   - Duonebs   - No antibx   - Hemodynamic monitoring   - proBNP normal   - 2 DECHO   - RVP and covid-19 neg   - Continue BP meds .. better control   - Oral diet   - Monitor blood glucose wit coverage   - Out pat Pulmo f/u   - DVT GI prophy   - Transfer out of ICU

## 2023-10-09 NOTE — CONSULT NOTE ADULT - ASSESSMENT
68 year old female from home with medical history of HTN, glaucoma, Pre DM, HLD, and Asthma presenting to ED for increased work of breathing and wheezing x2 days admitted for asthma exacerbation     #Acute Asthma exacerbation  #HTN emergency  # Chronic Grade 1 diastolic dysfunction     _________CNS___________  No active issues      _________CVS___________  #HTN emergency  -/ 164 on admission, now down to 136/ 80. Chest xray negative for pulm edema    #HTN  -c/w home meds losartan 25mg daily    #HLD  -c/w lipitor 20mg daily    #G1DD  - ECHO in july 2023 showing G1DD       _________RESP__________  #Asthma exacerbation  -s/p 1 dose albuterol  -Pt was placed on BiPAP by ED  -Pt now transitioned to 4LNC saturating 100%  -c/w duoneb tx q6h, solumederol 40mg q6h, montelukast, albuterol inhaler 2 puffs q6h, and trelegy therapeutic interchange with spiriva and symnbicort   -RVP negative   -f/u official xray read  -Bipap PRN       ___________GI____________  #no active issues    ________ RENAL__________  #no active issues      __________MSK___________  #no active issues      ___________ID____________  #no active issues      _________ENDO__________  #Hx of Pre DM   -on metformin at home  -c/w SSI     ______HEME/ONC_______  #no active issues      _________SKIN____________  #no active issues      ________PROPHY_______  #DVT Lovenox   #GI PPI      ______GOC/DISPO___________  ICU   FULL CODE    
68 year old female from home with medical history of HTN, glaucoma, Pre DM, HLD, and Asthma presenting to ED for increased work of breathing and wheezing x2 days,asthma exacerbation,LLL pneumonia.  1.Transfer to tele.  2.CAT-R/O PE and further eval LLL infiltrate.  3.HTN-cont  bp medication.  4.Asthma-MDI,steroids.  5.R/O TESSA as outpatient.  6.DM-insulin.  7.GI and DVT prophylaxis.

## 2023-10-09 NOTE — PHYSICAL THERAPY INITIAL EVALUATION ADULT - GENERAL OBSERVATIONS, REHAB EVAL
Consult received, chart reviewed. Patient received supine in bed, NAD, + Cardiac and SPo2 monitoring, +purewick. RASS 0 CAM ICU (-). Patient agreed to EVALUATION from Physical Therapist.

## 2023-10-09 NOTE — CONSULT NOTE ADULT - CONSULT REASON
Covering for Dr Jones, pt's primary care doctor
SOB,Sinus Tachycardia
Acute hypoxic resp failure /  ex of asthma

## 2023-10-09 NOTE — PROGRESS NOTE ADULT - ASSESSMENT
68 year old female from home with medical history of HTN, glaucoma, Pre DM, HLD, and Asthma presenting to ED for increased work of breathing and wheezing x2 days admitted for asthma exacerbation     #Acute Asthma exacerbation  #HTN emergency  # Chronic Grade 1 diastolic dysfunction     _________CNS___________  No active issues      _________CVS___________  #HTN emergency  -/ 164 on admission, now down to 136/ 80. Chest xray negative for pulm edema    #HTN  -c/w home meds losartan 25mg daily    #HLD  -c/w lipitor 20mg daily    #G1DD  - ECHO in july 2023 showing G1DD       _________RESP__________  #Asthma exacerbation  -s/p 1 dose albuterol  -Pt was placed on BiPAP by ED  -Pt now transitioned to 4LNC saturating 100%  -c/w duoneb tx q6h, solumederol 40mg q6h, montelukast, albuterol inhaler 2 puffs q6h, and trelegy therapeutic interchange with spiriva and symnbicort   -RVP negative   -f/u official xray read  -Bipap PRN       ___________GI____________  #no active issues    ________ RENAL__________  #no active issues      __________MSK___________  #no active issues      ___________ID____________  #no active issues      _________ENDO__________  #Hx of Pre DM   -on metformin at home  -c/w SSI     ______HEME/ONC_______  #no active issues      _________SKIN____________  #no active issues      ________PROPHY_______  #DVT Lovenox   #GI PPI      ______GOC/DISPO___________  ICU   FULL CODE

## 2023-10-09 NOTE — PHYSICAL THERAPY INITIAL EVALUATION ADULT - ACTIVE RANGE OF MOTION EXAMINATION, REHAB EVAL
except hips limited secondary to abdominal soft tissue/bilateral lower extremity Active ROM was WNL (within normal limits)/bilateral  lower extremity Active ROM was WFL (within functional limits)

## 2023-10-10 DIAGNOSIS — Z29.9 ENCOUNTER FOR PROPHYLACTIC MEASURES, UNSPECIFIED: ICD-10-CM

## 2023-10-10 DIAGNOSIS — E11.9 TYPE 2 DIABETES MELLITUS WITHOUT COMPLICATIONS: ICD-10-CM

## 2023-10-10 DIAGNOSIS — R00.0 TACHYCARDIA, UNSPECIFIED: ICD-10-CM

## 2023-10-10 DIAGNOSIS — I10 ESSENTIAL (PRIMARY) HYPERTENSION: ICD-10-CM

## 2023-10-10 DIAGNOSIS — Z02.9 ENCOUNTER FOR ADMINISTRATIVE EXAMINATIONS, UNSPECIFIED: ICD-10-CM

## 2023-10-10 DIAGNOSIS — J45.901 UNSPECIFIED ASTHMA WITH (ACUTE) EXACERBATION: ICD-10-CM

## 2023-10-10 LAB
ANION GAP SERPL CALC-SCNC: 4 MMOL/L — LOW (ref 5–17)
BUN SERPL-MCNC: 33 MG/DL — HIGH (ref 7–18)
CALCIUM SERPL-MCNC: 9.4 MG/DL — SIGNIFICANT CHANGE UP (ref 8.4–10.5)
CHLORIDE SERPL-SCNC: 103 MMOL/L — SIGNIFICANT CHANGE UP (ref 96–108)
CO2 SERPL-SCNC: 33 MMOL/L — HIGH (ref 22–31)
CREAT SERPL-MCNC: 1.26 MG/DL — SIGNIFICANT CHANGE UP (ref 0.5–1.3)
EGFR: 46 ML/MIN/1.73M2 — LOW
GLUCOSE BLDC GLUCOMTR-MCNC: 136 MG/DL — HIGH (ref 70–99)
GLUCOSE BLDC GLUCOMTR-MCNC: 143 MG/DL — HIGH (ref 70–99)
GLUCOSE BLDC GLUCOMTR-MCNC: 91 MG/DL — SIGNIFICANT CHANGE UP (ref 70–99)
GLUCOSE SERPL-MCNC: 147 MG/DL — HIGH (ref 70–99)
HCT VFR BLD CALC: 43.4 % — SIGNIFICANT CHANGE UP (ref 34.5–45)
HGB BLD-MCNC: 14.6 G/DL — SIGNIFICANT CHANGE UP (ref 11.5–15.5)
MAGNESIUM SERPL-MCNC: 2.3 MG/DL — SIGNIFICANT CHANGE UP (ref 1.6–2.6)
MCHC RBC-ENTMCNC: 26.4 PG — LOW (ref 27–34)
MCHC RBC-ENTMCNC: 33.6 GM/DL — SIGNIFICANT CHANGE UP (ref 32–36)
MCV RBC AUTO: 78.3 FL — LOW (ref 80–100)
NRBC # BLD: 0 /100 WBCS — SIGNIFICANT CHANGE UP (ref 0–0)
PHOSPHATE SERPL-MCNC: 4.2 MG/DL — SIGNIFICANT CHANGE UP (ref 2.5–4.5)
PLATELET # BLD AUTO: 341 K/UL — SIGNIFICANT CHANGE UP (ref 150–400)
POTASSIUM SERPL-MCNC: 4.2 MMOL/L — SIGNIFICANT CHANGE UP (ref 3.5–5.3)
POTASSIUM SERPL-SCNC: 4.2 MMOL/L — SIGNIFICANT CHANGE UP (ref 3.5–5.3)
RBC # BLD: 5.54 M/UL — HIGH (ref 3.8–5.2)
RBC # FLD: 17.2 % — HIGH (ref 10.3–14.5)
SODIUM SERPL-SCNC: 140 MMOL/L — SIGNIFICANT CHANGE UP (ref 135–145)
WBC # BLD: 7.19 K/UL — SIGNIFICANT CHANGE UP (ref 3.8–10.5)
WBC # FLD AUTO: 7.19 K/UL — SIGNIFICANT CHANGE UP (ref 3.8–10.5)

## 2023-10-10 PROCEDURE — 71275 CT ANGIOGRAPHY CHEST: CPT | Mod: 26

## 2023-10-10 RX ORDER — DILTIAZEM HCL 120 MG
30 CAPSULE, EXT RELEASE 24 HR ORAL EVERY 8 HOURS
Refills: 0 | Status: DISCONTINUED | OUTPATIENT
Start: 2023-10-10 | End: 2023-10-11

## 2023-10-10 RX ADMIN — Medication 3 MILLILITER(S): at 15:15

## 2023-10-10 RX ADMIN — LOSARTAN POTASSIUM 12.5 MILLIGRAM(S): 100 TABLET, FILM COATED ORAL at 05:34

## 2023-10-10 RX ADMIN — Medication 81 MILLIGRAM(S): at 12:44

## 2023-10-10 RX ADMIN — ENOXAPARIN SODIUM 40 MILLIGRAM(S): 100 INJECTION SUBCUTANEOUS at 12:44

## 2023-10-10 RX ADMIN — Medication 1 APPLICATION(S): at 12:49

## 2023-10-10 RX ADMIN — Medication 325 MILLIGRAM(S): at 12:44

## 2023-10-10 RX ADMIN — BUDESONIDE AND FORMOTEROL FUMARATE DIHYDRATE 2 PUFF(S): 160; 4.5 AEROSOL RESPIRATORY (INHALATION) at 12:43

## 2023-10-10 RX ADMIN — Medication 3 MILLILITER(S): at 08:50

## 2023-10-10 RX ADMIN — Medication 30 MILLIGRAM(S): at 18:00

## 2023-10-10 RX ADMIN — Medication 3 MILLILITER(S): at 03:17

## 2023-10-10 RX ADMIN — CHLORHEXIDINE GLUCONATE 1 APPLICATION(S): 213 SOLUTION TOPICAL at 05:29

## 2023-10-10 RX ADMIN — Medication 40 MILLIGRAM(S): at 05:29

## 2023-10-10 RX ADMIN — PANTOPRAZOLE SODIUM 40 MILLIGRAM(S): 20 TABLET, DELAYED RELEASE ORAL at 05:28

## 2023-10-10 RX ADMIN — DULOXETINE HYDROCHLORIDE 20 MILLIGRAM(S): 30 CAPSULE, DELAYED RELEASE ORAL at 15:15

## 2023-10-10 RX ADMIN — Medication 40 MILLIGRAM(S): at 05:34

## 2023-10-10 RX ADMIN — Medication 30 MILLIGRAM(S): at 21:48

## 2023-10-10 NOTE — PROGRESS NOTE ADULT - SUBJECTIVE AND OBJECTIVE BOX
Date of Service 10-10-23 @ 10:34    CHIEF COMPLAINT:Patient is a 68y old  Female who presents with a chief complaint of asthma exacerbation.Pt appears comfortable.    	  REVIEW OF SYSTEMS:  CONSTITUTIONAL: No fever, weight loss, or fatigue  EYES: No eye pain, visual disturbances, or discharge  ENT:  No difficulty hearing, tinnitus, vertigo; No sinus or throat pain  NECK: No pain or stiffness  RESPIRATORY: No cough, wheezing, chills or hemoptysis; No Shortness of Breath  CARDIOVASCULAR: No chest pain, palpitations, passing out, dizziness, or leg swelling  GASTROINTESTINAL: No abdominal or epigastric pain. No nausea, vomiting, or hematemesis; No diarrhea or constipation. No melena or hematochezia.  GENITOURINARY: No dysuria, frequency, hematuria, or incontinence  NEUROLOGICAL: No headaches, memory loss, loss of strength, numbness, or tremors  SKIN: No itching, burning, rashes, or lesions   LYMPH Nodes: No enlarged glands  ENDOCRINE: No heat or cold intolerance; No hair loss  MUSCULOSKELETAL: No joint pain or swelling; No muscle, back, or extremity pain  PSYCHIATRIC: No depression, anxiety, mood swings, or difficulty sleeping  HEME/LYMPH: No easy bruising, or bleeding gums  ALLERGY AND IMMUNOLOGIC: No hives or eczema	    PHYSICAL EXAM:  T(C): 37.1 (10-10-23 @ 07:38), Max: 37.1 (10-10-23 @ 07:38)  HR: 106 (10-10-23 @ 07:38) (95 - 119)  BP: 136/69 (10-10-23 @ 07:38) (81/47 - 148/76)  RR: 20 (10-10-23 @ 07:38) (18 - 29)  SpO2: 96% (10-10-23 @ 07:38) (91% - 100%)  Wt(kg): --  I&O's Summary    09 Oct 2023 07:01  -  10 Oct 2023 07:00  --------------------------------------------------------  IN: 0 mL / OUT: 2100 mL / NET: -2100 mL    10 Oct 2023 07:01  -  10 Oct 2023 10:34  --------------------------------------------------------  IN: 230 mL / OUT: 0 mL / NET: 230 mL        Appearance: Normal	  HEENT:   Normal oral mucosa, PERRL, EOMI	  Lymphatic: No lymphadenopathy  Cardiovascular: Normal S1 S2, No JVD, No murmurs, No edema  Respiratory: Lungs clear to auscultation	  Psychiatry: A & O x 3, Mood & affect appropriate  Gastrointestinal:  Soft, Non-tender, + BS	  Skin: No rashes, No ecchymoses, No cyanosis	  Neurologic: Non-focal  Extremities: Normal range of motion, No clubbing, cyanosis or edema  Vascular: Peripheral pulses palpable 2+ bilaterally    MEDICATIONS  (STANDING):  albuterol    90 MICROgram(s) HFA Inhaler 2 Puff(s) Inhalation every 6 hours  albuterol/ipratropium for Nebulization 3 milliLiter(s) Nebulizer every 6 hours  aspirin enteric coated 81 milliGRAM(s) Oral daily  atorvastatin 20 milliGRAM(s) Oral at bedtime  budesonide  80 MICROgram(s)/formoterol 4.5 MICROgram(s) Inhaler 2 Puff(s) Inhalation two times a day  chlorhexidine 2% Cloths 1 Application(s) Topical <User Schedule>  DULoxetine 20 milliGRAM(s) Oral daily  enoxaparin Injectable 40 milliGRAM(s) SubCutaneous every 24 hours  ferrous    sulfate 325 milliGRAM(s) Oral daily  furosemide    Tablet 40 milliGRAM(s) Oral daily  glucagon  Injectable 1 milliGRAM(s) IntraMuscular once  insulin lispro (ADMELOG) corrective regimen sliding scale   SubCutaneous at bedtime  insulin lispro (ADMELOG) corrective regimen sliding scale   SubCutaneous three times a day before meals  losartan 12.5 milliGRAM(s) Oral daily  montelukast 10 milliGRAM(s) Oral at bedtime  pantoprazole    Tablet 40 milliGRAM(s) Oral before breakfast  predniSONE   Tablet 40 milliGRAM(s) Oral daily  vitamin A &amp; D Ointment 1 Application(s) Topical daily      TELEMETRY: 	  sinus tach 120's  	  	  LABS:	 	        Troponin I, High Sensitivity Result: 123.9 ng/L (10-09 @ 08:20)  Troponin I, High Sensitivity Result: 136.3 ng/L (10-09 @ 03:47)  Troponin I, High Sensitivity Result: 30.9 ng/L (10-08 @ 03:25)                            14.6   7.19  )-----------( 341      ( 10 Oct 2023 08:09 )             43.4     10-10    140  |  103  |  33<H>  ----------------------------<  147<H>  4.2   |  33<H>  |  1.26    Ca    9.4      10 Oct 2023 08:09  Phos  4.2     10-10  Mg     2.3     10-10    TPro  7.3  /  Alb  3.2<L>  /  TBili  0.4  /  DBili  x   /  AST  10  /  ALT  23  /  AlkPhos  95  10-09        A1C with Estimated Average Glucose Result: 6.1: Method: Immunoassay   Reference Range 4.0-5.6%

## 2023-10-10 NOTE — PROGRESS NOTE ADULT - SUBJECTIVE AND OBJECTIVE BOX
DERIAN PEACOCK  MR# [id number]  68yFemale        Patient is a 68y old  Female who presents with a chief complaint of asthma exacerbation (10 Oct 2023 10:33)      INTERVAL HPI/OVERNIGHT EVENTS:  Patient seen and examined at bedside. No notations of chest pain, palpitation, SOB, orthopnea, nausea, vomiting or abdominal pain.    ALLERGIES  Duck (Unknown)  No Known Allergies      MEDICATIONS  albuterol    90 MICROgram(s) HFA Inhaler 2 Puff(s) Inhalation every 6 hours  albuterol/ipratropium for Nebulization 3 milliLiter(s) Nebulizer every 6 hours  aspirin enteric coated 81 milliGRAM(s) Oral daily  atorvastatin 20 milliGRAM(s) Oral at bedtime  budesonide  80 MICROgram(s)/formoterol 4.5 MICROgram(s) Inhaler 2 Puff(s) Inhalation two times a day  chlorhexidine 2% Cloths 1 Application(s) Topical <User Schedule>  DULoxetine 20 milliGRAM(s) Oral daily  enoxaparin Injectable 40 milliGRAM(s) SubCutaneous every 24 hours  ferrous    sulfate 325 milliGRAM(s) Oral daily  furosemide    Tablet 40 milliGRAM(s) Oral daily  glucagon  Injectable 1 milliGRAM(s) IntraMuscular once  insulin lispro (ADMELOG) corrective regimen sliding scale   SubCutaneous at bedtime  insulin lispro (ADMELOG) corrective regimen sliding scale   SubCutaneous three times a day before meals  losartan 12.5 milliGRAM(s) Oral daily  montelukast 10 milliGRAM(s) Oral at bedtime  pantoprazole    Tablet 40 milliGRAM(s) Oral before breakfast  predniSONE   Tablet 40 milliGRAM(s) Oral daily  vitamin A &amp; D Ointment 1 Application(s) Topical daily              REVIEW OF SYSTEMS:  CONSTITUTIONAL: No fever, weight loss, or fatigue  EYES: No eye pain, visual disturbances, or discharge  ENT:  No difficulty hearing, tinnitus, vertigo; No sinus or throat pain  NECK: No pain or stiffness  RESPIRATORY: No cough, wheezing, chills or hemoptysis; No Shortness of Breath  CARDIOVASCULAR: No chest pain, palpitations, passing out, dizziness, or leg swelling  GASTROINTESTINAL: No abdominal or epigastric pain. No nausea, vomiting, or hematemesis; No diarrhea or constipation. No melena or hematochezia.  GENITOURINARY: No dysuria, frequency, hematuria, or incontinence  NEUROLOGICAL: No headaches, memory loss, loss of strength, numbness, or tremors  SKIN: No itching, burning, rashes, or lesions   LYMPH Nodes: No enlarged glands  ENDOCRINE: No heat or cold intolerance; No hair loss  MUSCULOSKELETAL: No joint pain or swelling; No muscle, back, or extremity pain  PSYCHIATRIC: No depression, anxiety, mood swings, or difficulty sleeping  HEME/LYMPH: No easy bruising, or bleeding gums  ALLERGY AND IMMUNOLOGIC: No hives or eczema	    [ ] All others negative	  [ ] Unable to obtain      T(C): 37.1 (10-10-23 @ 11:06), Max: 37.1 (10-10-23 @ 07:38)  T(F): 98.7 (10-10-23 @ 11:06), Max: 98.8 (10-10-23 @ 07:38)  HR: 104 (10-10-23 @ 11:06) (95 - 119)  BP: 110/60 (10-10-23 @ 11:06) (81/47 - 148/76)  RR: 20 (10-10-23 @ 11:06) (18 - 22)  SpO2: 97% (10-10-23 @ 11:06) (91% - 100%)  Wt(kg): --    I&O's Summary    09 Oct 2023 07:01  -  10 Oct 2023 07:00  --------------------------------------------------------  IN: 0 mL / OUT: 2100 mL / NET: -2100 mL    10 Oct 2023 07:01  -  10 Oct 2023 12:31  --------------------------------------------------------  IN: 230 mL / OUT: 0 mL / NET: 230 mL          PHYSICAL EXAM:  A X O x  HEAD:  Atraumatic, Normocephalic  EYES: EOMI, PERRLA, conjunctiva and sclera clear  NECK: Supple, No JVD, Normal thyroid  Resp: CTAB, No crackles, wheezing,   CVS: Regular rate and rhythm; No discernable murmurs, rubs, or gallops  ABD: Soft, Nontender, Nondistended; Bowel sounds present  EXTREMITIES:  2+ Peripheral Pulses, No edema  LYMPH: No dicernable lymphadenopathy noted  GENERAL: NAD, well-groomed, well-developed      LABS:                        14.6   7.19  )-----------( 341      ( 10 Oct 2023 08:09 )             43.4     10-10    140  |  103  |  33<H>  ----------------------------<  147<H>  4.2   |  33<H>  |  1.26    Ca    9.4      10 Oct 2023 08:09  Phos  4.2     10-10  Mg     2.3     10-10    TPro  7.3  /  Alb  3.2<L>  /  TBili  0.4  /  DBili  x   /  AST  10  /  ALT  23  /  AlkPhos  95  10-09      Urinalysis Basic - ( 10 Oct 2023 08:09 )    Color: x / Appearance: x / SG: x / pH: x  Gluc: 147 mg/dL / Ketone: x  / Bili: x / Urobili: x   Blood: x / Protein: x / Nitrite: x   Leuk Esterase: x / RBC: x / WBC x   Sq Epi: x / Non Sq Epi: x / Bacteria: x      CAPILLARY BLOOD GLUCOSE      POCT Blood Glucose.: 143 mg/dL (10 Oct 2023 11:42)  POCT Blood Glucose.: 136 mg/dL (10 Oct 2023 07:54)  POCT Blood Glucose.: 114 mg/dL (09 Oct 2023 21:12)  POCT Blood Glucose.: 116 mg/dL (09 Oct 2023 16:12)      Troponins:  ProBNP:  Lipid Profile:   HgA1c:  TSH:           RADIOLOGY & ADDITIONAL TESTS:    Imaging Personally Reviewed:  [ ] YES  [ ] NO      Consultant(s) Notes Reviewed:  [x ] YES  [ ] NO    Care Discussed with Consultants/Other Providers [ x] YES  [ ] NO          PAST MEDICAL & SURGICAL HISTORY:  Asthma, unspecified asthma severity, unspecified whether complicated, unspecified whether persistent      Sarcoidosis      Essential hypertension      Glaucoma of both eyes, unspecified glaucoma type      Morbid obesity with BMI of 50.0-59.9, adult      Hyperlipemia      History of hysterectomy for benign disease  2006      S/P cataract surgery            Asthma with acute exacerbation    No pertinent family history in first degree relatives    Handoff    MEWS Score    Asthma, unspecified asthma severity, unspecified whether complicated, unspecified whether persistent    Sarcoidosis    Essential hypertension    Glaucoma of both eyes, unspecified glaucoma type    Morbid obesity with BMI of 50.0-59.9, adult    Hyperlipemia    Acute asthma exacerbation    History of hysterectomy for benign disease    S/P cataract surgery    History of cataract surgery    A) ASTHMA SOB    85    [email or web address]

## 2023-10-10 NOTE — PROGRESS NOTE ADULT - ASSESSMENT
68 year old female from home with medical history of HTN, glaucoma, Pre DM, HLD, and Asthma presenting to ED for increased work of breathing and wheezing x2 days. Patient states that she developed cold like symptoms earlier this week described as a non productive cough that is now resolved. Despite using her inhalers patient still had trouble breathing so came to ED for further evaluation. Patient states that she was never hospitalized for an asthma exacerbation and has never been intubated. She denies any other symptoms including fevers, chills, headaches, dizziness, chest pain, abd pain, n/v, diarrhea, constipation, hematuria, dysuria, sick contacts, and recent travel. VS: , / 164, RR 24, Spo2 94% RA , portable Cxray: negative. s/p 1 dose duoneb, started on BiPAP. Patient was admitted to ICU for management of  acute asthma exacerbation.    In the ICU-  Patient was evaluated and treated for following problems-  HTN emergency-/ 164 on admission, trended down to 136/ 80 without meds.  Chest xray negative for pulm edema.  Patient on home meds losartan 25mg daily and lasix 40 mg po daily. 10/9/23 Patient was continued on reduced dosage losartan- 12.5 mg with holding parameters and daily home dose lasix with holding parameters, in view of BP on lower side on monitoring.    G1DD- ECHO in july 2023 showing G1DD and EKG showed mild RBBB. ECHO 10/23 - G1DD, EF 55 %. 10/9-  Morning  labs- trops were found to be elevated( 136.3), EKG showed mild RBBB( unchanged) and trops downtrended( 123.9) on 10/9 AM. Dr. Bejarano consulted.    Asthma exacerbation- Is on home medication - Trelegy and Proventil home medication. s/p 1 dose albuterol in ED. Pt was placed on BiPAP by ED, transitioned to 3LNC saturating 100% on 10/9/23 AM , now weaned off to RA- 97 %. On 10/9/23- Patient endorsed marked symptomatic improvement. Discontinued spiriva and c/w duoneb tx q6h, Symbicort- BID,  montelukast OD, change IV solumedrol to PO prednisone 40 mg OD. RVP negative , C xray formal read-Mild central CHF somewhat increased from prior, c/w nocturnal Bipap.  PT consulted - recommended home PT for now, out of bed to chair. Pulmonology Dr. De Luna consulted, recommended Sleep study as OP to r/o TESSA component.     CTA chest negative for PE. Patient asymptomatic tachycardic, started low dose Cardizem

## 2023-10-10 NOTE — PROGRESS NOTE ADULT - ASSESSMENT
68 year old female from home with medical history of HTN, glaucoma, Pre DM, HLD, and Asthma presenting to ED for increased work of breathing and wheezing x2 days,asthma exacerbation,LLL pneumonia.  1.Tele.  2.CTA-R/O PE and further eval LLL infiltrate.  3.HTN-cont  bp medication.  4.Asthma-MDI,steroids.Pulm f/u.  5.R/O TESSA as outpatient.  6.DM-insulin.  7.GI and DVT prophylaxis.

## 2023-10-10 NOTE — PROGRESS NOTE ADULT - SUBJECTIVE AND OBJECTIVE BOX
NP Note discussed with  Primary Attending    Patient is a 68y old  Female who presents with a chief complaint of asthma exacerbation (10 Oct 2023 12:31)      INTERVAL HPI/OVERNIGHT EVENTS: no new complaints    MEDICATIONS  (STANDING):  albuterol    90 MICROgram(s) HFA Inhaler 2 Puff(s) Inhalation every 6 hours  albuterol/ipratropium for Nebulization 3 milliLiter(s) Nebulizer every 6 hours  aspirin enteric coated 81 milliGRAM(s) Oral daily  atorvastatin 20 milliGRAM(s) Oral at bedtime  budesonide  80 MICROgram(s)/formoterol 4.5 MICROgram(s) Inhaler 2 Puff(s) Inhalation two times a day  chlorhexidine 2% Cloths 1 Application(s) Topical <User Schedule>  DULoxetine 20 milliGRAM(s) Oral daily  enoxaparin Injectable 40 milliGRAM(s) SubCutaneous every 24 hours  ferrous    sulfate 325 milliGRAM(s) Oral daily  furosemide    Tablet 40 milliGRAM(s) Oral daily  glucagon  Injectable 1 milliGRAM(s) IntraMuscular once  insulin lispro (ADMELOG) corrective regimen sliding scale   SubCutaneous three times a day before meals  insulin lispro (ADMELOG) corrective regimen sliding scale   SubCutaneous at bedtime  losartan 12.5 milliGRAM(s) Oral daily  montelukast 10 milliGRAM(s) Oral at bedtime  pantoprazole    Tablet 40 milliGRAM(s) Oral before breakfast  predniSONE   Tablet 40 milliGRAM(s) Oral daily  vitamin A &amp; D Ointment 1 Application(s) Topical daily    MEDICATIONS  (PRN):      __________________________________________________  REVIEW OF SYSTEMS:    CONSTITUTIONAL: No fever,   EYES: no acute visual disturbances  NECK: No pain or stiffness  RESPIRATORY: No cough; No shortness of breath  CARDIOVASCULAR: No chest pain, no palpitations  GASTROINTESTINAL: No pain. No nausea or vomiting; No diarrhea   NEUROLOGICAL: No headache or numbness, no tremors  MUSCULOSKELETAL: No joint pain, no muscle pain  GENITOURINARY: no dysuria, no frequency, no hesitancy  PSYCHIATRY: no depression , no anxiety  ALL OTHER  ROS negative        Vital Signs Last 24 Hrs  T(C): 37.1 (10 Oct 2023 11:06), Max: 37.1 (10 Oct 2023 07:38)  T(F): 98.7 (10 Oct 2023 11:06), Max: 98.8 (10 Oct 2023 07:38)  HR: 104 (10 Oct 2023 11:06) (95 - 119)  BP: 110/60 (10 Oct 2023 11:06) (81/47 - 148/76)  BP(mean): 78 (09 Oct 2023 18:00) (59 - 87)  RR: 20 (10 Oct 2023 11:06) (18 - 22)  SpO2: 97% (10 Oct 2023 11:06) (91% - 100%)    Parameters below as of 10 Oct 2023 11:06  Patient On (Oxygen Delivery Method): room air        ________________________________________________  PHYSICAL EXAM:  GENERAL: NAD  HEENT: Normocephalic;  conjunctivae and sclerae clear; moist mucous membranes;   NECK : supple  CHEST/LUNG: Clear to auscultation bilaterally with good air entry   HEART: S1 S2  regular; no murmurs, gallops or rubs  ABDOMEN: Soft, Nontender, Nondistended; Bowel sounds present  EXTREMITIES: no cyanosis; no edema; no calf tenderness  SKIN: warm and dry; no rash  NERVOUS SYSTEM:  Awake and alert; Oriented  to place, person and time ; no new deficits    _________________________________________________  LABS:                        14.6   7.19  )-----------( 341      ( 10 Oct 2023 08:09 )             43.4     10-10    140  |  103  |  33<H>  ----------------------------<  147<H>  4.2   |  33<H>  |  1.26    Ca    9.4      10 Oct 2023 08:09  Phos  4.2     10-10  Mg     2.3     10-10    TPro  7.3  /  Alb  3.2<L>  /  TBili  0.4  /  DBili  x   /  AST  10  /  ALT  23  /  AlkPhos  95  10-09      Urinalysis Basic - ( 10 Oct 2023 08:09 )    Color: x / Appearance: x / SG: x / pH: x  Gluc: 147 mg/dL / Ketone: x  / Bili: x / Urobili: x   Blood: x / Protein: x / Nitrite: x   Leuk Esterase: x / RBC: x / WBC x   Sq Epi: x / Non Sq Epi: x / Bacteria: x      CAPILLARY BLOOD GLUCOSE      POCT Blood Glucose.: 143 mg/dL (10 Oct 2023 11:42)  POCT Blood Glucose.: 136 mg/dL (10 Oct 2023 07:54)  POCT Blood Glucose.: 114 mg/dL (09 Oct 2023 21:12)  POCT Blood Glucose.: 116 mg/dL (09 Oct 2023 16:12)        RADIOLOGY & ADDITIONAL TESTS:    Imaging  Reviewed:  YES/NO    Consultant(s) Notes Reviewed:   YES/ No      Plan of care was discussed with patient and /or primary care giver; all questions and concerns were addressed  NP Note discussed with  Primary Attending    Patient is a 68y old  Female who presents with a chief complaint of asthma exacerbation (10 Oct 2023 12:31)      INTERVAL HPI/OVERNIGHT EVENTS: no new complaints    MEDICATIONS  (STANDING):  albuterol    90 MICROgram(s) HFA Inhaler 2 Puff(s) Inhalation every 6 hours  albuterol/ipratropium for Nebulization 3 milliLiter(s) Nebulizer every 6 hours  aspirin enteric coated 81 milliGRAM(s) Oral daily  atorvastatin 20 milliGRAM(s) Oral at bedtime  budesonide  80 MICROgram(s)/formoterol 4.5 MICROgram(s) Inhaler 2 Puff(s) Inhalation two times a day  chlorhexidine 2% Cloths 1 Application(s) Topical <User Schedule>  DULoxetine 20 milliGRAM(s) Oral daily  enoxaparin Injectable 40 milliGRAM(s) SubCutaneous every 24 hours  ferrous    sulfate 325 milliGRAM(s) Oral daily  furosemide    Tablet 40 milliGRAM(s) Oral daily  glucagon  Injectable 1 milliGRAM(s) IntraMuscular once  insulin lispro (ADMELOG) corrective regimen sliding scale   SubCutaneous three times a day before meals  insulin lispro (ADMELOG) corrective regimen sliding scale   SubCutaneous at bedtime  losartan 12.5 milliGRAM(s) Oral daily  montelukast 10 milliGRAM(s) Oral at bedtime  pantoprazole    Tablet 40 milliGRAM(s) Oral before breakfast  predniSONE   Tablet 40 milliGRAM(s) Oral daily  vitamin A &amp; D Ointment 1 Application(s) Topical daily    MEDICATIONS  (PRN):      __________________________________________________  REVIEW OF SYSTEMS:    CONSTITUTIONAL: No fever,   EYES: no acute visual disturbances  NECK: No pain or stiffness  RESPIRATORY: No cough; No shortness of breath  CARDIOVASCULAR: No chest pain, no palpitations  GASTROINTESTINAL: No pain. No nausea or vomiting; No diarrhea   NEUROLOGICAL: No headache or numbness, no tremors  MUSCULOSKELETAL: No joint pain, no muscle pain  GENITOURINARY: no dysuria, no frequency, no hesitancy  PSYCHIATRY: no depression , no anxiety  ALL OTHER  ROS negative        Vital Signs Last 24 Hrs  T(C): 37.1 (10 Oct 2023 11:06), Max: 37.1 (10 Oct 2023 07:38)  T(F): 98.7 (10 Oct 2023 11:06), Max: 98.8 (10 Oct 2023 07:38)  HR: 104 (10 Oct 2023 11:06) (95 - 119)  BP: 110/60 (10 Oct 2023 11:06) (81/47 - 148/76)  BP(mean): 78 (09 Oct 2023 18:00) (59 - 87)  RR: 20 (10 Oct 2023 11:06) (18 - 22)  SpO2: 97% (10 Oct 2023 11:06) (91% - 100%)    Parameters below as of 10 Oct 2023 11:06  Patient On (Oxygen Delivery Method): room air        ________________________________________________  PHYSICAL EXAM:  GENERAL: NAD  HEENT: Normocephalic;  conjunctivae and sclerae clear; moist mucous membranes;   NECK : supple  CHEST/LUNG: Clear to auscultation bilaterally with good air entry   HEART: S1 S2  regular; no murmurs, gallops or rubs  ABDOMEN: obese, Soft, Nontender, Nondistended; Bowel sounds present  EXTREMITIES: no cyanosis; no edema; no calf tenderness  SKIN: warm and dry; no rash  NERVOUS SYSTEM:  Awake and alert; Oriented  to place, person and time ; no new deficits    _________________________________________________  LABS:                        14.6   7.19  )-----------( 341      ( 10 Oct 2023 08:09 )             43.4     10-10    140  |  103  |  33<H>  ----------------------------<  147<H>  4.2   |  33<H>  |  1.26    Ca    9.4      10 Oct 2023 08:09  Phos  4.2     10-10  Mg     2.3     10-10    TPro  7.3  /  Alb  3.2<L>  /  TBili  0.4  /  DBili  x   /  AST  10  /  ALT  23  /  AlkPhos  95  10-09      Urinalysis Basic - ( 10 Oct 2023 08:09 )    Color: x / Appearance: x / SG: x / pH: x  Gluc: 147 mg/dL / Ketone: x  / Bili: x / Urobili: x   Blood: x / Protein: x / Nitrite: x   Leuk Esterase: x / RBC: x / WBC x   Sq Epi: x / Non Sq Epi: x / Bacteria: x      CAPILLARY BLOOD GLUCOSE      POCT Blood Glucose.: 143 mg/dL (10 Oct 2023 11:42)  POCT Blood Glucose.: 136 mg/dL (10 Oct 2023 07:54)  POCT Blood Glucose.: 114 mg/dL (09 Oct 2023 21:12)  POCT Blood Glucose.: 116 mg/dL (09 Oct 2023 16:12)        RADIOLOGY & ADDITIONAL TESTS:  < from: CT Angio Chest PE Protocol w/ IV Cont (10.10.23 @ 13:33) >  ACC: [id number] EXAM:  CT ANGIO CHEST PULM ART [facility]   ORDERED BY:  ROHIT ENAMORADO     PROCEDURE DATE:  10/10/2023          INTERPRETATION:  CTA CHEST    INDICATION: Shortness of breath. Sinus tachycardia. Evaluate for   pulmonary embolus.    TECHNIQUE: Enhanced helical images were obtained of the chest. Coronal   and sagittal images were reconstructed Maximum intensity projection   images were generated.    Omnipaque 350 65 cc administered 35 cc discarded    COMPARISON: CT chest 7/11/2023 and 4/1/2021. As radiograph 10/8/2023.    FINDINGS:    Pulmonary Artery:  There is no main, lobar, or proximal to mid segmental   pulmonary embolus. The distal segmental and subsegmental divisions are   not well evaluated.    Tubes/Lines: None.    Lungs, airways and pleura: There are new bilateral lower lobe groundglass   opacities and peripheral linear opacities.    The bilateral nodules/opacities are unchanged.  *  The 5 mm right lower lobe nodule (series 16 image 81) is unchanged).  *  The 3 mm opacity along the left major fissure is unchanged (series 16   image 59).  The airways are unremarkable. No pleural effusion or pneumothorax.    Mediastinum: The calcified and noncalcified chest lymph nodes are   unchanged. The thyroid gland is normal.The esophagus is unremarkable.    The heart is normal in size. No pericardial effusion. The aorta is normal   in caliber.    Upper Abdomen: The upper abdomen is unremarkable.    Bones And Soft Tissues: The bones are unremarkable.  The soft tissues are   unremarkable.      IMPRESSION:    1.  No pulmonary embolus.  2.  New bilateral lower lobe groundglass opacities and linear opacities   are of uncertain etiology.  3.  No change in the calcified and noncalcified chest lymph nodes or in   the bilateral nodules/opacities since 7/11/2023.    --- End of Report ---      < end of copied text >    Imaging  Reviewed:  YES/NO    Consultant(s) Notes Reviewed:   YES/ No      Plan of care was discussed with patient and /or primary care giver; all questions and concerns were addressed

## 2023-10-11 ENCOUNTER — TRANSCRIPTION ENCOUNTER (OUTPATIENT)
Age: 68
End: 2023-10-11

## 2023-10-11 VITALS
RESPIRATION RATE: 18 BRPM | DIASTOLIC BLOOD PRESSURE: 69 MMHG | TEMPERATURE: 99 F | OXYGEN SATURATION: 96 % | HEART RATE: 109 BPM | SYSTOLIC BLOOD PRESSURE: 125 MMHG

## 2023-10-11 LAB
ALBUMIN SERPL ELPH-MCNC: 3.7 G/DL — SIGNIFICANT CHANGE UP (ref 3.5–5)
ALP SERPL-CCNC: 100 U/L — SIGNIFICANT CHANGE UP (ref 40–120)
ALT FLD-CCNC: 25 U/L DA — SIGNIFICANT CHANGE UP (ref 10–60)
ANION GAP SERPL CALC-SCNC: 8 MMOL/L — SIGNIFICANT CHANGE UP (ref 5–17)
AST SERPL-CCNC: 12 U/L — SIGNIFICANT CHANGE UP (ref 10–40)
BILIRUB SERPL-MCNC: 0.9 MG/DL — SIGNIFICANT CHANGE UP (ref 0.2–1.2)
BUN SERPL-MCNC: 40 MG/DL — HIGH (ref 7–18)
CALCIUM SERPL-MCNC: 9.3 MG/DL — SIGNIFICANT CHANGE UP (ref 8.4–10.5)
CHLORIDE SERPL-SCNC: 100 MMOL/L — SIGNIFICANT CHANGE UP (ref 96–108)
CO2 SERPL-SCNC: 31 MMOL/L — SIGNIFICANT CHANGE UP (ref 22–31)
CREAT SERPL-MCNC: 1.35 MG/DL — HIGH (ref 0.5–1.3)
EGFR: 43 ML/MIN/1.73M2 — LOW
GLUCOSE BLDC GLUCOMTR-MCNC: 147 MG/DL — HIGH (ref 70–99)
GLUCOSE BLDC GLUCOMTR-MCNC: 154 MG/DL — HIGH (ref 70–99)
GLUCOSE SERPL-MCNC: 120 MG/DL — HIGH (ref 70–99)
HCT VFR BLD CALC: 42.5 % — SIGNIFICANT CHANGE UP (ref 34.5–45)
HGB BLD-MCNC: 14.4 G/DL — SIGNIFICANT CHANGE UP (ref 11.5–15.5)
MAGNESIUM SERPL-MCNC: 2.6 MG/DL — SIGNIFICANT CHANGE UP (ref 1.6–2.6)
MCHC RBC-ENTMCNC: 26.3 PG — LOW (ref 27–34)
MCHC RBC-ENTMCNC: 33.9 GM/DL — SIGNIFICANT CHANGE UP (ref 32–36)
MCV RBC AUTO: 77.7 FL — LOW (ref 80–100)
NRBC # BLD: 0 /100 WBCS — SIGNIFICANT CHANGE UP (ref 0–0)
PHOSPHATE SERPL-MCNC: 4 MG/DL — SIGNIFICANT CHANGE UP (ref 2.5–4.5)
PLATELET # BLD AUTO: 326 K/UL — SIGNIFICANT CHANGE UP (ref 150–400)
POTASSIUM SERPL-MCNC: 3.8 MMOL/L — SIGNIFICANT CHANGE UP (ref 3.5–5.3)
POTASSIUM SERPL-SCNC: 3.8 MMOL/L — SIGNIFICANT CHANGE UP (ref 3.5–5.3)
PROT SERPL-MCNC: 7.9 G/DL — SIGNIFICANT CHANGE UP (ref 6–8.3)
RBC # BLD: 5.47 M/UL — HIGH (ref 3.8–5.2)
RBC # FLD: 16.6 % — HIGH (ref 10.3–14.5)
SODIUM SERPL-SCNC: 139 MMOL/L — SIGNIFICANT CHANGE UP (ref 135–145)
TSH SERPL-MCNC: 1.8 UU/ML — SIGNIFICANT CHANGE UP (ref 0.34–4.82)
WBC # BLD: 6.47 K/UL — SIGNIFICANT CHANGE UP (ref 3.8–10.5)
WBC # FLD AUTO: 6.47 K/UL — SIGNIFICANT CHANGE UP (ref 3.8–10.5)

## 2023-10-11 PROCEDURE — 82962 GLUCOSE BLOOD TEST: CPT

## 2023-10-11 PROCEDURE — 80048 BASIC METABOLIC PNL TOTAL CA: CPT

## 2023-10-11 PROCEDURE — 82330 ASSAY OF CALCIUM: CPT

## 2023-10-11 PROCEDURE — 36415 COLL VENOUS BLD VENIPUNCTURE: CPT

## 2023-10-11 PROCEDURE — 83735 ASSAY OF MAGNESIUM: CPT

## 2023-10-11 PROCEDURE — 83605 ASSAY OF LACTIC ACID: CPT

## 2023-10-11 PROCEDURE — 83036 HEMOGLOBIN GLYCOSYLATED A1C: CPT

## 2023-10-11 PROCEDURE — 84132 ASSAY OF SERUM POTASSIUM: CPT

## 2023-10-11 PROCEDURE — 94660 CPAP INITIATION&MGMT: CPT

## 2023-10-11 PROCEDURE — 99291 CRITICAL CARE FIRST HOUR: CPT

## 2023-10-11 PROCEDURE — 85027 COMPLETE CBC AUTOMATED: CPT

## 2023-10-11 PROCEDURE — 94640 AIRWAY INHALATION TREATMENT: CPT

## 2023-10-11 PROCEDURE — 71275 CT ANGIOGRAPHY CHEST: CPT

## 2023-10-11 PROCEDURE — 84295 ASSAY OF SERUM SODIUM: CPT

## 2023-10-11 PROCEDURE — 80053 COMPREHEN METABOLIC PANEL: CPT

## 2023-10-11 PROCEDURE — 71045 X-RAY EXAM CHEST 1 VIEW: CPT

## 2023-10-11 PROCEDURE — 83880 ASSAY OF NATRIURETIC PEPTIDE: CPT

## 2023-10-11 PROCEDURE — 97162 PT EVAL MOD COMPLEX 30 MIN: CPT

## 2023-10-11 PROCEDURE — 0225U NFCT DS DNA&RNA 21 SARSCOV2: CPT

## 2023-10-11 PROCEDURE — 84484 ASSAY OF TROPONIN QUANT: CPT

## 2023-10-11 PROCEDURE — 96372 THER/PROPH/DIAG INJ SC/IM: CPT

## 2023-10-11 PROCEDURE — 96374 THER/PROPH/DIAG INJ IV PUSH: CPT

## 2023-10-11 PROCEDURE — 85025 COMPLETE CBC W/AUTO DIFF WBC: CPT

## 2023-10-11 PROCEDURE — 84443 ASSAY THYROID STIM HORMONE: CPT

## 2023-10-11 PROCEDURE — 84100 ASSAY OF PHOSPHORUS: CPT

## 2023-10-11 PROCEDURE — 82803 BLOOD GASES ANY COMBINATION: CPT

## 2023-10-11 PROCEDURE — 93005 ELECTROCARDIOGRAM TRACING: CPT

## 2023-10-11 RX ORDER — SALICYLIC ACID 0.5 %
1 CLEANSER (GRAM) TOPICAL
Refills: 0
Start: 2023-10-11

## 2023-10-11 RX ORDER — DILTIAZEM HCL 120 MG
1 CAPSULE, EXT RELEASE 24 HR ORAL
Qty: 90 | Refills: 0
Start: 2023-10-11 | End: 2023-11-09

## 2023-10-11 RX ADMIN — Medication 40 MILLIGRAM(S): at 05:47

## 2023-10-11 RX ADMIN — Medication 1: at 12:17

## 2023-10-11 RX ADMIN — Medication 3 MILLILITER(S): at 15:12

## 2023-10-11 RX ADMIN — Medication 1 APPLICATION(S): at 14:49

## 2023-10-11 RX ADMIN — Medication 3 MILLILITER(S): at 08:53

## 2023-10-11 RX ADMIN — Medication 30 MILLIGRAM(S): at 14:48

## 2023-10-11 RX ADMIN — Medication 30 MILLIGRAM(S): at 05:47

## 2023-10-11 RX ADMIN — CHLORHEXIDINE GLUCONATE 1 APPLICATION(S): 213 SOLUTION TOPICAL at 05:48

## 2023-10-11 RX ADMIN — DULOXETINE HYDROCHLORIDE 20 MILLIGRAM(S): 30 CAPSULE, DELAYED RELEASE ORAL at 12:16

## 2023-10-11 RX ADMIN — PANTOPRAZOLE SODIUM 40 MILLIGRAM(S): 20 TABLET, DELAYED RELEASE ORAL at 05:47

## 2023-10-11 RX ADMIN — BUDESONIDE AND FORMOTEROL FUMARATE DIHYDRATE 2 PUFF(S): 160; 4.5 AEROSOL RESPIRATORY (INHALATION) at 12:16

## 2023-10-11 RX ADMIN — LOSARTAN POTASSIUM 12.5 MILLIGRAM(S): 100 TABLET, FILM COATED ORAL at 05:48

## 2023-10-11 RX ADMIN — Medication 81 MILLIGRAM(S): at 12:23

## 2023-10-11 RX ADMIN — Medication 325 MILLIGRAM(S): at 12:16

## 2023-10-11 NOTE — PROGRESS NOTE ADULT - REASON FOR ADMISSION
asthma exacerbation

## 2023-10-11 NOTE — DISCHARGE NOTE PROVIDER - CARE PROVIDER_API CALL
Tim Jones  Internal Medicine  [address] [place] [address], [place] [address]  Phone: [phone]  Fax: [phone]  Follow Up Time: 1 week   Tim Jones  Internal Medicine  [address] [place] [address], [address]  [place] [address]  Phone: [phone]  Fax: [phone]  Follow Up Time: 1 week    Mario Mccrary  Pulmonary Disease  [address] [facility], [address]  [place] [address]  Phone: [phone]  Fax: [phone]  Follow Up Time: 1 week

## 2023-10-11 NOTE — PROGRESS NOTE ADULT - PROBLEM SELECTOR PLAN 1
no in exacerbation  titrated of BiPAP- will need outpatient follow up for sleep study  continue budesonide and albuterol PRN  start continuous pulse oxymetry   continue prednisone   Pulmonology Dr. Hurd
no in exacerbation  titrated of BiPAP- will need outpatient follow up for sleep study  continue budesonide and albuterol PRN  start continuous pulse oxymetry   continue prednisone   Pulmonology Dr. Hurd

## 2023-10-11 NOTE — PROGRESS NOTE ADULT - ATTENDING COMMENTS
Awaiting CT angio of chest to r/o PE given persistent tachycardia.    10/11/23 - Ct Negative for PE, cleared for d/c Awaiting CT angio of chest to r/o PE given persistent tachycardia.    10/11/23 - Ct Negative for PE, old scarring consistent with h/o sarcoidosis. Cleared for d/c with outpt f/u w/ pulm for TESSA w/u.

## 2023-10-11 NOTE — DISCHARGE NOTE PROVIDER - NSDCMRMEDTOKEN_GEN_ALL_CORE_FT
Aspirin Enteric Coated 81 mg oral delayed release tablet: 1 tab(s) orally once a day  DULoxetine 20 mg oral delayed release capsule: 1 orally once a day  ferrous sulfate 325 mg (65 mg elemental iron) oral delayed release tablet: 1 tab(s) orally once a day  ipratropium-albuterol 0.5 mg-2.5 mg/3 mLinhalation solution: 3 milliliter(s) inhaled every 8 hours  Kerendia 10 mg oral tablet: 2 orally once a day  Lasix 40 mg oral tablet: 1 tab(s) orally once a day  Lipitor 20 mg oral tablet: 1 orally once a day  losartan 25 mg oral tablet: 1 tab(s) orally once a day  metFORMIN 500 mg oral tablet: 1 orally once a day  montelukast 10 mg oral tablet: 1 tab(s) orally once a day (at bedtime)  pantoprazole 40 mg oral delayed release tablet: 1 tab(s) orally once a day (before a meal)  ProAir HFA 90 mcg/inh inhalation aerosol: 2 puff(s) inhaled 4 times a day, As Needed  Symbicort 160 mcg-4.5 mcg/inh inhalation aerosol: 2 puff(s) inhaled 2 times a day  Trelegy Ellipta 200 mcg-62.5 mcg-25 mcg/inh inhalation powder: 1 puff(s) inhaled once a day  Vitamin D2 50,000 intl units (1.25 mg) oral capsule: 1 cap(s) orally once a day   Aspirin Enteric Coated 81 mg oral delayed release tablet: 1 tab(s) orally once a day  dilTIAZem 30 mg oral tablet: 1 tab(s) orally every 8 hours  DULoxetine 20 mg oral delayed release capsule: 1 orally once a day  ferrous sulfate 325 mg (65 mg elemental iron) oral delayed release tablet: 1 tab(s) orally once a day  ipratropium-albuterol 0.5 mg-2.5 mg/3 mLinhalation solution: 3 milliliter(s) inhaled every 8 hours  Kerendia 10 mg oral tablet: 2 orally once a day  Lipitor 20 mg oral tablet: 1 orally once a day  metFORMIN 500 mg oral tablet: 1 orally once a day  montelukast 10 mg oral tablet: 1 tab(s) orally once a day (at bedtime)  pantoprazole 40 mg oral delayed release tablet: 1 tab(s) orally once a day (before a meal)  ProAir HFA 90 mcg/inh inhalation aerosol: 2 puff(s) inhaled 4 times a day, As Needed  Symbicort 160 mcg-4.5 mcg/inh inhalation aerosol: 2 puff(s) inhaled 2 times a day  Trelegy Ellipta 200 mcg-62.5 mcg-25 mcg/inh inhalation powder: 1 puff(s) inhaled once a day  Vitamin D2 50,000 intl units (1.25 mg) oral capsule: 1 cap(s) orally once a day   Aspirin Enteric Coated 81 mg oral delayed release tablet: 1 tab(s) orally once a day  dilTIAZem 30 mg oral tablet: 1 tab(s) orally every 8 hours  DULoxetine 20 mg oral delayed release capsule: 1 orally once a day  ferrous sulfate 325 mg (65 mg elemental iron) oral delayed release tablet: 1 tab(s) orally once a day  ipratropium-albuterol 0.5 mg-2.5 mg/3 mLinhalation solution: 3 milliliter(s) inhaled every 8 hours  Kerendia 10 mg oral tablet: 2 orally once a day  Lipitor 20 mg oral tablet: 1 orally once a day  metFORMIN 500 mg oral tablet: 1 orally once a day  montelukast 10 mg oral tablet: 1 tab(s) orally once a day (at bedtime)  pantoprazole 40 mg oral delayed release tablet: 1 tab(s) orally once a day (before a meal)  predniSONE 10 mg oral tablet: 1 tab(s) orally once a day Stop after 10/20/23  predniSONE 10 mg oral tablet: 3 tab(s) orally once a day Stop after 10/14/23  predniSONE 20 mg oral tablet: 1 tab(s) orally once a day Stop after 10/17/23  ProAir HFA 90 mcg/inh inhalation aerosol: 2 puff(s) inhaled 4 times a day, As Needed  Symbicort 160 mcg-4.5 mcg/inh inhalation aerosol: 2 puff(s) inhaled 2 times a day  Trelegy Ellipta 200 mcg-62.5 mcg-25 mcg/inh inhalation powder: 1 puff(s) inhaled once a day  Vitamin D2 50,000 intl units (1.25 mg) oral capsule: 1 cap(s) orally once a day

## 2023-10-11 NOTE — DISCHARGE NOTE PROVIDER - CARE PROVIDERS DIRECT ADDRESSES
,DirectAddress_Unknown ,DirectAddress_Unknown,[email or web address]

## 2023-10-11 NOTE — DISCHARGE NOTE PROVIDER - NSDCCPCAREPLAN_GEN_ALL_CORE_FT
PRINCIPAL DISCHARGE DIAGNOSIS  Diagnosis: Acute asthma exacerbation  Assessment and Plan of Treatment: You presented to ED with difficulty breathing associated with very high blood pressure.  In ED you were noted using accessory muscles for breathing and wheezing.  You required BIPAP support to adequately oxygenate your lungs.  You were initially admitted to ICU and treated with intravenous steroids, bronchodilators and BIPAP.  You were followed by pulmonologist and monitored closely.  Your symptoms improved and you were transitioned to Oxygen via nasal canula and later to room air.    -Continue Asthma management as prior to hospitalization  -Avoid sick contact  -Follow up with your PCP and pulmonologist within one week  -Follow up SLEEP STUDIES to R/O obstructive sleep apnea.      SECONDARY DISCHARGE DIAGNOSES  Diagnosis: Diabetes  Assessment and Plan of Treatment: Your diabetes is well controlled as evidenced ny hemoglobin A1C 6.1  -Continue diabetes management as prior to hospitalization  -Life style modifications i.e diet, exercise  -Follow diabetic diet    Diagnosis: Hypertensive urgency  Assessment and Plan of Treatment: Your blood pressure was extremely high on admission likely due to your respiratory distress.  Your blood pressure improved with resolution of your respiratory distress.  -Continue blood pressure medications as prior to hospitalization  -Follow up with your primary doctor within one week

## 2023-10-11 NOTE — DISCHARGE NOTE PROVIDER - PROVIDER TOKENS
PROVIDER:[TOKEN:[[id number]],FOLLOWUP:[1 week]] PROVIDER:[TOKEN:[[id number]],FOLLOWUP:[1 week]],PROVIDER:[TOKEN:[[id number]],FOLLOWUP:[1 week]]

## 2023-10-11 NOTE — PROGRESS NOTE ADULT - ASSESSMENT
68 year old female from home with medical history of HTN, glaucoma, Pre DM, HLD, and Asthma presenting to ED for increased work of breathing and wheezing x2 days,asthma exacerbation,LLL pneumonia.  1.Tele.  2.CTA-no PE ,new b/L infiltrates (hx of sarcoidosis).F/U as outpatient with  her outpatient pulmonologist.  3.HTN-d/c cozaar and lasix, cont cardizem.  4.Asthma-MDI,steroids.Pulm f/u.  5.R/O TESSA as outpatient.  6.DM-insulin.  7.GI and DVT prophylaxis.

## 2023-10-11 NOTE — DISCHARGE NOTE NURSING/CASE MANAGEMENT/SOCIAL WORK - NSDCPEFALRISK_GEN_ALL_CORE
For information on Fall & Injury Prevention, visit: [email or web address] OR  [email or web address] OR  https://www.cdc.gov/steadi/patient.html

## 2023-10-11 NOTE — DISCHARGE NOTE PROVIDER - NSDCFUSCHEDAPPT_GEN_ALL_CORE_FT
[facility]  PULMMED [address]  Scheduled Appointment: 11/09/2023

## 2023-10-11 NOTE — PROGRESS NOTE ADULT - PROBLEM/PLAN-2
Patient went to the pharmacy and picked up a prescription for   hydroCHLOROthiazide (HYDRODIURIL) 25 MG tablet. Patient said he has never taken this before and isn't sure if he is supposed to take it. He remembers Dr talking about it, but didn't think he was supposed to start taking it.
Patient will start taking medication as discussed at visit from 9/21/23
DISPLAY PLAN FREE TEXT
DISPLAY PLAN FREE TEXT

## 2023-10-11 NOTE — DISCHARGE NOTE NURSING/CASE MANAGEMENT/SOCIAL WORK - PATIENT PORTAL LINK FT
You can access the FollowMyHealth Patient Portal offered by [facility] by registering at the following website: [email or web address]. By joining [employer]’s FollowMyHealth portal, you will also be able to view your health information using other applications (apps) compatible with our system.

## 2023-10-11 NOTE — DISCHARGE NOTE PROVIDER - HOSPITAL COURSE
69 y/o female from home with PMH  HTN, glaucoma, Pre DM, HLD, Morbid Obesity and Asthma presented to ED for increased work of breathing and wheezing x2 days after a URI. Despite using her inhalers patient still had trouble breathing so came to ED for further evaluation. Patient states that she was never hospitalized for an asthma exacerbation and has never been intubated.  RVP negative, CXR showed Heart enlargement and mild to moderate CHF. Possible small pneumonic infiltrate left lower lung.  Pt. noted in hypertensive urgency with b/p 206/164 with increased WOB, required BIPAP support, IV steroids, bronchodilation and ICU monitoring, transitioned to N/C 3L with 100% O2 sat on 10/9/23 AM, weaned off O2 with reported improved symptomatology.  b/p stabilized as well. Pt. followed by pulmonary, recc OP sleep studies, given body habitus , to R/O TESSA.  Pt. with G1DD- ECHO in july 2023 showing G1DD and EKG showed mild RBBB. ECHO 10/23 - G1DD, EF 55 %. 10/9-  Morning  labs- trops were found to be elevated( 136.3), EKG showed mild RBBB( unchanged) and trops downtrended( 123.9) on 10/9 AM. Dr. Bejarano consulted.  PT consulted - recommended home PT.  Pt. downgraded to tele10/9 to complete course of hospitalization, remained on RA with no s&s of resp distress.  Pt. is medically optimized for discharge to home with home PT.

## 2023-10-11 NOTE — PROGRESS NOTE ADULT - SUBJECTIVE AND OBJECTIVE BOX
Date of Service 10-11-23 @ 09:02    CHIEF COMPLAINT:Patient is a 68y old  Female who presents with a chief complaint of asthma exacerbation.Pt appears comfortable.    	  REVIEW OF SYSTEMS:  CONSTITUTIONAL: No fever, weight loss, or fatigue  EYES: No eye pain, visual disturbances, or discharge  ENT:  No difficulty hearing, tinnitus, vertigo; No sinus or throat pain  NECK: No pain or stiffness  RESPIRATORY: No cough, wheezing, chills or hemoptysis; + Shortness of Breath  CARDIOVASCULAR: No chest pain, palpitations, passing out, dizziness, or leg swelling  GASTROINTESTINAL: No abdominal or epigastric pain. No nausea, vomiting, or hematemesis; No diarrhea or constipation. No melena or hematochezia.  GENITOURINARY: No dysuria, frequency, hematuria, or incontinence  NEUROLOGICAL: No headaches, memory loss, loss of strength, numbness, or tremors  SKIN: No itching, burning, rashes, or lesions   LYMPH Nodes: No enlarged glands  ENDOCRINE: No heat or cold intolerance; No hair loss  MUSCULOSKELETAL: No joint pain or swelling; No muscle, back, or extremity pain  PSYCHIATRIC: No depression, anxiety, mood swings, or difficulty sleeping  HEME/LYMPH: No easy bruising, or bleeding gums  ALLERGY AND IMMUNOLOGIC: No hives or eczema	        PHYSICAL EXAM:  T(C): 37.1 (10-11-23 @ 08:03), Max: 37.3 (10-10-23 @ 20:18)  HR: 101 (10-11-23 @ 08:03) (101 - 122)  BP: 122/101 (10-11-23 @ 08:03) (110/60 - 140/67)  RR: 18 (10-11-23 @ 08:03) (18 - 20)  SpO2: 95% (10-11-23 @ 08:03) (95% - 100%)  Wt(kg): --  I&O's Summary    10 Oct 2023 07:01  -  11 Oct 2023 07:00  --------------------------------------------------------  IN: 460 mL / OUT: 0 mL / NET: 460 mL        Appearance: Normal	  HEENT:   Normal oral mucosa, PERRL, EOMI	  Lymphatic: No lymphadenopathy  Cardiovascular: Normal S1 S2, No JVD, No murmurs, No edema  Respiratory: Lungs clear to auscultation	  Psychiatry: A & O x 3, Mood & affect appropriate  Gastrointestinal:  Soft, Non-tender, + BS	  Skin: No rashes, No ecchymoses, No cyanosis	  Neurologic: Non-focal  Extremities: Normal range of motion, No clubbing, cyanosis or edema  Vascular: Peripheral pulses palpable 2+ bilaterally    MEDICATIONS  (STANDING):  albuterol    90 MICROgram(s) HFA Inhaler 2 Puff(s) Inhalation every 6 hours  albuterol/ipratropium for Nebulization 3 milliLiter(s) Nebulizer every 6 hours  aspirin enteric coated 81 milliGRAM(s) Oral daily  atorvastatin 20 milliGRAM(s) Oral at bedtime  budesonide  80 MICROgram(s)/formoterol 4.5 MICROgram(s) Inhaler 2 Puff(s) Inhalation two times a day  chlorhexidine 2% Cloths 1 Application(s) Topical <User Schedule>  diltiazem    Tablet 30 milliGRAM(s) Oral every 8 hours  DULoxetine 20 milliGRAM(s) Oral daily  enoxaparin Injectable 40 milliGRAM(s) SubCutaneous every 24 hours  ferrous    sulfate 325 milliGRAM(s) Oral daily  furosemide    Tablet 40 milliGRAM(s) Oral daily  glucagon  Injectable 1 milliGRAM(s) IntraMuscular once  insulin lispro (ADMELOG) corrective regimen sliding scale   SubCutaneous at bedtime  insulin lispro (ADMELOG) corrective regimen sliding scale   SubCutaneous three times a day before meals  montelukast 10 milliGRAM(s) Oral at bedtime  pantoprazole    Tablet 40 milliGRAM(s) Oral before breakfast  predniSONE   Tablet 40 milliGRAM(s) Oral daily  vitamin A &amp; D Ointment 1 Application(s) Topical daily      TELEMETRY: sinus tach,wct	    	  	  LABS:	 	      Troponin I, High Sensitivity Result: 123.9 ng/L (10-09 @ 08:20)  Troponin I, High Sensitivity Result: 136.3 ng/L (10-09 @ 03:47)                            14.4   6.47  )-----------( 326      ( 11 Oct 2023 06:00 )             42.5     10-11    139  |  100  |  40<H>  ----------------------------<  120<H>  3.8   |  31  |  1.35<H>    Ca    9.3      11 Oct 2023 06:00  Phos  4.0     10-11  Mg     2.6     10-11    TPro  7.9  /  Alb  3.7  /  TBili  0.9  /  DBili  x   /  AST  12  /  ALT  25  /  AlkPhos  100  10-11    proBNP:   Lipid Profile:   HgA1c:   TSH: Thyroid Stimulating Hormone, Serum: 1.80 uU/mL (10-11 @ 06:00)      	    < from: CT Angio Chest PE Protocol w/ IV Cont (10.10.23 @ 13:33) >  ACC: [id number] EXAM:  CT ANGIO CHEST PULM ART [facility]   ORDERED BY:  ROHIT ENAMORADO     PROCEDURE DATE:  10/10/2023          INTERPRETATION:  CTA CHEST    INDICATION: Shortness of breath. Sinus tachycardia. Evaluate for   pulmonary embolus.    TECHNIQUE: Enhanced helical images were obtained of the chest. Coronal   and sagittal images were reconstructed Maximum intensity projection   images were generated.    Omnipaque 350 65 cc administered 35 cc discarded    COMPARISON: CT chest 7/11/2023 and 4/1/2021. As radiograph 10/8/2023.    FINDINGS:    Pulmonary Artery:  There is no main, lobar, or proximal to mid segmental   pulmonary embolus. The distal segmental and subsegmental divisions are   not well evaluated.    Tubes/Lines: None.    Lungs, airways and pleura: There are new bilateral lower lobe groundglass   opacities and peripheral linear opacities.    The bilateral nodules/opacities are unchanged.  *  The 5 mm right lower lobe nodule (series 16 image 81) is unchanged).  *  The 3 mm opacity along the left major fissure is unchanged (series 16   image 59).  The airways are unremarkable. No pleural effusion or pneumothorax.    Mediastinum: The calcified and noncalcified chest lymph nodes are   unchanged. The thyroid gland is normal.The esophagus is unremarkable.    The heart is normal in size. No pericardial effusion. The aorta is normal   in caliber.    Upper Abdomen: The upper abdomen is unremarkable.    Bones And Soft Tissues: The bones are unremarkable.  The soft tissues are   unremarkable.      IMPRESSION:    1.  No pulmonary embolus.  2.  New bilateral lower lobe groundglass opacities and linear opacities   are of uncertain etiology.  3.  No change in the calcified and noncalcified chest lymph nodes or in   the bilateral nodules/opacities since 7/11/2023.    --- End of Report ---            THU RAM MD; Attending Radiologist  This document has been electronically signed. Oct 10 2023  2:35PM    < end of copied text >  < from: Transthoracic Echocardiogram (07.11.23 @ 10:40) >  OBSERVATIONS:  Mitral Valve: Mitral annular calcification. Mild to  moderate mitral regurgitation.  Aortic Root: Aortic Root: 3.5 cm.    Aortic Valve: Aortic valve not well visualized.  Left Atrium: Normal left atrium.  LA volume index = 33  cc/m2.  Left Ventricle: Normal Left Ventricular Systolic Function,  (EF = 55%) Normal left ventricular internal dimensions and  wall thicknesses. Grade I diastolic dysfunction (Impaired  relaxation, mild).  Right Heart: Normal right atrium. Normal right ventricular  size and systolic function (TAPSE 2.4 cm). Normal tricuspid  valve. Pulmonic valve not well seen.  Pericardium/PleuraNormal pericardium with no pericardial  effusion.  Hemodynamic: Unable to estimate RVSP.  ------------------------------------------------------------------------  CONCLUSIONS:  1. Mitral annular calcification. Mild to moderate mitral  regurgitation.  2. Aortic valve not well visualized.  3. Aortic Root: 3.5 cm.  4. Normal left atrium.  LA volume index = 33 cc/m2.  5. Normal left ventricular internal dimensions and wall  thicknesses.  6. Normal Left Ventricular Systolic Function,  (EF = 55%)  7. Grade I diastolic dysfunction (Impaired relaxation,  mild).  8. Normal right atrium.  9. Normal right ventricular size and systolic function  (TAPSE 2.4 cm).  10. Unable to estimate RVSP.  11. Normal tricuspid valve.  12. Pulmonic valve not well seen.  13. Normal pericardium with no pericardial effusion.    ------------------------------------------------------------------------  Confirmed on  7/12/2023 - 08:11:20 by Rohit Enamorado MD  ------------------------------------------------------------------------    < end of copied text >

## 2023-10-11 NOTE — PROGRESS NOTE ADULT - SUBJECTIVE AND OBJECTIVE BOX
DERIAN PEACOCK  MR# [id number]  68yFemale        Patient is a 68y old  Female who presents with a chief complaint of asthma exacerbation (11 Oct 2023 10:49)      INTERVAL HPI/OVERNIGHT EVENTS:  Patient seen and examined at bedside. No notations of chest pain, palpitation, SOB, orthopnea, nausea, vomiting or abdominal pain.    ALLERGIES  Duck (Unknown)  No Known Allergies      MEDICATIONS  albuterol    90 MICROgram(s) HFA Inhaler 2 Puff(s) Inhalation every 6 hours  albuterol/ipratropium for Nebulization 3 milliLiter(s) Nebulizer every 6 hours  aspirin enteric coated 81 milliGRAM(s) Oral daily  atorvastatin 20 milliGRAM(s) Oral at bedtime  budesonide  80 MICROgram(s)/formoterol 4.5 MICROgram(s) Inhaler 2 Puff(s) Inhalation two times a day  chlorhexidine 2% Cloths 1 Application(s) Topical <User Schedule>  diltiazem    Tablet 30 milliGRAM(s) Oral every 8 hours  DULoxetine 20 milliGRAM(s) Oral daily  enoxaparin Injectable 40 milliGRAM(s) SubCutaneous every 24 hours  ferrous    sulfate 325 milliGRAM(s) Oral daily  glucagon  Injectable 1 milliGRAM(s) IntraMuscular once  insulin lispro (ADMELOG) corrective regimen sliding scale   SubCutaneous at bedtime  insulin lispro (ADMELOG) corrective regimen sliding scale   SubCutaneous three times a day before meals  montelukast 10 milliGRAM(s) Oral at bedtime  pantoprazole    Tablet 40 milliGRAM(s) Oral before breakfast  predniSONE   Tablet 40 milliGRAM(s) Oral daily  vitamin A &amp; D Ointment 1 Application(s) Topical daily              REVIEW OF SYSTEMS:  CONSTITUTIONAL: No fever, weight loss, or fatigue  EYES: No eye pain, visual disturbances, or discharge  ENT:  No difficulty hearing, tinnitus, vertigo; No sinus or throat pain  NECK: No pain or stiffness  RESPIRATORY: No cough, wheezing, chills or hemoptysis; No Shortness of Breath  CARDIOVASCULAR: No chest pain, palpitations, passing out, dizziness, or leg swelling  GASTROINTESTINAL: No abdominal or epigastric pain. No nausea, vomiting, or hematemesis; No diarrhea or constipation. No melena or hematochezia.  GENITOURINARY: No dysuria, frequency, hematuria, or incontinence  NEUROLOGICAL: No headaches, memory loss, loss of strength, numbness, or tremors  SKIN: No itching, burning, rashes, or lesions   LYMPH Nodes: No enlarged glands  ENDOCRINE: No heat or cold intolerance; No hair loss  MUSCULOSKELETAL: No joint pain or swelling; No muscle, back, or extremity pain  PSYCHIATRIC: No depression, anxiety, mood swings, or difficulty sleeping  HEME/LYMPH: No easy bruising, or bleeding gums  ALLERGY AND IMMUNOLOGIC: No hives or eczema	    [ ] All others negative	  [ ] Unable to obtain      T(C): 37.2 (10-11-23 @ 11:14), Max: 37.3 (10-10-23 @ 20:18)  T(F): 99 (10-11-23 @ 11:14), Max: 99.1 (10-10-23 @ 20:18)  HR: 103 (10-11-23 @ 11:14) (101 - 122)  BP: 138/75 (10-11-23 @ 11:14) (122/65 - 140/67)  RR: 18 (10-11-23 @ 11:14) (18 - 20)  SpO2: 95% (10-11-23 @ 11:14) (95% - 100%)  Wt(kg): --    I&O's Summary    10 Oct 2023 07:01  -  11 Oct 2023 07:00  --------------------------------------------------------  IN: 460 mL / OUT: 0 mL / NET: 460 mL          PHYSICAL EXAM:  A X O x  HEAD:  Atraumatic, Normocephalic  EYES: EOMI, PERRLA, conjunctiva and sclera clear  NECK: Supple, No JVD, Normal thyroid  Resp: CTAB, No crackles, wheezing,   CVS: Regular rate and rhythm; No discernable murmurs, rubs, or gallops  ABD: Soft, Nontender, Nondistended; Bowel sounds present  EXTREMITIES:  2+ Peripheral Pulses, No edema  LYMPH: No dicernable lymphadenopathy noted  GENERAL: NAD, well-groomed, well-developed      LABS:                        14.4   6.47  )-----------( 326      ( 11 Oct 2023 06:00 )             42.5     10-11    139  |  100  |  40<H>  ----------------------------<  120<H>  3.8   |  31  |  1.35<H>    Ca    9.3      11 Oct 2023 06:00  Phos  4.0     10-11  Mg     2.6     10-11    TPro  7.9  /  Alb  3.7  /  TBili  0.9  /  DBili  x   /  AST  12  /  ALT  25  /  AlkPhos  100  10-11      Urinalysis Basic - ( 11 Oct 2023 06:00 )    Color: x / Appearance: x / SG: x / pH: x  Gluc: 120 mg/dL / Ketone: x  / Bili: x / Urobili: x   Blood: x / Protein: x / Nitrite: x   Leuk Esterase: x / RBC: x / WBC x   Sq Epi: x / Non Sq Epi: x / Bacteria: x      CAPILLARY BLOOD GLUCOSE      POCT Blood Glucose.: 154 mg/dL (11 Oct 2023 11:36)  POCT Blood Glucose.: 147 mg/dL (11 Oct 2023 07:46)  POCT Blood Glucose.: 108 mg/dL (10 Oct 2023 21:13)  POCT Blood Glucose.: 91 mg/dL (10 Oct 2023 17:23)      Troponins:  ProBNP:  Lipid Profile:   HgA1c:  TSH:           RADIOLOGY & ADDITIONAL TESTS:    Imaging Personally Reviewed:  [ ] YES  [ ] NO      Consultant(s) Notes Reviewed:  [x ] YES  [ ] NO    Care Discussed with Consultants/Other Providers [ x] YES  [ ] NO          PAST MEDICAL & SURGICAL HISTORY:  Asthma, unspecified asthma severity, unspecified whether complicated, unspecified whether persistent      Sarcoidosis      Essential hypertension      Glaucoma of both eyes, unspecified glaucoma type      Morbid obesity with BMI of 50.0-59.9, adult      Hyperlipemia      History of hysterectomy for benign disease  2006      S/P cataract surgery            Asthma with acute exacerbation    No pertinent family history in first degree relatives    Handoff    MEWS Score    Asthma, unspecified asthma severity, unspecified whether complicated, unspecified whether persistent    Sarcoidosis    Essential hypertension    Glaucoma of both eyes, unspecified glaucoma type    Morbid obesity with BMI of 50.0-59.9, adult    Hyperlipemia    Acute asthma exacerbation    Acute asthma exacerbation    Diabetes    Prophylactic measure    Discharge planning issues    HTN (hypertension)    Tachycardia    History of hysterectomy for benign disease    S/P cataract surgery    History of cataract surgery    A) ASTHMA SOB    85    Diabetes    Hypertensive urgency    [email or web address]

## 2023-10-11 NOTE — PROGRESS NOTE ADULT - PROVIDER SPECIALTY LIST ADULT
Critical Care
Internal Medicine
Cardiology
Cardiology
Critical Care
Internal Medicine

## 2023-10-11 NOTE — PROGRESS NOTE ADULT - PROBLEM SELECTOR PLAN 2
asymptomatic- no SOB or chest pain  started cardizem 30mg q8h  continue telemetry CTA chest negative for PE  antibiotic not started no fever or WBC elevation
asymptomatic- no SOB or chest pain  started cardizem 30mg q8h  continue telemetry CTA chest negative for PE  antibiotic not started no fever or WBC elevation

## 2023-11-09 ENCOUNTER — APPOINTMENT (OUTPATIENT)
Dept: PULMONOLOGY | Facility: CLINIC | Age: 68
End: 2023-11-09
Payer: MEDICARE

## 2023-11-09 VITALS
RESPIRATION RATE: 18 BRPM | SYSTOLIC BLOOD PRESSURE: 143 MMHG | HEART RATE: 109 BPM | OXYGEN SATURATION: 96 % | DIASTOLIC BLOOD PRESSURE: 78 MMHG

## 2023-11-09 DIAGNOSIS — R06.2 WHEEZING: ICD-10-CM

## 2023-11-09 PROCEDURE — 94727 GAS DIL/WSHOT DETER LNG VOL: CPT

## 2023-11-09 PROCEDURE — 99215 OFFICE O/P EST HI 40 MIN: CPT | Mod: 25

## 2023-11-09 PROCEDURE — 94729 DIFFUSING CAPACITY: CPT

## 2023-11-09 PROCEDURE — 94010 BREATHING CAPACITY TEST: CPT

## 2023-11-09 PROCEDURE — ZZZZZ: CPT

## 2023-11-09 PROCEDURE — 71046 X-RAY EXAM CHEST 2 VIEWS: CPT

## 2023-11-09 RX ORDER — BUDESONIDE, GLYCOPYRROLATE, AND FORMOTEROL FUMARATE 160; 9; 4.8 UG/1; UG/1; UG/1
160-9-4.8 AEROSOL, METERED RESPIRATORY (INHALATION)
Qty: 1 | Refills: 3 | Status: ACTIVE | COMMUNITY
Start: 2023-11-09 | End: 1900-01-01

## 2023-12-14 ENCOUNTER — APPOINTMENT (OUTPATIENT)
Dept: PULMONOLOGY | Facility: CLINIC | Age: 68
End: 2023-12-14

## 2023-12-29 ENCOUNTER — APPOINTMENT (OUTPATIENT)
Dept: PULMONOLOGY | Facility: CLINIC | Age: 68
End: 2023-12-29
Payer: MEDICARE

## 2023-12-29 VITALS — OXYGEN SATURATION: 94 % | DIASTOLIC BLOOD PRESSURE: 81 MMHG | HEART RATE: 101 BPM | SYSTOLIC BLOOD PRESSURE: 143 MMHG

## 2023-12-29 DIAGNOSIS — R06.02 SHORTNESS OF BREATH: ICD-10-CM

## 2023-12-29 PROCEDURE — 99214 OFFICE O/P EST MOD 30 MIN: CPT | Mod: 25

## 2023-12-29 PROCEDURE — 94618 PULMONARY STRESS TESTING: CPT

## 2023-12-29 PROCEDURE — 71046 X-RAY EXAM CHEST 2 VIEWS: CPT

## 2023-12-29 RX ORDER — MONTELUKAST 10 MG/1
10 TABLET, FILM COATED ORAL
Qty: 90 | Refills: 3 | Status: ACTIVE | OUTPATIENT
Start: 2019-04-10

## 2023-12-29 NOTE — HISTORY OF PRESENT ILLNESS
[None] : ~He/She~ has no significant interval events [Difficulty Breathing During Exertion] : stable dyspnea on exertion [Feelings Of Weakness On Exertion] : stable exercise intolerance [Cough] : stable coughing [Wheezing] : denies wheezing [Regional Soft Tissue Swelling Both Lower Extremities] : denies lower extremity edema [Chest Pain Or Discomfort] : denies chest pain [Fever] : denies fever [Never] : was never a smoker [TextBox_4] :    post Flu illness no tx Admission [facility] Oct 2023 Reviewed hospital discharge summary note 68-year-old female past medical history Hypertension prediabetes hyperlipidemia morbid obesity longstanding asthma presents to the emergency department with wheezing and URI symptoms RVP was negative Chest x-ray showed cardiac enlargement with mild to moderate congestive heart failure signs There was a question of a small pneumonia at the left lower lobe He is felt to be in hypertensive emergency with /160 for increased work of breathing She was placed on BiPAP IV steroids bronchodilation ICU monitoring subsequently transferred position to nasal cannula 3 L with O2 sats 100% x 10 9 and subsequently weaned off oxygen EKG mild right bundle branch block Echocardiogram ejection fraction 55% As part of outpatient work-up agree and recommend formal home sleep study Cardiology follow-up Noted from end of insurance was in question potential diagnosis for heart failure regarding patient's medications She is on diltiazem 30 mg She was also discharged with prednisone for asthma Discharge with levaquin because of the completion of treatment for pneumonia We will make decisions regarding use of diltiazem as per cardiology follow-up management  Review the 10/10/2023 CT chest angiogram Negative pulm negative pulmonary embolism bilateral lower lobe groundglass opacities linear opacities No change in the calcified and noncalcified lymph nodes No change in bilateral nodular opacities dating back to July 11, 2023 Right lower lobe 5 mm pulmonary nodule Left major fissure 3 mm opacity  Asthma exacerbation Lung exam per cardiology negative for wheeze WBC 6.57 hemoglobin 13.2 hematocrit 38.7 platelets 295,000 Serum electrolytes Creatinine 1.10 Magnesium phosphorus serum calcium all normal range Portable chest x-ray congestive picture mild to moderate increase findings from July 10, 2023 Small consolidation at the left base reported new Overall impression cardiac enlargement mild to moderate congestive heart failure positive for possible small pneumonia left lower lobe  Overall assessment Asthmatic bronchitis Left lower lobe pneumonia Rule out congestive heart failure Transfer to telemetry  Overall recommendation follow-up regarding sleep study Maintain follow-up cardiology PFT Consider overnight oximetry no cough wheeze. Post [facility]  admission Asthma SOB Chart review  Initial complaint was productive cough for 5 days  Outpatient chest x-ray was suspicious for pneumonia as subsequently was referred to the hospital  She experienced the loss of her daughter dating back 5 days prior to the admission  Developed wheezing  Being treated with nebulizers  Known history pulmonary sarcoidosis  Chest x-ray July 10, 2023  Mild central CHF increased from prior  Cardiac enlarged  Mild central congestive heart failure picture  Cardiology assessment  Echocardiogram Dopplers to rule out DVT  Acute diastolic heart failure IV Lasix  Hypertension discontinue Norvasc secondary to edema change to Cozaar 25 mg  Asthma with treatment protocol      CT chest July 11, 2023  No evidence of pneumonia  Right lower lobe partially calcified 5 mm pulmonary nodule no change dating back to April 1, 2021  Multiple mediastinal hilar lymph nodes internal foci calcification mildly enlarged  As noted patient does have history of sarcoidosis  Minimal bilateral lower areas of linear subsegmental atelectasis  Chief complaint shortness of breath  1 + year prior ED O2 saturation 98%-99 noted nasal cannula 2 L rate 124 Blood pressure reported 166/72 ED chart review Reported 99.1 reviewed EKG in ED Reported incomplete right bundle branch block They noted on exam clinical exam with wheezing will score thank you clinically did not have pulmonary embolism plan troponin labs coags BNP D-dimer cardiac monitor CBC Vital count 5.97 hemoglobin 12.8 hematocrit 38 platelet count 291,000 mild lymphopenia identified rest of bloods pending No Covid swab noted No chest x-ray completed 65-year-old female with history of hypertension hyperlipidemia obesity sarcoidosis asthma shortness of breath x4 days Also noted bilateral lower extremity edema Intermittent cough Was at primary care physician's office Dr. Jones in a.m. noted to have a new right bundle branch block  portable chest x-ray April 1, 2021 Limited difficult to assess cardiac size on AP view Widening of the ribs No evidence of parenchymal pulmonary opacities Favor an acute inflammatory airway inflammatory event widening the ribs suggestive of air trapping and a known patient of asthma Noted the official chest x-ray report is clear lungs  ADDENDUM Addendum data review April 1, 2021 CT angiogram protocol results pending Chest x-ray clear lungs COVID-19 PCR negative magnesium 2.2 phosphorus mildly reduced 2.2 serum glucose 148 BUN 19 creatinine 1.07 CBC White count 7.02 hemoglobin 12.7 hematocrit 37.4 Platelet count 313,000  Addendum preliminary report CT angiogram protocol No reported main left right upper lobe pulmonary emboli Partially calcified mediastinal and hilar adenopathy consistent with known diagnosis of sarcoidosis   [Wt Gain ___ Lbs] : no recent weight gain [Wt Loss ___ Lbs] : no recent weight loss [Oxygen] : the patient uses no supplemental oxygen

## 2023-12-29 NOTE — DISCUSSION/SUMMARY
[FreeTextEntry1] : s/p infuenza  ALEXANDER Exercise-induced hypoxemia with qualification oxygen therapy based on pulmonary 6-minute walk study O2 saturation down to 88% with increased heart rate Combined obstructive restrictive ventilatory impairment Chest x-ray findings demonstrate extensive cardiomegaly Rule out cardiac sarcoid rule out cardiac amyloid follow-up cardiology Consideration for heart failure team management  Posthospitalization [facility] Respiratory failure working diagnosis congestive heart failure hypertensive emergency multifocal pneumonia Short-term interval follow-up CT chest reevaluate at 1 month with PFT Cardiology follow-up Asthma  abnl PFT  Sarcoidosis  severe gas  exchange impairment  CT CHEST noted  RS symbicort and as prescribed PMD Spiriva  2 puffs QD  ? dose  singulair  primary care physician weight loss diet exercise protocol Exercise-induced hypoxemia f/u  Physical deconditioning Asthma- normalized NIOX  sarcoidosis noted  decline pulmonary physiology Reported new R BBB incomplete- concern re   cardiac sarcoid but in hospital did not tolerate Cardiac MRI Mod MR  Diastolic Dysfx On Dyazide Glaucoma Note BB   Rx updated  OPTH completed  per patient up  to date with APPT Oct 2022  MODERNA  COVID VACCINE x 2 doses recommendation booster with Bivalent  Weight loss low-fat diet Monitor 6-minute walk  Hold  symbicort  Change BREZTRI 2 puffs BID

## 2023-12-29 NOTE — PROCEDURE
[FreeTextEntry1] : Cardiomegaly Patchy pulmonary vascular disease No pleural effusion No interval change compared to chest x-ray November 2023  Spirometry no bronchodilator Severe reduction in flow rates FEV1 46% predicted Obstructive restrictive disease Pulmonary 6-minute walk exercise study 7/29/2023 Baseline O2 saturation at rest 94% 1 minute desaturation to 88% with increased heart rate 125 Clinical correlation Qualification portable oxygen therapy   PFTNov 9 2023  mod severe OAD  Pos  airtrapping  TLC 85 % WNL  DLCO 39 % with severe loss fx  alveolar  capillary units HGB 14.4  Chest x-ray PA lateral 11/9/2021 status post respiratory failure congestive heart failure history of sarcoidosis posthospitalization Cardiac size enlarged cannot exclude increased right heart size Aorta uncoiled Do not appreciate any evidence of parenchymal infiltrates consolidation pleural effusion pneumothorax cephalization  CT chest which was compared to 7/11/2023 October 2023 hospitalized [facility] Negative for pulmonary embolism New bilateral lower lobe groundglass opacities with linear opacities No change of calcified noncalcified lymph nodes in the chest consistent with known sarcoidosis No change bilateral pulmonary nodular opacities Congestive heart failure Multifocal pneumonia Short-term interval follow-up  CT chest July 11, 2023 No evidence of pneumonia Right lower lobe partially calcified 5 mm pulmonary nodule no change dating back to April 1, 2021 Multiple mediastinal hilar lymph nodes internal foci calcification mildly enlarged As noted patient does have history of sarcoidosis Minimal bilateral lower areas of linear subsegmental atelectasis  PFT 9/21/23 Moderate OAD  No BD at FEV1  Lung Volumes nl dec ERV sec truncal  obesity  Pos  mild airtrapping  DLCO  49 % with severe loss fx  alveolar  capillary units  HGB B12.5  Pulmonary 6 minute exercise Study 9/21/23  RA  vonnie RA desat 90 % no indication O2  PFT range May 25, 2023 Spirometry May 25, 2023 Moderate reduction in flow rates to severe FEV1 51% predicted Ratio 66 Positive decline compared to April 13, 2023 Patient has not returned to baseline of November 2022  Chest x-ray PA lateral May 23, 2023 Normal cardiac size Bilateral hilar fullness consistent with known diagnosis of sarcoidosis No parenchymal infiltrates pleural effusions or dominant pulmonary nodules No appreciable signs of interstitial lung disease   PFT 4/13/23  Mild  moderate reduction flow  rates Mild OAD  Lung volume s nl Pos  airtrapping  DLCo 64 % with mild loss fx  alveolar  capillary units HGB 16.5  NIOX  13  ppb WNL 4/13/23  Spirometry March 2 nd2023 Mild/moderate reduction in flow rates FEV1 FVC ratio 71 Stable FEV1 Obstructive ventilatory impairment cannot exclude restrictive component with reduced FVC  Pulmonary 6-minute walk exercise study March 2, 2023 Baseline O2 saturation 96% Positive desaturation to 88% with increased shortness of breath Positive vonnie desaturation 88% This is borderline to qualify for oxygen therapy Clinical correlation  Chest x-ray PA lateral March 2, 2023 Cardiac size is normal bilateral hilum rule out secondary known sarcoidosis No parenchymal infiltrates pleural effusions or dominant pulmonary nodules No interval change compared to chest x-ray March 7, 2022   PFT 12/9/22  moderate reduction flow  rates  Mild OAD  Lung Volumes nl  Pos  airtrapping  DLCO  54 % pred with moderate loss fx  alveolar  capillary units  HGB 15.1  DEXA bone density 12/9/2022 AP spine L1-L4 normal Left femoral neck normal Total left hip normal Impression normal study Follow-up bone density 2-5 years Spirometry November 30, 2022 Flow rates are normal Ratio 71 mild obstructive ventilatory.  Significant interval improvement at flow rates  PFT 9/2/22 mod severe reduction flow rates Lung Volumes nl  Pos airtrapping  DLCO 41 % IMP OAD with severe gas  exchange impairment NIOX 19  ppb 9/7/22 nl  range  [place] 8/22/22  mild mod decline at flow rates Mild OAD NIOX  16 ppp normalized  [place] 7/22/22 Mod reduction flow rates stable flow rates  NIOX 30 ppb inc bronchial inflammation  PFT 6/6/22 moderate reduction flow rates OAD  Lung Volumes pos  airtrapping  DLCP 47 % with severe loss fx  alveolar  capillary units HGB 13.3 NIOX  10  ppb WNL  PFT March 7, 2022 Moderate obstructive ventilatory impairment Borderline 10% response to bronchodilator at the FEV1 27% response to bronchodilator at the small airways Overall lung volumes are normal Total capacity 82% predicted Decreased ERV 27% predicted secondary to short stature with increased weight RV/TLC ratio 131% predicted consistent with air trapping Diffusion 50% predicted with a moderate loss of functioning alveolar capillary units Impression moderate obstructive ventilatory impairment with reactive component associated air trapping and moderate gas exchange impairment NIOX 10 PPB normal range 3/7/2022  Chest x-ray PA lateral 3/7/2022 Limited study Small lung volumes Motion lateral Cardiac size grossly normal Full bilateral hilum consistent with known diagnosis of sarcoidosis No interval change compared to chest x-ray of 9/9/2020   PFT No BD 8/30/21 moderate reduction Flow rates  Mild restrictive ventilatory impairment DLCO 42 % pred with severe loss fx  alveolar  capillary units  HGB 15.0 NIOX 12 ppb  ECHO 4/2/21 NO abnl reported finding  PFT 5/26/21 Moderate reduction flow rates Lung Volumes -nl TLC 86 % pred  Airtrapping DLCO 55 % with loss fx  alveolar  capillary units HGB 12.3  NIOX 11 ppb WNL 5/26/21  PFT 4/12/21 mild  moderate reduction flow rates  Normal lung Volumes  DLCO 58 % moderate reduction with loss fx alveolar  capillary units  HGB 12.3  NIOX  18  ppb WNL 4/12/21  PFT body box September 11, 2020 Mild reduction in flow rates with a mild obstructive ventilatory impairment Lung volumes are normal Air trapping with an RV/TLC ratio 129% predicted. Resistance is increased specific conductance is increased Mild reduction diffusion with a loss of functioning alveolocapillary units 56% of predicted. Impression chronic obstructive airway disease   nitric oxide 103 elevated  Hemoglobin 13.9 Inflammatory airway disease   Chest x-ray PA lateral September 9, 2020 Sarcoidosis Cardiac size are normal Uncoiled aorta No parenchymal infiltrates pleural effusions or dominant pulmonary nodules We will change compared to chest x-ray of November 14, 2019  Pulmonary 6-minute walk stress test September 9, 2020  Room air O2 saturation 97 Persistent tachycardia Desaturation to 89% on room air Impression borderline desaturation No indication for portable oxygen therapy   Spirometry 2/27/2020  moderate  reduction flow rates No BD at FEV1 Obstructive pattern  FENO  11  ppb  1/30 20  Chest x-ray PA lateral normal cardiac size Nov 14 2019 Uncoiled aorta No clear parenchymal infiltrates pleural effusions or dominant pulmonary nodules No interval change compared to chest x-ray barring difference in technique dating back to November 7, 2018  PFT November 14, 2019 Mild reduction in flow rates with an obstructive pattern Normal total lung capacity 80% predicted Mild air trapping RV/TLC ratio 129% predicted. Moderate reduction diffusion 56% predicted with a loss of functioning alveolocapillary units Heme globin 13.8 Bronchodilator response at small airways 29%  High-dose flu vaccination administered Sept 9 2020  DAta 1/30/20  Serum electrolytes normal Glucose 103 BUN 23 creatinine 1.02 Serum calcium 9.7 in patient with sarcoidosis ACE level normal range  High-dose flu vaccination administered September 9, 2020 PREVNAIR 10/12/2020  DEXA Bone Density  Normal study  HD Flu 9/21/23

## 2024-01-26 ENCOUNTER — APPOINTMENT (OUTPATIENT)
Dept: PULMONOLOGY | Facility: CLINIC | Age: 69
End: 2024-01-26

## 2024-03-06 ENCOUNTER — EMERGENCY (EMERGENCY)
Facility: HOSPITAL | Age: 69
LOS: 1 days | Discharge: HOME | End: 2024-03-06
Attending: STUDENT IN AN ORGANIZED HEALTH CARE EDUCATION/TRAINING PROGRAM
Payer: MEDICARE

## 2024-03-06 VITALS
WEIGHT: 293 LBS | HEIGHT: 65 IN | TEMPERATURE: 99 F | RESPIRATION RATE: 20 BRPM | DIASTOLIC BLOOD PRESSURE: 84 MMHG | HEART RATE: 88 BPM | SYSTOLIC BLOOD PRESSURE: 154 MMHG | OXYGEN SATURATION: 97 %

## 2024-03-06 DIAGNOSIS — Z90.710 ACQUIRED ABSENCE OF BOTH CERVIX AND UTERUS: Chronic | ICD-10-CM

## 2024-03-06 DIAGNOSIS — Z98.49 CATARACT EXTRACTION STATUS, UNSPECIFIED EYE: Chronic | ICD-10-CM

## 2024-03-06 LAB
ALBUMIN SERPL ELPH-MCNC: 3.2 G/DL — LOW (ref 3.5–5)
ALP SERPL-CCNC: 112 U/L — SIGNIFICANT CHANGE UP (ref 40–120)
ALT FLD-CCNC: 18 U/L DA — SIGNIFICANT CHANGE UP (ref 10–60)
ANION GAP SERPL CALC-SCNC: 3 MMOL/L — LOW (ref 5–17)
APPEARANCE UR: CLEAR — SIGNIFICANT CHANGE UP
AST SERPL-CCNC: 21 U/L — SIGNIFICANT CHANGE UP (ref 10–40)
BACTERIA # UR AUTO: ABNORMAL /HPF
BASOPHILS # BLD AUTO: 0.07 K/UL — SIGNIFICANT CHANGE UP (ref 0–0.2)
BASOPHILS NFR BLD AUTO: 1.1 % — SIGNIFICANT CHANGE UP (ref 0–2)
BILIRUB SERPL-MCNC: 0.6 MG/DL — SIGNIFICANT CHANGE UP (ref 0.2–1.2)
BILIRUB UR-MCNC: NEGATIVE — SIGNIFICANT CHANGE UP
BUN SERPL-MCNC: 24 MG/DL — HIGH (ref 7–18)
CALCIUM SERPL-MCNC: 9.5 MG/DL — SIGNIFICANT CHANGE UP (ref 8.4–10.5)
CHLORIDE SERPL-SCNC: 112 MMOL/L — HIGH (ref 96–108)
CO2 SERPL-SCNC: 30 MMOL/L — SIGNIFICANT CHANGE UP (ref 22–31)
COLOR SPEC: YELLOW — SIGNIFICANT CHANGE UP
CREAT SERPL-MCNC: 1.28 MG/DL — SIGNIFICANT CHANGE UP (ref 0.5–1.3)
DIFF PNL FLD: NEGATIVE — SIGNIFICANT CHANGE UP
EGFR: 45 ML/MIN/1.73M2 — LOW
EOSINOPHIL # BLD AUTO: 0.16 K/UL — SIGNIFICANT CHANGE UP (ref 0–0.5)
EOSINOPHIL NFR BLD AUTO: 2.4 % — SIGNIFICANT CHANGE UP (ref 0–6)
EPI CELLS # UR: PRESENT
GLUCOSE SERPL-MCNC: 107 MG/DL — HIGH (ref 70–99)
GLUCOSE UR QL: NEGATIVE MG/DL — SIGNIFICANT CHANGE UP
HCT VFR BLD CALC: 41.9 % — SIGNIFICANT CHANGE UP (ref 34.5–45)
HGB BLD-MCNC: 13.8 G/DL — SIGNIFICANT CHANGE UP (ref 11.5–15.5)
IMM GRANULOCYTES NFR BLD AUTO: 0.2 % — SIGNIFICANT CHANGE UP (ref 0–0.9)
KETONES UR-MCNC: ABNORMAL MG/DL
LEUKOCYTE ESTERASE UR-ACNC: NEGATIVE — SIGNIFICANT CHANGE UP
LYMPHOCYTES # BLD AUTO: 1.59 K/UL — SIGNIFICANT CHANGE UP (ref 1–3.3)
LYMPHOCYTES # BLD AUTO: 24.1 % — SIGNIFICANT CHANGE UP (ref 13–44)
MCHC RBC-ENTMCNC: 25.5 PG — LOW (ref 27–34)
MCHC RBC-ENTMCNC: 32.9 GM/DL — SIGNIFICANT CHANGE UP (ref 32–36)
MCV RBC AUTO: 77.3 FL — LOW (ref 80–100)
MONOCYTES # BLD AUTO: 0.7 K/UL — SIGNIFICANT CHANGE UP (ref 0–0.9)
MONOCYTES NFR BLD AUTO: 10.6 % — SIGNIFICANT CHANGE UP (ref 2–14)
NEUTROPHILS # BLD AUTO: 4.08 K/UL — SIGNIFICANT CHANGE UP (ref 1.8–7.4)
NEUTROPHILS NFR BLD AUTO: 61.6 % — SIGNIFICANT CHANGE UP (ref 43–77)
NITRITE UR-MCNC: NEGATIVE — SIGNIFICANT CHANGE UP
NRBC # BLD: 0 /100 WBCS — SIGNIFICANT CHANGE UP (ref 0–0)
PH UR: 6 — SIGNIFICANT CHANGE UP (ref 5–8)
PLATELET # BLD AUTO: 275 K/UL — SIGNIFICANT CHANGE UP (ref 150–400)
POTASSIUM SERPL-MCNC: 5.1 MMOL/L — SIGNIFICANT CHANGE UP (ref 3.5–5.3)
POTASSIUM SERPL-SCNC: 5.1 MMOL/L — SIGNIFICANT CHANGE UP (ref 3.5–5.3)
PROT SERPL-MCNC: 7.7 G/DL — SIGNIFICANT CHANGE UP (ref 6–8.3)
PROT UR-MCNC: ABNORMAL MG/DL
RBC # BLD: 5.42 M/UL — HIGH (ref 3.8–5.2)
RBC # FLD: 17.6 % — HIGH (ref 10.3–14.5)
RBC CASTS # UR COMP ASSIST: 3 /HPF — SIGNIFICANT CHANGE UP (ref 0–4)
SODIUM SERPL-SCNC: 145 MMOL/L — SIGNIFICANT CHANGE UP (ref 135–145)
SP GR SPEC: 1.03 — SIGNIFICANT CHANGE UP (ref 1–1.03)
UROBILINOGEN FLD QL: 1 MG/DL — SIGNIFICANT CHANGE UP (ref 0.2–1)
WBC # BLD: 6.61 K/UL — SIGNIFICANT CHANGE UP (ref 3.8–10.5)
WBC # FLD AUTO: 6.61 K/UL — SIGNIFICANT CHANGE UP (ref 3.8–10.5)
WBC UR QL: 2 /HPF — SIGNIFICANT CHANGE UP (ref 0–5)

## 2024-03-06 PROCEDURE — 99284 EMERGENCY DEPT VISIT MOD MDM: CPT | Mod: 25

## 2024-03-06 PROCEDURE — 96365 THER/PROPH/DIAG IV INF INIT: CPT

## 2024-03-06 PROCEDURE — 99284 EMERGENCY DEPT VISIT MOD MDM: CPT

## 2024-03-06 PROCEDURE — 87086 URINE CULTURE/COLONY COUNT: CPT

## 2024-03-06 PROCEDURE — 36415 COLL VENOUS BLD VENIPUNCTURE: CPT

## 2024-03-06 PROCEDURE — 81001 URINALYSIS AUTO W/SCOPE: CPT

## 2024-03-06 PROCEDURE — 80053 COMPREHEN METABOLIC PANEL: CPT

## 2024-03-06 PROCEDURE — 85025 COMPLETE CBC W/AUTO DIFF WBC: CPT

## 2024-03-06 RX ORDER — CEFUROXIME AXETIL 250 MG
1 TABLET ORAL
Qty: 10 | Refills: 0
Start: 2024-03-06 | End: 2024-03-10

## 2024-03-06 RX ORDER — CEFTRIAXONE 500 MG/1
1000 INJECTION, POWDER, FOR SOLUTION INTRAMUSCULAR; INTRAVENOUS ONCE
Refills: 0 | Status: COMPLETED | OUTPATIENT
Start: 2024-03-06 | End: 2024-03-06

## 2024-03-06 RX ADMIN — CEFTRIAXONE 1000 MILLIGRAM(S): 500 INJECTION, POWDER, FOR SOLUTION INTRAMUSCULAR; INTRAVENOUS at 20:31

## 2024-03-06 RX ADMIN — CEFTRIAXONE 100 MILLIGRAM(S): 500 INJECTION, POWDER, FOR SOLUTION INTRAMUSCULAR; INTRAVENOUS at 19:46

## 2024-03-06 NOTE — ED PROVIDER NOTE - PATIENT PORTAL LINK FT
You can access the FollowMyHealth Patient Portal offered by [facility] by registering at the following website: [email or web address]. By joining [employer]’s FollowMyHealth portal, you will also be able to view your health information using other applications (apps) compatible with our system.

## 2024-03-06 NOTE — ED ADULT NURSE NOTE - CAS TRG GENERAL AIRWAY, MLM
[FreeTextEntry1] : Ms. Talbot presents with above history and imaging.  .  I reviewed her MRI and will see her back upon completion of repeat MRI  LS spine as well as LS spine flexion-extension x-rays to assess for dynamic instability for surgical planning. \par Based on the above, I will discuss the possible options of left L5-S1 discectomy versus L5-S1 PLIF.\par In the interim she will continue her follow-up with Dr. Burris.  We will see her back upon completion of her additional imaging.\par Patient has been given an opportunity to ask and have their questions answered to their satisfaction.\par Patient knows to call the office if there are any new or worsening symptoms.\par \par \par \par \par \par \par \par  Patent

## 2024-03-06 NOTE — ED PROVIDER NOTE - NSFOLLOWUPINSTRUCTIONS_ED_ALL_ED_FT
You were seen today for positive urine culture results.  You are being sent home on antibiotics.  Please follow-up with your primary care doctor within 1 to 2 days for continued care.  If you start developing fevers, chills, abdominal pain or any other concerning symptoms please seek medical assistance immediately.

## 2024-03-06 NOTE — ED PROVIDER NOTE - CLINICAL SUMMARY MEDICAL DECISION MAKING FREE TEXT BOX
68-year-old female with past medical history of hyperlipidemia, asthma sent in by her PMD for positive urine culture.  Patient reports she does not have any complaints at this time.  Denies chest pain, shortness of breath, abdominal pain, nausea, vomiting, urinary complaints, fevers, chills.  States she went for her annual physical few days ago and received a call today that her urine culture is positive and she needs to come to the emergency department.  Patient came in with urine culture results that shows patient urine grew gram-negative bacteria greater than 100,000 bacteria -the name of the bacteria is not listed.  As per the sensitivities patient can receive ceftriaxone and first second and third generation cephalosporins.  Spoke with Dr. Jones over the phone–shared decision making since patient is completely asymptomatic and afebrile patient to follow-up with Dr. Jones in the office.  Patient is well-appearing.  No distress.  Abdomen soft nontender.  No CVA tenderness to palpation.  Differential diagnosis include but not limited to UTI.  UA in the ED is negative however given positive urine cultures plan to treat.

## 2024-03-06 NOTE — ED ADULT NURSE NOTE - OBJECTIVE STATEMENT
Patient presented to the ED after being sent from primary doctor for positive urine culture results and for antibiotics.

## 2024-03-06 NOTE — ED ADULT NURSE NOTE - NS ED NURSE LEVEL OF CONSCIOUSNESS ORIENTATION
SCAR MASSAGE INSTRUCTIONS    As your body heals following an injury or surgery, it forms scar tissue. Scarring is good in that it closes wounds, but heavy, binding scar can prevent normal hand function. To gain a soft, supple scar that allows good movement of the hand, the scar must be stressed. You can stress and remodel your scar by performing a variety of scar massage techniques.     You can use a lotion or cream that contains vitamin E, cocoa butter, or aloe vera when performing the scar massage.    Rub scar with thumb or one or two fingers using circular, up and down and sideways motion with a firm, deep pressure. Perform massage slowly, allowing tissues to stretch. Do not just slide over the skin.    Separate skin from tissue below by pinching skin between fingers and thumb. Roll skin between fingers.    Scar shearing- while bending your fingers, push scar towards the tip of your finger. While straightening your fingers, pull scar towards your palm/wrist.    If provided with a gel scar pad, wear gel pad 8-10 hours a day. Decrease time if skin becomes macerated.    Perform scar massage for 5 minutes, 2-3 times per day.   Oriented - self; Oriented - place; Oriented - time

## 2024-03-08 LAB
CULTURE RESULTS: SIGNIFICANT CHANGE UP
SPECIMEN SOURCE: SIGNIFICANT CHANGE UP

## 2024-04-07 ENCOUNTER — RX RENEWAL (OUTPATIENT)
Age: 69
End: 2024-04-07

## 2024-04-24 ENCOUNTER — APPOINTMENT (OUTPATIENT)
Dept: PULMONOLOGY | Facility: CLINIC | Age: 69
End: 2024-04-24
Payer: MEDICARE

## 2024-04-24 VITALS — OXYGEN SATURATION: 95 % | DIASTOLIC BLOOD PRESSURE: 86 MMHG | SYSTOLIC BLOOD PRESSURE: 161 MMHG | HEART RATE: 107 BPM

## 2024-04-24 DIAGNOSIS — R09.02 HYPOXEMIA: ICD-10-CM

## 2024-04-24 DIAGNOSIS — D86.9 SARCOIDOSIS, UNSPECIFIED: ICD-10-CM

## 2024-04-24 DIAGNOSIS — E66.9 OBESITY, UNSPECIFIED: ICD-10-CM

## 2024-04-24 DIAGNOSIS — R53.81 OTHER MALAISE: ICD-10-CM

## 2024-04-24 DIAGNOSIS — J45.909 UNSPECIFIED ASTHMA, UNCOMPLICATED: ICD-10-CM

## 2024-04-24 DIAGNOSIS — I50.9 HEART FAILURE, UNSPECIFIED: ICD-10-CM

## 2024-04-24 PROCEDURE — 94010 BREATHING CAPACITY TEST: CPT

## 2024-04-24 PROCEDURE — 95012 NITRIC OXIDE EXP GAS DETER: CPT

## 2024-04-24 PROCEDURE — 99214 OFFICE O/P EST MOD 30 MIN: CPT | Mod: 25

## 2024-04-24 PROCEDURE — 71046 X-RAY EXAM CHEST 2 VIEWS: CPT

## 2024-04-24 PROCEDURE — ZZZZZ: CPT

## 2024-04-24 PROCEDURE — 94729 DIFFUSING CAPACITY: CPT

## 2024-04-24 PROCEDURE — 94727 GAS DIL/WSHOT DETER LNG VOL: CPT

## 2024-04-24 RX ORDER — DOXYCYCLINE HYCLATE 100 MG/1
100 TABLET ORAL
Qty: 14 | Refills: 0 | Status: DISCONTINUED | COMMUNITY
Start: 2023-11-09 | End: 2024-04-24

## 2024-04-24 NOTE — DISCUSSION/SUMMARY
[FreeTextEntry1] : s/p infuenza  ALEXANDER Exercise-induced hypoxemia with qualification oxygen therapy based on pulmonary 6-minute walk study O2 saturation down to 88% with increased heart rate Combined obstructive restrictive ventilatory impairment Chest x-ray findings demonstrate extensive cardiomegaly Rule out cardiac sarcoid rule out cardiac amyloid follow-up cardiology Consideration for heart failure team management  Posthospitalization [facility] Respiratory failure working diagnosis congestive heart failure hypertensive emergency multifocal pneumonia Short-term interval follow-up CT chest reevaluate at 1 month with PFT Cardiology follow-up Asthma  abnl PFT  Sarcoidosis  severe gas  exchange impairment  CT CHEST noted  RS symbicort and as prescribed PMD Spiriva  2 puffs QD  ? dose  singulair  primary care physician weight loss diet exercise protocol Exercise-induced hypoxemia f/u  Physical deconditioning Asthma- normalized NIOX  sarcoidosis noted  decline pulmonary physiology Reported new R BBB incomplete- concern re   cardiac sarcoid but in hospital did not tolerate Cardiac MRI Mod MR  Diastolic Dysfx On Dyazide Glaucoma Note BB   Rx updated  OPTH completed  per patient up  to date with APPT Oct 2022  MODERNA  COVID VACCINE x 2 doses recommendation booster with Bivalent  Weight loss low-fat diet Monitor 6-minute walk  Hold  symbicort  Change BREZTRI 2 puffs BID

## 2024-04-24 NOTE — PROCEDURE
[FreeTextEntry1] : Chest x-ray PA lateral April 24, 2024 Cardiomegaly Patchy pulmonary vascular disease No pleural effusion No interval change compared to chest x-ray November 2023 No progression of vascular disease Uncoiled aorta PFT April 24, 2024 Severe reduction in flow rates with an obstructive pattern Lung capacity total 89% predicted normal range Positive air trapping with RV/TLC ratio 53% predicted Diffusion 38% predicted with severe loss of functioning alveolar capillary units Hemoglobin 13.8 Overall stable pulmonary physiology  Spirometry no bronchodilator Severe reduction in flow rates FEV1 46% predicted Obstructive restrictive disease Pulmonary 6-minute walk exercise study 7/29/2023 Baseline O2 saturation at rest 94% 1 minute desaturation to 88% with increased heart rate 125 Clinical correlation Qualification portable oxygen therapy   PFTNov 9 2023  mod severe OAD  Pos  airtrapping  TLC 85 % WNL  DLCO 39 % with severe loss fx  alveolar  capillary units HGB 14.4  Chest x-ray PA lateral 11/9/2021 status post respiratory failure congestive heart failure history of sarcoidosis posthospitalization Cardiac size enlarged cannot exclude increased right heart size Aorta uncoiled Do not appreciate any evidence of parenchymal infiltrates consolidation pleural effusion pneumothorax cephalization  CT chest which was compared to 7/11/2023 October 2023 hospitalized [facility] Negative for pulmonary embolism New bilateral lower lobe groundglass opacities with linear opacities No change of calcified noncalcified lymph nodes in the chest consistent with known sarcoidosis No change bilateral pulmonary nodular opacities Congestive heart failure Multifocal pneumonia Short-term interval follow-up  CT chest July 11, 2023 No evidence of pneumonia Right lower lobe partially calcified 5 mm pulmonary nodule no change dating back to April 1, 2021 Multiple mediastinal hilar lymph nodes internal foci calcification mildly enlarged As noted patient does have history of sarcoidosis Minimal bilateral lower areas of linear subsegmental atelectasis  PFT 9/21/23 Moderate OAD  No BD at FEV1  Lung Volumes nl dec ERV sec truncal  obesity  Pos  mild airtrapping  DLCO  49 % with severe loss fx  alveolar  capillary units  HGB B12.5  Pulmonary 6 minute exercise Study 9/21/23  RA  vonnie RA desat 90 % no indication O2  PFT range May 25, 2023 Spirometry May 25, 2023 Moderate reduction in flow rates to severe FEV1 51% predicted Ratio 66 Positive decline compared to April 13, 2023 Patient has not returned to baseline of November 2022  Chest x-ray PA lateral May 23, 2023 Normal cardiac size Bilateral hilar fullness consistent with known diagnosis of sarcoidosis No parenchymal infiltrates pleural effusions or dominant pulmonary nodules No appreciable signs of interstitial lung disease   PFT 4/13/23  Mild  moderate reduction flow  rates Mild OAD  Lung volume s nl Pos  airtrapping  DLCo 64 % with mild loss fx  alveolar  capillary units HGB 16.5  NIOX  13  ppb WNL 4/13/23  Spirometry March 2 nd2023 Mild/moderate reduction in flow rates FEV1 FVC ratio 71 Stable FEV1 Obstructive ventilatory impairment cannot exclude restrictive component with reduced FVC  Pulmonary 6-minute walk exercise study March 2, 2023 Baseline O2 saturation 96% Positive desaturation to 88% with increased shortness of breath Positive vonnie desaturation 88% This is borderline to qualify for oxygen therapy Clinical correlation  Chest x-ray PA lateral March 2, 2023 Cardiac size is normal bilateral hilum rule out secondary known sarcoidosis No parenchymal infiltrates pleural effusions or dominant pulmonary nodules No interval change compared to chest x-ray March 7, 2022   PFT 12/9/22  moderate reduction flow  rates  Mild OAD  Lung Volumes nl  Pos  airtrapping  DLCO  54 % pred with moderate loss fx  alveolar  capillary units  HGB 15.1  DEXA bone density 12/9/2022 AP spine L1-L4 normal Left femoral neck normal Total left hip normal Impression normal study Follow-up bone density 2-5 years Spirometry November 30, 2022 Flow rates are normal Ratio 71 mild obstructive ventilatory.  Significant interval improvement at flow rates  PFT 9/2/22 mod severe reduction flow rates Lung Volumes nl  Pos airtrapping  DLCO 41 % IMP OAD with severe gas  exchange impairment NIOX 19  ppb 9/7/22 nl  range  [place] 8/22/22  mild mod decline at flow rates Mild OAD NIOX  16 ppp normalized  [place] 7/22/22 Mod reduction flow rates stable flow rates  NIOX 30 ppb inc bronchial inflammation  PFT 6/6/22 moderate reduction flow rates OAD  Lung Volumes pos  airtrapping  DLCP 47 % with severe loss fx  alveolar  capillary units HGB 13.3 NIOX  10  ppb WNL  PFT March 7, 2022 Moderate obstructive ventilatory impairment Borderline 10% response to bronchodilator at the FEV1 27% response to bronchodilator at the small airways Overall lung volumes are normal Total capacity 82% predicted Decreased ERV 27% predicted secondary to short stature with increased weight RV/TLC ratio 131% predicted consistent with air trapping Diffusion 50% predicted with a moderate loss of functioning alveolar capillary units Impression moderate obstructive ventilatory impairment with reactive component associated air trapping and moderate gas exchange impairment NIOX 10 PPB normal range 3/7/2022  Chest x-ray PA lateral 3/7/2022 Limited study Small lung volumes Motion lateral Cardiac size grossly normal Full bilateral hilum consistent with known diagnosis of sarcoidosis No interval change compared to chest x-ray of 9/9/2020   PFT No BD 8/30/21 moderate reduction Flow rates  Mild restrictive ventilatory impairment DLCO 42 % pred with severe loss fx  alveolar  capillary units  HGB 15.0 NIOX 12 ppb  ECHO 4/2/21 NO abnl reported finding  PFT 5/26/21 Moderate reduction flow rates Lung Volumes -nl TLC 86 % pred  Airtrapping DLCO 55 % with loss fx  alveolar  capillary units HGB 12.3  NIOX 11 ppb WNL 5/26/21  PFT 4/12/21 mild  moderate reduction flow rates  Normal lung Volumes  DLCO 58 % moderate reduction with loss fx alveolar  capillary units  HGB 12.3  NIOX  18  ppb WNL 4/12/21  PFT body box September 11, 2020 Mild reduction in flow rates with a mild obstructive ventilatory impairment Lung volumes are normal Air trapping with an RV/TLC ratio 129% predicted. Resistance is increased specific conductance is increased Mild reduction diffusion with a loss of functioning alveolocapillary units 56% of predicted. Impression chronic obstructive airway disease   nitric oxide 103 elevated  Hemoglobin 13.9 Inflammatory airway disease   Chest x-ray PA lateral September 9, 2020 Sarcoidosis Cardiac size are normal Uncoiled aorta No parenchymal infiltrates pleural effusions or dominant pulmonary nodules We will change compared to chest x-ray of November 14, 2019  Pulmonary 6-minute walk stress test September 9, 2020  Room air O2 saturation 97 Persistent tachycardia Desaturation to 89% on room air Impression borderline desaturation No indication for portable oxygen therapy   Spirometry 2/27/2020  moderate  reduction flow rates No BD at FEV1 Obstructive pattern  FENO  11  ppb  1/30 20  Chest x-ray PA lateral normal cardiac size Nov 14 2019 Uncoiled aorta No clear parenchymal infiltrates pleural effusions or dominant pulmonary nodules No interval change compared to chest x-ray barring difference in technique dating back to November 7, 2018  PFT November 14, 2019 Mild reduction in flow rates with an obstructive pattern Normal total lung capacity 80% predicted Mild air trapping RV/TLC ratio 129% predicted. Moderate reduction diffusion 56% predicted with a loss of functioning alveolocapillary units Heme globin 13.8 Bronchodilator response at small airways 29%  High-dose flu vaccination administered Sept 9 2020  DAta 1/30/20  Serum electrolytes normal Glucose 103 BUN 23 creatinine 1.02 Serum calcium 9.7 in patient with sarcoidosis ACE level normal range  High-dose flu vaccination administered September 9, 2020 PREVNAIR 10/12/2020  DEXA Bone Density  Normal study  HD Flu 9/21/23

## 2024-04-24 NOTE — HISTORY OF PRESENT ILLNESS
[None] : ~He/She~ has no significant interval events [Difficulty Breathing During Exertion] : stable dyspnea on exertion [Feelings Of Weakness On Exertion] : stable exercise intolerance [Cough] : stable coughing [Wheezing] : denies wheezing [Regional Soft Tissue Swelling Both Lower Extremities] : denies lower extremity edema [Chest Pain Or Discomfort] : denies chest pain [Fever] : denies fever [Never] : was never a smoker [TextBox_4] :    post Flu illness no tx Admission [facility] Oct 2023 Reviewed hospital discharge summary note 68-year-old female past medical history Hypertension prediabetes hyperlipidemia morbid obesity longstanding asthma presents to the emergency department with wheezing and URI symptoms RVP was negative Chest x-ray showed cardiac enlargement with mild to moderate congestive heart failure signs There was a question of a small pneumonia at the left lower lobe  is felt to be in hypertensive emergency with /160 for increased work of breathing She was placed on BiPAP IV steroids bronchodilation ICU monitoring subsequently transferred position to nasal cannula 3 L with O2 sats 100% x 10 9 and subsequently weaned off oxygen EKG mild right bundle branch block Echocardiogram ejection fraction 55% As part of outpatient work-up agree and recommend formal home sleep study Cardiology follow-up Noted from end of insurance was in question potential diagnosis for heart failure regarding patient's medications She is on diltiazem 30 mg She was also discharged with prednisone for asthma Discharge with levaquin because of the completion of treatment for pneumonia We will make decisions regarding use of diltiazem as per cardiology follow-up management  Review the 10/10/2023 CT chest angiogram Negative pulm negative pulmonary embolism bilateral lower lobe groundglass opacities linear opacities No change in the calcified and noncalcified lymph nodes No change in bilateral nodular opacities dating back to July 11, 2023 Right lower lobe 5 mm pulmonary nodule Left major fissure 3 mm opacity  Asthma exacerbation Lung exam per cardiology negative for wheeze WBC 6.57 hemoglobin 13.2 hematocrit 38.7 platelets 295,000 Serum electrolytes Creatinine 1.10 Magnesium phosphorus serum calcium all normal range Portable chest x-ray congestive picture mild to moderate increase findings from July 10, 2023 Small consolidation at the left base reported new Overall impression cardiac enlargement mild to moderate congestive heart failure positive for possible small pneumonia left lower lobe  Overall assessment Asthmatic bronchitis Left lower lobe pneumonia Rule out congestive heart failure Transfer to telemetry  Overall recommendation follow-up regarding sleep study Maintain follow-up cardiology PFT Consider overnight oximetry no cough wheeze. Post [facility]  admission Asthma SOB Chart review  Initial complaint was productive cough for 5 days  Outpatient chest x-ray was suspicious for pneumonia as subsequently was referred to the hospital  She experienced the loss of her daughter dating back 5 days prior to the admission  Developed wheezing  Being treated with nebulizers  Known history pulmonary sarcoidosis  Chest x-ray July 10, 2023  Mild central CHF increased from prior  Cardiac enlarged  Mild central congestive heart failure picture  Cardiology assessment  Echocardiogram Dopplers to rule out DVT  Acute diastolic heart failure IV Lasix  Hypertension discontinue Norvasc secondary to edema change to Cozaar 25 mg  Asthma with treatment protocol      CT chest July 11, 2023  No evidence of pneumonia  Right lower lobe partially calcified 5 mm pulmonary nodule no change dating back to April 1, 2021  Multiple mediastinal hilar lymph nodes internal foci calcification mildly enlarged  As noted patient does have history of sarcoidosis  Minimal bilateral lower areas of linear subsegmental atelectasis  Chief complaint shortness of breath  1 + year prior ED O2 saturation 98%-99 noted nasal cannula 2 L rate 124 Blood pressure reported 166/72 ED chart review Reported 99.1 reviewed EKG in ED Reported incomplete right bundle branch block They noted on exam clinical exam with wheezing will score thank you clinically did not have pulmonary embolism plan troponin labs coags BNP D-dimer cardiac monitor CBC Vital count 5.97 hemoglobin 12.8 hematocrit 38 platelet count 291,000 mild lymphopenia identified rest of bloods pending No Covid swab noted No chest x-ray completed 65-year-old female with history of hypertension hyperlipidemia obesity sarcoidosis asthma shortness of breath x4 days Also noted bilateral lower extremity edema Intermittent cough Was at primary care physician's office Dr. Jones in a.m. noted to have a new right bundle branch block  portable chest x-ray April 1, 2021 Limited difficult to assess cardiac size on AP view Widening of the ribs No evidence of parenchymal pulmonary opacities Favor an acute inflammatory airway inflammatory event widening the ribs suggestive of air trapping and a known patient of asthma Noted the official chest x-ray report is clear lungs  ADDENDUM Addendum data review April 1, 2021 CT angiogram protocol results pending Chest x-ray clear lungs COVID-19 PCR negative magnesium 2.2 phosphorus mildly reduced 2.2 serum glucose 148 BUN 19 creatinine 1.07 CBC White count 7.02 hemoglobin 12.7 hematocrit 37.4 Platelet count 313,000  Addendum preliminary report CT angiogram protocol No reported main left right upper lobe pulmonary emboli Partially calcified mediastinal and hilar adenopathy consistent with known diagnosis of sarcoidosis   [Wt Gain ___ Lbs] : no recent weight gain [Wt Loss ___ Lbs] : no recent weight loss [Oxygen] : the patient uses no supplemental oxygen

## 2024-05-03 ENCOUNTER — RX RENEWAL (OUTPATIENT)
Age: 69
End: 2024-05-03

## 2024-05-03 RX ORDER — MONTELUKAST 10 MG/1
10 TABLET, FILM COATED ORAL
Qty: 90 | Refills: 0 | Status: ACTIVE | COMMUNITY
Start: 2023-05-25 | End: 1900-01-01

## 2024-06-12 NOTE — ED ADULT NURSE NOTE - BREATHING, MLM
Pt is 71 y.o. female seen for PT evaluation s/p admit to [facility] on 6/11/2024 w/ Colitis. PT consulted to assess pt's functional mobility and d/c needs. Order placed for PT eval and tx, w/ up and OOB as tolerated order. Pt agreeable to PT  session upon arrival, pt found supine in bed. The following objective measures performed on IE also reveal limitations: AM-PAC 6 Clicks 24/24.  Patient was independent w/ all functional mobility w/ no AD.  Pt presents at [facility] with transfers, ambulation, and bed mobility.  From PT/mobility standpoint, recommendation at time of d/c would be anticipate no needs/resources. Upon conclusion pt seated in recliner. D/c PT services at this time. Complexity: Comorbidities affecting pt's physical performance at time of assessment include: cancer and anxiety. Personal factors affecting pt at time of IE include: anxiety. Please find objective findings from PT assessment regarding body systems outlined above with impairments and limitations including pain and altered sensation.  Pt's clinical presentation is currently evolving seen in pt's presentation of pain. The patient's AM-PAC Basic Mobility Inpatient Short Form Raw Score is 24. Please also refer to the recommendation of the Physical Therapist for safe discharge planning. RN verbalized pt appropriate for PT session. Pt seen as a co-eval with OT due to the patient's co-morbidities, clinically unstable presentation, and present impairments which are a regression from the patient's baseline.    
Spontaneous, unlabored and symmetrical

## 2024-07-11 ENCOUNTER — NON-APPOINTMENT (OUTPATIENT)
Age: 69
End: 2024-07-11

## 2024-07-12 ENCOUNTER — APPOINTMENT (OUTPATIENT)
Dept: PULMONOLOGY | Facility: CLINIC | Age: 69
End: 2024-07-12
Payer: MEDICARE

## 2024-07-12 DIAGNOSIS — J45.909 UNSPECIFIED ASTHMA, UNCOMPLICATED: ICD-10-CM

## 2024-07-12 DIAGNOSIS — D86.9 SARCOIDOSIS, UNSPECIFIED: ICD-10-CM

## 2024-07-12 PROCEDURE — 99443: CPT

## 2024-07-12 RX ORDER — METHYLPREDNISOLONE 4 MG/1
4 TABLET ORAL
Qty: 1 | Refills: 1 | Status: ACTIVE | COMMUNITY
Start: 2024-07-12 | End: 1900-01-01

## 2024-07-12 RX ORDER — DOXYCYCLINE HYCLATE 100 MG/1
100 CAPSULE ORAL
Qty: 14 | Refills: 0 | Status: ACTIVE | COMMUNITY
Start: 2024-07-12 | End: 1900-01-01

## 2024-07-24 ENCOUNTER — APPOINTMENT (OUTPATIENT)
Dept: PULMONOLOGY | Facility: CLINIC | Age: 69
End: 2024-07-24
Payer: MEDICARE

## 2024-07-24 VITALS — SYSTOLIC BLOOD PRESSURE: 138 MMHG | OXYGEN SATURATION: 93 % | HEART RATE: 114 BPM | DIASTOLIC BLOOD PRESSURE: 86 MMHG

## 2024-07-24 DIAGNOSIS — R09.02 HYPOXEMIA: ICD-10-CM

## 2024-07-24 DIAGNOSIS — J45.909 UNSPECIFIED ASTHMA, UNCOMPLICATED: ICD-10-CM

## 2024-07-24 DIAGNOSIS — D86.9 SARCOIDOSIS, UNSPECIFIED: ICD-10-CM

## 2024-07-24 DIAGNOSIS — I50.9 HEART FAILURE, UNSPECIFIED: ICD-10-CM

## 2024-07-24 PROCEDURE — 94729 DIFFUSING CAPACITY: CPT

## 2024-07-24 PROCEDURE — 71046 X-RAY EXAM CHEST 2 VIEWS: CPT

## 2024-07-24 PROCEDURE — 94010 BREATHING CAPACITY TEST: CPT

## 2024-07-24 PROCEDURE — 94727 GAS DIL/WSHOT DETER LNG VOL: CPT

## 2024-07-24 PROCEDURE — 95012 NITRIC OXIDE EXP GAS DETER: CPT

## 2024-07-24 PROCEDURE — 99214 OFFICE O/P EST MOD 30 MIN: CPT | Mod: 25

## 2024-07-24 PROCEDURE — ZZZZZ: CPT

## 2024-07-24 NOTE — HISTORY OF PRESENT ILLNESS
[None] : ~He/She~ has no significant interval events [Difficulty Breathing During Exertion] : stable dyspnea on exertion [Feelings Of Weakness On Exertion] : stable exercise intolerance [Cough] : stable coughing [Wheezing] : denies wheezing [Regional Soft Tissue Swelling Both Lower Extremities] : denies lower extremity edema [Chest Pain Or Discomfort] : denies chest pain [Fever] : denies fever [Never] : was never a smoker [TextBox_4] :    post Flu illness no tx Admission [facility] Oct 2023 Reviewed hospital discharge summary note 68-year-old female past medical history Hypertension prediabetes hyperlipidemia morbid obesity longstanding asthma presents to the emergency department with wheezing and URI symptoms RVP was negative Chest x-ray showed cardiac enlargement with mild to moderate congestive heart failure signs There was a question of a small pneumonia at the left lower lobe  is felt to be in hypertensive emergency with /160 for increased work of breathing She was placed on BiPAP IV steroids bronchodilation ICU monitoring subsequently transferred position to nasal cannula 3 L with O2 sats 100% x 10 9 and subsequently weaned off oxygen EKG mild right bundle branch block Echocardiogram ejection fraction 55% As part of outpatient work-up agree and recommend formal home sleep study Cardiology follow-up Noted from end of insurance was in question potential diagnosis for heart failure regarding patient's medications She is on diltiazem 30 mg She was also discharged with prednisone for asthma Discharge with levaquin because of the completion of treatment for pneumonia We will make decisions regarding use of diltiazem as per cardiology follow-up management  Review the 10/10/2023 CT chest angiogram Negative pulm negative pulmonary embolism bilateral lower lobe groundglass opacities linear opacities No change in the calcified and noncalcified lymph nodes No change in bilateral nodular opacities dating back to July 11, 2023 Right lower lobe 5 mm pulmonary nodule Left major fissure 3 mm opacity  Asthma exacerbation Lung exam per cardiology negative for wheeze WBC 6.57 hemoglobin 13.2 hematocrit 38.7 platelets 295,000 Serum electrolytes Creatinine 1.10 Magnesium phosphorus serum calcium all normal range Portable chest x-ray congestive picture mild to moderate increase findings from July 10, 2023 Small consolidation at the left base reported new Overall impression cardiac enlargement mild to moderate congestive heart failure positive for possible small pneumonia left lower lobe  Overall assessment Asthmatic bronchitis Left lower lobe pneumonia Rule out congestive heart failure Transfer to telemetry  Overall recommendation follow-up regarding sleep study Maintain follow-up cardiology PFT Consider overnight oximetry no cough wheeze. Post [facility]  admission Asthma SOB Chart review  Initial complaint was productive cough for 5 days  Outpatient chest x-ray was suspicious for pneumonia as subsequently was referred to the hospital  She experienced the loss of her daughter dating back 5 days prior to the admission  Developed wheezing  Being treated with nebulizers  Known history pulmonary sarcoidosis  Chest x-ray July 10, 2023  Mild central CHF increased from prior  Cardiac enlarged  Mild central congestive heart failure picture  Cardiology assessment  Echocardiogram Dopplers to rule out DVT  Acute diastolic heart failure IV Lasix  Hypertension discontinue Norvasc secondary to edema change to Cozaar 25 mg  Asthma with treatment protocol      CT chest July 11, 2023  No evidence of pneumonia  Right lower lobe partially calcified 5 mm pulmonary nodule no change dating back to April 1, 2021  Multiple mediastinal hilar lymph nodes internal foci calcification mildly enlarged  As noted patient does have history of sarcoidosis  Minimal bilateral lower areas of linear subsegmental atelectasis  Chief complaint shortness of breath  1 + year prior ED O2 saturation 98%-99 noted nasal cannula 2 L rate 124 Blood pressure reported 166/72 ED chart review Reported 99.1 reviewed EKG in ED Reported incomplete right bundle branch block They noted on exam clinical exam with wheezing will score thank you clinically did not have pulmonary embolism plan troponin labs coags BNP D-dimer cardiac monitor CBC Vital count 5.97 hemoglobin 12.8 hematocrit 38 platelet count 291,000 mild lymphopenia identified rest of bloods pending No Covid swab noted No chest x-ray completed 65-year-old female with history of hypertension hyperlipidemia obesity sarcoidosis asthma shortness of breath x4 days Also noted bilateral lower extremity edema Intermittent cough Was at primary care physician's office Dr. Jones in a.m. noted to have a new right bundle branch block  portable chest x-ray April 1, 2021 Limited difficult to assess cardiac size on AP view Widening of the ribs No evidence of parenchymal pulmonary opacities Favor an acute inflammatory airway inflammatory event widening the ribs suggestive of air trapping and a known patient of asthma Noted the official chest x-ray report is clear lungs  ADDENDUM Addendum data review April 1, 2021 CT angiogram protocol results pending Chest x-ray clear lungs COVID-19 PCR negative magnesium 2.2 phosphorus mildly reduced 2.2 serum glucose 148 BUN 19 creatinine 1.07 CBC White count 7.02 hemoglobin 12.7 hematocrit 37.4 Platelet count 313,000  Addendum preliminary report CT angiogram protocol No reported main left right upper lobe pulmonary emboli Partially calcified mediastinal and hilar adenopathy consistent with known diagnosis of sarcoidosis   [Wt Gain ___ Lbs] : no recent weight gain [Wt Loss ___ Lbs] : no recent weight loss [Oxygen] : the patient uses no supplemental oxygen

## 2024-07-24 NOTE — PHYSICAL EXAM
[General Appearance - Well Developed] : well developed [Normal Appearance] : normal appearance [Well Groomed] : well groomed [General Appearance - Well Nourished] : well nourished [No Deformities] : no deformities [General Appearance - In No Acute Distress] : no acute distress [Normal Conjunctiva] : the conjunctiva exhibited no abnormalities [Eyelids - No Xanthelasma] : the eyelids demonstrated no xanthelasmas [Normal Oropharynx] : normal oropharynx [Neck Appearance] : the appearance of the neck was normal [Neck Cervical Mass (___cm)] : no neck mass was observed [Jugular Venous Distention Increased] : there was no jugular-venous distention [Thyroid Nodule] : there were no palpable thyroid nodules [Thyroid Diffuse Enlargement] : the thyroid was not enlarged [Heart Rate And Rhythm] : heart rate and rhythm were normal [Heart Sounds] : normal S1 and S2 [Murmurs] : no murmurs present [Arterial Pulses Normal] : the arterial pulses were normal [Edema] : no peripheral edema present [Veins - Varicosity Changes] : no varicosital changes were noted in the lower extremities [Respiration, Rhythm And Depth] : normal respiratory rhythm and effort [Exaggerated Use Of Accessory Muscles For Inspiration] : no accessory muscle use [Auscultation Breath Sounds / Voice Sounds] : lungs were clear to auscultation bilaterally [Chest Palpation] : palpation of the chest revealed no abnormalities [Lungs Percussion] : the lungs were normal to percussion [Bowel Sounds] : normal bowel sounds [Abdomen Soft] : soft [Abdomen Tenderness] : non-tender [Abdomen Mass (___ Cm)] : no abdominal mass palpated [Abnormal Walk] : normal gait [Gait - Sufficient For Exercise Testing] : the gait was sufficient for exercise testing [Nail Clubbing] : no clubbing of the fingernails [Cyanosis, Localized] : no localized cyanosis [Petechial Hemorrhages (___cm)] : no petechial hemorrhages [Skin Color & Pigmentation] : normal skin color and pigmentation [] : no rash [No Venous Stasis] : no venous stasis [Skin Lesions] : no skin lesions [No Skin Ulcers] : no skin ulcer [No Xanthoma] : no  xanthoma was observed [Deep Tendon Reflexes (DTR)] : deep tendon reflexes were 2+ and symmetric [Sensation] : the sensory exam was normal to light touch and pinprick [No Focal Deficits] : no focal deficits [FreeTextEntry1] : Cleared wheeze  rhonchi from hospital visit

## 2024-07-24 NOTE — DISCUSSION/SUMMARY
[FreeTextEntry1] : Asthma with mild elevation NIOX 28 trend follow-up no change    Pulmonary Sarcoid  CHF Overweight obesity Abnormal PFT with severe gas exchange impairment obstructive pattern with air trapping overall no decline in pulmonary physiology Follow-up 6-week   s/p infuenza  history ALEXANDER Exercise-induced hypoxemia with qualification oxygen therapy based on pulmonary 6-minute walk study O2 saturation down to 88% with increased heart rate Combined obstructive restrictive ventilatory impairment Chest x-ray findings demonstrate extensive cardiomegaly Rule out cardiac sarcoid rule out cardiac amyloid follow-up cardiology Consideration for heart failure team management  Posthospitalization [facility] Respiratory failure working diagnosis congestive heart failure hypertensive emergency multifocal pneumonia Short-term interval follow-up CT chest reevaluate at 1 month with PFT Cardiology follow-up Asthma  abnl PFT  Sarcoidosis  severe gas  exchange impairment  CT CHEST noted  RS symbicort and as prescribed PMD Spiriva  2 puffs QD  ? dose  singulair  primary care physician weight loss diet exercise protocol Exercise-induced hypoxemia f/u  Physical deconditioning Asthma- normalized NIOX  sarcoidosis noted  decline pulmonary physiology Reported new R BBB incomplete- concern re   cardiac sarcoid but in hospital did not tolerate Cardiac MRI Mod MR  Diastolic Dysfx On Dyazide Glaucoma Note BB   Rx updated  OPTH completed  per patient up  to date with APPT Oct 2022  MODERNA  COVID VACCINE x 2 doses recommendation booster with Bivalent  Weight loss low-fat diet Monitor 6-minute walk  Hold  symbicort  Change BREZTRI 2 puffs BID

## 2024-07-24 NOTE — PROCEDURE
[FreeTextEntry1] : Chest x-ray PA lateral  7/24/24 Cardiomegaly No pleural effusion No interval change compared to chest x-ray November 2023 No progression of vascular disease Uncoiled aorta  Repeat PFT July 24, 2024 Moderate to severe reduction in flow rates Obstructive pattern with ratio 61 Normal total lung capacity 98% predicted Increased RV/TLC ratio 160 consistent with air trapping Severe reduction diffusion 37% of predicted NIOX 28 mild airway inflammation increased compared to prior study dated July 24, 2020 PFT April 24, 2024 Severe reduction in flow rates with an obstructive pattern Lung capacity total 89% predicted normal range Positive air trapping with RV/TLC ratio 53% predicted Diffusion 38% predicted with severe loss of functioning alveolar capillary units Hemoglobin 13.8 Overall stable pulmonary physiology  Spirometry no bronchodilator Severe reduction in flow rates FEV1 46% predicted Obstructive restrictive disease Pulmonary 6-minute walk exercise study 7/29/2023 Baseline O2 saturation at rest 94% 1 minute desaturation to 88% with increased heart rate 125 Clinical correlation Qualification portable oxygen therapy   PFTNov 9 2023  mod severe OAD  Pos  airtrapping  TLC 85 % WNL  DLCO 39 % with severe loss fx  alveolar  capillary units HGB 14.4  Chest x-ray PA lateral 11/9/2021 status post respiratory failure congestive heart failure history of sarcoidosis posthospitalization Cardiac size enlarged cannot exclude increased right heart size Aorta uncoiled Do not appreciate any evidence of parenchymal infiltrates consolidation pleural effusion pneumothorax cephalization  CT chest which was compared to 7/11/2023 October 2023 hospitalized [facility] Negative for pulmonary embolism New bilateral lower lobe groundglass opacities with linear opacities No change of calcified noncalcified lymph nodes in the chest consistent with known sarcoidosis No change bilateral pulmonary nodular opacities Congestive heart failure Multifocal pneumonia Short-term interval follow-up  CT chest July 11, 2023 No evidence of pneumonia Right lower lobe partially calcified 5 mm pulmonary nodule no change dating back to April 1, 2021 Multiple mediastinal hilar lymph nodes internal foci calcification mildly enlarged As noted patient does have history of sarcoidosis Minimal bilateral lower areas of linear subsegmental atelectasis  PFT 9/21/23 Moderate OAD  No BD at FEV1  Lung Volumes nl dec ERV sec truncal  obesity  Pos  mild airtrapping  DLCO  49 % with severe loss fx  alveolar  capillary units  HGB B12.5  Pulmonary 6 minute exercise Study 9/21/23  RA  vonnie RA desat 90 % no indication O2  PFT range May 25, 2023 Spirometry May 25, 2023 Moderate reduction in flow rates to severe FEV1 51% predicted Ratio 66 Positive decline compared to April 13, 2023 Patient has not returned to baseline of November 2022  Chest x-ray PA lateral May 23, 2023 Normal cardiac size Bilateral hilar fullness consistent with known diagnosis of sarcoidosis No parenchymal infiltrates pleural effusions or dominant pulmonary nodules No appreciable signs of interstitial lung disease   PFT 4/13/23  Mild  moderate reduction flow  rates Mild OAD  Lung volume s nl Pos  airtrapping  DLCo 64 % with mild loss fx  alveolar  capillary units HGB 16.5  NIOX  13  ppb WNL 4/13/23  Spirometry March 2 nd2023 Mild/moderate reduction in flow rates FEV1 FVC ratio 71 Stable FEV1 Obstructive ventilatory impairment cannot exclude restrictive component with reduced FVC  Pulmonary 6-minute walk exercise study March 2, 2023 Baseline O2 saturation 96% Positive desaturation to 88% with increased shortness of breath Positive vonnie desaturation 88% This is borderline to qualify for oxygen therapy Clinical correlation  Chest x-ray PA lateral March 2, 2023 Cardiac size is normal bilateral hilum rule out secondary known sarcoidosis No parenchymal infiltrates pleural effusions or dominant pulmonary nodules No interval change compared to chest x-ray March 7, 2022   PFT 12/9/22  moderate reduction flow  rates  Mild OAD  Lung Volumes nl  Pos  airtrapping  DLCO  54 % pred with moderate loss fx  alveolar  capillary units  HGB 15.1  DEXA bone density 12/9/2022 AP spine L1-L4 normal Left femoral neck normal Total left hip normal Impression normal study Follow-up bone density 2-5 years Spirometry November 30, 2022 Flow rates are normal Ratio 71 mild obstructive ventilatory.  Significant interval improvement at flow rates  PFT 9/2/22 mod severe reduction flow rates Lung Volumes nl  Pos airtrapping  DLCO 41 % IMP OAD with severe gas  exchange impairment NIOX 19  ppb 9/7/22 nl  range  [place]eland 8/22/22  mild mod decline at flow rates Mild OAD NIOX  16 ppp normalized  [place]eland 7/22/22 Mod reduction flow rates stable flow rates  NIOX 30 ppb inc bronchial inflammation  PFT 6/6/22 moderate reduction flow rates OAD  Lung Volumes pos  airtrapping  DLCP 47 % with severe loss fx  alveolar  capillary units HGB 13.3 NIOX  10  ppb WNL  PFT March 7, 2022 Moderate obstructive ventilatory impairment Borderline 10% response to bronchodilator at the FEV1 27% response to bronchodilator at the small airways Overall lung volumes are normal Total capacity 82% predicted Decreased ERV 27% predicted secondary to short stature with increased weight RV/TLC ratio 131% predicted consistent with air trapping Diffusion 50% predicted with a moderate loss of functioning alveolar capillary units Impression moderate obstructive ventilatory impairment with reactive component associated air trapping and moderate gas exchange impairment NIOX 10 PPB normal range 3/7/2022  Chest x-ray PA lateral 3/7/2022 Limited study Small lung volumes Motion lateral Cardiac size grossly normal Full bilateral hilum consistent with known diagnosis of sarcoidosis No interval change compared to chest x-ray of 9/9/2020   PFT No BD 8/30/21 moderate reduction Flow rates  Mild restrictive ventilatory impairment DLCO 42 % pred with severe loss fx  alveolar  capillary units  HGB 15.0 NIOX 12 ppb  ECHO 4/2/21 NO abnl reported finding  PFT 5/26/21 Moderate reduction flow rates Lung Volumes -nl TLC 86 % pred  Airtrapping DLCO 55 % with loss fx  alveolar  capillary units HGB 12.3  NIOX 11 ppb WNL 5/26/21  PFT 4/12/21 mild  moderate reduction flow rates  Normal lung Volumes  DLCO 58 % moderate reduction with loss fx alveolar  capillary units  HGB 12.3  NIOX  18  ppb WNL 4/12/21  PFT body box September 11, 2020 Mild reduction in flow rates with a mild obstructive ventilatory impairment Lung volumes are normal Air trapping with an RV/TLC ratio 129% predicted. Resistance is increased specific conductance is increased Mild reduction diffusion with a loss of functioning alveolocapillary units 56% of predicted. Impression chronic obstructive airway disease   nitric oxide 103 elevated  Hemoglobin 13.9 Inflammatory airway disease   Chest x-ray PA lateral September 9, 2020 Sarcoidosis Cardiac size are normal Uncoiled aorta No parenchymal infiltrates pleural effusions or dominant pulmonary nodules We will change compared to chest x-ray of November 14, 2019  Pulmonary 6-minute walk stress test September 9, 2020  Room air O2 saturation 97 Persistent tachycardia Desaturation to 89% on room air Impression borderline desaturation No indication for portable oxygen therapy   Spirometry 2/27/2020  moderate  reduction flow rates No BD at FEV1 Obstructive pattern  FENO  11  ppb  1/30 20  Chest x-ray PA lateral normal cardiac size Nov 14 2019 Uncoiled aorta No clear parenchymal infiltrates pleural effusions or dominant pulmonary nodules No interval change compared to chest x-ray barring difference in technique dating back to November 7, 2018  PFT November 14, 2019 Mild reduction in flow rates with an obstructive pattern Normal total lung capacity 80% predicted Mild air trapping RV/TLC ratio 129% predicted. Moderate reduction diffusion 56% predicted with a loss of functioning alveolocapillary units Heme globin 13.8 Bronchodilator response at small airways 29%  High-dose flu vaccination administered Sept 9 2020  DAta 1/30/20  Serum electrolytes normal Glucose 103 BUN 23 creatinine 1.02 Serum calcium 9.7 in patient with sarcoidosis ACE level normal range  High-dose flu vaccination administered September 9, 2020 PREVNAIR 10/12/2020  DEXA Bone Density  Normal study  HD Flu 9/21/23

## 2024-08-01 NOTE — ED ADULT NURSE NOTE - CCCP TRG CHIEF CMPLNT
Pt here with c/o abd cramping and BRB per rectum/in stool past 12 hrs. Chronic nausea. No dysuria or flank pain. ABC intact.          shortness of breath

## 2024-09-04 ENCOUNTER — APPOINTMENT (OUTPATIENT)
Dept: PULMONOLOGY | Facility: CLINIC | Age: 69
End: 2024-09-04
Payer: MEDICARE

## 2024-09-04 VITALS — SYSTOLIC BLOOD PRESSURE: 135 MMHG | DIASTOLIC BLOOD PRESSURE: 84 MMHG | HEART RATE: 114 BPM | OXYGEN SATURATION: 93 %

## 2024-09-04 DIAGNOSIS — R53.81 OTHER MALAISE: ICD-10-CM

## 2024-09-04 DIAGNOSIS — J45.909 UNSPECIFIED ASTHMA, UNCOMPLICATED: ICD-10-CM

## 2024-09-04 DIAGNOSIS — J18.9 PNEUMONIA, UNSPECIFIED ORGANISM: ICD-10-CM

## 2024-09-04 DIAGNOSIS — D86.9 SARCOIDOSIS, UNSPECIFIED: ICD-10-CM

## 2024-09-04 PROCEDURE — 94060 EVALUATION OF WHEEZING: CPT

## 2024-09-04 PROCEDURE — 99214 OFFICE O/P EST MOD 30 MIN: CPT | Mod: 25

## 2024-09-04 PROCEDURE — 71046 X-RAY EXAM CHEST 2 VIEWS: CPT

## 2024-09-04 PROCEDURE — 95012 NITRIC OXIDE EXP GAS DETER: CPT

## 2024-09-04 NOTE — DISCUSSION/SUMMARY
[FreeTextEntry1] : Asthma with mild elevation NIOX 35 trend follow-up no change s/p dx Aug pneumonia with CT CHEST 8/28/24 [facility] Radiology  result pending NOTED Flu vaccine Oct Visit Noted CT chest Granulomatous disease sarcoidosis follow-up 6 months  Pulmonary Sarcoid  CHF Overweight obesity Abnormal PFT with severe gas exchange impairment obstructive pattern with air trapping overall no decline in pulmonary physiology Follow-up 6-week   s/p infuenza  history ALEXANDER Exercise-induced hypoxemia with qualification oxygen therapy based on pulmonary 6-minute walk study O2 saturation down to 88% with increased heart rate Combined obstructive restrictive ventilatory impairment Chest x-ray findings demonstrate extensive cardiomegaly Rule out cardiac sarcoid rule out cardiac amyloid follow-up cardiology Consideration for heart failure team management  Posthospitalization [facility] Respiratory failure working diagnosis congestive heart failure hypertensive emergency multifocal pneumonia Short-term interval follow-up CT chest reevaluate at 1 month with PFT Cardiology follow-up Asthma  abnl PFT  Sarcoidosis  severe gas  exchange impairment  CT CHEST noted  RS symbicort and as prescribed PMD Spiriva  2 puffs QD  ? dose  singulair  primary care physician weight loss diet exercise protocol Exercise-induced hypoxemia f/u  Physical deconditioning Asthma- normalized NIOX  sarcoidosis noted  decline pulmonary physiology Reported new R BBB incomplete- concern re   cardiac sarcoid but in hospital did not tolerate Cardiac MRI Mod MR  Diastolic Dysfx On Dyazide Glaucoma Note BB   Rx updated  OPTH completed  per patient up  to date with APPT Oct 2022  MODERNA  COVID VACCINE x 2 doses recommendation booster with Bivalent  Weight loss low-fat diet Monitor 6-minute walk  Hold  symbicort  Change BREZTRI 2 puffs BID cont and Rinse  advised Fall Flu Vaccine  and newest COVID variant  also addressed RSV

## 2024-09-04 NOTE — HISTORY OF PRESENT ILLNESS
[None] : ~He/She~ has no significant interval events [Difficulty Breathing During Exertion] : stable dyspnea on exertion [Feelings Of Weakness On Exertion] : stable exercise intolerance [Cough] : stable coughing [Wheezing] : denies wheezing [Regional Soft Tissue Swelling Both Lower Extremities] : denies lower extremity edema [Chest Pain Or Discomfort] : denies chest pain [Fever] : denies fever [Never] : was never a smoker [TextBox_4] :  09/04/2024 two weeks after last visit, pt confirmed to have PNA - rx steriods, abx, and cough med txed with antibx  Doxycycline Covid  neg no Resp sx at present without fever SOB Cough wheeze stated CT CHEST at [facility] Radiology 8/28/24    post Flu illness no tx Admission [facility] Oct 2023 Reviewed hospital discharge summary note 68-year-old female past medical history Hypertension prediabetes hyperlipidemia morbid obesity longstanding asthma presents to the emergency department with wheezing and URI symptoms RVP was negative Chest x-ray showed cardiac enlargement with mild to moderate congestive heart failure signs There was a question of a small pneumonia at the left lower lobe  is felt to be in hypertensive emergency with /160 for increased work of breathing She was placed on BiPAP IV steroids bronchodilation ICU monitoring subsequently transferred position to nasal cannula 3 L with O2 sats 100% x 10 9 and subsequently weaned off oxygen EKG mild right bundle branch block Echocardiogram ejection fraction 55% As part of outpatient work-up agree and recommend formal home sleep study Cardiology follow-up Noted from end of insurance was in question potential diagnosis for heart failure regarding patient's medications She is on diltiazem 30 mg She was also discharged with prednisone for asthma Discharge with levaquin because of the completion of treatment for pneumonia We will make decisions regarding use of diltiazem as per cardiology follow-up management  Review the 10/10/2023 CT chest angiogram Negative pulm negative pulmonary embolism bilateral lower lobe groundglass opacities linear opacities No change in the calcified and noncalcified lymph nodes No change in bilateral nodular opacities dating back to July 11, 2023 Right lower lobe 5 mm pulmonary nodule Left major fissure 3 mm opacity  Asthma exacerbation Lung exam per cardiology negative for wheeze WBC 6.57 hemoglobin 13.2 hematocrit 38.7 platelets 295,000 Serum electrolytes Creatinine 1.10 Magnesium phosphorus serum calcium all normal range Portable chest x-ray congestive picture mild to moderate increase findings from July 10, 2023 Small consolidation at the left base reported new Overall impression cardiac enlargement mild to moderate congestive heart failure positive for possible small pneumonia left lower lobe  Overall assessment Asthmatic bronchitis Left lower lobe pneumonia Rule out congestive heart failure Transfer to telemetry  Overall recommendation follow-up regarding sleep study Maintain follow-up cardiology PFT Consider overnight oximetry no cough wheeze. Post [facility]  admission Asthma SOB Chart review  Initial complaint was productive cough for 5 days  Outpatient chest x-ray was suspicious for pneumonia as subsequently was referred to the hospital  She experienced the loss of her daughter dating back 5 days prior to the admission  Developed wheezing  Being treated with nebulizers  Known history pulmonary sarcoidosis  Chest x-ray July 10, 2023  Mild central CHF increased from prior  Cardiac enlarged  Mild central congestive heart failure picture  Cardiology assessment  Echocardiogram Dopplers to rule out DVT  Acute diastolic heart failure IV Lasix  Hypertension discontinue Norvasc secondary to edema change to Cozaar 25 mg  Asthma with treatment protocol      CT chest July 11, 2023  No evidence of pneumonia  Right lower lobe partially calcified 5 mm pulmonary nodule no change dating back to April 1, 2021  Multiple mediastinal hilar lymph nodes internal foci calcification mildly enlarged  As noted patient does have history of sarcoidosis  Minimal bilateral lower areas of linear subsegmental atelectasis  Chief complaint shortness of breath  1 + year prior ED O2 saturation 98%-99 noted nasal cannula 2 L rate 124 Blood pressure reported 166/72 ED chart review Reported 99.1 reviewed EKG in ED Reported incomplete right bundle branch block They noted on exam clinical exam with wheezing will score thank you clinically did not have pulmonary embolism plan troponin labs coags BNP D-dimer cardiac monitor CBC Vital count 5.97 hemoglobin 12.8 hematocrit 38 platelet count 291,000 mild lymphopenia identified rest of bloods pending No Covid swab noted No chest x-ray completed 65-year-old female with history of hypertension hyperlipidemia obesity sarcoidosis asthma shortness of breath x4 days Also noted bilateral lower extremity edema Intermittent cough Was at primary care physician's office Dr. Jones in a.m. noted to have a new right bundle branch block  portable chest x-ray April 1, 2021 Limited difficult to assess cardiac size on AP view Widening of the ribs No evidence of parenchymal pulmonary opacities Favor an acute inflammatory airway inflammatory event widening the ribs suggestive of air trapping and a known patient of asthma Noted the official chest x-ray report is clear lungs  ADDENDUM Addendum data review April 1, 2021 CT angiogram protocol results pending Chest x-ray clear lungs COVID-19 PCR negative magnesium 2.2 phosphorus mildly reduced 2.2 serum glucose 148 BUN 19 creatinine 1.07 CBC White count 7.02 hemoglobin 12.7 hematocrit 37.4 Platelet count 313,000  Addendum preliminary report CT angiogram protocol No reported main left right upper lobe pulmonary emboli Partially calcified mediastinal and hilar adenopathy consistent with known diagnosis of sarcoidosis   [Wt Gain ___ Lbs] : no recent weight gain [Wt Loss ___ Lbs] : no recent weight loss [Oxygen] : the patient uses no supplemental oxygen

## 2024-09-04 NOTE — PROCEDURE
[FreeTextEntry1] : Addendum CT chest [facility] radiology Study date August 20, 2024 Subsegmental atelectasis pulmonary scarring Mosaic attenuation secondary to air trapping Enlarged mediastinal lymph nodes some partially calcified These findings are most consistent with patient's known diagnosis of sarcoidosis Likely also sequela of prior granulomatous disease Repeat CT chest in 6 months  Chest x-ray PA lateral 9/4/2024 with comparison 7/24/24 Cardiomegaly No pleural effusion No interval change compared to chest x-ray November 2023 No progression of vascular disease Uncoiled aorta FIDEL 9/4/24  mod severe reduction at flow  rates  OAD  Pos BD at FEV!1 mild  decline NIOX mild elevation 35 ppb pos  air way inflammation and prior 28 ppb  9/4/24   Repeat PFT July 24, 2024 Moderate to severe reduction in flow rates Obstructive pattern with ratio 61 Normal total lung capacity 98% predicted Increased RV/TLC ratio 160 consistent with air trapping Severe reduction diffusion 37% of predicted NIOX 28 mild airway inflammation increased compared to prior study dated July 24, 2020 PFT April 24, 2024 Severe reduction in flow rates with an obstructive pattern Lung capacity total 89% predicted normal range Positive air trapping with RV/TLC ratio 53% predicted Diffusion 38% predicted with severe loss of functioning alveolar capillary units Hemoglobin 13.8 Overall stable pulmonary physiology  Spirometry no bronchodilator Severe reduction in flow rates FEV1 46% predicted Obstructive restrictive disease Pulmonary 6-minute walk exercise study 7/29/2023 Baseline O2 saturation at rest 94% 1 minute desaturation to 88% with increased heart rate 125 Clinical correlation Qualification portable oxygen therapy   PFTNov 9 2023  mod severe OAD  Pos  airtrapping  TLC 85 % WNL  DLCO 39 % with severe loss fx  alveolar  capillary units HGB 14.4  Chest x-ray PA lateral 11/9/2021 status post respiratory failure congestive heart failure history of sarcoidosis posthospitalization Cardiac size enlarged cannot exclude increased right heart size Aorta uncoiled Do not appreciate any evidence of parenchymal infiltrates consolidation pleural effusion pneumothorax cephalization  CT chest which was compared to 7/11/2023 October 2023 hospitalized [facility] Negative for pulmonary embolism New bilateral lower lobe groundglass opacities with linear opacities No change of calcified noncalcified lymph nodes in the chest consistent with known sarcoidosis No change bilateral pulmonary nodular opacities Congestive heart failure Multifocal pneumonia Short-term interval follow-up  CT chest July 11, 2023 No evidence of pneumonia Right lower lobe partially calcified 5 mm pulmonary nodule no change dating back to April 1, 2021 Multiple mediastinal hilar lymph nodes internal foci calcification mildly enlarged As noted patient does have history of sarcoidosis Minimal bilateral lower areas of linear subsegmental atelectasis  PFT 9/21/23 Moderate OAD  No BD at FEV1  Lung Volumes nl dec ERV sec truncal  obesity  Pos  mild airtrapping  DLCO  49 % with severe loss fx  alveolar  capillary units  HGB B12.5  Pulmonary 6 minute exercise Study 9/21/23  RA  vonnie RA desat 90 % no indication O2  PFT range May 25, 2023 Spirometry May 25, 2023 Moderate reduction in flow rates to severe FEV1 51% predicted Ratio 66 Positive decline compared to April 13, 2023 Patient has not returned to baseline of November 2022  Chest x-ray PA lateral May 23, 2023 Normal cardiac size Bilateral hilar fullness consistent with known diagnosis of sarcoidosis No parenchymal infiltrates pleural effusions or dominant pulmonary nodules No appreciable signs of interstitial lung disease   PFT 4/13/23  Mild  moderate reduction flow  rates Mild OAD  Lung volume s nl Pos  airtrapping  DLCo 64 % with mild loss fx  alveolar  capillary units HGB 16.5  NIOX  13  ppb WNL 4/13/23  Spirometry March 2 nd2023 Mild/moderate reduction in flow rates FEV1 FVC ratio 71 Stable FEV1 Obstructive ventilatory impairment cannot exclude restrictive component with reduced FVC  Pulmonary 6-minute walk exercise study March 2, 2023 Baseline O2 saturation 96% Positive desaturation to 88% with increased shortness of breath Positive vonnie desaturation 88% This is borderline to qualify for oxygen therapy Clinical correlation  Chest x-ray PA lateral March 2, 2023 Cardiac size is normal bilateral hilum rule out secondary known sarcoidosis No parenchymal infiltrates pleural effusions or dominant pulmonary nodules No interval change compared to chest x-ray March 7, 2022   PFT 12/9/22  moderate reduction flow  rates  Mild OAD  Lung Volumes nl  Pos  airtrapping  DLCO  54 % pred with moderate loss fx  alveolar  capillary units  HGB 15.1  DEXA bone density 12/9/2022 AP spine L1-L4 normal Left femoral neck normal Total left hip normal Impression normal study Follow-up bone density 2-5 years Spirometry November 30, 2022 Flow rates are normal Ratio 71 mild obstructive ventilatory.  Significant interval improvement at flow rates  PFT 9/2/22 mod severe reduction flow rates Lung Volumes nl  Pos airtrapping  DLCO 41 % IMP OAD with severe gas  exchange impairment NIOX 19  ppb 9/7/22 nl  range  [place] 8/22/22  mild mod decline at flow rates Mild OAD NIOX  16 ppp normalized  [place] 7/22/22 Mod reduction flow rates stable flow rates  NIOX 30 ppb inc bronchial inflammation  PFT 6/6/22 moderate reduction flow rates OAD  Lung Volumes pos  airtrapping  DLCP 47 % with severe loss fx  alveolar  capillary units HGB 13.3 NIOX  10  ppb WNL  PFT March 7, 2022 Moderate obstructive ventilatory impairment Borderline 10% response to bronchodilator at the FEV1 27% response to bronchodilator at the small airways Overall lung volumes are normal Total capacity 82% predicted Decreased ERV 27% predicted secondary to short stature with increased weight RV/TLC ratio 131% predicted consistent with air trapping Diffusion 50% predicted with a moderate loss of functioning alveolar capillary units Impression moderate obstructive ventilatory impairment with reactive component associated air trapping and moderate gas exchange impairment NIOX 10 PPB normal range 3/7/2022  Chest x-ray PA lateral 3/7/2022 Limited study Small lung volumes Motion lateral Cardiac size grossly normal Full bilateral hilum consistent with known diagnosis of sarcoidosis No interval change compared to chest x-ray of 9/9/2020   PFT No BD 8/30/21 moderate reduction Flow rates  Mild restrictive ventilatory impairment DLCO 42 % pred with severe loss fx  alveolar  capillary units  HGB 15.0 NIOX 12 ppb  ECHO 4/2/21 NO abnl reported finding  PFT 5/26/21 Moderate reduction flow rates Lung Volumes -nl TLC 86 % pred  Airtrapping DLCO 55 % with loss fx  alveolar  capillary units HGB 12.3  NIOX 11 ppb WNL 5/26/21  PFT 4/12/21 mild  moderate reduction flow rates  Normal lung Volumes  DLCO 58 % moderate reduction with loss fx alveolar  capillary units  HGB 12.3  NIOX  18  ppb WNL 4/12/21  PFT body box September 11, 2020 Mild reduction in flow rates with a mild obstructive ventilatory impairment Lung volumes are normal Air trapping with an RV/TLC ratio 129% predicted. Resistance is increased specific conductance is increased Mild reduction diffusion with a loss of functioning alveolocapillary units 56% of predicted. Impression chronic obstructive airway disease   nitric oxide 103 elevated  Hemoglobin 13.9 Inflammatory airway disease   Chest x-ray PA lateral September 9, 2020 Sarcoidosis Cardiac size are normal Uncoiled aorta No parenchymal infiltrates pleural effusions or dominant pulmonary nodules We will change compared to chest x-ray of November 14, 2019  Pulmonary 6-minute walk stress test September 9, 2020  Room air O2 saturation 97 Persistent tachycardia Desaturation to 89% on room air Impression borderline desaturation No indication for portable oxygen therapy   Spirometry 2/27/2020  moderate  reduction flow rates No BD at FEV1 Obstructive pattern  FENO  11  ppb  1/30 20  Chest x-ray PA lateral normal cardiac size Nov 14 2019 Uncoiled aorta No clear parenchymal infiltrates pleural effusions or dominant pulmonary nodules No interval change compared to chest x-ray barring difference in technique dating back to November 7, 2018  PFT November 14, 2019 Mild reduction in flow rates with an obstructive pattern Normal total lung capacity 80% predicted Mild air trapping RV/TLC ratio 129% predicted. Moderate reduction diffusion 56% predicted with a loss of functioning alveolocapillary units Heme globin 13.8 Bronchodilator response at small airways 29%  High-dose flu vaccination administered Sept 9 2020  DAta 1/30/20  Serum electrolytes normal Glucose 103 BUN 23 creatinine 1.02 Serum calcium 9.7 in patient with sarcoidosis ACE level normal range  High-dose flu vaccination administered September 9, 2020 PREVNAIR 10/12/2020  DEXA Bone Density  Normal study  HD Flu 9/21/23

## 2024-10-02 ENCOUNTER — APPOINTMENT (OUTPATIENT)
Dept: PULMONOLOGY | Facility: CLINIC | Age: 69
End: 2024-10-02
Payer: MEDICARE

## 2024-10-02 VITALS
OXYGEN SATURATION: 95 % | SYSTOLIC BLOOD PRESSURE: 141 MMHG | WEIGHT: 293 LBS | BODY MASS INDEX: 48.82 KG/M2 | DIASTOLIC BLOOD PRESSURE: 79 MMHG | HEIGHT: 65 IN | HEART RATE: 109 BPM

## 2024-10-02 DIAGNOSIS — D86.9 SARCOIDOSIS, UNSPECIFIED: ICD-10-CM

## 2024-10-02 DIAGNOSIS — E66.9 OBESITY, UNSPECIFIED: ICD-10-CM

## 2024-10-02 DIAGNOSIS — J45.909 UNSPECIFIED ASTHMA, UNCOMPLICATED: ICD-10-CM

## 2024-10-02 PROCEDURE — 90662 IIV NO PRSV INCREASED AG IM: CPT

## 2024-10-02 PROCEDURE — 94618 PULMONARY STRESS TESTING: CPT

## 2024-10-02 PROCEDURE — 99214 OFFICE O/P EST MOD 30 MIN: CPT | Mod: 25

## 2024-10-02 PROCEDURE — G0008: CPT

## 2024-10-02 PROCEDURE — 94060 EVALUATION OF WHEEZING: CPT

## 2024-10-02 PROCEDURE — 95012 NITRIC OXIDE EXP GAS DETER: CPT

## 2024-10-02 NOTE — DISCUSSION/SUMMARY
[FreeTextEntry1] : Asthma with mild elevation NIOX 35 trend follow-up no change with noted interval improvement of NIOX 16 Exercise-induced hypoxemia-Home oxygen 2 L with portable s/p dx Aug pneumonia with CT CHEST 8/28/24 [facility] Radiology  result pending NOTED Flu vaccine Oct Visit Noted CT chest Granulomatous disease sarcoidosis follow-up 6 months  Pulmonary Sarcoid  CHF Overweight obesity Abnormal PFT with severe gas exchange impairment obstructive pattern with air trapping overall no decline in pulmonary physiology Follow-up 6-week   s/p infuenza  history ALEXANDER Exercise-induced hypoxemia with qualification oxygen therapy based on pulmonary 6-minute walk study O2 saturation down to 88% with increased heart rate Combined obstructive restrictive ventilatory impairment Chest x-ray findings demonstrate extensive cardiomegaly Rule out cardiac sarcoid rule out cardiac amyloid follow-up cardiology Consideration for heart failure team management  Posthospitalization [facility] Respiratory failure working diagnosis congestive heart failure hypertensive emergency multifocal pneumonia Short-term interval follow-up CT chest reevaluate at 1 month with PFT Cardiology follow-up Asthma  abnl PFT  Sarcoidosis  severe gas  exchange impairment  CT CHEST noted  RS symbicort and as prescribed PMD Spiriva  2 puffs QD  ? dose  singulair  primary care physician weight loss diet exercise protocol Exercise-induced hypoxemia f/u  Physical deconditioning Asthma- normalized NIOX  sarcoidosis noted  decline pulmonary physiology Reported new R BBB incomplete- concern re   cardiac sarcoid but in hospital did not tolerate Cardiac MRI Mod MR  Diastolic Dysfx On [place] Glaucoma Note BB   Rx updated  OPTH completed  per patient up  to date with APPT Oct 2022  MODERNA  COVID VACCINE x 2 doses recommendation booster with Bivalent  Weight loss low-fat diet Monitor 6-minute walk  Hold  symbicort  Change BREZTRI 2 puffs BID cont and Rinse  advised Fall Flu Vaccine  and newest COVID variant  also addressed RSV

## 2024-10-02 NOTE — PROCEDURE
[FreeTextEntry1] : Baseline O2 saturation immediate desaturation to 88% with ambulation when placed on 2 L with saturations up to 96% at rest and then 90 to 91% Impression exercise-induced hypoxemia 2 L portable oxygen Spirometry October 2, 2024 with severe reduction flow rate secondary to increased weight Obstructive pattern No bronchodilator sponsor FEV1 No decline at flow rates NIOX 16 interval improvement previously 35 study date October 2, 2020   Addendum CT chest [facility] radiology Study date August 20, 2024 Subsegmental atelectasis pulmonary scarring Mosaic attenuation secondary to air trapping Enlarged mediastinal lymph nodes some partially calcified These findings are most consistent with patient's known diagnosis of sarcoidosis Likely also sequela of prior granulomatous disease Repeat CT chest in 6 months  Chest x-ray PA lateral 9/4/2024 with comparison 7/24/24 Cardiomegaly No pleural effusion No interval change compared to chest x-ray November 2023 No progression of vascular disease Uncoiled aorta [place] 9/4/24  mod severe reduction at flow  rates  OAD  Pos BD at FEV!1 mild  decline NIOX mild elevation 35 ppb pos  air way inflammation and prior 28 ppb  9/4/24   Repeat PFT July 24, 2024 Moderate to severe reduction in flow rates Obstructive pattern with ratio 61 Normal total lung capacity 98% predicted Increased RV/TLC ratio 160 consistent with air trapping Severe reduction diffusion 37% of predicted NIOX 28 mild airway inflammation increased compared to prior study dated July 24, 2020 PFT April 24, 2024 Severe reduction in flow rates with an obstructive pattern Lung capacity total 89% predicted normal range Positive air trapping with RV/TLC ratio 53% predicted Diffusion 38% predicted with severe loss of functioning alveolar capillary units Hemoglobin 13.8 Overall stable pulmonary physiology  Spirometry no bronchodilator Severe reduction in flow rates FEV1 46% predicted Obstructive restrictive disease Pulmonary 6-minute walk exercise study 7/29/2023 Baseline O2 saturation at rest 94% 1 minute desaturation to 88% with increased heart rate 125 Clinical correlation Qualification portable oxygen therapy   PFTNov 9 2023  mod severe OAD  Pos  airtrapping  TLC 85 % WNL  DLCO 39 % with severe loss fx  alveolar  capillary units HGB 14.4  Chest x-ray PA lateral 11/9/2021 status post respiratory failure congestive heart failure history of sarcoidosis posthospitalization Cardiac size enlarged cannot exclude increased right heart size Aorta uncoiled Do not appreciate any evidence of parenchymal infiltrates consolidation pleural effusion pneumothorax cephalization  CT chest which was compared to 7/11/2023 October 2023 hospitalized [facility] Negative for pulmonary embolism New bilateral lower lobe groundglass opacities with linear opacities No change of calcified noncalcified lymph nodes in the chest consistent with known sarcoidosis No change bilateral pulmonary nodular opacities Congestive heart failure Multifocal pneumonia Short-term interval follow-up  CT chest July 11, 2023 No evidence of pneumonia Right lower lobe partially calcified 5 mm pulmonary nodule no change dating back to April 1, 2021 Multiple mediastinal hilar lymph nodes internal foci calcification mildly enlarged As noted patient does have history of sarcoidosis Minimal bilateral lower areas of linear subsegmental atelectasis  PFT 9/21/23 Moderate OAD  No BD at FEV1  Lung Volumes nl dec ERV sec truncal  obesity  Pos  mild airtrapping  DLCO  49 % with severe loss fx  alveolar  capillary units  HGB B12.5  Pulmonary 6 minute exercise Study 9/21/23  RA  vonnie RA desat 90 % no indication O2  PFT range May 25, 2023 Spirometry May 25, 2023 Moderate reduction in flow rates to severe FEV1 51% predicted Ratio 66 Positive decline compared to April 13, 2023 Patient has not returned to baseline of November 2022  Chest x-ray PA lateral May 23, 2023 Normal cardiac size Bilateral hilar fullness consistent with known diagnosis of sarcoidosis No parenchymal infiltrates pleural effusions or dominant pulmonary nodules No appreciable signs of interstitial lung disease   PFT 4/13/23  Mild  moderate reduction flow  rates Mild OAD  Lung volume s nl Pos  airtrapping  DLCo 64 % with mild loss fx  alveolar  capillary units HGB 16.5  NIOX  13  ppb WNL 4/13/23  Spirometry March 2 nd2023 Mild/moderate reduction in flow rates FEV1 FVC ratio 71 Stable FEV1 Obstructive ventilatory impairment cannot exclude restrictive component with reduced FVC  Pulmonary 6-minute walk exercise study March 2, 2023 Baseline O2 saturation 96% Positive desaturation to 88% with increased shortness of breath Positive vonnie desaturation 88% This is borderline to qualify for oxygen therapy Clinical correlation  Chest x-ray PA lateral March 2, 2023 Cardiac size is normal bilateral hilum rule out secondary known sarcoidosis No parenchymal infiltrates pleural effusions or dominant pulmonary nodules No interval change compared to chest x-ray March 7, 2022   PFT 12/9/22  moderate reduction flow  rates  Mild OAD  Lung Volumes nl  Pos  airtrapping  DLCO  54 % pred with moderate loss fx  alveolar  capillary units  HGB 15.1  DEXA bone density 12/9/2022 AP spine L1-L4 normal Left femoral neck normal Total left hip normal Impression normal study Follow-up bone density 2-5 years Spirometry November 30, 2022 Flow rates are normal Ratio 71 mild obstructive ventilatory.  Significant interval improvement at flow rates  PFT 9/2/22 mod severe reduction flow rates Lung Volumes nl  Pos airtrapping  DLCO 41 % IMP OAD with severe gas  exchange impairment NIOX 19  ppb 9/7/22 nl  range  [place] 8/22/22  mild mod decline at flow rates Mild OAD NIOX  16 ppp normalized  [place] 7/22/22 Mod reduction flow rates stable flow rates  NIOX 30 ppb inc bronchial inflammation  PFT 6/6/22 moderate reduction flow rates OAD  Lung Volumes pos  airtrapping  DLCP 47 % with severe loss fx  alveolar  capillary units HGB 13.3 NIOX  10  ppb WNL  PFT March 7, 2022 Moderate obstructive ventilatory impairment Borderline 10% response to bronchodilator at the FEV1 27% response to bronchodilator at the small airways Overall lung volumes are normal Total capacity 82% predicted Decreased ERV 27% predicted secondary to short stature with increased weight RV/TLC ratio 131% predicted consistent with air trapping Diffusion 50% predicted with a moderate loss of functioning alveolar capillary units Impression moderate obstructive ventilatory impairment with reactive component associated air trapping and moderate gas exchange impairment NIOX 10 PPB normal range 3/7/2022  Chest x-ray PA lateral 3/7/2022 Limited study Small lung volumes Motion lateral Cardiac size grossly normal Full bilateral hilum consistent with known diagnosis of sarcoidosis No interval change compared to chest x-ray of 9/9/2020   PFT No BD 8/30/21 moderate reduction Flow rates  Mild restrictive ventilatory impairment DLCO 42 % pred with severe loss fx  alveolar  capillary units  HGB 15.0 NIOX 12 ppb  ECHO 4/2/21 NO abnl reported finding  PFT 5/26/21 Moderate reduction flow rates Lung Volumes -nl TLC 86 % pred  Airtrapping DLCO 55 % with loss fx  alveolar  capillary units HGB 12.3  NIOX 11 ppb WNL 5/26/21  PFT 4/12/21 mild  moderate reduction flow rates  Normal lung Volumes  DLCO 58 % moderate reduction with loss fx alveolar  capillary units  HGB 12.3  NIOX  18  ppb WNL 4/12/21  PFT body box September 11, 2020 Mild reduction in flow rates with a mild obstructive ventilatory impairment Lung volumes are normal Air trapping with an RV/TLC ratio 129% predicted. Resistance is increased specific conductance is increased Mild reduction diffusion with a loss of functioning alveolocapillary units 56% of predicted. Impression chronic obstructive airway disease   nitric oxide 103 elevated  Hemoglobin 13.9 Inflammatory airway disease   Chest x-ray PA lateral September 9, 2020 Sarcoidosis Cardiac size are normal Uncoiled aorta No parenchymal infiltrates pleural effusions or dominant pulmonary nodules We will change compared to chest x-ray of November 14, 2019  Pulmonary 6-minute walk stress test September 9, 2020  Room air O2 saturation 97 Persistent tachycardia Desaturation to 89% on room air Impression borderline desaturation No indication for portable oxygen therapy   Spirometry 2/27/2020  moderate  reduction flow rates No BD at FEV1 Obstructive pattern  FENO  11  ppb  1/30 20  Chest x-ray PA lateral normal cardiac size Nov 14 2019 Uncoiled aorta No clear parenchymal infiltrates pleural effusions or dominant pulmonary nodules No interval change compared to chest x-ray barring difference in technique dating back to November 7, 2018  PFT November 14, 2019 Mild reduction in flow rates with an obstructive pattern Normal total lung capacity 80% predicted Mild air trapping RV/TLC ratio 129% predicted. Moderate reduction diffusion 56% predicted with a loss of functioning alveolocapillary units Heme globin 13.8 Bronchodilator response at small airways 29%  High-dose flu vaccination administered Sept 9 2020  DAta 1/30/20  Serum electrolytes normal Glucose 103 BUN 23 creatinine 1.02 Serum calcium 9.7 in patient with sarcoidosis ACE level normal range  High-dose flu vaccination administered September 9, 2020 PREVNAIR 10/12/2020  DEXA Bone Density f/u after Dec 2024  Normal study  HD Flu 10/2/24

## 2024-10-02 NOTE — PHYSICAL EXAM
[General Appearance - Well Developed] : well developed [Normal Appearance] : normal appearance [Well Groomed] : well groomed [General Appearance - Well Nourished] : well nourished [No Deformities] : no deformities [General Appearance - In No Acute Distress] : no acute distress [Normal Conjunctiva] : the conjunctiva exhibited no abnormalities [Eyelids - No Xanthelasma] : the eyelids demonstrated no xanthelasmas [Normal Oropharynx] : normal oropharynx [Neck Appearance] : the appearance of the neck was normal [Neck Cervical Mass (___cm)] : no neck mass was observed [Jugular Venous Distention Increased] : there was no jugular-venous distention [Thyroid Diffuse Enlargement] : the thyroid was not enlarged [Thyroid Nodule] : there were no palpable thyroid nodules [Heart Rate And Rhythm] : heart rate and rhythm were normal [Heart Sounds] : normal S1 and S2 [Murmurs] : no murmurs present [Edema] : no peripheral edema present [Arterial Pulses Normal] : the arterial pulses were normal [Veins - Varicosity Changes] : no varicosital changes were noted in the lower extremities [Respiration, Rhythm And Depth] : normal respiratory rhythm and effort [Exaggerated Use Of Accessory Muscles For Inspiration] : no accessory muscle use [Auscultation Breath Sounds / Voice Sounds] : lungs were clear to auscultation bilaterally [Chest Palpation] : palpation of the chest revealed no abnormalities [Lungs Percussion] : the lungs were normal to percussion [Bowel Sounds] : normal bowel sounds [Abdomen Soft] : soft [Abdomen Tenderness] : non-tender [Abdomen Mass (___ Cm)] : no abdominal mass palpated [Abnormal Walk] : normal gait [Gait - Sufficient For Exercise Testing] : the gait was sufficient for exercise testing [Nail Clubbing] : no clubbing of the fingernails [Cyanosis, Localized] : no localized cyanosis [Petechial Hemorrhages (___cm)] : no petechial hemorrhages [Skin Color & Pigmentation] : normal skin color and pigmentation [] : no rash [No Venous Stasis] : no venous stasis [Skin Lesions] : no skin lesions [No Skin Ulcers] : no skin ulcer [No Xanthoma] : no  xanthoma was observed [Deep Tendon Reflexes (DTR)] : deep tendon reflexes were 2+ and symmetric [Sensation] : the sensory exam was normal to light touch and pinprick [No Focal Deficits] : no focal deficits [FreeTextEntry1] : Cleared wheeze  rhonchi from hospital visit

## 2024-10-02 NOTE — HISTORY OF PRESENT ILLNESS
[None] : ~He/She~ has no significant interval events [Difficulty Breathing During Exertion] : stable dyspnea on exertion [Feelings Of Weakness On Exertion] : stable exercise intolerance [Cough] : stable coughing [Wheezing] : denies wheezing [Regional Soft Tissue Swelling Both Lower Extremities] : denies lower extremity edema [Chest Pain Or Discomfort] : denies chest pain [Fever] : denies fever [Never] : was never a smoker [TextBox_4] :  09/04/2024 two weeks after last visit, pt confirmed to have PNA - rx steriods, abx, and cough med txed with antibx  Doxycycline Covid  neg no Resp sx at present without fever SOB Cough wheeze stated CT CHEST at [facility] Radiology 8/28/24    post Flu illness no tx Admission [facility] Oct 2023 Reviewed hospital discharge summary note 68-year-old female past medical history Hypertension prediabetes hyperlipidemia morbid obesity longstanding asthma presents to the emergency department with wheezing and URI symptoms RVP was negative Chest x-ray showed cardiac enlargement with mild to moderate congestive heart failure signs There was a question of a small pneumonia at the left lower lobe  is felt to be in hypertensive emergency with /160 for increased work of breathing She was placed on BiPAP IV steroids bronchodilation ICU monitoring subsequently transferred position to nasal cannula 3 L with O2 sats 100% x 10 9 and subsequently weaned off oxygen EKG mild right bundle branch block Echocardiogram ejection fraction 55% As part of outpatient work-up agree and recommend formal home sleep study Cardiology follow-up Noted from end of insurance was in question potential diagnosis for heart failure regarding patient's medications She is on diltiazem 30 mg She was also discharged with prednisone for asthma Discharge with levaquin because of the completion of treatment for pneumonia We will make decisions regarding use of diltiazem as per cardiology follow-up management  Review the 10/10/2023 CT chest angiogram Negative pulm negative pulmonary embolism bilateral lower lobe groundglass opacities linear opacities No change in the calcified and noncalcified lymph nodes No change in bilateral nodular opacities dating back to July 11, 2023 Right lower lobe 5 mm pulmonary nodule Left major fissure 3 mm opacity  Asthma exacerbation Lung exam per cardiology negative for wheeze WBC 6.57 hemoglobin 13.2 hematocrit 38.7 platelets 295,000 Serum electrolytes Creatinine 1.10 Magnesium phosphorus serum calcium all normal range Portable chest x-ray congestive picture mild to moderate increase findings from July 10, 2023 Small consolidation at the left base reported new Overall impression cardiac enlargement mild to moderate congestive heart failure positive for possible small pneumonia left lower lobe  Overall assessment Asthmatic bronchitis Left lower lobe pneumonia Rule out congestive heart failure Transfer to telemetry  Overall recommendation follow-up regarding sleep study Maintain follow-up cardiology PFT Consider overnight oximetry no cough wheeze. Post [facility]  admission Asthma SOB Chart review  Initial complaint was productive cough for 5 days  Outpatient chest x-ray was suspicious for pneumonia as subsequently was referred to the hospital  She experienced the loss of her daughter dating back 5 days prior to the admission  Developed wheezing  Being treated with nebulizers  Known history pulmonary sarcoidosis  Chest x-ray July 10, 2023  Mild central CHF increased from prior  Cardiac enlarged  Mild central congestive heart failure picture  Cardiology assessment  Echocardiogram Dopplers to rule out DVT  Acute diastolic heart failure IV Lasix  Hypertension discontinue Norvasc secondary to edema change to Cozaar 25 mg  Asthma with treatment protocol      CT chest July 11, 2023  No evidence of pneumonia  Right lower lobe partially calcified 5 mm pulmonary nodule no change dating back to April 1, 2021  Multiple mediastinal hilar lymph nodes internal foci calcification mildly enlarged  As noted patient does have history of sarcoidosis  Minimal bilateral lower areas of linear subsegmental atelectasis  Chief complaint shortness of breath  1 + year prior ED O2 saturation 98%-99 noted nasal cannula 2 L rate 124 Blood pressure reported 166/72 ED chart review Reported 99.1 reviewed EKG in ED Reported incomplete right bundle branch block They noted on exam clinical exam with wheezing will score thank you clinically did not have pulmonary embolism plan troponin labs coags BNP D-dimer cardiac monitor CBC Vital count 5.97 hemoglobin 12.8 hematocrit 38 platelet count 291,000 mild lymphopenia identified rest of bloods pending No Covid swab noted No chest x-ray completed 65-year-old female with history of hypertension hyperlipidemia obesity sarcoidosis asthma shortness of breath x4 days Also noted bilateral lower extremity edema Intermittent cough Was at primary care physician's office Dr. Jones in a.m. noted to have a new right bundle branch block  portable chest x-ray April 1, 2021 Limited difficult to assess cardiac size on AP view Widening of the ribs No evidence of parenchymal pulmonary opacities Favor an acute inflammatory airway inflammatory event widening the ribs suggestive of air trapping and a known patient of asthma Noted the official chest x-ray report is clear lungs  ADDENDUM Addendum data review April 1, 2021 CT angiogram protocol results pending Chest x-ray clear lungs COVID-19 PCR negative magnesium 2.2 phosphorus mildly reduced 2.2 serum glucose 148 BUN 19 creatinine 1.07 CBC White count 7.02 hemoglobin 12.7 hematocrit 37.4 Platelet count 313,000  Addendum preliminary report CT angiogram protocol No reported main left right upper lobe pulmonary emboli Partially calcified mediastinal and hilar adenopathy consistent with known diagnosis of sarcoidosis   [Wt Gain ___ Lbs] : no recent weight gain [Wt Loss ___ Lbs] : no recent weight loss [Oxygen] : the patient uses no supplemental oxygen

## 2024-10-08 ENCOUNTER — RX RENEWAL (OUTPATIENT)
Age: 69
End: 2024-10-08

## 2024-11-13 ENCOUNTER — APPOINTMENT (OUTPATIENT)
Dept: PULMONOLOGY | Facility: CLINIC | Age: 69
End: 2024-11-13
Payer: MEDICARE

## 2024-11-13 VITALS
RESPIRATION RATE: 16 BRPM | SYSTOLIC BLOOD PRESSURE: 142 MMHG | HEART RATE: 99 BPM | OXYGEN SATURATION: 94 % | DIASTOLIC BLOOD PRESSURE: 78 MMHG

## 2024-11-13 DIAGNOSIS — R09.02 HYPOXEMIA: ICD-10-CM

## 2024-11-13 DIAGNOSIS — I50.9 HEART FAILURE, UNSPECIFIED: ICD-10-CM

## 2024-11-13 DIAGNOSIS — R53.81 OTHER MALAISE: ICD-10-CM

## 2024-11-13 DIAGNOSIS — J45.909 UNSPECIFIED ASTHMA, UNCOMPLICATED: ICD-10-CM

## 2024-11-13 DIAGNOSIS — D86.9 SARCOIDOSIS, UNSPECIFIED: ICD-10-CM

## 2024-11-13 PROCEDURE — 94727 GAS DIL/WSHOT DETER LNG VOL: CPT

## 2024-11-13 PROCEDURE — ZZZZZ: CPT

## 2024-11-13 PROCEDURE — 95012 NITRIC OXIDE EXP GAS DETER: CPT

## 2024-11-13 PROCEDURE — 94010 BREATHING CAPACITY TEST: CPT

## 2024-11-13 PROCEDURE — 99214 OFFICE O/P EST MOD 30 MIN: CPT | Mod: 25

## 2024-11-13 PROCEDURE — 94729 DIFFUSING CAPACITY: CPT

## 2024-12-27 ENCOUNTER — APPOINTMENT (OUTPATIENT)
Dept: PULMONOLOGY | Facility: CLINIC | Age: 69
End: 2024-12-27

## 2025-03-05 ENCOUNTER — APPOINTMENT (OUTPATIENT)
Dept: PULMONOLOGY | Facility: CLINIC | Age: 70
End: 2025-03-05

## 2025-03-05 ENCOUNTER — APPOINTMENT (OUTPATIENT)
Dept: PULMONOLOGY | Facility: CLINIC | Age: 70
End: 2025-03-05
Payer: MEDICARE

## 2025-03-05 VITALS — OXYGEN SATURATION: 90 % | DIASTOLIC BLOOD PRESSURE: 70 MMHG | SYSTOLIC BLOOD PRESSURE: 136 MMHG | HEART RATE: 114 BPM

## 2025-03-05 DIAGNOSIS — Z13.820 ENCOUNTER FOR SCREENING FOR OSTEOPOROSIS: ICD-10-CM

## 2025-03-05 DIAGNOSIS — D86.9 SARCOIDOSIS, UNSPECIFIED: ICD-10-CM

## 2025-03-05 DIAGNOSIS — J45.909 UNSPECIFIED ASTHMA, UNCOMPLICATED: ICD-10-CM

## 2025-03-05 DIAGNOSIS — R53.81 OTHER MALAISE: ICD-10-CM

## 2025-03-05 LAB — POCT - HEMOGLOBIN (HGB), QUANTITATIVE, TRANSCUTANEOUS: 14.2

## 2025-03-05 PROCEDURE — 94727 GAS DIL/WSHOT DETER LNG VOL: CPT

## 2025-03-05 PROCEDURE — 95012 NITRIC OXIDE EXP GAS DETER: CPT

## 2025-03-05 PROCEDURE — 71046 X-RAY EXAM CHEST 2 VIEWS: CPT

## 2025-03-05 PROCEDURE — 99214 OFFICE O/P EST MOD 30 MIN: CPT | Mod: 25

## 2025-03-05 PROCEDURE — 77085 DXA BONE DENSITY AXL VRT FX: CPT

## 2025-03-05 PROCEDURE — 94060 EVALUATION OF WHEEZING: CPT

## 2025-03-05 PROCEDURE — 88738 HGB QUANT TRANSCUTANEOUS: CPT

## 2025-03-05 PROCEDURE — 94729 DIFFUSING CAPACITY: CPT

## 2025-03-05 PROCEDURE — ZZZZZ: CPT

## 2025-03-06 ENCOUNTER — NON-APPOINTMENT (OUTPATIENT)
Age: 70
End: 2025-03-06

## 2025-03-06 LAB
ANION GAP SERPL CALC-SCNC: 11 MMOL/L
BASOPHILS # BLD AUTO: 0.05 K/UL
BASOPHILS NFR BLD AUTO: 0.9 %
BUN SERPL-MCNC: 14 MG/DL
CALCIUM SERPL-MCNC: 9.5 MG/DL
CHLORIDE SERPL-SCNC: 107 MMOL/L
CO2 SERPL-SCNC: 27 MMOL/L
CREAT SERPL-MCNC: 0.9 MG/DL
EGFRCR SERPLBLD CKD-EPI 2021: 69 ML/MIN/1.73M2
EOSINOPHIL # BLD AUTO: 0.22 K/UL
EOSINOPHIL NFR BLD AUTO: 3.9 %
GLUCOSE SERPL-MCNC: 109 MG/DL
HCT VFR BLD CALC: 41.2 %
HGB BLD-MCNC: 13.7 G/DL
IMM GRANULOCYTES NFR BLD AUTO: 0.2 %
LYMPHOCYTES # BLD AUTO: 2.19 K/UL
LYMPHOCYTES NFR BLD AUTO: 38.4 %
MAN DIFF?: NORMAL
MCHC RBC-ENTMCNC: 27.2 PG
MCHC RBC-ENTMCNC: 33.3 G/DL
MCV RBC AUTO: 81.7 FL
MONOCYTES # BLD AUTO: 0.7 K/UL
MONOCYTES NFR BLD AUTO: 12.3 %
NEUTROPHILS # BLD AUTO: 2.53 K/UL
NEUTROPHILS NFR BLD AUTO: 44.3 %
PLATELET # BLD AUTO: 288 K/UL
POTASSIUM SERPL-SCNC: 4.2 MMOL/L
RBC # BLD: 5.04 M/UL
RBC # FLD: 16.9 %
SODIUM SERPL-SCNC: 145 MMOL/L
WBC # FLD AUTO: 5.7 K/UL

## 2025-03-07 LAB — ACE BLD-CCNC: 69 U/L

## 2025-06-11 ENCOUNTER — APPOINTMENT (OUTPATIENT)
Dept: PULMONOLOGY | Facility: CLINIC | Age: 70
End: 2025-06-11